# Patient Record
Sex: MALE | Race: BLACK OR AFRICAN AMERICAN | NOT HISPANIC OR LATINO | Employment: OTHER | URBAN - METROPOLITAN AREA
[De-identification: names, ages, dates, MRNs, and addresses within clinical notes are randomized per-mention and may not be internally consistent; named-entity substitution may affect disease eponyms.]

---

## 2017-02-10 ENCOUNTER — HOSPITAL ENCOUNTER (INPATIENT)
Facility: HOSPITAL | Age: 62
LOS: 4 days | Discharge: HOME | DRG: 249 | End: 2017-02-15
Attending: EMERGENCY MEDICINE | Admitting: INTERNAL MEDICINE
Payer: MEDICAID

## 2017-02-10 DIAGNOSIS — F14.10 COCAINE ABUSE: ICD-10-CM

## 2017-02-10 DIAGNOSIS — R20.0 ANESTHESIA OF SKIN: ICD-10-CM

## 2017-02-10 DIAGNOSIS — F19.94 SUBSTANCE INDUCED MOOD DISORDER: ICD-10-CM

## 2017-02-10 DIAGNOSIS — R07.9 CHEST PAIN, UNSPECIFIED TYPE: Primary | ICD-10-CM

## 2017-02-10 DIAGNOSIS — I21.02 ST ELEVATION MYOCARDIAL INFARCTION INVOLVING LEFT ANTERIOR DESCENDING (LAD) CORONARY ARTERY: ICD-10-CM

## 2017-02-10 DIAGNOSIS — R79.89 ELEVATED TROPONIN: ICD-10-CM

## 2017-02-10 DIAGNOSIS — I21.3 ST ELEVATION MYOCARDIAL INFARCTION (STEMI), UNSPECIFIED ARTERY: ICD-10-CM

## 2017-02-10 DIAGNOSIS — I10 ESSENTIAL HYPERTENSION: Chronic | ICD-10-CM

## 2017-02-10 DIAGNOSIS — E11.9 TYPE 2 DIABETES MELLITUS WITHOUT COMPLICATION, WITHOUT LONG-TERM CURRENT USE OF INSULIN: ICD-10-CM

## 2017-02-10 DIAGNOSIS — I21.3 STEMI (ST ELEVATION MYOCARDIAL INFARCTION): ICD-10-CM

## 2017-02-10 DIAGNOSIS — E78.5 HYPERLIPIDEMIA LDL GOAL <70: ICD-10-CM

## 2017-02-10 DIAGNOSIS — I20.9 ISCHEMIC CHEST PAIN: ICD-10-CM

## 2017-02-10 LAB
BASOPHILS # BLD AUTO: 0.03 K/UL
BASOPHILS NFR BLD: 0.5 %
DIFFERENTIAL METHOD: ABNORMAL
EOSINOPHIL # BLD AUTO: 0.4 K/UL
EOSINOPHIL NFR BLD: 6.9 %
ERYTHROCYTE [DISTWIDTH] IN BLOOD BY AUTOMATED COUNT: 13.3 %
HCT VFR BLD AUTO: 41.7 %
HGB BLD-MCNC: 14.1 G/DL
LYMPHOCYTES # BLD AUTO: 3 K/UL
LYMPHOCYTES NFR BLD: 51.2 %
MCH RBC QN AUTO: 31.5 PG
MCHC RBC AUTO-ENTMCNC: 33.8 %
MCV RBC AUTO: 93 FL
MONOCYTES # BLD AUTO: 0.6 K/UL
MONOCYTES NFR BLD: 10.8 %
NEUTROPHILS # BLD AUTO: 1.7 K/UL
NEUTROPHILS NFR BLD: 29.1 %
PLATELET # BLD AUTO: 265 K/UL
PMV BLD AUTO: 8.9 FL
RBC # BLD AUTO: 4.48 M/UL
WBC # BLD AUTO: 5.82 K/UL

## 2017-02-10 PROCEDURE — 96376 TX/PRO/DX INJ SAME DRUG ADON: CPT

## 2017-02-10 PROCEDURE — 93010 ELECTROCARDIOGRAM REPORT: CPT | Mod: ,,, | Performed by: INTERNAL MEDICINE

## 2017-02-10 PROCEDURE — 96374 THER/PROPH/DIAG INJ IV PUSH: CPT

## 2017-02-10 PROCEDURE — 96375 TX/PRO/DX INJ NEW DRUG ADDON: CPT

## 2017-02-10 PROCEDURE — 80307 DRUG TEST PRSMV CHEM ANLYZR: CPT

## 2017-02-10 PROCEDURE — 84484 ASSAY OF TROPONIN QUANT: CPT

## 2017-02-10 PROCEDURE — 80053 COMPREHEN METABOLIC PANEL: CPT

## 2017-02-10 PROCEDURE — 99291 CRITICAL CARE FIRST HOUR: CPT | Mod: 25

## 2017-02-10 PROCEDURE — 99285 EMERGENCY DEPT VISIT HI MDM: CPT | Mod: ,,, | Performed by: EMERGENCY MEDICINE

## 2017-02-10 PROCEDURE — 83880 ASSAY OF NATRIURETIC PEPTIDE: CPT

## 2017-02-10 PROCEDURE — 96361 HYDRATE IV INFUSION ADD-ON: CPT

## 2017-02-10 PROCEDURE — 99285 EMERGENCY DEPT VISIT HI MDM: CPT

## 2017-02-10 PROCEDURE — 85025 COMPLETE CBC W/AUTO DIFF WBC: CPT

## 2017-02-10 PROCEDURE — 93005 ELECTROCARDIOGRAM TRACING: CPT

## 2017-02-10 RX ORDER — ONDANSETRON 2 MG/ML
8 INJECTION INTRAMUSCULAR; INTRAVENOUS
Status: DISCONTINUED | OUTPATIENT
Start: 2017-02-10 | End: 2017-02-11

## 2017-02-10 RX ORDER — NITROGLYCERIN 0.4 MG/1
0.4 TABLET SUBLINGUAL EVERY 5 MIN PRN
Status: COMPLETED | OUTPATIENT
Start: 2017-02-11 | End: 2017-02-11

## 2017-02-10 RX ADMIN — ONDANSETRON 8 MG: 2 INJECTION INTRAMUSCULAR; INTRAVENOUS at 11:02

## 2017-02-10 RX ADMIN — NITROGLYCERIN 0.4 MG: 0.4 TABLET SUBLINGUAL at 11:02

## 2017-02-10 NOTE — IP AVS SNAPSHOT
[facility]  [address]  [facility] [address]  Phone: [phone]           Patient Discharge Instructions     Our goal is to set you up for success. This packet includes information on your condition, medications, and your home care. It will help you to care for yourself so you don't get sicker and need to go back to the hospital.     Please ask your nurse if you have any questions.        There are many details to remember when preparing to leave the hospital. Here is what you will need to do:    1. Take your medicine. If you are prescribed medications, review your Medication List in the following pages. You may have new medications to  at the pharmacy and others that you'll need to stop taking. Review the instructions for how and when to take your medications. Talk with your doctor or nurses if you are unsure of what to do.     2. Go to your follow-up appointments. Specific follow-up information is listed in the following pages. Your may be contacted by a transition nurse or clinical provider about future appointments. Be sure we have all of the phone numbers to reach you, if needed. Please contact your provider's office if you are unable to make an appointment.     3. Watch for warning signs. Your doctor or nurse will give you detailed warning signs to watch for and when to call for assistance. These instructions may also include educational information about your condition. If you experience any of warning signs to your health, call your doctor.               [facility]  Unless otherwise directed by your provider, please contact [facility], our nurse care line that is available for 24/7 assistance.     [phone] (toll-free)    Registered nurses in the [facility] provide clinical advisement, health education, appointment booking, and other advisory services.                    ** Verify the list of medication(s) below is accurate and up  to date. Carry this with you in case of emergency. If your medications have changed, please notify your healthcare provider.             Medication List      START taking these medications        Additional Info    Begin Date AM Noon PM Bedtime    carvedilol 6.25 MG tablet   Commonly known as:  COREG   Quantity:  60 tablet   Refills:  1   Dose:  6.25 mg    Last time this was given:  6.25 mg on 2/15/2017  8:41 AM   Instructions:  Take 1 tablet (6.25 mg total) by mouth 2 (two) times daily.            02/16/2017           02/15/2017           clopidogrel 75 mg tablet   Commonly known as:  PLAVIX   Quantity:  30 tablet   Refills:  1   Dose:  75 mg    Instructions:  Take 1 tablet (75 mg total) by mouth once daily.            02/16/2017                   nitroGLYCERIN 0.4 MG SL tablet   Commonly known as:  NITROSTAT   Quantity:  25 tablet   Refills:  2   Dose:  0.4 mg    Last time this was given:  0.4 mg on 2/11/2017 12:23 PM   Instructions:  Place 1 tablet (0.4 mg total) under the tongue every 5 (five) minutes as needed for Chest pain.                              CHANGE how you take these medications        Additional Info    Begin Date AM Noon PM Bedtime    aspirin 81 MG EC tablet   Commonly known as:  ECOTRIN   Refills:  0   Dose:  81 mg   What changed:    - medication strength  - how much to take    Last time this was given:  81 mg on 2/13/2017  8:38 AM   Instructions:  Take 1 tablet (81 mg total) by mouth once daily.            02/16/2017                   atorvastatin 80 MG tablet   Commonly known as:  LIPITOR   Quantity:  30 tablet   Refills:  1   Dose:  80 mg   What changed:    - medication strength  - how much to take    Last time this was given:  80 mg on 2/15/2017  8:41 AM   Instructions:  Take 1 tablet (80 mg total) by mouth once daily.            02/16/2017                   lisinopril 10 MG tablet   Quantity:  30 tablet   Refills:  1   Dose:  10 mg   What changed:    - medication strength  - how much to  take    Last time this was given:  10 mg on 2/15/2017  8:41 AM   Instructions:  Take 1 tablet (10 mg total) by mouth once daily.            02/16/2017                     CONTINUE taking these medications        Additional Info    Begin Date AM Noon PM Bedtime    sertraline 25 MG tablet   Commonly known as:  ZOLOFT   Refills:  0   Dose:  25 mg    Last time this was given:  25 mg on 2/14/2017  9:01 AM   Instructions:  Take 25 mg by mouth once daily.            02/16/2017                        Where to Get Your Medications      These medications were sent to [facility] - [place], [place] - [address]  [address] [facility] [facility] [address]     Phone:  [phone]     atorvastatin 80 MG tablet    carvedilol 6.25 MG tablet    clopidogrel 75 mg tablet    lisinopril 10 MG tablet    nitroGLYCERIN 0.4 MG SL tablet         You can get these medications from any pharmacy     You don't need a prescription for these medications     aspirin 81 MG EC tablet                  Please bring to all follow up appointments:    1. A copy of your discharge instructions.  2. All medicines you are currently taking in their original bottles.  3. Identification and insurance card.    Please arrive 15 minutes ahead of scheduled appointment time.    Please call 24 hours in advance if you must reschedule your appointment and/or time.        Follow-up Information     Follow up with [facility] On 3/9/2017.    Why:   @1:30PM  TERRELL BARNES   [id number]    Contact information:    [address]  [place] [address]  [phone]          Schedule an appointment as soon as possible for a visit with [facility] - Cardiology.    Specialty:  Cardiology    Why:  follow up 6 weeks    Contact information:    [address]  [facility] [address] [phone]    Additional information:    3rd floor        Call [facility]-Interventional Card.    Specialty:  Cardiology    Why:  Follow up 2-3 weeks   "   Contact information:    [address]  [facility] [address] [phone]    Additional information:    [facility] [place] - 3rd Floor      Referrals     Future Orders    Ambulatory referral to Cardiology     Ambulatory Referral to Interventional Cardiology         Discharge Instructions     Future Orders    Activity as tolerated     Call MD for:  difficulty breathing or increased cough     Call MD for:  increased confusion or weakness     Call MD for:  persistent dizziness, light-headedness, or visual disturbances     Call MD for:  persistent nausea and vomiting or diarrhea     Call MD for:  severe uncontrolled pain     Call MD for:  temperature >100.4     Diet general     Questions:    Total calories:      Fat restriction, if any:      Protein restriction, if any:      Na restriction, if any:      Fluid restriction:      Additional restrictions:  Diabetic 1800    Cardiac (Low Na/Chol)      Discharge References/Attachments     CARDIAC REHABILITATION, FOLLOWING AN EXERCISE PROGRAM (ENGLISH)    REHABILITATION, CARDIAC (ENGLISH)        Primary Diagnosis     Your primary diagnosis was:  Heart Attack      Admission Information     Date & Time Provider Department CSN    2/10/2017 10:53 PM Tiffany Jimenez MD [facility] [id number]      Care Providers     Provider Role Specialty Primary office phone    Tiffany Jimenez MD Attending Provider Hospitalist [phone]    Tiffany Jimenez MD Team Attending  Hospitalist [phone]      Important Medicare Message          Most Recent Value    Important Message from Medicare Regarding Discharge Appeal Rights  Given to patient/caregiver, Explained to patient/caregiver, Signed/date by patient/caregiver yes 02/14/2017 0849      Your Vitals Were     BP Pulse Temp Resp Height Weight    96/61 (BP Location: Right arm, Patient Position: Sitting, BP Method: Automatic) 77 98.6 °F (37 °C) (Oral) 16 5' 11" (1.803 m) 90.2 kg (198 lb 13.7 oz)    SpO2 BMI          "    99% 27.73 kg/m2         Recent Lab Values        12/16/2011 5/25/2016                        6:40 AM  9:34 AM          A1C 5.6 5.9                     Pending Labs     Order Current Status    Blood culture In process    Hemoglobin A1c if not done in past 6 weeks In process    Blood culture Preliminary result    Blood culture Preliminary result      Allergies as of 2/15/2017     No Known Allergies      Advance Directives     An advance directive is a document which, in the event you are no longer able to make decisions for yourself, tells your healthcare team what kind of treatment you do or do not want to receive, or who you would like to make those decisions for you.  If you do not currently have an advance directive, [facility] encourages you to create one.  For more information call:  [phone]), [phone] ([phone]),  or log on to [email or web address].        Smoking Cessation     If you would like to quit smoking:   You may be eligible for free services if you are a [place] resident and started smoking cigarettes before September 1, 1988.  Call the Smoking Cessation Trust (SCT) toll free at (439) 477-0560 or (259) 650-4143.   Call 2-587-QUIT-NOW if you do not meet the above criteria.            Language Assistance Services     ATTENTION: Language assistance services are available, free of charge. Please call [phone].      ATENCIÓN: Si habla español, tiene a diane disposición servicios gratuitos de asistencia lingüística. Llame al [phone].     CHÚ Ý: N?u b?n nói Ti?ng Vi?t, có các d?ch v? h? tr? ngôn ng? mi?n phí dành cho b?n. G?i s? [phone].        Diabetes Discharge Instructions                                    [facility] complies with applicable Federal civil rights laws and does not discriminate on the basis of race, color, national origin, age, disability, or sex.

## 2017-02-11 PROBLEM — R79.89 ELEVATED TROPONIN: Status: ACTIVE | Noted: 2017-02-11

## 2017-02-11 PROBLEM — I21.3 STEMI (ST ELEVATION MYOCARDIAL INFARCTION): Status: ACTIVE | Noted: 2017-02-11

## 2017-02-11 PROBLEM — R07.9 CHEST PAIN: Status: ACTIVE | Noted: 2017-02-11

## 2017-02-11 LAB
ALBUMIN SERPL BCP-MCNC: 3.8 G/DL
ALP SERPL-CCNC: 87 U/L
ALT SERPL W/O P-5'-P-CCNC: 73 U/L
AMPHET+METHAMPHET UR QL: NEGATIVE
ANION GAP SERPL CALC-SCNC: 8 MMOL/L
AST SERPL-CCNC: 54 U/L
BARBITURATES UR QL SCN>200 NG/ML: NEGATIVE
BENZODIAZ UR QL SCN>200 NG/ML: NEGATIVE
BILIRUB SERPL-MCNC: 0.3 MG/DL
BNP SERPL-MCNC: <10 PG/ML
BUN SERPL-MCNC: 17 MG/DL
BZE UR QL SCN: NORMAL
CALCIUM SERPL-MCNC: 9.4 MG/DL
CANNABINOIDS UR QL SCN: NEGATIVE
CHLORIDE SERPL-SCNC: 108 MMOL/L
CO2 SERPL-SCNC: 26 MMOL/L
CREAT SERPL-MCNC: 1.4 MG/DL
CREAT UR-MCNC: 133 MG/DL
D DIMER PPP IA.FEU-MCNC: 0.78 MG/L FEU
EST. GFR  (AFRICAN AMERICAN): >60 ML/MIN/1.73 M^2
EST. GFR  (NON AFRICAN AMERICAN): 53.5 ML/MIN/1.73 M^2
ETHANOL UR-MCNC: <10 MG/DL
GLUCOSE SERPL-MCNC: 109 MG/DL
METHADONE UR QL SCN>300 NG/ML: NEGATIVE
OPIATES UR QL SCN: NEGATIVE
PCP UR QL SCN>25 NG/ML: NEGATIVE
POTASSIUM SERPL-SCNC: 4 MMOL/L
PROT SERPL-MCNC: 7.8 G/DL
SODIUM SERPL-SCNC: 142 MMOL/L
TOXICOLOGY INFORMATION: NORMAL
TROPONIN I SERPL DL<=0.01 NG/ML-MCNC: 0.04 NG/ML
TROPONIN I SERPL DL<=0.01 NG/ML-MCNC: >50 NG/ML

## 2017-02-11 PROCEDURE — 92921 CATH LAB PROCEDURE: CPT | Mod: LD

## 2017-02-11 PROCEDURE — 92941 PRQ TRLML REVSC TOT OCCL AMI: CPT | Mod: LC,GC,, | Performed by: INTERNAL MEDICINE

## 2017-02-11 PROCEDURE — 93005 ELECTROCARDIOGRAM TRACING: CPT

## 2017-02-11 PROCEDURE — 92921 CATH LAB PROCEDURE: CPT | Mod: LD,GC,, | Performed by: INTERNAL MEDICINE

## 2017-02-11 PROCEDURE — 99223 1ST HOSP IP/OBS HIGH 75: CPT | Mod: ,,, | Performed by: INTERNAL MEDICINE

## 2017-02-11 PROCEDURE — 63600175 PHARM REV CODE 636 W HCPCS: Performed by: PHYSICIAN ASSISTANT

## 2017-02-11 PROCEDURE — 25000003 PHARM REV CODE 250: Performed by: INTERNAL MEDICINE

## 2017-02-11 PROCEDURE — B2111ZZ FLUOROSCOPY OF MULTIPLE CORONARY ARTERIES USING LOW OSMOLAR CONTRAST: ICD-10-PCS | Performed by: INTERNAL MEDICINE

## 2017-02-11 PROCEDURE — 25000003 PHARM REV CODE 250

## 2017-02-11 PROCEDURE — 80307 DRUG TEST PRSMV CHEM ANLYZR: CPT

## 2017-02-11 PROCEDURE — 93458 L HRT ARTERY/VENTRICLE ANGIO: CPT | Mod: 26,59,GC, | Performed by: INTERNAL MEDICINE

## 2017-02-11 PROCEDURE — 25500020 PHARM REV CODE 255: Performed by: EMERGENCY MEDICINE

## 2017-02-11 PROCEDURE — 94761 N-INVAS EAR/PLS OXIMETRY MLT: CPT

## 2017-02-11 PROCEDURE — 85379 FIBRIN DEGRADATION QUANT: CPT

## 2017-02-11 PROCEDURE — 4A023N7 MEASUREMENT OF CARDIAC SAMPLING AND PRESSURE, LEFT HEART, PERCUTANEOUS APPROACH: ICD-10-PCS | Performed by: INTERNAL MEDICINE

## 2017-02-11 PROCEDURE — 63600175 PHARM REV CODE 636 W HCPCS

## 2017-02-11 PROCEDURE — 99233 SBSQ HOSP IP/OBS HIGH 50: CPT | Mod: ,,, | Performed by: INTERNAL MEDICINE

## 2017-02-11 PROCEDURE — 25000003 PHARM REV CODE 250: Performed by: EMERGENCY MEDICINE

## 2017-02-11 PROCEDURE — 63600175 PHARM REV CODE 636 W HCPCS: Performed by: EMERGENCY MEDICINE

## 2017-02-11 PROCEDURE — 93010 ELECTROCARDIOGRAM REPORT: CPT | Mod: 76,,, | Performed by: INTERNAL MEDICINE

## 2017-02-11 PROCEDURE — 11000001 HC ACUTE MED/SURG PRIVATE ROOM

## 2017-02-11 PROCEDURE — 93010 ELECTROCARDIOGRAM REPORT: CPT | Mod: ,,, | Performed by: INTERNAL MEDICINE

## 2017-02-11 PROCEDURE — 02C03ZZ EXTIRPATION OF MATTER FROM CORONARY ARTERY, ONE ARTERY, PERCUTANEOUS APPROACH: ICD-10-PCS | Performed by: INTERNAL MEDICINE

## 2017-02-11 PROCEDURE — 84484 ASSAY OF TROPONIN QUANT: CPT

## 2017-02-11 PROCEDURE — 02703EZ DILATION OF CORONARY ARTERY, ONE ARTERY WITH TWO INTRALUMINAL DEVICES, PERCUTANEOUS APPROACH: ICD-10-PCS | Performed by: INTERNAL MEDICINE

## 2017-02-11 RX ORDER — HYDROCODONE BITARTRATE AND ACETAMINOPHEN 10; 325 MG/1; MG/1
1 TABLET ORAL ONCE
Status: DISCONTINUED | OUTPATIENT
Start: 2017-02-11 | End: 2017-02-15 | Stop reason: HOSPADM

## 2017-02-11 RX ORDER — ONDANSETRON 8 MG/1
8 TABLET, ORALLY DISINTEGRATING ORAL EVERY 8 HOURS PRN
Status: DISCONTINUED | OUTPATIENT
Start: 2017-02-11 | End: 2017-02-15 | Stop reason: HOSPADM

## 2017-02-11 RX ORDER — ONDANSETRON 2 MG/ML
4 INJECTION INTRAMUSCULAR; INTRAVENOUS EVERY 8 HOURS PRN
Status: DISCONTINUED | OUTPATIENT
Start: 2017-02-11 | End: 2017-02-15 | Stop reason: HOSPADM

## 2017-02-11 RX ORDER — MORPHINE SULFATE 2 MG/ML
8 INJECTION, SOLUTION INTRAMUSCULAR; INTRAVENOUS
Status: COMPLETED | OUTPATIENT
Start: 2017-02-11 | End: 2017-02-11

## 2017-02-11 RX ORDER — LABETALOL HYDROCHLORIDE 5 MG/ML
5 INJECTION, SOLUTION INTRAVENOUS ONCE
Status: COMPLETED | OUTPATIENT
Start: 2017-02-11 | End: 2017-02-11

## 2017-02-11 RX ORDER — AMLODIPINE BESYLATE 5 MG/1
5 TABLET ORAL DAILY
Status: DISCONTINUED | OUTPATIENT
Start: 2017-02-11 | End: 2017-02-13

## 2017-02-11 RX ORDER — ATORVASTATIN CALCIUM 20 MG/1
80 TABLET, FILM COATED ORAL DAILY
Status: DISCONTINUED | OUTPATIENT
Start: 2017-02-12 | End: 2017-02-11

## 2017-02-11 RX ORDER — HYDRALAZINE HYDROCHLORIDE 25 MG/1
25 TABLET, FILM COATED ORAL EVERY 6 HOURS PRN
Status: DISCONTINUED | OUTPATIENT
Start: 2017-02-11 | End: 2017-02-12

## 2017-02-11 RX ORDER — LISINOPRIL 5 MG/1
5 TABLET ORAL DAILY
Status: DISCONTINUED | OUTPATIENT
Start: 2017-02-11 | End: 2017-02-13

## 2017-02-11 RX ORDER — PANTOPRAZOLE SODIUM 40 MG/1
40 TABLET, DELAYED RELEASE ORAL DAILY
Status: DISCONTINUED | OUTPATIENT
Start: 2017-02-12 | End: 2017-02-15 | Stop reason: HOSPADM

## 2017-02-11 RX ORDER — ATORVASTATIN CALCIUM 20 MG/1
40 TABLET, FILM COATED ORAL DAILY
Status: DISCONTINUED | OUTPATIENT
Start: 2017-02-11 | End: 2017-02-11

## 2017-02-11 RX ORDER — LABETALOL HYDROCHLORIDE 5 MG/ML
INJECTION, SOLUTION INTRAVENOUS
Status: DISPENSED
Start: 2017-02-11 | End: 2017-02-12

## 2017-02-11 RX ORDER — ENOXAPARIN SODIUM 100 MG/ML
40 INJECTION SUBCUTANEOUS EVERY 24 HOURS
Status: DISCONTINUED | OUTPATIENT
Start: 2017-02-11 | End: 2017-02-11

## 2017-02-11 RX ORDER — LISINOPRIL 2.5 MG/1
2.5 TABLET ORAL DAILY
Status: DISCONTINUED | OUTPATIENT
Start: 2017-02-11 | End: 2017-02-11

## 2017-02-11 RX ORDER — ONDANSETRON 2 MG/ML
8 INJECTION INTRAMUSCULAR; INTRAVENOUS
Status: COMPLETED | OUTPATIENT
Start: 2017-02-11 | End: 2017-02-10

## 2017-02-11 RX ORDER — MORPHINE SULFATE 2 MG/ML
4 INJECTION, SOLUTION INTRAMUSCULAR; INTRAVENOUS EVERY 4 HOURS PRN
Status: DISCONTINUED | OUTPATIENT
Start: 2017-02-11 | End: 2017-02-11

## 2017-02-11 RX ORDER — MORPHINE SULFATE 2 MG/ML
7 INJECTION, SOLUTION INTRAMUSCULAR; INTRAVENOUS
Status: DISCONTINUED | OUTPATIENT
Start: 2017-02-11 | End: 2017-02-11

## 2017-02-11 RX ORDER — ACETAMINOPHEN 325 MG/1
650 TABLET ORAL EVERY 4 HOURS PRN
Status: DISCONTINUED | OUTPATIENT
Start: 2017-02-11 | End: 2017-02-14

## 2017-02-11 RX ORDER — SERTRALINE HYDROCHLORIDE 25 MG/1
25 TABLET, FILM COATED ORAL DAILY
Status: DISCONTINUED | OUTPATIENT
Start: 2017-02-12 | End: 2017-02-15 | Stop reason: HOSPADM

## 2017-02-11 RX ORDER — DIPHENHYDRAMINE HYDROCHLORIDE 50 MG/ML
50 INJECTION INTRAMUSCULAR; INTRAVENOUS EVERY 6 HOURS
Status: CANCELLED | OUTPATIENT
Start: 2017-02-11 | End: 2017-02-12

## 2017-02-11 RX ORDER — ATORVASTATIN CALCIUM 20 MG/1
80 TABLET, FILM COATED ORAL NIGHTLY
Status: DISCONTINUED | OUTPATIENT
Start: 2017-02-11 | End: 2017-02-13

## 2017-02-11 RX ORDER — ACETAMINOPHEN 325 MG/1
650 TABLET ORAL EVERY 6 HOURS PRN
Status: DISCONTINUED | OUTPATIENT
Start: 2017-02-11 | End: 2017-02-15 | Stop reason: HOSPADM

## 2017-02-11 RX ORDER — ONDANSETRON 2 MG/ML
4 INJECTION INTRAMUSCULAR; INTRAVENOUS EVERY 12 HOURS PRN
Status: DISCONTINUED | OUTPATIENT
Start: 2017-02-11 | End: 2017-02-14

## 2017-02-11 RX ORDER — MORPHINE SULFATE 2 MG/ML
2 INJECTION, SOLUTION INTRAMUSCULAR; INTRAVENOUS EVERY 4 HOURS PRN
Status: DISCONTINUED | OUTPATIENT
Start: 2017-02-11 | End: 2017-02-11

## 2017-02-11 RX ORDER — SODIUM CHLORIDE 0.9 % (FLUSH) 0.9 %
3 SYRINGE (ML) INJECTION EVERY 8 HOURS
Status: DISCONTINUED | OUTPATIENT
Start: 2017-02-11 | End: 2017-02-15 | Stop reason: HOSPADM

## 2017-02-11 RX ORDER — ASPIRIN 81 MG/1
81 TABLET ORAL DAILY
Status: DISCONTINUED | OUTPATIENT
Start: 2017-02-11 | End: 2017-02-13

## 2017-02-11 RX ORDER — NITROGLYCERIN 20 MG/100ML
INJECTION INTRAVENOUS
Status: DISCONTINUED
Start: 2017-02-11 | End: 2017-02-11 | Stop reason: WASHOUT

## 2017-02-11 RX ORDER — HEPARIN SODIUM 5000 [USP'U]/ML
5000 INJECTION, SOLUTION INTRAVENOUS; SUBCUTANEOUS EVERY 8 HOURS
Status: DISCONTINUED | OUTPATIENT
Start: 2017-02-12 | End: 2017-02-15 | Stop reason: HOSPADM

## 2017-02-11 RX ORDER — SODIUM CHLORIDE 9 MG/ML
1.5 INJECTION, SOLUTION INTRAVENOUS CONTINUOUS
Status: ACTIVE | OUTPATIENT
Start: 2017-02-11 | End: 2017-02-11

## 2017-02-11 RX ADMIN — NITROGLYCERIN 0.4 MG: 0.4 TABLET SUBLINGUAL at 12:02

## 2017-02-11 RX ADMIN — MORPHINE SULFATE 8 MG: 2 INJECTION, SOLUTION INTRAMUSCULAR; INTRAVENOUS at 12:02

## 2017-02-11 RX ADMIN — MORPHINE SULFATE 8 MG: 2 INJECTION, SOLUTION INTRAMUSCULAR; INTRAVENOUS at 03:02

## 2017-02-11 RX ADMIN — IOHEXOL 75 ML: 350 INJECTION, SOLUTION INTRAVENOUS at 04:02

## 2017-02-11 RX ADMIN — ATORVASTATIN CALCIUM 80 MG: 20 TABLET, FILM COATED ORAL at 08:02

## 2017-02-11 RX ADMIN — MORPHINE SULFATE 8 MG: 2 INJECTION, SOLUTION INTRAMUSCULAR; INTRAVENOUS at 07:02

## 2017-02-11 RX ADMIN — SODIUM CHLORIDE, SODIUM LACTATE, POTASSIUM CHLORIDE, AND CALCIUM CHLORIDE 1000 ML: .6; .31; .03; .02 INJECTION, SOLUTION INTRAVENOUS at 12:02

## 2017-02-11 RX ADMIN — LABETALOL HYDROCHLORIDE 5 MG: 5 INJECTION, SOLUTION INTRAVENOUS at 12:02

## 2017-02-11 RX ADMIN — ASPIRIN 81 MG: 81 TABLET, COATED ORAL at 02:02

## 2017-02-11 RX ADMIN — SODIUM CHLORIDE 1.5 ML/KG/HR: 0.9 INJECTION, SOLUTION INTRAVENOUS at 02:02

## 2017-02-11 RX ADMIN — AMLODIPINE BESYLATE 5 MG: 5 TABLET ORAL at 08:02

## 2017-02-11 RX ADMIN — SODIUM CHLORIDE, PRESERVATIVE FREE 3 ML: 5 INJECTION INTRAVENOUS at 09:02

## 2017-02-11 RX ADMIN — ONDANSETRON 4 MG: 2 INJECTION INTRAMUSCULAR; INTRAVENOUS at 06:02

## 2017-02-11 NOTE — PROGRESS NOTES
Several infiltrated IVs while in ED. Could not establish another due to swelling. At this time, no need for IV from ED prospective, so EJ not attempted. Cardiology aware. Please call cardiology for further IV management.    Rae Sharp MD, PGY-3  10:58 AM

## 2017-02-11 NOTE — ED PROVIDER NOTES
Encounter Date: 2/10/2017    SCRIBE #1 NOTE: I, Nikolas Bridges, am scribing for, and in the presence of,  Dr. Rivas. I have scribed the following portions of the note - the Resident attestation.       History     Chief Complaint   Patient presents with    Chest Pain     x15 minutes PTA intermittenly. 324 mg aspirin, 1 SL nitro given with some pain relief. Pain currently 5/10.      Review of patient's allergies indicates:  No Known Allergies  HPI Comments: 60 yo male with h/o HIV, CVA with residual left sided deficits, depression with SI, HTN, cocaine use and kidney dysfunction presenting to ED c/o acute onset chest pain that started approx 1 hour PTA. Not associated with recent cocaine or substance use. Pain is described as substernal sharp pressure rated 20/10, associated with N/V and mild SOB. Denies h/o DVT or PE, pt denies LE swelling/calf tenderness or recent travel. Pt has had chest pain in the past, has never had an angiogram. Echo from 5/2016 was WNL. Pt was given 324mg aspirin and SL nitrogylcerin in fast track, then moved to ED bed.     Past Medical History   Diagnosis Date    Acute renal insufficiency 6/21/2015    Cocaine abuse     Depression     History of psychiatric care     HTN (hypertension) 1/25/2014    Stroke      Past Medical History Pertinent Negatives   Diagnosis Date Noted    ADHD (attention deficit hyperactivity disorder) 5/4/2014    Anxiety 5/4/2014    Asthma 9/9/2013    Behavioral problem 5/4/2014    Bipolar disorder 5/4/2014    Borderline personality disorder 5/4/2014    CHF (congestive heart failure) 5/4/2014    COPD (chronic obstructive pulmonary disease) 5/4/2014    Dementia 5/4/2014    Diabetes mellitus 9/9/2013    Dialysis patient 5/4/2014    Fatigue 5/4/2014    Headache(784.0) 5/4/2014    History of psychiatric hospitalization 5/4/2014    HIV infection 5/4/2014    Liver disease 5/4/2014    Keyla 5/4/2014    Neuropathy 5/4/2014    Obsessive-compulsive  disorder 5/4/2014    Oppositional defiant disorder 5/4/2014    Psychiatric exam requested by authority 5/4/2014    Psychiatric problem 5/4/2014    Psychosis 5/4/2014    PTSD (post-traumatic stress disorder) 5/4/2014    Schizoaffective disorder 5/4/2014    Seizures 5/4/2014    Self-harming behavior 5/4/2014    Suicide attempt 5/4/2014    Therapy 5/4/2014    Thyroid disease 5/4/2014     Past Surgical History   Procedure Laterality Date    Neck surgery       Family History   Problem Relation Age of Onset    Diabetes Mother     Heart disease Mother     Hypertension Mother      Social History   Substance Use Topics    Smoking status: Current Every Day Smoker     Packs/day: 0.25     Types: Cigarettes    Smokeless tobacco: Never Used    Alcohol use Yes      Comment: 6pk/week     Review of Systems   Constitutional: Negative for chills and fever.   HENT: Negative for congestion and sore throat.    Respiratory: Positive for shortness of breath. Negative for cough and stridor.    Cardiovascular: Positive for chest pain. Negative for palpitations and leg swelling.   Gastrointestinal: Positive for nausea and vomiting.   Genitourinary: Negative for dysuria.   Musculoskeletal: Negative for back pain.   Skin: Negative for rash.   Neurological: Negative for dizziness, tremors, seizures, syncope, speech difficulty, weakness, light-headedness, numbness and headaches.   Hematological: Does not bruise/bleed easily.       Physical Exam   Initial Vitals   BP Pulse Resp Temp SpO2   02/10/17 2252 02/10/17 2252 02/10/17 2252 -- 02/10/17 2252   141/100 96 16  99 %     Physical Exam    Nursing note and vitals reviewed.  Constitutional: He is diaphoretic. He appears distressed.   HENT:   Head: Normocephalic and atraumatic.   Mouth/Throat: Oropharynx is clear and moist.   Eyes: Conjunctivae and EOM are normal. Pupils are equal, round, and reactive to light.   Neck: Normal range of motion. Neck supple.   Cardiovascular: Normal  rate, regular rhythm, normal heart sounds and intact distal pulses. Exam reveals no gallop and no friction rub.    No murmur heard.  Pulmonary/Chest: Breath sounds normal. No respiratory distress. He has no wheezes. He has no rhonchi. He has no rales.   Abdominal: Soft. Bowel sounds are normal. He exhibits no distension. There is no tenderness. There is no rebound and no guarding.   Pt is actively vomiting     Musculoskeletal: Normal range of motion. He exhibits no edema or tenderness.   Neurological: He is alert and oriented to person, place, and time. He has normal strength. No cranial nerve deficit or sensory deficit.   Skin: Skin is warm. No rash noted. No pallor.         ED Course   Procedures  Labs Reviewed   CBC W/ AUTO DIFFERENTIAL - Abnormal; Notable for the following:        Result Value    RBC 4.48 (*)     MCH 31.5 (*)     MPV 8.9 (*)     Gran # 1.7 (*)     Gran% 29.1 (*)     Lymph% 51.2 (*)     All other components within normal limits   COMPREHENSIVE METABOLIC PANEL - Abnormal; Notable for the following:     AST 54 (*)     ALT 73 (*)     eGFR if non  53.5 (*)     All other components within normal limits   TROPONIN I - Abnormal; Notable for the following:     Troponin I 0.036 (*)     All other components within normal limits   D DIMER, QUANTITATIVE - Abnormal; Notable for the following:     D-Dimer 0.78 (*)     All other components within normal limits   B-TYPE NATRIURETIC PEPTIDE   TOXICOLOGY SCREEN, URINE, RANDOM (COMPLIANCE)   LIPASE   DRUGS OF ABUSE SCREEN, BLOOD   DRUGS OF ABUSE SCREEN, BLOOD    Narrative:     drugb add on per Wandy Rivas MD [phone]   02/11/2017  00:35      EKG Readings: (Independently Interpreted)   NSR at 87 bpm, upright T waves (III, AVF) that were inverted in previous EKG from 11-, no ST elevation.       X-Rays:   Independently Interpreted Readings:   Chest X-Ray: Increased interstitial markings throughout, no focal infiltrates.     Medical  Decision Making:   History:   Old Medical Records: I decided to obtain old medical records.  Independently Interpreted Test(s):   I have ordered and independently interpreted X-rays - see prior notes.  I have ordered and independently interpreted EKG Reading(s) - see prior notes  Clinical Tests:   Lab Tests: Ordered and Reviewed  Radiological Study: Ordered and Reviewed  Medical Tests: Ordered and Reviewed       APC / Resident Notes:   PGY-3 MDM A/P:  60 yo M with h/o HIV, CVA, HTN, depression presenting with acute onset of chest pain associated with diaphoresis, N/V and mild SOB.  EKG on arrival shows NSR without T wave inversions, Q waves or ST elevations/depressions. Repeat unchanged.  (prior EKG did show diffuse T wave inversions)   Pt given 324mg aspirin and one sublingual nitroglycerin prior to ED bed at triage.   Diff dx: NSTEMI, PE, pancreatitis, GERD, pericarditis, endocarditis, new onset CHF, aortic dissection  Plan: Zofran 8mg for active vomiting, 1 L LR, CBC, CMP troponin, BNP, CXR, repeat EKG.  Administer SL nitroglycerin. If pain still unresolved despite nitroglycerin will administer morphine as long as SBP stable.   Will re-assess pt when not actively vomiting and plan on bedside echocardiogram.     Alyse Garcia MD  [facility] PGY-3  11:43 PM    PGY-3 MDM Update:  Troponin mildly elevated at 0.036, pt still actively vomiting with persistent chest pain despite Zofran and SL nitroglycerin. Will administer morphine, CXR negative for acute cardiopulmonary disease, or widened mediastinum. Given severity of symptoms concerned for aortic dissection vs PE, will obtain CTA. Pt will likely require admission regardless for ACS r/o.     Alyse Garcia MD PGY-3  12:13 AM    PGY-3 MDM Update:  CTA delayed as there were multiple stroke activations. Pt appears comfortable now, not actively vomiting but still asks for pain medications.  When I walk by patient's room he is resting comfortably, closing  "his eyes. When he sees someone in hallway he sits up immediately and asks for pain medication. Pt c/o long wait for CTA, it was explained to patient reason for delay, he threatened to pull out IV and leave and states "i can wait at home in pain." Pt agreed to stay for CTA, will give pt repeat dose of pain medication.    Alyse Garcia MD PGY-3  4:21 AM    PGY-3 MDM Update:  CTA was delayed as IV infiltrated and nurses unable to obtain line. I was able to place a left deep brachial and pt sent to CTA. Pt also given re-dose on morphine.    Plan of care was discussed with oncoming resident Dr. Sharp who will follow up with results of CTA. If negative for aortic dissection or PE, pt will then be consulted to medicine for ACS r/o.    Alyse Garcia MD PGY-3  7:13 AM       No evidence of aortic dissection or PE. Will consult medicine to rule out ACS. [place] be [place] [facility] [place]. Marie.   Rae Sharp MD, PGY-3  10:01 AM    Repeat EKG shows acute STEMI. Code STEMI called. At this time, will send to cath Lab.  Rae Sharp MD, PGY-3  12:23 PM            Scribe Attestation:   Scribe #1: I performed the above scribed service and the documentation accurately describes the services I performed. I attest to the accuracy of the note.    Attending Attestation:   Physician Attestation Statement for Resident:  As the supervising MD   Physician Attestation Statement: I have personally seen and examined this patient.   I agree with the above history. -: Patient complains of sudden onset of mid chest pain, nonradiating, associated with nausea and vomiting.  No prior episodes like this.  Reports that it is severe.  Nothing makes it worse or better.   As the supervising MD I agree with the above PE.   -: Patient appears very uncomfortable, actively vomiting.  He is hypertensive.  Lungs are clear to auscultation.  Heart: Regular rate and rhythm.   As the supervising MD I agree with the above treatment, " course, plan, and disposition.   -: Initial EKG had significant artifact since pt was actively vomiting and may have shown hyperacute T waves but no ST elevation.  Repeat EKG showed no ischemic changes.  Chest x-ray, independently interpreted by me, showed no infiltrate, no acute findings.  Patient was given nitroglycerin which did not significantly improve his pain.  He was treated with morphine which temporarily improved his pain.  We ordered a CT scan to evaluate his aorta and will let dissection.  There was a delay in obtaining the study as we had issues with IV access.  His troponin returned mildly elevated as did his d-dimer.  If there is no evidence of aortic dissection on CT scan, patient will need to be admitted for rule out MI.  Do not think PE given patient has no complaints of pleuritic chest pain.          Physician Attestation for Scribe:  Physician Attestation Statement for Scribe #1: I, Dr. Rivas, reviewed documentation, as scribed by Nikolas Bridges in my presence, and it is both accurate and complete.                 ED Course     Clinical Impression:   The primary encounter diagnosis was Type 2 diabetes mellitus without complication, without long-term current use of insulin. Diagnoses of Essential hypertension and Elevated troponin were also pertinent to this visit.    Disposition:   Disposition: Placed in Observation  Condition: Stable       Alyse Garcia MD  Resident  02/12/17 2108       Wandy Mata MD  02/13/17 1900

## 2017-02-11 NOTE — PROGRESS NOTES
Radiology       Radiology was contacted by Dr. Segura (ED attending) earlier this morning regarding planned CTA for concern of aortic dissection.  The patient suffered a contrast extravasation with the first attempt at contrast administration within his upper left arm.  Since then, multiple attempts were made at obtaining IV access with eventual access established within the right hand.  Due to the great difficulty in obtaining access, the request was made to use this access for IV contrast administration.  The case was discussed with Dr. Hercules (Radiology attending).  The risk of recurrent contrast extravasation and potential for a non-diagnostic study were discussed with Dr. Segura.  Due to the emergent nature of the examination, it was decided to proceed with administration of contrast through the hand with understanding of the risks involved.    The study was successfully obtained and the patient again suffered contrast extravasation.  When seen at bedside, the patient reported some right hand swelling with no other symptoms as detailed in the radiology note earlier today.      Shana Gutierrez MD  Radiology Resident

## 2017-02-11 NOTE — ED NOTES
Dr Sharp at bedside to attempt another IV insertion. Internal medicine MD present to bedside as well.

## 2017-02-11 NOTE — PROGRESS NOTES
"    Post Cath Note  Referring Physician: Nilesh Marie MD  Procedure: HEART CATH-LEFT (Left)       Access: Right CFA    LVEDP 20 mmHg, LVEF mildly depressed    See full report for further details    Intervention:   BMS x 1 to mid LAD with CLEMENCIA 3 flow  Balloon angioplasty to ostial first diagonal with CLEMENCIA 3 flow  Tolerated well with <5cc blood loss  Closure device: Perclosure    Post Cath Exam:     Visit Vitals    /60    Pulse 105    Temp 98.6 °F (37 °C) (Oral)    Resp 19    Ht 5' 11" (1.803 m)    Wt 95.3 kg (210 lb)    SpO2 95%    BMI 29.29 kg/m2     No unusual pain, hematoma, thrill or bruit at vascular access site.  Distal pulse present without signs of ischemia.    Recommendations:   - Routine post-cath care  - IVF at 1.5cc/kg/hr for 4 hrs  - ASA 81mg indefinitely; ticagrelor 90mg BID x 3 months, Cardiac rehab referral, Smoking cessation counseling, Risk factor reduction      Tucker Pierre MD  PGY-4 [phone])  Cardiology Fellow    "

## 2017-02-11 NOTE — ED NOTES
Back from CT. Receive report from CT staff that pt's left upper arm IV infiltrated while contrast was being pushed. Dr Segura is aware and currently at bedside attempting to insert a new IV. Pt AAOX4, no distress noted. Denies pain to left upper arm. No redness or edema noted to area. Safety maintained. Will continue to monitor.

## 2017-02-11 NOTE — CONSULTS
Interventional Cardiology Consult Note  Attending Physician: Nilesh Marie MD  Reason for Consult: STEMI     HPI:   61 y.o. gentleman with PMH of HIV, CVA with residual left sided deficits, depression with SI, HTN, active cocaine use and kidney dysfunction presenting to ED c/o acute onset chest pain that started approx 1 hour PTA around 10PM. Associated with recent cocaine or substance use. Pain is described as substernal sharp pressure rated 20/10, associated with N/V and mild SOB. Denies h/o DVT or PE, pt denies LE swelling/calf tenderness or recent travel. Pt has had chest pain in the past, has never had an angiogram. Echo from 5/2016 was WNL. Pt was given 324mg aspirin and SL nitrogylcerin in fast track, then moved to ED bed. CTA chest done overnight negative for PE or aortic dissection.The pain improved overnight with morphine. Code STEMI called 30 minutes earlier, EKG consistent with Anterior STEMI. Case d/w Dr. Woodson/Deana. Will take the patient to cath lab.       ROS:    Constitution: Negative for fever, chills, weight loss or gain.   HENT: Negative for sore throat, rhinorrhea, or headache.  Eyes: Negative for blurred or double vision.   Cardiovascular: See above  Pulmonary: Positive for SOB   Gastrointestinal: Negative for abdominal pain, nausea, vomiting, or diarrhea.   : Negative for dysuria.   Neurological: Negative for focal weakness or sensory changes.  PMH:     Past Medical History   Diagnosis Date    Acute renal insufficiency 6/21/2015    Cocaine abuse     Depression     History of psychiatric care     HTN (hypertension) 1/25/2014    Stroke      Past Surgical History   Procedure Laterality Date    Neck surgery       Allergies:   Review of patient's allergies indicates:  No Known Allergies  Medications:     No current facility-administered medications on file prior to encounter.      Current Outpatient Prescriptions on File Prior to Encounter   Medication Sig Dispense Refill     "aspirin (ECOTRIN) 325 MG EC tablet Take 1 tablet (325 mg total) by mouth once daily.  0    atorvastatin (LIPITOR) 40 MG tablet Take 1 tablet (40 mg total) by mouth once daily. 30 tablet 1    lisinopril (PRINIVIL,ZESTRIL) 2.5 MG tablet Take 2.5 mg by mouth once daily.      sertraline (ZOLOFT) 25 MG tablet Take 25 mg by mouth once daily.      [DISCONTINUED] atorvastatin (LIPITOR) 40 MG tablet Take 1 tablet (40 mg total) by mouth once daily. 90 tablet 0       Inpatient Medications   Continuous Infusions:   Scheduled Meds:   atorvastatin  40 mg Oral Daily    enoxaparin  40 mg Subcutaneous Daily    hydrocodone-acetaminophen 10-325mg  1 tablet Oral Once    labetalol        lisinopril  5 mg Oral Daily    nitroGLYCERIN        sodium chloride 0.9%  3 mL Intravenous Q8H     PRN Meds:acetaminophen, morphine, morphine, ondansetron, ondansetron     Social History:     Social History   Substance Use Topics    Smoking status: Current Every Day Smoker     Packs/day: 0.25     Types: Cigarettes    Smokeless tobacco: Never Used    Alcohol use Yes      Comment: 6pk/week     Family History:     Family History   Problem Relation Age of Onset    Diabetes Mother     Heart disease Mother     Hypertension Mother      Physical Exam:     Vitals:  Temp:  [98.6 °F (37 °C)]   Pulse:  [74-97]   Resp:  [16-24]   BP: (141-193)/()   SpO2:  [94 %-100 %]     Visit Vitals    BP (!) 171/95    Pulse 97    Temp 98.6 °F (37 °C) (Oral)    Resp 19    Ht 5' 11" (1.803 m)    Wt 95.3 kg (210 lb)    SpO2 (!) 94%    BMI 29.29 kg/m2     I/O's:  No intake or output data in the 24 hours ending 02/11/17 1235     Constitutional: Appearing uncomfortable  HEENT: Sclera anicteric, PERRLA, EOMI  Neck: No JVD, no carotid bruits  CV: RRR, no murmur, normal S1/S2, No Pericardial rub  Pulm: CTAB with no wheezes, rales, or rhonchi  GI: Abdomen soft, NTND, +BS  Extremities: No LE edema, warm and well perfused, No cyanosis, No clubbing  Skin: No " ecchymosis, erythema, or ulcers  Psych: AOx3, appropriate affect  Neuro: CNII-XII intact, no focal deficits  Vascular:   LEFT RIGHT   RADIAL 2+ 2+   BRACHIAL 2+ 2+   FEMORAL 2+ 2+   DP 2+ 2+   TP 2+ 2+   Moe's Test Normal Normal       Labs:       Recent Labs  Lab 02/10/17  2309      K 4.0      CO2 26   BUN 17   CREATININE 1.4   ANIONGAP 8       Recent Labs  Lab 02/10/17  2309   AST 54*   ALT 73*   ALKPHOS 87   BILITOT 0.3   ALBUMIN 3.8       Recent Labs  Lab 02/10/17  2309   TROPONINI 0.036*   BNP <10      Recent Labs  Lab 02/10/17  2309   WBC 5.82   HGB 14.1   HCT 41.7      GRAN 29.1*  1.7*     No results for input(s): PTT, INR in the last 168 hours.  Lab Results   Component Value Date    CHOL 181 05/26/2016    HDL 42 05/26/2016    LDLCALC 126.6 05/26/2016    TRIG 62 05/26/2016     Lab Results   Component Value Date    HGBA1C 5.9 05/25/2016        Micro:  Blood Cultures  No results found for: LABBLOO  Urine Cultures  No results found for: LABURIN    Imaging:     EF   Date Value Ref Range Status   05/26/2016 55 55 - 65        EKG: NSR, ST elevations V2-V5    Assessment:   Anterior STEMI    ASA Physical Status: 3 - A patient with severe systemic disease    Plan:   Emergent LHC via femoral access  Loaded with  mg, not loaded with 2nd antiplatelet agent (Dr. Leblanc aware)  CMICU transfer post LHC    - The risks, benefits & alternatives of the procedure were explained to the patient.   - The risks of coronary angiography include but are not limited to: Bleeding, infection, heart rhythm abnormalities, allergic reactions, kidney injury requiring dilaysis, stroke and death.   - Should stenting be indicated, the patient has agreed to dual anti-platelet therapy for 1-consecutive year with a drug-eluting stent and a minimum of 1-month with the use of a bare metal stent.   - The risks of moderate sedation include hypotension, respiratory depression, arrhythmias, bronchospasm, & death.   - Informed  consent was obtained & the patient is agreeable to proceed with the procedure.  - This patient was discussed with the attending interventional cardiologist who agrees with the above assessment & plan.        Signed:  Gatito Cao MD  Cardiology Fellow  Pager: [phone]  2/11/2017 12:35 PM

## 2017-02-11 NOTE — ED NOTES
Resting in bed. No distress noted. Reports improvement in pain. Rates 3/10. Denies c/o needs. Safety maintained. Call bell in reach.

## 2017-02-11 NOTE — PROVIDER PROGRESS NOTES - EMERGENCY DEPT.
Encounter Date: 2/10/2017    ED Physician Progress Notes        Physician Note:   Repeat EKG c/w STEMI.  Code STEMI activated.  Cardiology fellow at bedside.  Pt still with active CP but refusing NTG.  To be admitted to cath lab

## 2017-02-11 NOTE — H&P
[facility]  Cardiology  History and Physical     Patient Name: Damir Faria  MRN: [id number]  Admission Date: 2/10/2017  Code Status: Full Code   Attending Provider: Garth Dunn MD  Primary Care Physician: [facility]  Principal Problem:STEMI (ST elevation myocardial infarction)    Patient information was obtained from patient and ER records.     Subjective:     Chief Complaint:  Chest Pain     HPI: Mr. Faria is a 61 y.o. gentleman with PMH of ? Able HIV (Pt denied adamantly, medical record is positive for this history),  HTN, CKD, active cocaine user, CVA with left sided residual deficits, depression with SI, who presenting to ED last night with sharp relentless chest pain that started around 10 PM, and he woke up from sleep with this sharp pain. The pain stayed for more than an hour, associated with N/V and mild SOB with out radiation to jaw, left side of the neck, or left arm.   He denied h/o DVT or PE, pt denies LE swelling/calf tenderness or recent travel.   Echo from 5/2016 was WNL.   In the ER, pt was given 325mg aspirin, SL nitroglycerine and later on Morphine IV which improved his chest pain. His initial EKG was negative for significant changes of current of ischemia. CTA chest done overnight negative for PE or aortic dissection. Pt did have recurrent chest pain last night and then this morning. He underwent a repeat EKG later in the afternoon today and was found to have STEMI. Code STEMI called and patient was taken to cath lab.    On table he received Brillinta bolus 180 mg. He was found to have significant mid LAD lesion and tight ostial D1 lesion. He underwent BMS placement in the mLAD and balloon angioplasty of the ostial D1. His EF appeared around 40-45% in cath lab. There werent any complications.     At the time of my interview, he denied CP, SOB or palpitation.     Past Medical History   Diagnosis Date    Acute renal insufficiency 6/21/2015    Cocaine abuse      Depression     History of psychiatric care     HTN (hypertension) 1/25/2014    Stroke        Past Surgical History   Procedure Laterality Date    Neck surgery         Review of patient's allergies indicates:  No Known Allergies    No current facility-administered medications on file prior to encounter.      Current Outpatient Prescriptions on File Prior to Encounter   Medication Sig    aspirin (ECOTRIN) 325 MG EC tablet Take 1 tablet (325 mg total) by mouth once daily.    atorvastatin (LIPITOR) 40 MG tablet Take 1 tablet (40 mg total) by mouth once daily.    lisinopril (PRINIVIL,ZESTRIL) 2.5 MG tablet Take 2.5 mg by mouth once daily.    sertraline (ZOLOFT) 25 MG tablet Take 25 mg by mouth once daily.    [DISCONTINUED] atorvastatin (LIPITOR) 40 MG tablet Take 1 tablet (40 mg total) by mouth once daily.     Family History     Problem Relation (Age of Onset)    Diabetes Mother    Heart disease Mother    Hypertension Mother        Social History Main Topics    Smoking status: Current Every Day Smoker     Packs/day: 0.25     Types: Cigarettes    Smokeless tobacco: Never Used    Alcohol use Yes      Comment: 6pk/week    Drug use: Yes     Special: Cocaine      Comment: last used a few days ago    Sexual activity: Yes     Partners: Female     Birth control/ protection: Condom     ROS  Objective:     Vital Signs (Most Recent):  Temp: 98.4 °F (36.9 °C) (02/11/17 1500)  Pulse: 96 (02/11/17 1630)  Resp: 15 (02/11/17 1630)  BP: (!) 161/72 (02/11/17 1630)  SpO2: 97 % (02/11/17 1630) Vital Signs (24h Range):  Temp:  [98.4 °F (36.9 °C)-98.6 °F (37 °C)] 98.4 °F (36.9 °C)  Pulse:  [] 96  Resp:  [13-28] 15  SpO2:  [94 %-100 %] 97 %  BP: (119-193)/() 161/72     Weight: 95.3 kg (210 lb)  Body mass index is 29.29 kg/(m^2).    SpO2: 97 %  O2 Device (Oxygen Therapy): room air      Intake/Output Summary (Last 24 hours) at 02/11/17 8692  Last data filed at 02/11/17 1600   Gross per 24 hour   Intake           195.43  ml   Output                0 ml   Net           195.43 ml       Lines/Drains/Airways     Peripheral Intravenous Line                 Peripheral IV - Single Lumen 02/11/17 1242 Left Hand less than 1 day                Physical Exam   GEN: Lying in bed in no distress.   HEENT: No e/o oropharyngeal erythema  HEART: s1s2 no m/r/g.   Peripheral vascular: 2+ Peripheral pulses. Right groin with perclose in place with a small ooze.    CHEST: CTAB, No crackles, no wheeze  ABD: Soft, NT, ND, BS audible.   EXT: No clubbing, cyanosis, or pitting edema.   Neuro: AOX3, Non focal     Significant Labs:   BMP:   Recent Labs  Lab 02/10/17  2309         K 4.0      CO2 26   BUN 17   CREATININE 1.4   CALCIUM 9.4    and CBC   Recent Labs  Lab 02/10/17  2309   WBC 5.82   HGB 14.1   HCT 41.7          Significant Imaging: STEMI in V1-V5  Assessment and Plan:     Active Diagnoses:    Diagnosis Date Noted POA    PRINCIPAL PROBLEM:  STEMI (ST elevation myocardial infarction) [I21.3] 02/11/2017 Unknown    Chest pain [R07.9] 02/11/2017 Yes    Elevated troponin [R79.89] 02/11/2017 Unknown    Type 2 diabetes mellitus without complication, without long-term current use of insulin [E11.9] 09/28/2016 Yes      Problems Resolved During this Admission:    Diagnosis Date Noted Date Resolved POA       VTE Risk Mitigation         Ordered     heparin (porcine) injection 5,000 Units  Every 8 hours     Route:  Subcutaneous        02/11/17 1711     Medium Risk of VTE  Once      02/11/17 1711     enoxaparin injection 40 mg  Daily     Route:  Subcutaneous        02/11/17 1119        61 YRs OLD gentleman with STEMI with significant risk factors.     1) STEMI S/P C with PCI to mLAD   - Continue ASA, Ticagrilor 90 mg BID, and high intensity statin and ACE in hibitors after post-contrast protocol hydration.    - Hold BB for today due to recent cocaine use.    - Cardiac rehab phase 1   - Counseling about cocaine use.    - Follow up on  Troponin and post procedure EKG     2) HTN    - Stable for now   - Will start on PRN Hydralazine.     DVT ppx   - Heparin 5k TID    PUD ppx   - Pantoprazole.             Basilia Ferrera MD  Cardiology   [facility]

## 2017-02-11 NOTE — PLAN OF CARE
Radiology      Contrast Extravasation Note     Called to see patient in ER room secondary to contrast extravasation. Approximately 20 cc of Omnipaque 350 extravasated into the right hand.      Patient denies any pain, numbness, tingling, or motor dysfunction in the right hand. There is an area of nontender soft tissue swelling of the right hand, which the patient reports had reduced significantly since the initial extravastion. Patient was instructed to keep his arm elevated for at least 24hours. The patient should also use cold compresses over the affected area for at least 24hours.      Patient was instructed to return in 24 hours for re-evaluation by the Radiology resident. If patient experiences any new symptoms such as increased arm pain, worsening swelling, or skin changes, she should contact the Radiology resident on call or report to the Emergency Department at once.      Shnaa Gutierrez M.D.  Resident  Department of Radiology

## 2017-02-11 NOTE — ED NOTES
Spoke to Dr Awad via phone. Notified her of pt's need for a central line or PICC line due to multiple infiltrated IV's as well as need for PO pain meds due to pt's refusal of nitro for returning intermittent chest pain. Per Dr Awad, the ER doctors need to place a EJ before pt is brought to the floor. Dr Segura notified and given repeat EKG ordered per Dr Awad. ECG changes noted. Awaiting new orders.

## 2017-02-11 NOTE — PLAN OF CARE
Problem: Patient Care Overview  Goal: Plan of Care Review  Outcome: Ongoing (interventions implemented as appropriate)  Pt's VSS at this time, pt's BP more on the hypertensive side, pt has no c/o CP or chest discomfort, R groin site is CDI no hematoma, will continue to monitor patient closely, see flow sheet for full assessment, will continue to monitor.

## 2017-02-11 NOTE — ED NOTES
Left side lying on stretcher sleeping. Respirations even/unlabored at 16/min. Opens eyes to voice. Denies c/o needs. Safety maintained. Call bell in reach.

## 2017-02-11 NOTE — PLAN OF CARE
Radiology     Contrast Extravasation Note    Called to see patient in CT room secondary to contrast reaction. 75cc of Omnipaque 350 was injected into the left arm.     Patient denies any pain in the left arm. There is an area of nontender soft tissue swelling of the left upper arm. Patient was instructed to keep her arm elevated for at least 24hours. The patient should also use cold compresses over the affected area for at least 24hours.     Patient was instructed to return in 24 hours for re-evaluation by the Radiology resident. If patient experiences any new symptoms such as increased arm pain, worsening swelling, or skin changes, she should contact the Radiology resident on call or report to the Emergency Department at once.     Richardson Griffin M.D.  PGY-3  Department of Radiology

## 2017-02-11 NOTE — ED TRIAGE NOTES
Damir Faria, a 61 y.o. male presents to the ED      Chief Complaint   Patient presents with    Chest Pain     x15 minutes PTA intermittenly. 324 mg aspirin, 1 SL nitro given with some pain relief. Pain currently 5/10.      Review of patient's allergies indicates:  No Known Allergies  Past Medical History   Diagnosis Date    Acute renal insufficiency 6/21/2015    Cocaine abuse     Depression     History of psychiatric care     HTN (hypertension) 1/25/2014    Stroke

## 2017-02-11 NOTE — PROGRESS NOTES
61 year old AAM with past medical history of CVA with residual left-sided weakness, DMT2, HLD, HTN, depression with SI and previous psychiatric admissions. The ED note reports h/o HIV but patient denies and last HIV test negative (10/2016). The patient was called in for admission around 1015 to hospital medicine for chest pain in patient with uncontrolled HTN, DMT2, medication non-compliance, and active cocaine use with positive urine drug screen. Elevated Troponin 0.036. Per patient, chest pain started Friday evening while at rest and described as severe, substernal heaviness that relieved some with NTG and ASA in ambulance but later returned. He also endorses associated SOB and nausea with vomiting x 4. While in ED, given Morphine IV x 3 doses and chest pain improved. He denies previous h/o chest pain and last 2D Echo (5/2016) with EF 55%.   Repeat EKG showed ST-T wave changes in patient with ongoing chest pain and elevated troponin. Cardiology consulted and patient to be taken to cath lab today. Case discussed with Cardiology service and agree with plan.     Heidi Awad PA-C  ADY  Staff: Dr. Marie

## 2017-02-11 NOTE — ED NOTES
Back from CT. No distress noted. Per CT tech Shyam, right hand 20G IV infiltrated post injection of IV contrast. Right hand noted with +2 edema laterally. Cannula tip removed intact. Pressure dressing applied. Pt tolerated well. Denies c/o needs. Safety maintained. Call bell in reach.

## 2017-02-12 PROBLEM — I21.3 STEMI (ST ELEVATION MYOCARDIAL INFARCTION): Status: ACTIVE | Noted: 2017-02-12

## 2017-02-12 PROBLEM — R79.89 ELEVATED TROPONIN: Status: ACTIVE | Noted: 2017-02-12

## 2017-02-12 LAB
ALBUMIN SERPL BCP-MCNC: 3.7 G/DL
ALP SERPL-CCNC: 91 U/L
ALT SERPL W/O P-5'-P-CCNC: 98 U/L
ANION GAP SERPL CALC-SCNC: 14 MMOL/L
AST SERPL-CCNC: 410 U/L
BASOPHILS # BLD AUTO: 0.01 K/UL
BASOPHILS NFR BLD: 0.1 %
BILIRUB SERPL-MCNC: 1.6 MG/DL
BUN SERPL-MCNC: 15 MG/DL
CALCIUM SERPL-MCNC: 9.5 MG/DL
CHLORIDE SERPL-SCNC: 102 MMOL/L
CHOLEST/HDLC SERPL: 3.7 {RATIO}
CO2 SERPL-SCNC: 23 MMOL/L
CORONARY STENOSIS: ABNORMAL
CORONARY STENT: YES
CREAT SERPL-MCNC: 1.2 MG/DL
DIASTOLIC DYSFUNCTION: NO
DIFFERENTIAL METHOD: ABNORMAL
EOSINOPHIL # BLD AUTO: 0 K/UL
EOSINOPHIL NFR BLD: 0.3 %
ERYTHROCYTE [DISTWIDTH] IN BLOOD BY AUTOMATED COUNT: 13.3 %
EST. GFR  (AFRICAN AMERICAN): >60 ML/MIN/1.73 M^2
EST. GFR  (NON AFRICAN AMERICAN): >60 ML/MIN/1.73 M^2
ESTIMATED PA SYSTOLIC PRESSURE: 15.68
GLUCOSE SERPL-MCNC: 98 MG/DL
HCT VFR BLD AUTO: 44.2 %
HDL/CHOLESTEROL RATIO: 27.3 %
HDLC SERPL-MCNC: 176 MG/DL
HDLC SERPL-MCNC: 48 MG/DL
HGB BLD-MCNC: 15.4 G/DL
LDLC SERPL CALC-MCNC: 106.8 MG/DL
LYMPHOCYTES # BLD AUTO: 1.7 K/UL
LYMPHOCYTES NFR BLD: 17.3 %
MAGNESIUM SERPL-MCNC: 1.8 MG/DL
MCH RBC QN AUTO: 31.8 PG
MCHC RBC AUTO-ENTMCNC: 34.8 %
MCV RBC AUTO: 91 FL
MITRAL VALVE REGURGITATION: ABNORMAL
MONOCYTES # BLD AUTO: 1.4 K/UL
MONOCYTES NFR BLD: 14.5 %
NEUTROPHILS # BLD AUTO: 6.4 K/UL
NEUTROPHILS NFR BLD: 67.3 %
NONHDLC SERPL-MCNC: 128 MG/DL
PLATELET # BLD AUTO: 293 K/UL
PMV BLD AUTO: 9.2 FL
POTASSIUM SERPL-SCNC: 4 MMOL/L
PROT SERPL-MCNC: 8 G/DL
RBC # BLD AUTO: 4.84 M/UL
RETIRED EF AND QEF - SEE NOTES: 35 (ref 55–65)
SODIUM SERPL-SCNC: 139 MMOL/L
TRICUSPID VALVE REGURGITATION: ABNORMAL
TRIGL SERPL-MCNC: 106 MG/DL
TROPONIN I SERPL DL<=0.01 NG/ML-MCNC: >50 NG/ML
WBC # BLD AUTO: 9.56 K/UL

## 2017-02-12 PROCEDURE — 93005 ELECTROCARDIOGRAM TRACING: CPT

## 2017-02-12 PROCEDURE — 99233 SBSQ HOSP IP/OBS HIGH 50: CPT | Mod: ,,, | Performed by: INTERNAL MEDICINE

## 2017-02-12 PROCEDURE — 25000003 PHARM REV CODE 250: Performed by: INTERNAL MEDICINE

## 2017-02-12 PROCEDURE — 93306 TTE W/DOPPLER COMPLETE: CPT

## 2017-02-12 PROCEDURE — 11000001 HC ACUTE MED/SURG PRIVATE ROOM

## 2017-02-12 PROCEDURE — 85025 COMPLETE CBC W/AUTO DIFF WBC: CPT

## 2017-02-12 PROCEDURE — 93306 TTE W/DOPPLER COMPLETE: CPT | Mod: 26,,, | Performed by: INTERNAL MEDICINE

## 2017-02-12 PROCEDURE — 80053 COMPREHEN METABOLIC PANEL: CPT

## 2017-02-12 PROCEDURE — 25000003 PHARM REV CODE 250: Performed by: PHYSICIAN ASSISTANT

## 2017-02-12 PROCEDURE — 80061 LIPID PANEL: CPT

## 2017-02-12 PROCEDURE — 93010 ELECTROCARDIOGRAM REPORT: CPT | Mod: ,,, | Performed by: INTERNAL MEDICINE

## 2017-02-12 PROCEDURE — 63600175 PHARM REV CODE 636 W HCPCS: Performed by: INTERNAL MEDICINE

## 2017-02-12 PROCEDURE — 84484 ASSAY OF TROPONIN QUANT: CPT

## 2017-02-12 PROCEDURE — 81001 URINALYSIS AUTO W/SCOPE: CPT

## 2017-02-12 PROCEDURE — 83735 ASSAY OF MAGNESIUM: CPT

## 2017-02-12 RX ORDER — CARVEDILOL 12.5 MG/1
12.5 TABLET ORAL 2 TIMES DAILY
Status: DISCONTINUED | OUTPATIENT
Start: 2017-02-12 | End: 2017-02-12

## 2017-02-12 RX ORDER — METOPROLOL TARTRATE 25 MG/1
25 TABLET, FILM COATED ORAL 2 TIMES DAILY
Status: DISCONTINUED | OUTPATIENT
Start: 2017-02-12 | End: 2017-02-12

## 2017-02-12 RX ORDER — CARVEDILOL 6.25 MG/1
6.25 TABLET ORAL 2 TIMES DAILY
Status: DISCONTINUED | OUTPATIENT
Start: 2017-02-12 | End: 2017-02-15 | Stop reason: HOSPADM

## 2017-02-12 RX ORDER — CARVEDILOL 6.25 MG/1
6.25 TABLET ORAL 2 TIMES DAILY
Status: DISCONTINUED | OUTPATIENT
Start: 2017-02-12 | End: 2017-02-12

## 2017-02-12 RX ADMIN — CARVEDILOL 6.25 MG: 6.25 TABLET, FILM COATED ORAL at 09:02

## 2017-02-12 RX ADMIN — MAGNESIUM SULFATE HEPTAHYDRATE 1 G: 500 INJECTION, SOLUTION INTRAMUSCULAR; INTRAVENOUS at 10:02

## 2017-02-12 RX ADMIN — CARVEDILOL 6.25 MG: 6.25 TABLET, FILM COATED ORAL at 08:02

## 2017-02-12 RX ADMIN — HEPARIN SODIUM 5000 UNITS: 5000 INJECTION, SOLUTION INTRAVENOUS; SUBCUTANEOUS at 02:02

## 2017-02-12 RX ADMIN — HEPARIN SODIUM 5000 UNITS: 5000 INJECTION, SOLUTION INTRAVENOUS; SUBCUTANEOUS at 06:02

## 2017-02-12 RX ADMIN — SODIUM CHLORIDE, PRESERVATIVE FREE 3 ML: 5 INJECTION INTRAVENOUS at 02:02

## 2017-02-12 RX ADMIN — ATORVASTATIN CALCIUM 80 MG: 20 TABLET, FILM COATED ORAL at 09:02

## 2017-02-12 RX ADMIN — HYDRALAZINE HYDROCHLORIDE 25 MG: 25 TABLET ORAL at 02:02

## 2017-02-12 RX ADMIN — HEPARIN SODIUM 5000 UNITS: 5000 INJECTION, SOLUTION INTRAVENOUS; SUBCUTANEOUS at 09:02

## 2017-02-12 RX ADMIN — TICAGRELOR 90 MG: 90 TABLET ORAL at 09:02

## 2017-02-12 RX ADMIN — SODIUM CHLORIDE, PRESERVATIVE FREE 3 ML: 5 INJECTION INTRAVENOUS at 06:02

## 2017-02-12 RX ADMIN — AMLODIPINE BESYLATE 5 MG: 5 TABLET ORAL at 08:02

## 2017-02-12 RX ADMIN — ASPIRIN 81 MG: 81 TABLET, COATED ORAL at 08:02

## 2017-02-12 RX ADMIN — LISINOPRIL 5 MG: 5 TABLET ORAL at 08:02

## 2017-02-12 RX ADMIN — TICAGRELOR 90 MG: 90 TABLET ORAL at 08:02

## 2017-02-12 RX ADMIN — PANTOPRAZOLE SODIUM 40 MG: 40 TABLET, DELAYED RELEASE ORAL at 08:02

## 2017-02-12 NOTE — PROGRESS NOTES
Progress Note  Cardiology    Admit Date: 2/10/2017   LOS: 1 day     SUBJECTIVE:     Interval History: Patient seen and examined.  Reports no events over night and has no new complaints. Had some chest discomfort that resolved with burping. No more chest pain.      Scheduled Meds:   amlodipine  5 mg Oral Daily    aspirin  81 mg Oral Daily    atorvastatin  80 mg Oral QHS    carvedilol  6.25 mg Oral BID    heparin (porcine)  5,000 Units Subcutaneous Q8H    hydrocodone-acetaminophen 10-325mg  1 tablet Oral Once    lisinopril  5 mg Oral Daily    pantoprazole  40 mg Oral Daily    sertraline  25 mg Oral Daily    sodium chloride 0.9%  3 mL Intravenous Q8H    sodium chloride 0.9%  3 mL Intravenous Q8H    ticagrelor  180 mg Oral Once    ticagrelor  90 mg Oral BID     Continuous Infusions:   PRN Meds:acetaminophen, acetaminophen, ondansetron, ondansetron, ondansetron    Review of patient's allergies indicates:  No Known Allergies      OBJECTIVE:     Vital Signs (Most Recent)  Temp: 98.6 °F (37 °C) (02/12/17 0700)  Pulse: 109 (02/12/17 0700)  Resp: (!) 24 (02/12/17 0700)  BP: 139/87 (02/12/17 0700)  SpO2: 96 % (02/12/17 0700)    Vital Signs Range (Last 24H):  Temp:  [98.4 °F (36.9 °C)-99.8 °F (37.7 °C)]   Pulse:  []   Resp:  [11-28]   BP: (119-196)/()   SpO2:  [94 %-100 %]     I & O (Last 24H):  Intake/Output Summary (Last 24 hours) at 02/12/17 0759  Last data filed at 02/12/17 0700   Gross per 24 hour   Intake           481.43 ml   Output              915 ml   Net          -433.57 ml       Telemetry: Sinus tachycardia.     Physical Exam:  Constitutional: Oriented to person, place, and time. Appears well-developed and well-nourished.   Neck: No JVD present. No thyromegaly present.   Cardiovascular: Normal rate, regular rhythm and intact distal pulses.  No murmurs heard.   Pulmonary/Chest: No respiratory distress. No wheezes. Pt exhibits no tenderness.   Abdominal: + BS, exhibits no distension. There is  no tenderness. There is no rebound.   Extremities: no cyanosis, clubbing or edema    Labs: Reviewed, Tn >50      ASSESSMENT/PLAN:     Damir Faria is a 61 y.o. male who presented with chest pain and initial normal EKG. He was initially ruled ot for PE, however, subsequently, while in the ED he had ST elevation MI. He underwent PCI with BMS to mid LAD and POBA of D1. EF 40% on v-gram with anterolateral hypokinesis and dyskinetic apex.     1. Acute MI: Continue ASA/Brilinta for 1 month followed by ASA for life. Substance abuse counseling. Will start alpha-beta blocker. He is tachycardic today and is likely a combination of cocaine, heart failure and hydralazine dose.   1. Check 2D echo   2. Cardiac rehab  3. High intensity statin Rx  4. Clarification: Patient DID NOT present to the ED with STEMI.   2. Hypertension: Continue to adjust regimen, avoid hydralazine  3. Cocaine use: Counseling referral    Jass Leblanc MD  Interventional Cardiology  Structural/Valvular heart disease  Fellow PGY-8  [phone]    4.

## 2017-02-12 NOTE — PROGRESS NOTES
Radiology       Contrast Extravasation Progress Note      Evaluated patient at bedside in regards to yesterday's contrast extravasations within the upper left arm and right hand.    Left arm:  Patient denies any pain, numbness, tingling, or motor dysfunction in the left arm. No residual swelling identified.      Right hand: Continued swelling in pt's right hand, similar to yesterday's examination.  Pt reports he has not been elevating the hand or using cold compresses as instructed.  Patient reports minimal pain (3/10) upon palpation over the dorsum of the 5th metacarpal.  Patient denies any numbness, tingling, or motor dysfunction in the right hand.  Patient and nursing were instructed to keep his hand elevated and use cold compresses to aid resolution of swelling.      Patient was instructed to inform nursing of any new symptoms such as increased hand pain, numbness, tingling, worsening swelling, or skin changes      Please contact radiology resident on-call with any worsening symptoms, questions, or concerns.       Shana Gutierrez M.D.  Resident  Department of Radiology

## 2017-02-12 NOTE — PROGRESS NOTES
[facility]  Cardiology  Progress Note    Patient Name: Damir Faria  MRN: [id number]  Admission Date: 2/10/2017  Hospital Length of Stay: 1 days  Code Status: Full Code   Attending Physician: Juan A Walker MD   Primary Care Physician: [facility]  Expected Discharge Date:   Principal Problem:STEMI (ST elevation myocardial infarction)    Subjective:     Hospital Course: Pt underwent PCI to mLAD without complications.   Interval History: Remained stable overnight without new chest pain, SOB or palpitation    ROS:    Objective:     Vital Signs (Most Recent):  Temp: 98 °F (36.7 °C) (02/12/17 1130)  Pulse: 98 (02/12/17 1200)  Resp: (!) 25 (02/12/17 1200)  BP: 123/80 (02/12/17 1200)  SpO2: 96 % (02/12/17 1130) Vital Signs (24h Range):  Temp:  [98 °F (36.7 °C)-99.8 °F (37.7 °C)] 98 °F (36.7 °C)  Pulse:  [] 98  Resp:  [11-31] 25  SpO2:  [94 %-100 %] 96 %  BP: ()/() 123/80     Weight: 95.3 kg (210 lb)  Body mass index is 29.29 kg/(m^2).    SpO2: 96 %  O2 Device (Oxygen Therapy): room air      Intake/Output Summary (Last 24 hours) at 02/12/17 1251  Last data filed at 02/12/17 1027   Gross per 24 hour   Intake           771.43 ml   Output              915 ml   Net          -143.57 ml     Lines/Drains/Airways     Peripheral Intravenous Line                 Peripheral IV - Single Lumen 02/11/17 1242 Left Hand 1 day                Physical Exam   GEN: Lying in bed in no distress.   HEENT: No e/o oropharyngeal erythema  HEART: s1s2 no m/r/g.   Peripheral vascular: 2+ Peripheral pulses.   CHEST: CTAB, No crackles, no wheeze  ABD: Soft, NT, ND, BS audible.   EXT: No clubbing, cyanosis, or pitting edema.   Neuro: AOX3, Non focal   .  Significant Labs:   CMP   Recent Labs  Lab 02/10/17  2309 02/12/17  0415    139   K 4.0 4.0    102   CO2 26 23    98   BUN 17 15   CREATININE 1.4 1.2   CALCIUM 9.4 9.5   PROT 7.8 8.0   ALBUMIN 3.8 3.7   BILITOT 0.3 1.6*   ALKPHOS 87 91    AST 54* 410*   ALT 73* 98*   ANIONGAP 8 14   ESTGFRAFRICA >60.0 >60.0   EGFRNONAA 53.5* >60.0    and CBC   Recent Labs  Lab 02/10/17  2309 02/12/17  0415   WBC 5.82 9.56   HGB 14.1 15.4   HCT 41.7 44.2    293       Significant Imaging: POST PCI EKG with LAD territory ST elevation   Assessment and Plan:     Brief HPI: 62 YO AAM with H/O HTN, HLD, Cocaine use presented to ED with chest pain with initial negative EKG , and subsequent development of STEMI, now post PCI to mLAD.       1) CAD S/P PCI to mLAD and balloon angio toOD1 - DAY1  - No recurrent CP,   - Continued on ASA, Brilinta and ACE-I and high intensity Lipitor   - Started on low dose BB  - Interventional Cardiology following pt, appreciate input.   - Cardiac rehab    2) HTN  - Controlled  - Avoid Hydralaizine for now      3) DLD  - Continue Lipitor.     4) DVT ppx  - Heparin TID    5) PUD ppx  Pantoprazole.      Active Diagnoses:    Diagnosis Date Noted POA    PRINCIPAL PROBLEM:  STEMI (ST elevation myocardial infarction) [I21.3] 02/12/2017 Yes    Elevated troponin [R79.89] 02/12/2017 Yes    Chest pain [R07.9] 02/11/2017 Yes    Type 2 diabetes mellitus without complication, without long-term current use of insulin [E11.9] 09/28/2016 Yes      Problems Resolved During this Admission:    Diagnosis Date Noted Date Resolved POA       VTE Risk Mitigation         Ordered     heparin (porcine) injection 5,000 Units  Every 8 hours     Route:  Subcutaneous        02/11/17 1711     Medium Risk of VTE  Once      02/11/17 1711          Anticipated Disposition: Home or Self Care      Basilia Ferrera MD  Cardiology  [facility]

## 2017-02-12 NOTE — PROGRESS NOTES
No acute events overnight. No c/o SOB or pain. Patient SBP reached 190's, medically managed with hydralazine prn per MD order. Patient site without, hematoma however some drainage is present. Pressure applied and patient remains lying flat. Site does not appear to be actively bleeding. Plan of care reviewed with patient, questions answered, needs addressed.

## 2017-02-13 DIAGNOSIS — Z95.5 STENTED CORONARY ARTERY: Primary | ICD-10-CM

## 2017-02-13 DIAGNOSIS — I21.3 STEMI (ST ELEVATION MYOCARDIAL INFARCTION): ICD-10-CM

## 2017-02-13 PROBLEM — I11.9 HYPERTENSIVE HEART DISEASE WITHOUT HEART FAILURE: Status: ACTIVE | Noted: 2017-02-13

## 2017-02-13 PROBLEM — I20.9 ISCHEMIC CHEST PAIN: Status: ACTIVE | Noted: 2017-02-11

## 2017-02-13 LAB
ALBUMIN SERPL BCP-MCNC: 3.3 G/DL
ALP SERPL-CCNC: 76 U/L
ALT SERPL W/O P-5'-P-CCNC: 71 U/L
ANION GAP SERPL CALC-SCNC: 11 MMOL/L
AST SERPL-CCNC: 185 U/L
BACTERIA #/AREA URNS AUTO: ABNORMAL /HPF
BASOPHILS # BLD AUTO: 0.02 K/UL
BASOPHILS NFR BLD: 0.2 %
BILIRUB SERPL-MCNC: 1.3 MG/DL
BILIRUB UR QL STRIP: NEGATIVE
BILIRUB UR QL STRIP: NEGATIVE
BUN SERPL-MCNC: 29 MG/DL
CALCIUM SERPL-MCNC: 8.6 MG/DL
CHLORIDE SERPL-SCNC: 106 MMOL/L
CLARITY UR REFRACT.AUTO: CLEAR
CLARITY UR REFRACT.AUTO: CLEAR
CO2 SERPL-SCNC: 21 MMOL/L
COLOR UR AUTO: YELLOW
COLOR UR AUTO: YELLOW
CREAT SERPL-MCNC: 1.4 MG/DL
DIFFERENTIAL METHOD: ABNORMAL
EOSINOPHIL # BLD AUTO: 0.2 K/UL
EOSINOPHIL NFR BLD: 2.1 %
ERYTHROCYTE [DISTWIDTH] IN BLOOD BY AUTOMATED COUNT: 14.1 %
EST. GFR  (AFRICAN AMERICAN): >60 ML/MIN/1.73 M^2
EST. GFR  (NON AFRICAN AMERICAN): 53.5 ML/MIN/1.73 M^2
GLUCOSE SERPL-MCNC: 104 MG/DL
GLUCOSE UR QL STRIP: NEGATIVE
GLUCOSE UR QL STRIP: NEGATIVE
HCT VFR BLD AUTO: 42.5 %
HGB BLD-MCNC: 14.2 G/DL
HGB UR QL STRIP: ABNORMAL
HGB UR QL STRIP: NEGATIVE
HYALINE CASTS UR QL AUTO: 16 /LPF
KETONES UR QL STRIP: NEGATIVE
KETONES UR QL STRIP: NEGATIVE
LEUKOCYTE ESTERASE UR QL STRIP: NEGATIVE
LEUKOCYTE ESTERASE UR QL STRIP: NEGATIVE
LYMPHOCYTES # BLD AUTO: 2.8 K/UL
LYMPHOCYTES NFR BLD: 34.5 %
MCH RBC QN AUTO: 31.4 PG
MCHC RBC AUTO-ENTMCNC: 33.4 %
MCV RBC AUTO: 94 FL
MICROSCOPIC COMMENT: ABNORMAL
MONOCYTES # BLD AUTO: 1.3 K/UL
MONOCYTES NFR BLD: 15.5 %
NEUTROPHILS # BLD AUTO: 3.8 K/UL
NEUTROPHILS NFR BLD: 47.7 %
NITRITE UR QL STRIP: NEGATIVE
NITRITE UR QL STRIP: NEGATIVE
PH UR STRIP: 6 [PH] (ref 5–8)
PH UR STRIP: 6 [PH] (ref 5–8)
PLATELET # BLD AUTO: 283 K/UL
PLATELET BLD QL SMEAR: ABNORMAL
PMV BLD AUTO: 9.4 FL
POTASSIUM SERPL-SCNC: 3.8 MMOL/L
PROT SERPL-MCNC: 7.4 G/DL
PROT UR QL STRIP: ABNORMAL
PROT UR QL STRIP: NEGATIVE
RBC # BLD AUTO: 4.52 M/UL
RBC #/AREA URNS AUTO: 5 /HPF (ref 0–4)
SODIUM SERPL-SCNC: 138 MMOL/L
SP GR UR STRIP: 1.02 (ref 1–1.03)
SP GR UR STRIP: 1.03 (ref 1–1.03)
SQUAMOUS #/AREA URNS AUTO: 1 /HPF
URN SPEC COLLECT METH UR: ABNORMAL
URN SPEC COLLECT METH UR: NORMAL
UROBILINOGEN UR STRIP-ACNC: 1 EU/DL
UROBILINOGEN UR STRIP-ACNC: 2 EU/DL
WBC # BLD AUTO: 8.14 K/UL
WBC #/AREA URNS AUTO: 2 /HPF (ref 0–5)

## 2017-02-13 PROCEDURE — 87086 URINE CULTURE/COLONY COUNT: CPT

## 2017-02-13 PROCEDURE — 80053 COMPREHEN METABOLIC PANEL: CPT

## 2017-02-13 PROCEDURE — 25000003 PHARM REV CODE 250: Performed by: PHYSICIAN ASSISTANT

## 2017-02-13 PROCEDURE — 20600001 HC STEP DOWN PRIVATE ROOM

## 2017-02-13 PROCEDURE — 25000003 PHARM REV CODE 250: Performed by: STUDENT IN AN ORGANIZED HEALTH CARE EDUCATION/TRAINING PROGRAM

## 2017-02-13 PROCEDURE — 81003 URINALYSIS AUTO W/O SCOPE: CPT

## 2017-02-13 PROCEDURE — 25000003 PHARM REV CODE 250: Performed by: INTERNAL MEDICINE

## 2017-02-13 PROCEDURE — 99233 SBSQ HOSP IP/OBS HIGH 50: CPT | Mod: ,,, | Performed by: INTERNAL MEDICINE

## 2017-02-13 PROCEDURE — 85025 COMPLETE CBC W/AUTO DIFF WBC: CPT

## 2017-02-13 PROCEDURE — 63600175 PHARM REV CODE 636 W HCPCS: Performed by: STUDENT IN AN ORGANIZED HEALTH CARE EDUCATION/TRAINING PROGRAM

## 2017-02-13 PROCEDURE — 87040 BLOOD CULTURE FOR BACTERIA: CPT | Mod: 59

## 2017-02-13 RX ORDER — LISINOPRIL 10 MG/1
10 TABLET ORAL 2 TIMES DAILY
Status: DISCONTINUED | OUTPATIENT
Start: 2017-02-13 | End: 2017-02-14

## 2017-02-13 RX ORDER — LISINOPRIL 10 MG/1
10 TABLET ORAL 2 TIMES DAILY
Status: CANCELLED | OUTPATIENT
Start: 2017-02-13

## 2017-02-13 RX ADMIN — SODIUM CHLORIDE, PRESERVATIVE FREE 3 ML: 5 INJECTION INTRAVENOUS at 01:02

## 2017-02-13 RX ADMIN — SODIUM CHLORIDE, PRESERVATIVE FREE 3 ML: 5 INJECTION INTRAVENOUS at 10:02

## 2017-02-13 RX ADMIN — LISINOPRIL 10 MG: 10 TABLET ORAL at 09:02

## 2017-02-13 RX ADMIN — HEPARIN SODIUM 5000 UNITS: 5000 INJECTION, SOLUTION INTRAVENOUS; SUBCUTANEOUS at 01:02

## 2017-02-13 RX ADMIN — AMLODIPINE BESYLATE 5 MG: 5 TABLET ORAL at 08:02

## 2017-02-13 RX ADMIN — CARVEDILOL 6.25 MG: 6.25 TABLET, FILM COATED ORAL at 09:02

## 2017-02-13 RX ADMIN — HEPARIN SODIUM 5000 UNITS: 5000 INJECTION, SOLUTION INTRAVENOUS; SUBCUTANEOUS at 09:02

## 2017-02-13 RX ADMIN — CARVEDILOL 6.25 MG: 6.25 TABLET, FILM COATED ORAL at 08:02

## 2017-02-13 RX ADMIN — PANTOPRAZOLE SODIUM 40 MG: 40 TABLET, DELAYED RELEASE ORAL at 08:02

## 2017-02-13 RX ADMIN — ASPIRIN 81 MG: 81 TABLET, COATED ORAL at 08:02

## 2017-02-13 RX ADMIN — LISINOPRIL 5 MG: 5 TABLET ORAL at 08:02

## 2017-02-13 RX ADMIN — CEFTRIAXONE 1 G: 1 INJECTION, SOLUTION INTRAVENOUS at 10:02

## 2017-02-13 RX ADMIN — HEPARIN SODIUM 5000 UNITS: 5000 INJECTION, SOLUTION INTRAVENOUS; SUBCUTANEOUS at 05:02

## 2017-02-13 RX ADMIN — TICAGRELOR 90 MG: 90 TABLET ORAL at 08:02

## 2017-02-13 NOTE — PROGRESS NOTES
"Admit Note     Met with patient to assess needs. Patient is a 61 y.o.  male, admitted for STEMI.      Patient admitted on 2/10/2017 .  At this time, patient presents as alert and oriented x 4, pleasant, calm and asking and answering questions appropriately.  At this time, patient is alone in room.    Household/Family Systems     Patient resides alone, at     [address]  [place] [address].      Cell [phone]  Sister Hilda [phone]    Support system includes sister Hilda.  Patient does not have dependents that are need of being cared for.     Confirmed patient does not have access to reliable transportation. Pt states he uses the bus.    Vocation/Disability   .  Working for Income: No  If no, reason not working: Disability    Substance Use    Patient reports the following substance usage.    Tobacco: pt reports very seldom use.  Alcohol: pt reports 2-3 beers per day.  Illicit Drugs/Non-prescribed Medications: pt reports cocaine use "less than a couple times a week". Pt requested a referral for drug rehab. SW provided pt with contact information for the following out patient programs:  [facility] ph [phone]  [facility] ph [phone]  [facility] ph [phone]  [facility] ph [phone]    Patient states clear understanding of the potential impact of substance use.  Substance abstinence/cessation counseling, education and resources provided and reviewed.     Services Utilizing/ADLS    Infusion Service: Prior to admission, patient utilizing? no  Home Health: Prior to admission, patient utilizing? no  DME: Prior to admission, no. Pt reports he would like a cane at d/c  Pulmonary/Cardiac Rehab: Prior to admission, no  Dialysis:  Prior to admission, no    Prior to admission, patient reports patient was independent with ADLS and was not driving.  Patient reports patient is not able to care for self at this time due to " compromised medical condition (as documented in medical record) and physical weakness..  Patient indicates a willingness to care for self once medically cleared to do so.    Insurance/Medications    Insured by   Payor/Plan Subscr  Sex Relation Sub. Ins. ID Effective Group Num   1. [place] - [place]* KIMMY FIGUEREDO 1955 Male  [id number]                                    [address]   2. [place] KIMMY FIGUEREDO 1955 Male  [id number]                                    [address] [address]      Primary Insurance (for UNOS reporting): Public Insurance - Medicare FFS (Fee For Service)  Secondary Insurance (for UNOS reporting): Public Insurance - Medicaid    Patient reports patient is able to obtain and afford medications at this time and at time of discharge.    Living Will/Healthcare Power of     Patient states patient does not have a LW and/or HCPA.   provided education regarding LW and HCPA and the completion of forms.    Discharge Planning    At time of discharge, patient plans to return to patient's home under the care of self.  Patient reports he will need a cab ride home.  Per rounds today, expected discharge date has not been medically determined at this time. Patient and patients caregiver  verbalize understanding and are involved in treatment planning and discharge process.    Additional Concerns     providing ongoing psychosocial support, education, resources and d/c planning as needed.  SW remains available. Patient denies additional needs and/or concerns at this time.

## 2017-02-13 NOTE — PLAN OF CARE
Problem: Patient Care Overview  Goal: Plan of Care Review  Outcome: Ongoing (interventions implemented as appropriate)  Pt's VSS this shift, pt's HR still 90-100s, pt up ad nevin most of shift, no c/o chest pain, pt states that he is SOB at times however objectively he has no s/s of labored breathing or SOB, pt had blood tinged urine, UA to be performed when pt can give clean catch specimen, will continue to monitor patient, plan to go to floor tomorrow, see flow sheet for full assessment.

## 2017-02-13 NOTE — NURSING TRANSFER
Nursing Transfer Note      2/13/2017     Transfer To: CSU 342A    Transfer via bed    Transfer with cardiac monitoring    Transported by Cristiana BACON RN and Heidy BACON RN    Medicines sent: no medications to be sent at this time.    Chart send with patient: Yes    Notified: Pt notified sister    Patient reassessed at: Immediately upon arrival by SHEYLA Salomon    Upon arrival to floor: cardiac monitor applied, patient oriented to room, call bell in reach and bed in lowest position.  Pt transported with no complications. All vitals stable. All personal belongings sent with pt.

## 2017-02-13 NOTE — PROGRESS NOTES
"Transfer Note:    61 y.o. gentleman with PMH of HIV, CVA with residual left sided deficits, depression with SI, HTN, active cocaine use and kidney dysfunction presenting to ED on 2/10/17 with c/o acute onset chest pain that started approx 1 hour PTA. Associated with recent cocaine or substance use. Pain is described as substernal sharp pressure rated 20/10, associated with N/V and mild SOB. Denies h/o DVT or PE, pt denies LE swelling/calf tenderness or recent travel. Pt has had chest pain in the past, has never had an angiogram. Echo from 5/2016 was WNL. Pt was given 324mg aspirin and SL nitrogylcerin in fast track, then moved to ED bed. CTA chest done which was negative for PE or aortic dissection.The pain improved overnight with morphine. Initial diagnosis was chest pain and ACS was yet to be ruled out. The patient developed recurrence of chest pain and telemetry monitor/EKG were consistent with ANY in anterior leads. Code STEMI called and patient was taken to the cath lab.  On cath table he received Brillinta bolus 180 mg. He was found to have significant mid LAD lesion and tight ostial D1 lesion. He underwent BMS placement in the mLAD and balloon angioplasty of the ostial D1. His EF appeared around 40-45% in cath lab. His course in the ICU was complicated by an episode of hematuria and c/o dysuria. He was started IV ceftriaxone, Blood cx and Urine cx were ordered. Urine drug screen positive for cocaine use.     Visit Vitals    /63 (BP Location: Right arm, Patient Position: Lying, BP Method: Automatic)    Pulse 85    Temp 98.4 °F (36.9 °C) (Oral)    Resp (!) 21    Ht 5' 11" (1.803 m)    Wt 95.3 kg (210 lb)    SpO2 96%    BMI 29.29 kg/m2     PE:  GEN: NAD, laying comfortably  HEENT: No JVD  CHEST: CTA B/L  CVS: S1S2, RRR, no murmurs  ABD: S/ND/ TTP in suprapubic region      Imp/Plan:  1) STEMI s/p PCI of mLAD with BMS and POBA of D1 complicated by ICM (EF 35-40%)  - C/w ASA indefinitely  - Brillinta " 90 mg BID x 1 month minimum but up to 1 year ideal  - c/w lipitor 80, coreg 6.25 mg BID (would not uptitrate coreg due to cocaine use)  - avoid hydralazine,CCBs, increased lisinopril to 10 mg BID (uptitrate to max dose tolerable - 20 mg BID)  - TTE reavealed EF 35-40%  - cardiac rehab  - peak Tn >50    2) ICM - c/w coreg/lisinopril    3) Transient hematuria secondary to UTI  - C/W IV rocephin, F/U Blood cx, Ucx. If Ucx negative, would transition to PO ABx    4) Substance abuse - active cocaine use  - psych referral placed  - smoking cessation    5) Disp: Transfer to floor. IM-C Service.  Once discharged, needs to follow up general cardiology clinic and PCP    Case d/w Dr. Hewitt.    Gatito Cao MD  Cardiology Fellow

## 2017-02-13 NOTE — CONSULTS
"Cardiac Rehab     Damir Faria   [id number]   2/13/2017       Activity taught: Yes    Cardiac Rehab Phase Taught: Phase I & II    Risk Factors-Modifiable: diabetes, nutrition, hyperlipidemia, hypertension, positive family history, tobacco use, stress, exercise    Risk Factors-Non modifiable: age, heredity, gender, race    Teaching Method: Verbal, Written, Living with Heart Disease    Understanding/Response: Patient verbalized understanding, all questions answered. Patient's only transportation is his bicycle, he states [place] is close and would be easier financially.     Comments: S/P STEMI and coronary stent. Discussed cardiac rehab and risk factor modification. Team to refer patient to [facility] Cardiac Rehab Phase II. Educational materials were used in the process and given to the patient. They included "Your Guide to Living with Heart Disease", Phase Two Cardiac Rehabilitation information along with a sample Mediterranean diet.The patient expressed understanding of the teaching and expressed desire to take a role in modifying the risk factors when they return home.    Josefina Loza RN  Cardiac Rehab Nurse        "

## 2017-02-13 NOTE — PROGRESS NOTES
Night RN informed me in report this morning of hematuria in pt's urine. Pt also complains of pain and burning when urinating. Pt has not urinated for me at this time. Urinalysis was done and has just resulted. Called MD with HTS to inform. MD will be by to see pt shortly.       0930: Dr Cao to bedside to assess pt. Informed MD of Pt urine complaints and Urinalysis results. Pt has still not voided this morning. MD started pt on 1 G Rocephin at this time for suspected UTI.   Pt will most likely be transferred to floor today.

## 2017-02-13 NOTE — PLAN OF CARE
Problem: Patient Care Overview  Goal: Plan of Care Review  Outcome: Ongoing (interventions implemented as appropriate)  POC reviewed with pt at 0400. Pt verbalized understanding. Questions and concerns addressed. No acute events overnight. Pt progressing toward goals. Will continue to monitor. See flowsheets for full assessment and VS info

## 2017-02-13 NOTE — CONSULTS
Spoke with Primary team.  According note by LCSW dated 2/13/2017 at 1105 AM, the patient was counciled regarding cocaine abuse and he was given resources for drug rehab upon discharge.  Primary to cancel consult and will re consult as needed.    Hellen Carson. NP  February 13, 2017

## 2017-02-13 NOTE — PROGRESS NOTES
"Acceptance Note:     Patient stepped down to hospital medicine IM from cardiac ICU for ongoing management of STEMI s/p PCI of mLAD with BMS and POBA of D1 complicated by ICM (EF 35-40%). VSS and labs appear baseline although AST/ALT trending up. Will continue to monitor.     Visit Vitals    /75 (BP Location: Right arm, Patient Position: Lying, BP Method: Automatic)    Pulse 85    Temp 98.4 °F (36.9 °C) (Oral)    Resp 18    Ht 5' 11" (1.803 m)    Wt 95.3 kg (210 lb)    SpO2 100%    BMI 29.29 kg/m2     Heidi Awad PA-C  [facility]  Staff: Dr. Marie  "

## 2017-02-14 PROBLEM — N17.9 AKI (ACUTE KIDNEY INJURY): Status: ACTIVE | Noted: 2017-02-14

## 2017-02-14 LAB
ALBUMIN SERPL BCP-MCNC: 3.4 G/DL
ALP SERPL-CCNC: 72 U/L
ALT SERPL W/O P-5'-P-CCNC: 60 U/L
ANION GAP SERPL CALC-SCNC: 11 MMOL/L
ANION GAP SERPL CALC-SCNC: 9 MMOL/L
ANISOCYTOSIS BLD QL SMEAR: SLIGHT
AST SERPL-CCNC: 117 U/L
BACTERIA UR CULT: NO GROWTH
BASOPHILS # BLD AUTO: 0.02 K/UL
BASOPHILS NFR BLD: 0.3 %
BILIRUB SERPL-MCNC: 0.6 MG/DL
BUN SERPL-MCNC: 31 MG/DL
BUN SERPL-MCNC: 31 MG/DL
CALCIUM SERPL-MCNC: 8.8 MG/DL
CALCIUM SERPL-MCNC: 8.9 MG/DL
CHLORIDE SERPL-SCNC: 104 MMOL/L
CHLORIDE SERPL-SCNC: 104 MMOL/L
CO2 SERPL-SCNC: 23 MMOL/L
CO2 SERPL-SCNC: 24 MMOL/L
CREAT SERPL-MCNC: 1.6 MG/DL
CREAT SERPL-MCNC: 1.6 MG/DL
DIFFERENTIAL METHOD: ABNORMAL
EOSINOPHIL # BLD AUTO: 0.3 K/UL
EOSINOPHIL NFR BLD: 3.5 %
ERYTHROCYTE [DISTWIDTH] IN BLOOD BY AUTOMATED COUNT: 13.5 %
EST. GFR  (AFRICAN AMERICAN): 52.6 ML/MIN/1.73 M^2
EST. GFR  (AFRICAN AMERICAN): 52.6 ML/MIN/1.73 M^2
EST. GFR  (NON AFRICAN AMERICAN): 45.5 ML/MIN/1.73 M^2
EST. GFR  (NON AFRICAN AMERICAN): 45.5 ML/MIN/1.73 M^2
GLUCOSE SERPL-MCNC: 89 MG/DL
GLUCOSE SERPL-MCNC: 91 MG/DL
HCT VFR BLD AUTO: 37 %
HGB BLD-MCNC: 12.5 G/DL
HYPOCHROMIA BLD QL SMEAR: ABNORMAL
LYMPHOCYTES # BLD AUTO: 2.6 K/UL
LYMPHOCYTES NFR BLD: 32.9 %
MCH RBC QN AUTO: 31.5 PG
MCHC RBC AUTO-ENTMCNC: 33.8 %
MCV RBC AUTO: 93 FL
MONOCYTES # BLD AUTO: 1.8 K/UL
MONOCYTES NFR BLD: 22.6 %
NEUTROPHILS # BLD AUTO: 3.1 K/UL
NEUTROPHILS NFR BLD: 40.7 %
OVALOCYTES BLD QL SMEAR: ABNORMAL
PLATELET # BLD AUTO: 280 K/UL
PMV BLD AUTO: 9.3 FL
POCT GLUCOSE: 104 MG/DL (ref 70–110)
POCT GLUCOSE: 106 MG/DL (ref 70–110)
POCT GLUCOSE: 142 MG/DL (ref 70–110)
POIKILOCYTOSIS BLD QL SMEAR: SLIGHT
POLYCHROMASIA BLD QL SMEAR: ABNORMAL
POTASSIUM SERPL-SCNC: 3.8 MMOL/L
POTASSIUM SERPL-SCNC: 3.8 MMOL/L
PROT SERPL-MCNC: 7.1 G/DL
RBC # BLD AUTO: 3.97 M/UL
SODIUM SERPL-SCNC: 137 MMOL/L
SODIUM SERPL-SCNC: 138 MMOL/L
WBC # BLD AUTO: 7.76 K/UL

## 2017-02-14 PROCEDURE — 93005 ELECTROCARDIOGRAM TRACING: CPT

## 2017-02-14 PROCEDURE — 80053 COMPREHEN METABOLIC PANEL: CPT

## 2017-02-14 PROCEDURE — 63600175 PHARM REV CODE 636 W HCPCS: Performed by: STUDENT IN AN ORGANIZED HEALTH CARE EDUCATION/TRAINING PROGRAM

## 2017-02-14 PROCEDURE — 20600001 HC STEP DOWN PRIVATE ROOM

## 2017-02-14 PROCEDURE — 93010 ELECTROCARDIOGRAM REPORT: CPT | Mod: ,,, | Performed by: INTERNAL MEDICINE

## 2017-02-14 PROCEDURE — 25000003 PHARM REV CODE 250: Performed by: STUDENT IN AN ORGANIZED HEALTH CARE EDUCATION/TRAINING PROGRAM

## 2017-02-14 PROCEDURE — 25000003 PHARM REV CODE 250: Performed by: PHYSICIAN ASSISTANT

## 2017-02-14 PROCEDURE — 99406 BEHAV CHNG SMOKING 3-10 MIN: CPT

## 2017-02-14 PROCEDURE — 99233 SBSQ HOSP IP/OBS HIGH 50: CPT | Mod: ,,, | Performed by: PHYSICIAN ASSISTANT

## 2017-02-14 PROCEDURE — 36415 COLL VENOUS BLD VENIPUNCTURE: CPT

## 2017-02-14 PROCEDURE — 80048 BASIC METABOLIC PNL TOTAL CA: CPT

## 2017-02-14 PROCEDURE — 85025 COMPLETE CBC W/AUTO DIFF WBC: CPT

## 2017-02-14 PROCEDURE — 25000003 PHARM REV CODE 250: Performed by: INTERNAL MEDICINE

## 2017-02-14 RX ORDER — INSULIN ASPART 100 [IU]/ML
0-5 INJECTION, SOLUTION INTRAVENOUS; SUBCUTANEOUS
Status: DISCONTINUED | OUTPATIENT
Start: 2017-02-14 | End: 2017-02-15 | Stop reason: HOSPADM

## 2017-02-14 RX ORDER — CLOPIDOGREL BISULFATE 75 MG/1
75 TABLET ORAL DAILY
Status: DISCONTINUED | OUTPATIENT
Start: 2017-02-14 | End: 2017-02-14

## 2017-02-14 RX ORDER — ATORVASTATIN CALCIUM 20 MG/1
80 TABLET, FILM COATED ORAL DAILY
Status: DISCONTINUED | OUTPATIENT
Start: 2017-02-14 | End: 2017-02-15 | Stop reason: HOSPADM

## 2017-02-14 RX ORDER — NAPROXEN SODIUM 220 MG/1
81 TABLET, FILM COATED ORAL DAILY
Status: DISCONTINUED | OUTPATIENT
Start: 2017-02-14 | End: 2017-02-15 | Stop reason: HOSPADM

## 2017-02-14 RX ORDER — IBUPROFEN 200 MG
24 TABLET ORAL
Status: DISCONTINUED | OUTPATIENT
Start: 2017-02-14 | End: 2017-02-15 | Stop reason: HOSPADM

## 2017-02-14 RX ORDER — LISINOPRIL 10 MG/1
10 TABLET ORAL DAILY
Status: DISCONTINUED | OUTPATIENT
Start: 2017-02-15 | End: 2017-02-15 | Stop reason: HOSPADM

## 2017-02-14 RX ORDER — GLUCAGON 1 MG
1 KIT INJECTION
Status: DISCONTINUED | OUTPATIENT
Start: 2017-02-14 | End: 2017-02-15 | Stop reason: HOSPADM

## 2017-02-14 RX ORDER — IBUPROFEN 200 MG
16 TABLET ORAL
Status: DISCONTINUED | OUTPATIENT
Start: 2017-02-14 | End: 2017-02-15 | Stop reason: HOSPADM

## 2017-02-14 RX ADMIN — CEFTRIAXONE 1 G: 1 INJECTION, SOLUTION INTRAVENOUS at 09:02

## 2017-02-14 RX ADMIN — SODIUM CHLORIDE, PRESERVATIVE FREE 3 ML: 5 INJECTION INTRAVENOUS at 10:02

## 2017-02-14 RX ADMIN — SODIUM CHLORIDE, PRESERVATIVE FREE 3 ML: 5 INJECTION INTRAVENOUS at 06:02

## 2017-02-14 RX ADMIN — CARVEDILOL 6.25 MG: 6.25 TABLET, FILM COATED ORAL at 10:02

## 2017-02-14 RX ADMIN — ASPIRIN 81 MG CHEWABLE TABLET 81 MG: 81 TABLET CHEWABLE at 12:02

## 2017-02-14 RX ADMIN — LISINOPRIL 10 MG: 10 TABLET ORAL at 09:02

## 2017-02-14 RX ADMIN — HEPARIN SODIUM 5000 UNITS: 5000 INJECTION, SOLUTION INTRAVENOUS; SUBCUTANEOUS at 06:02

## 2017-02-14 RX ADMIN — ATORVASTATIN CALCIUM 80 MG: 20 TABLET, FILM COATED ORAL at 12:02

## 2017-02-14 RX ADMIN — PANTOPRAZOLE SODIUM 40 MG: 40 TABLET, DELAYED RELEASE ORAL at 09:02

## 2017-02-14 RX ADMIN — TICAGRELOR 90 MG: 90 TABLET ORAL at 10:02

## 2017-02-14 RX ADMIN — CARVEDILOL 6.25 MG: 6.25 TABLET, FILM COATED ORAL at 09:02

## 2017-02-14 RX ADMIN — HEPARIN SODIUM 5000 UNITS: 5000 INJECTION, SOLUTION INTRAVENOUS; SUBCUTANEOUS at 02:02

## 2017-02-14 RX ADMIN — TICAGRELOR 90 MG: 90 TABLET ORAL at 12:02

## 2017-02-14 RX ADMIN — SERTRALINE HYDROCHLORIDE 25 MG: 25 TABLET ORAL at 09:02

## 2017-02-14 NOTE — PROGRESS NOTES
Patient Consulted by CTTS:     The following was discussed by the Tobacco Treatment Specialist:  ? Relevance of Quitting  ? Risk to Health  ? Long Term Risk  ? Risk for Others  ? Rewards of Quitting  ? Motivation Intervention to Quit        Information given to patient concerning the [facility] smoking cessation program.

## 2017-02-14 NOTE — ASSESSMENT & PLAN NOTE
- SCr with mild increase from 1.4>1.2  - Decrease ACEi but avoid discontinuation due to above  - Avoid nephrotoxic meds  - Repeat BMP in am

## 2017-02-14 NOTE — PLAN OF CARE
IMM rights explained to pt today since DANELLE noted for tomorrow. Pt verbalized understanding.  When asked about his dc plan, he mentioned outpatient therapy as the plan  Will gather more info at [facility].  Pt may need ride home if sister unable to pick him up but if he has a day's notice, he may be able to secure a ride home.     02/14/17 0849   Right Care Assessment   Can the patient answer the patient profile reliably? Yes, cognitively intact   How often would a person be available to care for the patient? Often   Describe the patient's ability to walk at the present time. Minor restrictions or changes   How does the patient rate their overall health at the present time? Good   Number of comorbid conditions (as recorded on the chart) One   During the past month, has the patient often been bothered by feeling down, depressed or hopeless? No   During the past month, has the patient often been bothered by little interest or pleasure in doing things? No

## 2017-02-14 NOTE — PROGRESS NOTES
Pt went for stroll in hallway, when he returned to the room he reported that he had a little pressure in his chest and a little shortness of breath, both resolved when he stopped for a moment.  Pt BP when returned to the room was 125/74 map 84. Will continue to monitor.

## 2017-02-14 NOTE — SUBJECTIVE & OBJECTIVE
Interval History: Patient up walking around the hallway in no distress. He reports brief episode of chest pain last night while at rest that lasted minutes and resolved.     Review of Systems   Constitutional: Negative for activity change, appetite change, chills, fatigue and fever.   Respiratory: Negative for cough, shortness of breath and wheezing.    Cardiovascular: Positive for chest pain (see HPI). Negative for palpitations and leg swelling.   Gastrointestinal: Negative for abdominal pain, constipation, diarrhea, nausea and vomiting.   Genitourinary: Positive for dysuria and hematuria. Negative for flank pain.   Neurological: Negative for dizziness, weakness, numbness and headaches.     Objective:     Vital Signs (Most Recent):  Temp: 98.1 °F (36.7 °C) (02/14/17 1200)  Pulse: 84 (02/14/17 1500)  Resp: 16 (02/14/17 1200)  BP: 109/66 (02/14/17 1200)  SpO2: 100 % (02/14/17 1200) Vital Signs (24h Range):  Temp:  [98.1 °F (36.7 °C)-99.5 °F (37.5 °C)] 98.1 °F (36.7 °C)  Pulse:  [] 84  Resp:  [16-20] 16  SpO2:  [96 %-100 %] 100 %  BP: ()/(51-75) 109/66     Weight: 89.7 kg (197 lb 12 oz)  Body mass index is 27.58 kg/(m^2).    Intake/Output Summary (Last 24 hours) at 02/14/17 1510  Last data filed at 02/14/17 1000   Gross per 24 hour   Intake              240 ml   Output                0 ml   Net              240 ml      Physical Exam   Constitutional: He is oriented to person, place, and time. He appears well-developed and well-nourished. No distress.   HENT:   Head: Normocephalic and atraumatic.   Poor dentition   Neck: Normal range of motion. Neck supple. No JVD present.   Cardiovascular: Normal rate, regular rhythm and intact distal pulses.    No murmur heard.  Pulmonary/Chest: Effort normal and breath sounds normal. He has no wheezes. He has no rales.   Abdominal: Soft. Bowel sounds are normal. He exhibits no distension and no mass. There is no tenderness. There is no guarding.   Neurological: He is  alert and oriented to person, place, and time. No cranial nerve deficit.       Significant Labs:   BMP:   Recent Labs  Lab 02/14/17  0352   GLU 89  91     137   K 3.8  3.8     104   CO2 23  24   BUN 31*  31*   CREATININE 1.6*  1.6*   CALCIUM 8.8  8.9     CBC:   Recent Labs  Lab 02/13/17  0326 02/14/17  0352   WBC 8.14 7.76   HGB 14.2 12.5*   HCT 42.5 37.0*    280

## 2017-02-14 NOTE — PROGRESS NOTES
Patient admitted to CSU. Patient alert and oriented to room. Patient connected to telemetry, assessed, denies any pain, oriented to room, and instructed to call for assistance or any needs. Call bell in reach. Reviewed goals for today. Will continue to monitor.

## 2017-02-14 NOTE — PLAN OF CARE
Problem: Patient Care Overview  Goal: Plan of Care Review  Outcome: Ongoing (interventions implemented as appropriate)  Pt remained free of falls, injury, or complaints throughout the shift. Generalized skin remains CDI. No occurrences of CP throughout the shift,. Rocephin infused per order. Glucose remained diet controlled. Pt tolerating plan of care.

## 2017-02-14 NOTE — ASSESSMENT & PLAN NOTE
- CAD S/P PCI to mLAD and balloon angio to OD1  - Continue ASA, Brilinta, ACE-I and high intensity Lipitor   - Started on low dose BB, Coreg 12.5 mg BID  - Interventional Cardiology consulted and recs appreciated   - Consult placed to cardiac rehab and provided resources for outpatient follow up

## 2017-02-14 NOTE — ASSESSMENT & PLAN NOTE
- BP well controlled. Decreased lisinopril to 10 mg daily (from BID) in setting of mild CARTER and continue Coreg 12.5 mg BID

## 2017-02-14 NOTE — PROGRESS NOTES
[facility] Hospital Medicine  Progress Note    Patient Name: Damir Faria  MRN: [id number]  Patient Class: IP- Inpatient   Admission Date: 2/10/2017  Length of Stay: 3 days  Attending Physician: Tiffany Jimenez MD  Primary Care Provider: [facility] Medicine Team: [facility] J Heidi Awad PA-C    Subjective:     Principal Problem:STEMI (ST elevation myocardial infarction)    HPI:  61 y.o. gentleman with PMH of ? 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 The pain stayed for more than an hour, associated with N/V and mild SOB with out radiation to jaw, left side of the neck, or left arm.   He denied h/o DVT or PE, pt denies LE swelling/calf tenderness or recent travel.   Echo from 5/2016 was WNL.   In the ER, pt was given 325mg aspirin, SL nitroglycerine and later on Morphine IV which improved his chest pain. His initial EKG was negative for significant changes of current of ischemia. CTA chest done overnight negative for PE or aortic dissection. Pt did have recurrent chest pain last night and then this morning. He underwent a repeat EKG later in the afternoon today and was found to have STEMI. Code STEMI called and patient was taken to cath lab.     On table he received Brillinta bolus 180 mg. He was found to have significant mid LAD lesion and tight ostial D1 lesion. He underwent BMS placement in the mLAD and balloon angioplasty of the ostial D1. His EF appeared around 40-45% in cath lab. There werent any complications.      At the time of my interview, he denied CP, SOB or palpitation.       Hospital Course:  Admitted to cardiac ICU for management of STEMI. Interventional Cardiology consulted and S/P  [facility] with PCI to mLAD (2/12/17). Stepped down to hospital medicine 2/13/17 for ongoing management. Patient counseled on discontinuing use of tobacco products and recreational drugs. He was found to have UTI with hematuria and started on Rocephin. UCx pending. Found to have worsening acute kidney injury on 2/14 and medications adjusted.     Interval History: Patient up walking around the hallway in no distress. He reports brief episode of chest pain last night while at rest that lasted minutes and resolved.     Review of Systems   Constitutional: Negative for activity change, appetite change, chills, fatigue and fever.   Respiratory: Negative for cough, shortness of breath and wheezing.    Cardiovascular: Positive for chest pain (see HPI). Negative for palpitations and leg swelling.   Gastrointestinal: Negative for abdominal pain, constipation, diarrhea, nausea and vomiting.   Genitourinary: Positive for dysuria and hematuria. Negative for flank pain.   Neurological: Negative for dizziness, weakness, numbness and headaches.     Objective:     Vital Signs (Most Recent):  Temp: 98.1 °F (36.7 °C) (02/14/17 1200)  Pulse: 84 (02/14/17 1500)  Resp: 16 (02/14/17 1200)  BP: 109/66 (02/14/17 1200)  SpO2: 100 % (02/14/17 1200) Vital Signs (24h Range):  Temp:  [98.1 °F (36.7 °C)-99.5 °F (37.5 °C)] 98.1 °F (36.7 °C)  Pulse:  [] 84  Resp:  [16-20] 16  SpO2:  [96 %-100 %] 100 %  BP: ()/(51-75) 109/66     Weight: 89.7 kg (197 lb 12 oz)  Body mass index is 27.58 kg/(m^2).    Intake/Output Summary (Last 24 hours) at 02/14/17 1510  Last data filed at 02/14/17 1000   Gross per 24 hour   Intake              240 ml   Output                0 ml   Net              240 ml      Physical Exam   Constitutional: He is oriented to person, place, and time. He appears well-developed and well-nourished. No distress.   HENT:   Head: Normocephalic and atraumatic.   Poor dentition   Neck: Normal range of motion. Neck supple. No JVD present.    Cardiovascular: Normal rate, regular rhythm and intact distal pulses.    No murmur heard.  Pulmonary/Chest: Effort normal and breath sounds normal. He has no wheezes. He has no rales.   Abdominal: Soft. Bowel sounds are normal. He exhibits no distension and no mass. There is no tenderness. There is no guarding.   Neurological: He is alert and oriented to person, place, and time. No cranial nerve deficit.       Significant Labs:   BMP:   Recent Labs  Lab 02/14/17  0352   GLU 89  91     137   K 3.8  3.8     104   CO2 23  24   BUN 31*  31*   CREATININE 1.6*  1.6*   CALCIUM 8.8  8.9     CBC:   Recent Labs  Lab 02/13/17  0326 02/14/17  0352   WBC 8.14 7.76   HGB 14.2 12.5*   HCT 42.5 37.0*    280         Assessment/Plan:      * STEMI (ST elevation myocardial infarction)  - CAD S/P PCI to mLAD and balloon angio to OD1  - Continue ASA, Brilinta, ACE-I and high intensity Lipitor   - Started on low dose BB, Coreg 12.5 mg BID  - Interventional Cardiology consulted and recs appreciated   - Consult placed to cardiac rehab and provided resources for outpatient follow up    Essential hypertension  - BP well controlled. Decreased lisinopril to 10 mg daily (from BID) in setting of mild CARTER and continue Coreg 12.5 mg BID    CARTER (acute kidney injury)  - SCr with mild increase from 1.4>1.2  - Decrease ACEi but avoid discontinuation due to above  - Avoid nephrotoxic meds  - Repeat BMP in am    Type 2 diabetes mellitus without complication, without long-term current use of insulin  - well controlled and cover with sliding scale insulin  - Monitor glucose and diabetic diet  - Check hgb A1C    Cocaine abuse  - UDS positive on admission. Patient counseled on importance of abstaining from recreational drug use and CM/SW to assist with discharge assistance    Hyperlipidemia LDL goal <70  - statin      VTE Risk Mitigation         Ordered     heparin (porcine) injection 5,000 Units  Every 8 hours     Route:   Subcutaneous        02/11/17 1711     Medium Risk of VTE  Once      02/11/17 1711          Heidi Awad PA-C  Department of Hospital Medicine   [facility]

## 2017-02-14 NOTE — PROGRESS NOTES
Pt reported he is reluctant to go pee because it hurts and burns when he goes,  He stated that he has to strain to urinate to get his stream going and he experiences pain. Also reported that he had some blood in his urine yesterday.

## 2017-02-14 NOTE — PLAN OF CARE
met with pt at the bedside to answer questions.  Pt is an alert disabled gentleman who [place] [facility] [place] [facility] [place]  [place] interested in housing resources.   attempted to provide pt with information on [employer]UD, senior living and nursing home and he declines.  He is followed up at [facility].  Pt states [facility] will [facility] [place] when he is discharged.  Pt will purchase a blood pressure machine privately.   will arrange taxi when pt is discharged.

## 2017-02-14 NOTE — ASSESSMENT & PLAN NOTE
- UDS positive on admission. Patient counseled on importance of abstaining from recreational drug use and CM/SW to assist with discharge assistance

## 2017-02-14 NOTE — PLAN OF CARE
Problem: Fall Risk (Adult)  Goal: Absence of Falls  Patient will demonstrate the desired outcomes by discharge/transition of care.   Outcome: Ongoing (interventions implemented as appropriate)  Pt remains free of injury and falls.  Non skid socks used while out of bed. Room free of clutter. Pt ambulates independently.         Problem: Patient Care Overview  Goal: Plan of Care Review  Outcome: Ongoing (interventions implemented as appropriate)  Pt c/o some substernal pressure around 11pm 4/10 intensity what lasted 2-3 minutes then resolved spontaneously. Also says his right groin is a little tender.  Pt BP was low at midnight after getting 20mg lisinopril and 6.25mg coreg, pt was side lying bp was 78/51 map 63 and laying flat pt was 89/54 map 65.  Pt was asymptomatic.  VS and assessment per flow sheet, patient progressing towards goals as tolerated, plan of care reviewed with Damir Faria and family, all concerns addressed, will continue to monitor.

## 2017-02-15 VITALS
HEART RATE: 101 BPM | RESPIRATION RATE: 16 BRPM | BODY MASS INDEX: 27.84 KG/M2 | TEMPERATURE: 99 F | SYSTOLIC BLOOD PRESSURE: 96 MMHG | DIASTOLIC BLOOD PRESSURE: 61 MMHG | OXYGEN SATURATION: 99 % | HEIGHT: 71 IN | WEIGHT: 198.88 LBS

## 2017-02-15 PROBLEM — N17.9 AKI (ACUTE KIDNEY INJURY): Status: RESOLVED | Noted: 2017-02-14 | Resolved: 2017-02-15

## 2017-02-15 LAB
ALBUMIN SERPL BCP-MCNC: 3.2 G/DL
ALP SERPL-CCNC: 70 U/L
ALT SERPL W/O P-5'-P-CCNC: 46 U/L
ANION GAP SERPL CALC-SCNC: 8 MMOL/L
AST SERPL-CCNC: 76 U/L
BASOPHILS # BLD AUTO: 0.02 K/UL
BASOPHILS NFR BLD: 0.3 %
BILIRUB SERPL-MCNC: 0.9 MG/DL
BUN SERPL-MCNC: 22 MG/DL
CALCIUM SERPL-MCNC: 8.6 MG/DL
CHLORIDE SERPL-SCNC: 108 MMOL/L
CO2 SERPL-SCNC: 23 MMOL/L
CREAT SERPL-MCNC: 1.3 MG/DL
DIFFERENTIAL METHOD: ABNORMAL
EOSINOPHIL # BLD AUTO: 0.3 K/UL
EOSINOPHIL NFR BLD: 4.4 %
ERYTHROCYTE [DISTWIDTH] IN BLOOD BY AUTOMATED COUNT: 13.4 %
EST. GFR  (AFRICAN AMERICAN): >60 ML/MIN/1.73 M^2
EST. GFR  (NON AFRICAN AMERICAN): 58.5 ML/MIN/1.73 M^2
ESTIMATED AVG GLUCOSE: 126 MG/DL
GLUCOSE SERPL-MCNC: 89 MG/DL
HBA1C MFR BLD HPLC: 6 %
HCT VFR BLD AUTO: 35.1 %
HGB BLD-MCNC: 12.2 G/DL
LYMPHOCYTES # BLD AUTO: 2.1 K/UL
LYMPHOCYTES NFR BLD: 35.9 %
MAGNESIUM SERPL-MCNC: 2.1 MG/DL
MCH RBC QN AUTO: 31.7 PG
MCHC RBC AUTO-ENTMCNC: 34.8 %
MCV RBC AUTO: 91 FL
MONOCYTES # BLD AUTO: 1 K/UL
MONOCYTES NFR BLD: 16.7 %
NEUTROPHILS # BLD AUTO: 2.5 K/UL
NEUTROPHILS NFR BLD: 41.9 %
PLATELET # BLD AUTO: 309 K/UL
PMV BLD AUTO: 9.2 FL
POCT GLUCOSE: 121 MG/DL (ref 70–110)
POCT GLUCOSE: 95 MG/DL (ref 70–110)
POTASSIUM SERPL-SCNC: 4.1 MMOL/L
PROT SERPL-MCNC: 6.9 G/DL
RBC # BLD AUTO: 3.85 M/UL
SODIUM SERPL-SCNC: 139 MMOL/L
WBC # BLD AUTO: 5.93 K/UL

## 2017-02-15 PROCEDURE — 25000003 PHARM REV CODE 250: Performed by: PHYSICIAN ASSISTANT

## 2017-02-15 PROCEDURE — 25000003 PHARM REV CODE 250: Performed by: INTERNAL MEDICINE

## 2017-02-15 PROCEDURE — 80053 COMPREHEN METABOLIC PANEL: CPT

## 2017-02-15 PROCEDURE — 83036 HEMOGLOBIN GLYCOSYLATED A1C: CPT

## 2017-02-15 PROCEDURE — 85025 COMPLETE CBC W/AUTO DIFF WBC: CPT

## 2017-02-15 PROCEDURE — 99239 HOSP IP/OBS DSCHRG MGMT >30: CPT | Mod: ,,, | Performed by: PHYSICIAN ASSISTANT

## 2017-02-15 PROCEDURE — 25000003 PHARM REV CODE 250

## 2017-02-15 PROCEDURE — 36415 COLL VENOUS BLD VENIPUNCTURE: CPT

## 2017-02-15 PROCEDURE — 83735 ASSAY OF MAGNESIUM: CPT

## 2017-02-15 PROCEDURE — 63600175 PHARM REV CODE 636 W HCPCS: Performed by: PHYSICIAN ASSISTANT

## 2017-02-15 RX ORDER — CLOPIDOGREL BISULFATE 75 MG/1
75 TABLET ORAL DAILY
Qty: 30 TABLET | Refills: 1 | Status: SHIPPED | OUTPATIENT
Start: 2017-02-15 | End: 2018-02-15

## 2017-02-15 RX ORDER — ASPIRIN 81 MG/1
81 TABLET ORAL DAILY
Refills: 0 | COMMUNITY
Start: 2017-02-15 | End: 2018-02-15

## 2017-02-15 RX ORDER — LISINOPRIL 10 MG/1
10 TABLET ORAL DAILY
Qty: 30 TABLET | Refills: 1 | Status: SHIPPED | OUTPATIENT
Start: 2017-02-15 | End: 2018-07-06 | Stop reason: SDUPTHER

## 2017-02-15 RX ORDER — CARVEDILOL 6.25 MG/1
6.25 TABLET ORAL 2 TIMES DAILY
Qty: 60 TABLET | Refills: 1 | Status: SHIPPED | OUTPATIENT
Start: 2017-02-15 | End: 2017-06-08 | Stop reason: SDUPTHER

## 2017-02-15 RX ORDER — CLOPIDOGREL 300 MG/1
TABLET, FILM COATED ORAL
Status: COMPLETED
Start: 2017-02-15 | End: 2017-02-15

## 2017-02-15 RX ORDER — NITROGLYCERIN 0.4 MG/1
0.4 TABLET SUBLINGUAL EVERY 5 MIN PRN
Qty: 25 TABLET | Refills: 2 | Status: SHIPPED | OUTPATIENT
Start: 2017-02-15 | End: 2018-02-15

## 2017-02-15 RX ORDER — CLOPIDOGREL 300 MG/1
600 TABLET, FILM COATED ORAL ONCE
Status: COMPLETED | OUTPATIENT
Start: 2017-02-15 | End: 2017-02-15

## 2017-02-15 RX ORDER — ATORVASTATIN CALCIUM 80 MG/1
80 TABLET, FILM COATED ORAL DAILY
Qty: 30 TABLET | Refills: 1 | Status: SHIPPED | OUTPATIENT
Start: 2017-02-15 | End: 2018-02-15

## 2017-02-15 RX ADMIN — SODIUM CHLORIDE, PRESERVATIVE FREE 3 ML: 5 INJECTION INTRAVENOUS at 05:02

## 2017-02-15 RX ADMIN — CARVEDILOL 6.25 MG: 6.25 TABLET, FILM COATED ORAL at 08:02

## 2017-02-15 RX ADMIN — CLOPIDOGREL 600 MG: 300 TABLET, FILM COATED ORAL at 12:02

## 2017-02-15 RX ADMIN — CEFTRIAXONE 1 G: 1 INJECTION, SOLUTION INTRAVENOUS at 08:02

## 2017-02-15 RX ADMIN — CLOPIDOGREL BISULFATE 600 MG: 300 TABLET, FILM COATED ORAL at 12:02

## 2017-02-15 RX ADMIN — TICAGRELOR 90 MG: 90 TABLET ORAL at 08:02

## 2017-02-15 RX ADMIN — ATORVASTATIN CALCIUM 80 MG: 20 TABLET, FILM COATED ORAL at 08:02

## 2017-02-15 RX ADMIN — LISINOPRIL 10 MG: 10 TABLET ORAL at 08:02

## 2017-02-15 RX ADMIN — PANTOPRAZOLE SODIUM 40 MG: 40 TABLET, DELAYED RELEASE ORAL at 08:02

## 2017-02-15 RX ADMIN — ASPIRIN 81 MG CHEWABLE TABLET 81 MG: 81 TABLET CHEWABLE at 08:02

## 2017-02-15 NOTE — ASSESSMENT & PLAN NOTE
- UDS positive on admission.   - Patient counseled on importance of abstaining from recreational drug use and CM/SW to assist with discharge assistance

## 2017-02-15 NOTE — ASSESSMENT & PLAN NOTE
- BP well controlled. Decreased lisinopril to 10 mg daily (from BID) in setting of mild CARTER and continue Coreg 12.5 mg BID.

## 2017-02-15 NOTE — PLAN OF CARE
pt requested  assist with transportation to hotel and back to hospital to wait on his sister to pick him up.   explained to pt that case management can pay for a taxi through [employer] to bring him to the hotel or to his sisters home.  Pt decided he will go down stairs and wait on his sister.

## 2017-02-15 NOTE — DISCHARGE SUMMARY
[facility] Hospital Medicine  Discharge Summary      Patient Name: Damir Faria  MRN: [id number]  Admission Date: 2/10/2017  Hospital Length of Stay: 4 days  Discharge Date and Time:  02/15/2017 4:52 PM  Attending Physician: No att. providers found   Discharging Provider: Heidi Awad PA-C  Primary Care Provider: [facility] Medicine Team: [facility] [place] [place] [facility] MED J Heidi Awad PA-C    HPI:   61 y.o. gentleman with PMH of ? 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 The pain stayed for more than an hour, associated with N/V and mild SOB with out radiation to jaw, left side of the neck, or left arm.   He denied h/o DVT or PE, pt denies LE swelling/calf tenderness or recent travel.   Echo from 5/2016 was WNL.   In the ER, pt was given 325mg aspirin, SL nitroglycerine and later on Morphine IV which improved his chest pain. His initial EKG was negative for significant changes of current of ischemia. CTA chest done overnight negative for PE or aortic dissection. Pt did have recurrent chest pain last night and then this morning. He underwent a repeat EKG later in the afternoon today and was found to have STEMI. Code STEMI called and patient was taken to cath lab.     On table he received Brillinta bolus 180 mg. He was found to have significant mid LAD lesion and tight ostial D1 lesion. He underwent BMS placement in the mLAD and balloon angioplasty of the ostial D1. His EF appeared around 40-45% in cath lab. There werent any complications.      At the time of my interview, he denied CP, SOB or palpitation.       Procedure(s) (LRB):  HEART CATH-LEFT (Left)      Indwelling Lines/Drains at time of discharge:    Lines/Drains/Airways          No matching active lines, drains, or airways        Hospital Course:   Admitted to cardiac ICU for management of STEMI. Interventional Cardiology consulted and S/P [facility] with PCI to mLAD (2/12/17). Stepped down to hospital medicine 2/13/17 for ongoing management. Patient counseled on discontinuing use of tobacco products and recreational drugs. He was found to have UTI with hematuria and started on Rocephin. UCx no growth (prelim). CM/SW assisted with discharge planning and medication assistance. Discharged home in stable condition.     Consults:   Consults         Status Ordering Provider     Inpatient consult to Cardiac Rehab  Once     Provider:  (Not yet assigned)    Completed JOVANI PANDYA     Inpatient consult to Cardiac Rehab  Once     Provider:  (Not yet assigned)    Completed DONALD MENA     Inpatient consult to PICC team (NIAS)  Once     Provider:  (Not yet assigned)    Completed DONALD MENA     Inpatient consult to Psychiatry  Once     Provider:  (Not yet assigned)    Completed DEMETRIUS VALDEZ     Inpatient consult to Social Work  Once     Provider:  (Not yet assigned)    Acknowledged TWILA GALARZA          Significant Diagnostic Studies: Labs:   BMP:   Recent Labs  Lab 02/14/17  0352 02/15/17  0516   GLU 89  91 89     137 139   K 3.8  3.8 4.1     104 108   CO2 23  24 23   BUN 31*  31* 22   CREATININE 1.6*  1.6* 1.3   CALCIUM 8.8  8.9 8.6*   MG  --  2.1   , CMP   Recent Labs  Lab 02/14/17  0352 02/15/17  0516     137 139   K 3.8  3.8 4.1     104 108   CO2 23  24 23   GLU 89  91 89   BUN 31*  31* 22   CREATININE 1.6*  1.6* 1.3   CALCIUM 8.8  8.9 8.6*   PROT 7.1 6.9   ALBUMIN 3.4* 3.2*   BILITOT 0.6 0.9   ALKPHOS 72 70   * 76*   ALT 60* 46*   ANIONGAP 11  9 8   ESTGFRAFRICA 52.6*  52.6* >60.0   EGFRNONAA 45.5*  45.5* 58.5*    and All labs within the past 24 hours have been reviewed  Cardiac Graphics:  Echocardiogram:   2D echo with color flow doppler:   Results for orders placed or performed during the hospital encounter of 02/10/17   2D echo with color flow doppler   Result Value Ref Range    EF 35 (A) 55 - 65    Mitral Valve Regurgitation TRIVIAL     Diastolic Dysfunction No     Est. PA Systolic Pressure 15.68     Tricuspid Valve Regurgitation TRIVIAL      Cath lab procedure (2/11/17)  Diagnostic:          Patient has a right dominant coronary artery.        - Left Main Coronary Artery:             The LM has luminal irregularities. There is CLEMENCIA 3 flow.     - Left Anterior Descending Artery:             The mid LAD is occluded. There is CLEMENCIA 0 flow.     - Left Circumflex Artery:             The LCX is normal. There is CLEMENCIA 3 flow.     - Right Coronary Artery:             The RCA has luminal irregularities. There is CLEMENCIA 3 flow.     - D1:             The D1 arises from the LAD culprit lesion and has 90% ostial stenosis.  This sidebranch was covered by the LAD stent.  Itt is too small to stent so was not protected.  It closed when the LAD was stented so we opened it with a 2.0 balloon to   restore flow.     - Common Femoral Artery:             The right CFA is normal.    Intervention          Mid LAD:              The lesion was successfully intervened. Post-stenosis of 0%, post-CLEMENCIA 3 flow and TMP grade 3. The vessel was accessed natively.  The following items were used: 2.5X20 Euphora SC Balloon, Cath Pronto Lp and Stent Integrity 3.0 X 26.       RCA:              The lesion was successfully intervened. Post-stenosis of 95%, post-CLEMENCIA 3 flow and TMP grade 3. The vessel was accessed natively.        D1:              The following items were used: Blln Sc Euphora 2.0 X 10.    Pending Diagnostic Studies:     None        Final Active Diagnoses:    Diagnosis Date Noted POA    PRINCIPAL PROBLEM:  STEMI (ST elevation myocardial infarction) [I21.3] 02/12/2017 Yes    Essential hypertension [I10] 01/25/2014 Yes      Chronic    Type 2 diabetes mellitus without complication, without long-term current use of insulin [E11.9] 09/28/2016 Yes    Cocaine abuse [F14.10] 09/28/2016 Yes    Hyperlipidemia LDL goal <70 [E78.5] 05/25/2016 Yes      Problems Resolved During this Admission:    Diagnosis Date Noted Date Resolved POA    CARTER (acute kidney injury) [N17.9] 02/14/2017 02/15/2017 No      * STEMI (ST elevation myocardial infarction)  - CAD S/P PCI to mLAD and balloon angio to OD1  - Continue ASA, ACE-I and high intensity Lipitor.   - Patient started on Brillinta post-cath, however due to costs it was discontinued and given Plavix 300 mg x 1 dose and continued on plavix 75 mg daily.   - Started on low dose BB, Coreg 12.5 mg BID  - Interventional Cardiology consulted and recs appreciated   - Consult placed to cardiac rehab and provided resources for outpatient follow up  - Patient was provided 1 month supply of all discharge medications and provided resources for medications assistance long term. Follow up at [address] for ongoing assistance.     Essential hypertension  - BP well controlled. Decreased lisinopril to 10 mg daily (from BID) in setting of mild CARTER and continue Coreg 12.5 mg BID.    CARTER (acute kidney injury), resolved as of 2/15/2017  - SCr trending back down. SCr 1.2>>1.6>1.3  - Decrease ACEi but avoid discontinuation due to above  - Avoid nephrotoxic meds    Type 2 diabetes mellitus without complication, without long-term current use of insulin  - well controlled and cover with sliding scale insulin  - Monitor glucose and diabetic diet    Cocaine abuse  - UDS positive on admission.   - Patient counseled on importance of abstaining from recreational drug use and CM/SW to assist with discharge assistance    Hyperlipidemia LDL goal <70  - statin      Discharged Condition: good    Disposition: Home or Self Care    Follow Up:  Follow-up Information     Follow up with [facility]EN[facility] On 3/9/2017.    Why:    @1:30PM  TERRELL BARNES   [address]    Contact information:    [address] [address]  [phone]          Schedule an appointment as soon as possible for a visit with [facility].    Specialty:  Cardiology    Why:  follow up 6 weeks    Contact information:    [address]  [facility] [address] [phone]    Additional information:    3rd floor        Call [facility].    Specialty:  Cardiology    Why:  Follow up 2-3 weeks     Contact information:    [address]  [facility] [address] [phone]    Additional information:    Clinic [place] - 3rd Floor        Patient Instructions:     Ambulatory referral to Cardiology   Referral Priority: Routine Referral Type: Consultation   Referral Reason: Specialty Services Required    Requested Specialty: Cardiology    Number of Visits Requested: 1      Ambulatory Referral to Interventional Cardiology   Referral Priority: Routine Referral Type: Consultation   Referral Reason: Specialty Services Required    Requested Specialty: INTERVENTIONAL CARDIOLOGY    Number of Visits Requested: 1      Diet general   Order Specific Question Answer Comments   Additional restrictions: Diabetic 1800    Additional restrictions: Cardiac (Low Na/Chol)      Activity as tolerated     Call MD for:  temperature >100.4     Call MD for:  persistent nausea and vomiting or diarrhea     Call MD for:  severe uncontrolled pain     Call MD for:  difficulty breathing or increased cough     Call MD for:  persistent dizziness, light-headedness, or visual disturbances     Call MD for:  increased confusion or weakness       Medications:  Reconciled Home Medications:   Discharge Medication List as of 2/15/2017 12:01 PM      START taking these medications    Details   carvedilol (COREG) 6.25 MG tablet Take 1 tablet (6.25 mg total) by mouth 2 (two) times daily., Starting 2/15/2017, Until u 2/15/18, Normal      clopidogrel (PLAVIX) 75 mg  tablet Take 1 tablet (75 mg total) by mouth once daily., Starting 2/15/2017, Until Thu 2/15/18, Normal      nitroGLYCERIN (NITROSTAT) 0.4 MG SL tablet Place 1 tablet (0.4 mg total) under the tongue every 5 (five) minutes as needed for Chest pain., Starting 2/15/2017, Until Thu 2/15/18, Normal         CONTINUE these medications which have CHANGED    Details   aspirin (ECOTRIN) 81 MG EC tablet Take 1 tablet (81 mg total) by mouth once daily., Starting 2/15/2017, Until Thu 2/15/18, OTC      atorvastatin (LIPITOR) 80 MG tablet Take 1 tablet (80 mg total) by mouth once daily., Starting 2/15/2017, Until Thu 2/15/18, Normal      lisinopril 10 MG tablet Take 1 tablet (10 mg total) by mouth once daily., Starting 2/15/2017, Until Discontinued, Normal         CONTINUE these medications which have NOT CHANGED    Details   sertraline (ZOLOFT) 25 MG tablet Take 25 mg by mouth once daily., Until Discontinued, Historical Med         STOP taking these medications       ticagrelor (BRILINTA) 90 mg tablet Comments:   Reason for Stopping:             Time spent on the discharge of patient: 40 minutes    Heidi Awda PA-C  Department of Hospital Medicine  [facility]

## 2017-02-15 NOTE — PROGRESS NOTES
Pt discharged per MD orders.  Tele and IV discontinued.  Catheter tip intact x1.  Medication list and prescriptions reviewed; prescriptions sent to pt preferred pharmacy and printed prescriptions provided.  Pt verbalizes understanding of all written and verbal discharge instructions.  MIS performed and documented in chart. Pt awaiting family arrival.  Will continue to monitor.

## 2017-02-15 NOTE — ASSESSMENT & PLAN NOTE
- CAD S/P PCI to mLAD and balloon angio to OD1  - Continue ASA, ACE-I and high intensity Lipitor.   - Patient started on Brillinta post-cath, however due to costs it was discontinued and given Plavix 300 mg x 1 dose and continued on plavix 75 mg daily.   - Started on low dose BB, Coreg 12.5 mg BID  - Interventional Cardiology consulted and recs appreciated   - Consult placed to cardiac rehab and provided resources for outpatient follow up  - Patient was provided 1 month supply of all discharge medications and provided resources for medications assistance long term. Follow up at [facility] for ongoing assistance.

## 2017-02-15 NOTE — ASSESSMENT & PLAN NOTE
- SCr trending back down. SCr 1.2>>1.6>1.3  - Decrease ACEi but avoid discontinuation due to above  - Avoid nephrotoxic meds

## 2017-02-15 NOTE — PLAN OF CARE
Problem: Patient Care Overview  Goal: Plan of Care Review  Outcome: Outcome(s) achieved Date Met:  02/15/17  Pt remained free of falls, injury, or complaints throughout the shift. Generalized skin remains CDI. Rocephin infused per order. Glucose remains diet controlled. Pt to be discharged today pending medication arrival to room from pharmacy. Pt tolerating plan of care.

## 2017-02-15 NOTE — PLAN OF CARE
Problem: Patient Care Overview  Goal: Plan of Care Review  Outcome: Ongoing (interventions implemented as appropriate)  No acute events throughout night, VS and assessment per flow sheet, medication information provided to patient and blood pressure parameters explained to patient.  Patient progressing towards goals as tolerated, plan of care reviewed with Damir Faria and family, all concerns addressed, will continue to monitor.

## 2017-02-18 LAB
BACTERIA BLD CULT: NORMAL
BACTERIA BLD CULT: NORMAL

## 2017-02-23 ENCOUNTER — OFFICE VISIT (OUTPATIENT)
Dept: CARDIOLOGY | Facility: CLINIC | Age: 62
End: 2017-02-23
Payer: MEDICARE

## 2017-02-23 VITALS
DIASTOLIC BLOOD PRESSURE: 59 MMHG | SYSTOLIC BLOOD PRESSURE: 121 MMHG | HEART RATE: 82 BPM | BODY MASS INDEX: 28.05 KG/M2 | HEIGHT: 71 IN | WEIGHT: 200.38 LBS

## 2017-02-23 DIAGNOSIS — F19.10 POLYSUBSTANCE ABUSE: ICD-10-CM

## 2017-02-23 DIAGNOSIS — I21.02 ST ELEVATION MYOCARDIAL INFARCTION INVOLVING LEFT ANTERIOR DESCENDING (LAD) CORONARY ARTERY: Primary | ICD-10-CM

## 2017-02-23 DIAGNOSIS — I51.9 DEPRESSED LEFT VENTRICULAR SYSTOLIC FUNCTION: ICD-10-CM

## 2017-02-23 PROCEDURE — 99214 OFFICE O/P EST MOD 30 MIN: CPT | Mod: S$PBB,,, | Performed by: HOSPITALIST

## 2017-02-23 PROCEDURE — 99213 OFFICE O/P EST LOW 20 MIN: CPT | Mod: PBBFAC

## 2017-02-23 PROCEDURE — 99999 PR PBB SHADOW E&M-EST. PATIENT-LVL III: CPT | Mod: PBBFAC,,,

## 2017-02-23 RX ORDER — SPIRONOLACTONE 25 MG/1
25 TABLET ORAL DAILY
Qty: 30 TABLET | Refills: 11 | Status: SHIPPED | OUTPATIENT
Start: 2017-02-23 | End: 2018-02-23

## 2017-02-23 NOTE — PROGRESS NOTES
Cardiology Clinic Note  Reason for Visit: STEMI follow up at 2 weeks    HPI:    Mr. Damir Faria is a 61 y.o. gentleman with history of recent STEMI of the LAD territory in the setting of cocaine abuse (s/p PCI with NAM to m LAD, balloon angioplasty of ostial D1 on 2/11/17) with post-MI myocardial stunning (LVEF 35% on /14/17 while previous LVEF 55% 9 months ago; new septal hypokinesis and apical akinesis), DM (A1C 6.0% in 2/17), prior CVA with residual left paresis, HTN, chronic cocaine and tobacco abuse who presented to the clinic for out-pt follow up after recent MI.     He presented to the ED on 2/10/17 after severe, crushing, substernal chest pain that improved with nitroglycerin. He reports no similar pain again similar to this after discharge from the hospital. He had an episode of chest discomfort last night that felt like heart burn and went away on its own in a few minutes; he vehemently denies any chest pressure, diaphoresis or pain anything similar to 2/10/17; in fact, he hasn't used his nitroglycerin since discharge. He denies any continued tobacco use today but admits to still smoking cigarettes. He reports that he cannot give up cigarettes. However, he denies any continued cocaine use and reports quitting it forever. He is grateful to have his life saved after the heart attack. He takes all his cardiac medications prescribed in the hospital. Today, he specifically denies any orthopnea, PND, dyspnea, exertional angina, dyspnea on exertion, claudication, or peripheral edema. He regularly walks more than a mile, and denies any angina with this activity.     ROS:    Constitution: negative for - fever, chills, weight loss or weight gain  HENT: negative for - sore throat, rhinorrhea, or headache  Eyes: negative for - blurred or double vision  Cardiovascular: no chest pain or dyspnea on exertion  Pulmonary: no cough, shortness of breath, or wheezing  Gastrointestinal: negative for - abdominal pain,  "nausea, vomiting, or diarrhea  : negative for - dysuria  Neurological: negative for - focal weakness or sensory changes  PMH:     Past Medical History   Diagnosis Date    Acute renal insufficiency 6/21/2015    Cocaine abuse     Depression     History of psychiatric care     HTN (hypertension) 1/25/2014    Stroke      Past Surgical History   Procedure Laterality Date    Neck surgery       Allergies:   Review of patient's allergies indicates:  No Known Allergies  Medications:     Current Outpatient Prescriptions on File Prior to Visit   Medication Sig Dispense Refill    aspirin (ECOTRIN) 81 MG EC tablet Take 1 tablet (81 mg total) by mouth once daily.  0    atorvastatin (LIPITOR) 80 MG tablet Take 1 tablet (80 mg total) by mouth once daily. 30 tablet 1    carvedilol (COREG) 6.25 MG tablet Take 1 tablet (6.25 mg total) by mouth 2 (two) times daily. 60 tablet 1    clopidogrel (PLAVIX) 75 mg tablet Take 1 tablet (75 mg total) by mouth once daily. 30 tablet 1    lisinopril 10 MG tablet Take 1 tablet (10 mg total) by mouth once daily. 30 tablet 1    nitroGLYCERIN (NITROSTAT) 0.4 MG SL tablet Place 1 tablet (0.4 mg total) under the tongue every 5 (five) minutes as needed for Chest pain. 25 tablet 2    sertraline (ZOLOFT) 25 MG tablet Take 25 mg by mouth once daily.       No current facility-administered medications on file prior to visit.      Social History:     Social History   Substance Use Topics    Smoking status: Current Some Day Smoker     Packs/day: 0.25     Types: Cigarettes    Smokeless tobacco: Never Used    Alcohol use Yes      Comment: 6pk/week     Family History:     Family History   Problem Relation Age of Onset    Diabetes Mother     Heart disease Mother     Hypertension Mother      Physical Exam:     Visit Vitals    BP (!) 121/59 (BP Location: Left arm, Patient Position: Sitting, BP Method: Automatic)    Pulse 82    Ht 5' 11" (1.803 m)    Wt 90.9 kg (200 lb 6.4 oz)    BMI 27.95 " kg/m2        Constitutional: NAD, conversant  HEENT: Sclera anicteric, PERRLA, EOMI  Neck: No JVD, no carotid bruits  CV: RRR, no murmur, no S4 appreciated  Pulm: CTAB, no wheezes, rales, or ronchi  GI: Abdomen soft, NTND, +BS  Extremities: No LE edema, warm and well perfused  Skin: No ecchymosis, erythema, or ulcers; 2x0.5 cm mass at right groin at LHC insertion site c/w hematoma   Psych: AOx3, appropriate affect  Neuro: CNII-XII intact, no focal deficits    Labs:     Lab Results   Component Value Date     02/15/2017    K 4.1 02/15/2017     02/15/2017    CO2 23 02/15/2017    BUN 22 02/15/2017    CREATININE 1.3 02/15/2017    ANIONGAP 8 02/15/2017     Lab Results   Component Value Date    HGBA1C 6.0 02/15/2017     Lab Results   Component Value Date    BNP <10 02/10/2017    BNP 21 06/20/2015    BNP 24 01/28/2015    Lab Results   Component Value Date    WBC 5.93 02/15/2017    HGB 12.2 (L) 02/15/2017    HCT 35.1 (L) 02/15/2017     02/15/2017    GRAN 2.5 02/15/2017    GRAN 41.9 02/15/2017     Lab Results   Component Value Date    CHOL 176 02/12/2017    HDL 48 02/12/2017    LDLCALC 106.8 02/12/2017    TRIG 106 02/12/2017          Imaging:     EF   Date Value Ref Range Status   02/12/2017 35 (A) 55 - 65    05/26/2016 55 55 - 65          EKG: NSR with ST elevation in anterior & lateral leads c/w recent STEMI; q waves in inferior leads   Assessment:     60 yo man with recent STEMI in the setting of cocaine abuse seen in clinic for post-MI outpatient cardiology evaluation. Patient underwent percutaneous intervention with one NAM placed in mLAD and balloon angioplasty of ostial D1. Patient with no symptoms clinically suggesting acute heart failure in the setting of MI, but recent ECHO suggests newly depressed LVEF 35%    Plan:   ST elevation myocardial infarction involving left anterior descending (LAD) coronary artery  -     spironolactone (ALDACTONE) 25 MG tablet; Take 1 tablet (25 mg total) by mouth once  "daily.  Dispense: 30 tablet; Refill: 11  -     Basic metabolic panel; Future; Expected date: 3/2/17  -     2D Echo w/ Color Flow Doppler; Future; Expected date: 5/23/17        1. ST elevation myocardial infarction involving left anterior descending (LAD) coronary artery  - 2 weeks after STEMI now s/p PCI with NAM in mLAD, balloon angioplasty of ostial D1   - continue medical management:    - carvedilol 6.25 mg, lisinopril 20 mg    - given his hx of DM and LVEF 35% on last ECHO after STEMI, we will add spironolactone 25 mg once daily now; check his BMP in one week    - pt counseled to continue ASA 81 mg and Plavix 75 mg s/p NAM placement in LAD   - spironolactone (ALDACTONE) 25 MG tablet; Take 1 tablet (25 mg total) by mouth once daily.  Dispense: 30 tablet; Refill: 11  - Basic metabolic panel; Future      2. Depressed left ventricular systolic function  - will check LVEF with repeat ECHO in 90 days after maximizingl medical therapy   - 2D Echo w/ Color Flow Doppler; Future       3. Polysubstance abuse  - discussed at length regarding quitting tobacco   - discussed at length about risks of continued cocaine abuse, including another myocardial infarction or stroke, significant injury and even death    - told pt specifically regarding risks of continued cocaine abuse on myocardial function, including another massive heart attack or massive stroke  told in no uncertain terms that continued cocaine use can kill him     - pt willing to abuse from drug use now, vowing to never do it again as this is his "second chance"     Discussed with Dr. Koo. RTC in 3 months after ECHO done.       Signed:  Discussed with attending Dr. Koo. Staff recommendations to follow.     Cornelio Nation MD   PGY1 Internal Medicine   [facility]   Pager [phone]     "

## 2017-02-23 NOTE — PATIENT INSTRUCTIONS
Please continue to take ASPIRIN and CLOPIDOGREL for at least one year since you have a stent in your heart blood vessels. You have to call or contact us if you want to stop it for any reason.   You are starting a new pill called spironolactone. You have to take it everyday. Please get blood work done one week after you start this pill.   Remember to quit smoking and not do cocaine as we discussed at length today.   Heart study called ECHO in 3 months. We will have you come back to the clinic after you get this study done.

## 2017-02-23 NOTE — MR AVS SNAPSHOT
[facility]  [address]  [place] [address]  Phone: [phone]                  Damir Faria   2017 10:00 AM   Office Visit    Description:  Male : 1955   Provider:  CARDIO, RESIDENT   Department:  [facility]           Reason for Visit     Type 2 diabetes mellitus without complication, without long-     Essential hypertension     STEMI (ST elevation myocardial infarction)     Hyperlipidemia LDL goal <70           Diagnoses this Visit        Comments    ST elevation myocardial infarction involving left anterior descending (LAD) coronary artery    -  Primary            To Do List           Goals (5 Years of Data)     None      Follow-Up and Disposition     Return in about 3 months (around 2017).       These Medications        Disp Refills Start End    spironolactone (ALDACTONE) 25 MG tablet 30 tablet 11 2017    Take 1 tablet (25 mg total) by mouth once daily. - Oral    Pharmacy: [facility] - [place], [place] - [address] Ph #: [phone]         [facility]     [facility] -  Assistance  Registered nurses in the [facility] provide clinical advisement, health education, appointment booking, and other advisory services.  Call for this free service at [phone].             Medications           Message regarding Medications     Verify the changes and/or additions to your medication regime listed below are the same as discussed with your clinician today.  If any of these changes or additions are incorrect, please notify your healthcare provider.        START taking these NEW medications        Refills    spironolactone (ALDACTONE) 25 MG tablet 11    Sig: Take 1 tablet (25 mg total) by mouth once daily.    Class: Normal    Route: Oral           Verify that the below list of medications is an accurate representation of the medications you are currently  "taking.  If none reported, the list may be blank. If incorrect, please contact your healthcare provider. Carry this list with you in case of emergency.           Current Medications     aspirin (ECOTRIN) 81 MG EC tablet Take 1 tablet (81 mg total) by mouth once daily.    atorvastatin (LIPITOR) 80 MG tablet Take 1 tablet (80 mg total) by mouth once daily.    carvedilol (COREG) 6.25 MG tablet Take 1 tablet (6.25 mg total) by mouth 2 (two) times daily.    clopidogrel (PLAVIX) 75 mg tablet Take 1 tablet (75 mg total) by mouth once daily.    lisinopril 10 MG tablet Take 1 tablet (10 mg total) by mouth once daily.    nitroGLYCERIN (NITROSTAT) 0.4 MG SL tablet Place 1 tablet (0.4 mg total) under the tongue every 5 (five) minutes as needed for Chest pain.    sertraline (ZOLOFT) 25 MG tablet Take 25 mg by mouth once daily.    spironolactone (ALDACTONE) 25 MG tablet Take 1 tablet (25 mg total) by mouth once daily.           Clinical Reference Information           Your Vitals Were     BP Pulse Height Weight BMI    121/59 (BP Location: Left arm, Patient Position: Sitting, BP Method: Automatic) 82 5' 11" (1.803 m) 90.9 kg (200 lb 6.4 oz) 27.95 kg/m2      Blood Pressure          Most Recent Value    Right Arm BP - Sitting  118/67    Left Arm BP - Sitting  121/59    BP  (!)  121/59      Allergies as of 2/23/2017     No Known Allergies      Immunizations Administered on Date of Encounter - 2/23/2017     None      Orders Placed During Today's Visit     Future Labs/Procedures Expected by Expires    Basic metabolic panel  3/2/2017 4/24/2018    2D Echo w/ Color Flow Doppler  5/23/2017 2/23/2018      Instructions    Please continue to take ASPIRIN and CLOPIDOGREL for at least one year since you have a stent in your heart blood vessels. You have to call or contact us if you want to stop it for any reason.   You are starting a new pill called spironolactone. You have to take it everyday. Please get blood work done one week after you " start this pill.   Remember to quit smoking and not do cocaine as we discussed at length today.   Heart study called ECHO in 3 months. We will have you come back to the clinic after you get this study done.        Smoking Cessation     If you would like to quit smoking:   You may be eligible for free services if you are a [place] resident and started smoking cigarettes before September 1, 1988.  Call the [employer]oking [employer]essation [employer] ([employer]) toll free at (377) 534-7451 or (545) 210-6619.   Call 1-356-QUIT-NOW if you do not meet the above criteria.            Language Assistance Services     ATTENTION: Language assistance services are available, free of charge. Please call [phone].      ATENCIÓN: Si habla español, tiene a diane disposición servicios gratuitos de asistencia lingüística. Llame al [phone].     CHÚ Ý: N?u b?n nói Ti?ng Vi?t, có các d?ch v? h? tr? ngôn ng? mi?n phí dành cho b?n. G?i s? [phone].         [facility] complies with applicable Federal civil rights laws and does not discriminate on the basis of race, color, national origin, age, disability, or sex.

## 2017-02-24 NOTE — PROGRESS NOTES
I have personally seen and examined the patient. All labs and studies were reviewed. I agree with the fellows findings and plan as above. He appears well compensated. Start spironolactone 25 mg daily with follow up BMP in one week. Continue cardiac rehab and drug abstinence. Repeat echo in 3 months to reassess LV function.

## 2017-03-02 ENCOUNTER — LAB VISIT (OUTPATIENT)
Dept: LAB | Facility: HOSPITAL | Age: 62
End: 2017-03-02
Payer: MEDICAID

## 2017-03-02 DIAGNOSIS — I21.02 ST ELEVATION MYOCARDIAL INFARCTION INVOLVING LEFT ANTERIOR DESCENDING (LAD) CORONARY ARTERY: ICD-10-CM

## 2017-03-02 LAB
ANION GAP SERPL CALC-SCNC: 7 MMOL/L
BUN SERPL-MCNC: 17 MG/DL
CALCIUM SERPL-MCNC: 9.1 MG/DL
CHLORIDE SERPL-SCNC: 106 MMOL/L
CO2 SERPL-SCNC: 26 MMOL/L
CREAT SERPL-MCNC: 1.3 MG/DL
EST. GFR  (AFRICAN AMERICAN): >60 ML/MIN/1.73 M^2
EST. GFR  (NON AFRICAN AMERICAN): 58.5 ML/MIN/1.73 M^2
GLUCOSE SERPL-MCNC: 117 MG/DL
POTASSIUM SERPL-SCNC: 4.5 MMOL/L
SODIUM SERPL-SCNC: 139 MMOL/L

## 2017-03-02 PROCEDURE — 80048 BASIC METABOLIC PNL TOTAL CA: CPT

## 2017-03-02 PROCEDURE — 36415 COLL VENOUS BLD VENIPUNCTURE: CPT

## 2017-03-03 ENCOUNTER — TELEPHONE (OUTPATIENT)
Dept: CARDIOLOGY | Facility: CLINIC | Age: 62
End: 2017-03-03

## 2017-03-03 NOTE — TELEPHONE ENCOUNTER
----- Message from Shabnam Koo MD sent at 3/2/2017  1:10 PM CST -----  Potassium and kidney function look stable on spironolactone.

## 2017-03-06 ENCOUNTER — HOSPITAL ENCOUNTER (EMERGENCY)
Facility: HOSPITAL | Age: 62
Discharge: PSYCHIATRIC OR SUBSTANCE USE FACILITY | End: 2017-03-06
Attending: EMERGENCY MEDICINE | Admitting: EMERGENCY MEDICINE
Payer: MEDICAID

## 2017-03-06 VITALS
BODY MASS INDEX: 24.08 KG/M2 | OXYGEN SATURATION: 97 % | HEIGHT: 71 IN | SYSTOLIC BLOOD PRESSURE: 131 MMHG | DIASTOLIC BLOOD PRESSURE: 81 MMHG | WEIGHT: 172 LBS | HEART RATE: 67 BPM | TEMPERATURE: 98 F | RESPIRATION RATE: 16 BRPM

## 2017-03-06 DIAGNOSIS — R45.851 SUICIDAL IDEATIONS: Primary | ICD-10-CM

## 2017-03-06 LAB
ALBUMIN SERPL BCP-MCNC: 3.8 G/DL
ALP SERPL-CCNC: 86 U/L
ALT SERPL W/O P-5'-P-CCNC: 27 U/L
AMPHET+METHAMPHET UR QL: NEGATIVE
ANION GAP SERPL CALC-SCNC: 10 MMOL/L
APAP SERPL-MCNC: <3 UG/ML
AST SERPL-CCNC: 27 U/L
BARBITURATES UR QL SCN>200 NG/ML: NEGATIVE
BASOPHILS # BLD AUTO: 0.01 K/UL
BASOPHILS NFR BLD: 0.2 %
BENZODIAZ UR QL SCN>200 NG/ML: NEGATIVE
BILIRUB SERPL-MCNC: 0.9 MG/DL
BILIRUB UR QL STRIP: NEGATIVE
BUN SERPL-MCNC: 19 MG/DL
BZE UR QL SCN: NORMAL
CALCIUM SERPL-MCNC: 9.3 MG/DL
CANNABINOIDS UR QL SCN: NEGATIVE
CHLORIDE SERPL-SCNC: 109 MMOL/L
CLARITY UR REFRACT.AUTO: CLEAR
CO2 SERPL-SCNC: 23 MMOL/L
COLOR UR AUTO: YELLOW
CREAT SERPL-MCNC: 1.5 MG/DL
CREAT UR-MCNC: 243 MG/DL
DIFFERENTIAL METHOD: ABNORMAL
EOSINOPHIL # BLD AUTO: 0.2 K/UL
EOSINOPHIL NFR BLD: 5.1 %
ERYTHROCYTE [DISTWIDTH] IN BLOOD BY AUTOMATED COUNT: 13.9 %
EST. GFR  (AFRICAN AMERICAN): 56.9 ML/MIN/1.73 M^2
EST. GFR  (NON AFRICAN AMERICAN): 49.2 ML/MIN/1.73 M^2
ETHANOL SERPL-MCNC: <10 MG/DL
GLUCOSE SERPL-MCNC: 79 MG/DL
GLUCOSE UR QL STRIP: NEGATIVE
HCT VFR BLD AUTO: 40.4 %
HGB BLD-MCNC: 13.8 G/DL
HGB UR QL STRIP: ABNORMAL
KETONES UR QL STRIP: NEGATIVE
LEUKOCYTE ESTERASE UR QL STRIP: NEGATIVE
LYMPHOCYTES # BLD AUTO: 1.9 K/UL
LYMPHOCYTES NFR BLD: 39.9 %
MCH RBC QN AUTO: 31.5 PG
MCHC RBC AUTO-ENTMCNC: 34.2 %
MCV RBC AUTO: 92 FL
METHADONE UR QL SCN>300 NG/ML: NEGATIVE
MONOCYTES # BLD AUTO: 0.6 K/UL
MONOCYTES NFR BLD: 12.7 %
NEUTROPHILS # BLD AUTO: 2 K/UL
NEUTROPHILS NFR BLD: 41.7 %
NITRITE UR QL STRIP: NEGATIVE
OPIATES UR QL SCN: NEGATIVE
PCP UR QL SCN>25 NG/ML: NEGATIVE
PH UR STRIP: 6 [PH] (ref 5–8)
PLATELET # BLD AUTO: 275 K/UL
PMV BLD AUTO: 8.6 FL
POTASSIUM SERPL-SCNC: 3.8 MMOL/L
PROT SERPL-MCNC: 8.1 G/DL
PROT UR QL STRIP: NEGATIVE
RBC # BLD AUTO: 4.38 M/UL
SODIUM SERPL-SCNC: 142 MMOL/L
SP GR UR STRIP: 1.02 (ref 1–1.03)
TOXICOLOGY INFORMATION: NORMAL
TSH SERPL DL<=0.005 MIU/L-ACNC: 0.42 UIU/ML
URN SPEC COLLECT METH UR: ABNORMAL
UROBILINOGEN UR STRIP-ACNC: NEGATIVE EU/DL
WBC # BLD AUTO: 4.71 K/UL

## 2017-03-06 PROCEDURE — 80320 DRUG SCREEN QUANTALCOHOLS: CPT

## 2017-03-06 PROCEDURE — 99285 EMERGENCY DEPT VISIT HI MDM: CPT | Mod: 25

## 2017-03-06 PROCEDURE — 84443 ASSAY THYROID STIM HORMONE: CPT

## 2017-03-06 PROCEDURE — 81003 URINALYSIS AUTO W/O SCOPE: CPT

## 2017-03-06 PROCEDURE — 80053 COMPREHEN METABOLIC PANEL: CPT

## 2017-03-06 PROCEDURE — 80329 ANALGESICS NON-OPIOID 1 OR 2: CPT

## 2017-03-06 PROCEDURE — 82570 ASSAY OF URINE CREATININE: CPT

## 2017-03-06 PROCEDURE — 93005 ELECTROCARDIOGRAM TRACING: CPT

## 2017-03-06 PROCEDURE — 85025 COMPLETE CBC W/AUTO DIFF WBC: CPT

## 2017-03-06 PROCEDURE — 93010 ELECTROCARDIOGRAM REPORT: CPT | Mod: ,,, | Performed by: INTERNAL MEDICINE

## 2017-03-06 PROCEDURE — 99285 EMERGENCY DEPT VISIT HI MDM: CPT | Mod: ,,, | Performed by: EMERGENCY MEDICINE

## 2017-03-06 RX ORDER — DOXEPIN HYDROCHLORIDE 100 MG/1
100 CAPSULE ORAL NIGHTLY
Status: ON HOLD | COMMUNITY
End: 2018-08-22 | Stop reason: HOSPADM

## 2017-03-06 NOTE — ED NOTES
Patient sitting in recliner room, Calm and cooperative.  VSS.  Patient given a sandwich and juice.  Denies any other needs.  Sitter in direct visual contact documenting per flow sheet.

## 2017-03-06 NOTE — ED NOTES
Spoke with Flora from [facility] patient has reservations to go to [facility] once medically cleared.

## 2017-03-06 NOTE — ED NOTES
"Patient reports he had an MI 3 weeks ago and was "seeing everyone die around me" and "I got depressed". Reports suicidal thoughts.  States he used Cocaine about 2-3 days ago and last had anything to drink 2 days ago.  Denies having a plan.  Refuses to give emergency contact number and states nobody knows he is here and he wishes to keep it that way. Patient is calm and cooperative.     "

## 2017-03-06 NOTE — ED PROVIDER NOTES
"Encounter Date: 3/6/2017    SCRIBE #1 NOTE: I, Char Cisnerosaide, am scribing for, and in the presence of, Dr. Florez.       History     Chief Complaint   Patient presents with    Suicidal     "I am just depressed ma'am. I suffer with depression often and I takes my meds" Pt AAOx3 cooperative. States he tried to overdose on cocaine last night but today "I realized I better call someone"     Review of patient's allergies indicates:  No Known Allergies  HPI Comments: Time seen by provider: 4:45 PM    This is a 61 y.o. male with a PMHx of MI, HTN, psychiatric problems and acute renal insufficiency who presents with complaint of depression. The patient report recently a few of his friends were killed and he had a recent MI on February 10th, both of which drove him to begin the risky behavior of cocaine use. He states he started doing this in mind of the risk of further heart damages. He denies chest pain. He denies any other drug use. He reports possibly taking too much of his daily medications but he states this was not to hurt himself. He has a history of self harm behavior last year and was admitted to a psychiatric hospital at this time. He is voluntarily here but says he doesn't want his family to know about his struggle with depression. He denies drug or alcohol use today. He denies hallucinations and HI.     The history is provided by the patient.     Past Medical History:   Diagnosis Date    Acute renal insufficiency 6/21/2015    Cocaine abuse     Depression     History of psychiatric care     HTN (hypertension) 1/25/2014    Stroke      Past Surgical History:   Procedure Laterality Date    NECK SURGERY       Family History   Problem Relation Age of Onset    Diabetes Mother     Heart disease Mother     Hypertension Mother      Social History   Substance Use Topics    Smoking status: Current Some Day Smoker     Packs/day: 0.25     Types: Cigarettes    Smokeless tobacco: Never Used    Alcohol use Yes    "   Comment: 6pk/week     Review of Systems   Constitutional: Negative for chills and fever.   HENT: Negative for facial swelling and nosebleeds.    Eyes: Negative for visual disturbance.   Respiratory: Negative for cough and shortness of breath.    Cardiovascular: Negative for chest pain and palpitations.   Gastrointestinal: Negative for abdominal distention, abdominal pain, diarrhea, nausea and vomiting.   Genitourinary: Negative for difficulty urinating, dysuria, frequency and hematuria.   Musculoskeletal: Negative for neck pain and neck stiffness.   Skin: Negative for rash.   Neurological: Negative for seizures, syncope and speech difficulty.   Psychiatric/Behavioral: Positive for suicidal ideas. Negative for hallucinations.       Physical Exam   Initial Vitals   BP Pulse Resp Temp SpO2   03/06/17 1513 03/06/17 1513 03/06/17 1513 03/06/17 1513 03/06/17 1513   146/88 78 17 98.6 °F (37 °C) 99 %     Physical Exam    Nursing note and vitals reviewed.  Constitutional: He appears well-developed and well-nourished. He is not diaphoretic. No distress.   HENT:   Head: Normocephalic and atraumatic.   Poor dentition   Eyes: Conjunctivae and EOM are normal. Pupils are equal, round, and reactive to light. No scleral icterus.   Neck: Normal range of motion. Neck supple.   Cardiovascular: Normal rate, regular rhythm and normal heart sounds.   No murmur heard.  Pulmonary/Chest: Breath sounds normal. No respiratory distress. He has no wheezes. He has no rhonchi. He has no rales.   Abdominal: Soft. Bowel sounds are normal. He exhibits no distension. There is no tenderness. There is no CVA tenderness.   Musculoskeletal: Normal range of motion. He exhibits no edema or tenderness.   No lower extremity edema.    Neurological: He is alert and oriented to person, place, and time. He has normal strength. No cranial nerve deficit.   Skin: Skin is warm and dry. No rash noted. No erythema.   Psychiatric: He is not actively hallucinating. He  exhibits a depressed mood. He expresses suicidal ideation. He expresses no homicidal ideation. He expresses suicidal plans. He expresses no homicidal plans.   The patient is pleasant with good insight.          ED Course   Procedures  Labs Reviewed   CBC W/ AUTO DIFFERENTIAL - Abnormal; Notable for the following:        Result Value    RBC 4.38 (*)     Hemoglobin 13.8 (*)     MCH 31.5 (*)     MPV 8.6 (*)     All other components within normal limits   COMPREHENSIVE METABOLIC PANEL - Abnormal; Notable for the following:     Creatinine 1.5 (*)     eGFR if  56.9 (*)     eGFR if non  49.2 (*)     All other components within normal limits   URINALYSIS - Abnormal; Notable for the following:     Occult Blood UA Trace (*)     All other components within normal limits   ACETAMINOPHEN LEVEL - Abnormal; Notable for the following:     Acetaminophen (Tylenol), Serum <3.0 (*)     All other components within normal limits   TSH   DRUG SCREEN PANEL, URINE EMERGENCY   ALCOHOL,MEDICAL (ETHANOL)     EKG Readings: (Independently Interpreted)   Initial Reading: No STEMI.   Normal sinus rhythm. Negative twaves in V2-V6          Medical Decision Making:   History:   Old Medical Records: I decided to obtain old medical records.  Initial Assessment:   62 y/o M h/o depression presents with suicidal ideation  Good insight, no active plan, poor support system  Routine psych clearance labs ordered  Consult psychiatry  Independently Interpreted Test(s):   I have ordered and independently interpreted EKG Reading(s) - see prior notes  Clinical Tests:   Lab Tests: Ordered and Reviewed  Medical Tests: Reviewed and Ordered  Other:   I have discussed this case with another health care provider.            Scribe Attestation:   Scribe #1: I performed the above scribed service and the documentation accurately describes the services I performed. I attest to the accuracy of the note.    Attending Attestation:            Physician Attestation for Scribe:  Physician Attestation Statement for Scribe #1: I, Dr. Florez, reviewed documentation, as scribed by Char Goode in my presence, and it is both accurate and complete.         Attending ED Notes:   6:15 PM  Patient's creatinine is slightly elevated at 1.5 which is his baseline. He is presumptive positive for cocaine which the patient admits to 2-3 days ago. The patient is medically clear for psychiatric admission.     Patient is admitted to the [place] psychiatric facility.           ED Course     Clinical Impression:   The encounter diagnosis was Suicidal ideations.    Disposition:   Disposition: Transferred       Mckayla Florez MD  03/09/17 0600       Mckayla Florez MD  03/09/17 0601

## 2017-03-07 NOTE — ED NOTES
Patient arrived to [facility] with nurse and security from emergency department at approximately 1922. Calm, cooperative on arrival. Belongings secured.

## 2017-03-07 NOTE — ED NOTES
Patient transported from [facility] to [facility] per [facility]. Security transported to car. Left at approximately 2211. Belongings sent with patient upon discharge. Patient requested no family or friends be notified of transfer.

## 2017-03-07 NOTE — ED NOTES
Report called to Cici De Los Santos RN at [facility]. Notified of transport by [facility]

## 2017-03-07 NOTE — ED NOTES
PEC received in [facility]. Will actively begin seeking inpatient psych placement once patient is medically cleared.

## 2017-03-07 NOTE — ED NOTES
Patient has been accepted by Flora at [facility] ([address] [address] [facility] [place], [place]) under the care Dr. De La O. Call report to [phone]

## 2017-03-15 LAB
AMPHETAMINES SERPL QL: NEGATIVE
BARBITURATES SERPL QL SCN: NEGATIVE
BENZODIAZ SERPL QL: NEGATIVE
CANNABINOIDS SERPL QL: NEGATIVE
ETHANOL SERPL-MCNC: NEGATIVE MG/DL
METHADONE SERPL QL SCN: NEGATIVE
OPIATES SERPL QL SCN: NEGATIVE
PCP SERPL QL SCN: NEGATIVE
PROPOXYPH SERPL QL: NEGATIVE

## 2017-06-08 ENCOUNTER — OFFICE VISIT (OUTPATIENT)
Dept: CARDIOLOGY | Facility: CLINIC | Age: 62
End: 2017-06-08
Payer: MEDICARE

## 2017-06-08 VITALS
HEART RATE: 84 BPM | SYSTOLIC BLOOD PRESSURE: 127 MMHG | DIASTOLIC BLOOD PRESSURE: 75 MMHG | BODY MASS INDEX: 29.02 KG/M2 | HEIGHT: 71 IN | OXYGEN SATURATION: 98 % | WEIGHT: 207.25 LBS

## 2017-06-08 DIAGNOSIS — I10 ESSENTIAL HYPERTENSION: Chronic | ICD-10-CM

## 2017-06-08 DIAGNOSIS — Z86.73 HX-TIA (TRANSIENT ISCHEMIC ATTACK): ICD-10-CM

## 2017-06-08 DIAGNOSIS — E11.9 TYPE 2 DIABETES MELLITUS WITHOUT COMPLICATION, WITHOUT LONG-TERM CURRENT USE OF INSULIN: ICD-10-CM

## 2017-06-08 DIAGNOSIS — I21.02 ST ELEVATION MYOCARDIAL INFARCTION INVOLVING LEFT ANTERIOR DESCENDING (LAD) CORONARY ARTERY: ICD-10-CM

## 2017-06-08 DIAGNOSIS — I25.10 CORONARY ARTERY DISEASE, ANGINA PRESENCE UNSPECIFIED, UNSPECIFIED VESSEL OR LESION TYPE, UNSPECIFIED WHETHER NATIVE OR TRANSPLANTED HEART: Primary | ICD-10-CM

## 2017-06-08 DIAGNOSIS — F19.94 SUBSTANCE INDUCED MOOD DISORDER: ICD-10-CM

## 2017-06-08 PROCEDURE — 99213 OFFICE O/P EST LOW 20 MIN: CPT | Mod: PBBFAC | Performed by: INTERNAL MEDICINE

## 2017-06-08 PROCEDURE — 99999 PR PBB SHADOW E&M-EST. PATIENT-LVL III: CPT | Mod: PBBFAC,,, | Performed by: INTERNAL MEDICINE

## 2017-06-08 PROCEDURE — 99213 OFFICE O/P EST LOW 20 MIN: CPT | Mod: S$GLB,,, | Performed by: INTERNAL MEDICINE

## 2017-06-08 PROCEDURE — 3044F HG A1C LEVEL LT 7.0%: CPT | Mod: S$GLB,,, | Performed by: INTERNAL MEDICINE

## 2017-06-08 RX ORDER — CARVEDILOL 12.5 MG/1
12.5 TABLET ORAL 2 TIMES DAILY
Qty: 60 TABLET | Refills: 3 | Status: SHIPPED | OUTPATIENT
Start: 2017-06-08 | End: 2018-06-08

## 2017-06-08 RX ORDER — MIRTAZAPINE 15 MG/1
15 TABLET, FILM COATED ORAL
Status: ON HOLD | COMMUNITY
End: 2019-07-15 | Stop reason: HOSPADM

## 2017-06-08 NOTE — PROGRESS NOTES
Subjective:    Patient ID:  Damir Faria is a 62 y.o. male who presents for follow-up of ST elevation myocardial infarction involving left anterior d (3 months fu)      HPI   . Damir Faria is a 62 y.o. gentleman with history of STEMI of the LAD territory in the setting of cocaine abuse (s/p PCI with NAM to m LAD, balloon angioplasty of ostial D1 on 2/11/17) with post-MI myocardial stunning (LVEF 35% on /14/17 while previous LVEF 55% 9 months ago; new septal hypokinesis and apical akinesis in Feb, 2017), DM (A1C 6.0% in 2/17), prior CVA with residual left paresis, HTN, chronic cocaine and tobacco abuse who presented to the clinic for out-pt follow up after recent MI. He was supposed to come in with a TTE to re-evaluate LVEF but he missed his appointment with echo.     He admits to still smoking cigarettes. He reports that he cannot give up cigarettes. However, he denies any continued cocaine use and reports quitting it forever. He admits compliant all his cardiac medications prescribed in the hospital. Today, he specifically denies any orthopnea, PND, dyspnea, exertional angina, dyspnea on exertion, claudication, or peripheral edema. He regularly walks more than a mile, and denies any angina with this activity.       Review of Systems   Constitution: Negative for chills, decreased appetite, fever, weakness, night sweats, weight gain and weight loss.   HENT: Negative for congestion, headaches, hoarse voice, stridor and tinnitus.    Eyes: Negative for blurred vision, pain and visual disturbance.   Cardiovascular: Negative for chest pain, claudication, dyspnea on exertion, irregular heartbeat, leg swelling, near-syncope, orthopnea, palpitations, paroxysmal nocturnal dyspnea and syncope.   Respiratory: Negative for cough, hemoptysis, shortness of breath, snoring and wheezing.    Endocrine: Negative for cold intolerance, heat intolerance and polyuria.   Hematologic/Lymphatic: Negative for bleeding problem. Does  not bruise/bleed easily.   Skin: Negative for color change and rash.   Musculoskeletal: Negative for arthritis, back pain, joint pain, muscle cramps, myalgias and stiffness.   Gastrointestinal: Negative for abdominal pain, anorexia, constipation, diarrhea, dysphagia, heartburn, hemorrhoids, melena, nausea and vomiting.   Genitourinary: Negative for frequency, hematuria, hesitancy, nocturia and urgency.   Neurological: Negative for difficulty with concentration, dizziness, focal weakness, light-headedness, numbness, seizures, tremors and vertigo.   Psychiatric/Behavioral: Negative for altered mental status and depression. The patient does not have insomnia.    Allergic/Immunologic: Negative for hives.        Objective:    Physical Exam   Constitutional: He is oriented to person, place, and time. He appears well-developed and well-nourished.   HENT:   Head: Normocephalic and atraumatic.   Eyes: Conjunctivae are normal. Pupils are equal, round, and reactive to light.   Neck: Normal range of motion. No JVD present. No tracheal deviation present. No thyromegaly present.   Cardiovascular: Regular rhythm, S1 normal, S2 normal, normal heart sounds, intact distal pulses and normal pulses.  Exam reveals no S3.    No murmur heard.  Pulses:       Carotid pulses are 2+ on the right side, and 2+ on the left side.       Radial pulses are 2+ on the right side, and 2+ on the left side.        Femoral pulses are 2+ on the right side, and 2+ on the left side.       Dorsalis pedis pulses are 2+ on the right side, and 2+ on the left side.        Posterior tibial pulses are 2+ on the right side, and 2+ on the left side.   Pulmonary/Chest: Effort normal and breath sounds normal. No stridor. No respiratory distress. He has no wheezes. He has no rales.   Abdominal: Soft. Bowel sounds are normal. He exhibits no distension. There is no tenderness.   Musculoskeletal: Normal range of motion. He exhibits no edema or tenderness.   Neurological:  "He is alert and oriented to person, place, and time.   Skin: Skin is warm and dry. He is not diaphoretic. No erythema.   Psychiatric: He has a normal mood and affect.     /75 (BP Location: Left arm, Patient Position: Sitting, BP Method: Automatic)   Pulse 84   Ht 5' 11" (1.803 m)   Wt 94 kg (207 lb 3.7 oz)   SpO2 98%   BMI 28.90 kg/m²         Assessment:   60 yo man with recent STEMI in the setting of cocaine abuse seen in clinic for post-MI outpatient cardiology evaluation. Patient underwent percutaneous intervention with one NAM placed in mLAD and balloon angioplasty of ostial D1. Patient with no symptoms clinically suggesting acute heart failure in the setting of MI, but recent ECHO suggests newly depressed LVEF 35%  Plan:         1. ST elevation myocardial infarction involving left anterior descending (LAD) coronary artery  - 3 months post STEMI  s/p PCI with NAM in mLAD, balloon angioplasty of ostial D1   - continue medical management:                         -  Increased carvedilol to 12.5 mg, continue lisinopril 20 mg                         - given his hx of DM and LVEF 35% continue spironolactone 25 mg once daily                         - pt counseled to continue ASA 81 mg and Plavix 75 mg s/p NAM placement in LAD                          - follow up in one month with Echo to evaluate for an ICD     2. Depressed left ventricular systolic function  - Follow up in one month with Echo to evaluate for an ICD  - 2D Echo w/ Color Flow Doppler; Future        3. Polysubstance abuse  - discussed at length regarding quitting tobacco   - discussed at length about risks of continued cocaine abuse, including another myocardial infarction or stroke, significant injury and even death                         - told pt specifically regarding risks of continued cocaine abuse on myocardial function, including another massive heart attack or massive stroke  told in no uncertain terms that continued cocaine use can " kill him                     Patient seen and case discussed with Dr. Roberts

## 2017-06-19 ENCOUNTER — TELEPHONE (OUTPATIENT)
Dept: CARDIOLOGY | Facility: CLINIC | Age: 62
End: 2017-06-19

## 2017-06-19 ENCOUNTER — HOSPITAL ENCOUNTER (OUTPATIENT)
Dept: CARDIOLOGY | Facility: CLINIC | Age: 62
Discharge: HOME | End: 2017-06-19
Payer: COMMERCIAL

## 2017-06-19 DIAGNOSIS — I23.6 LV (LEFT VENTRICULAR) MURAL THROMBUS FOLLOWING MI: Primary | ICD-10-CM

## 2017-06-19 DIAGNOSIS — I25.10 CORONARY ARTERY DISEASE, ANGINA PRESENCE UNSPECIFIED, UNSPECIFIED VESSEL OR LESION TYPE, UNSPECIFIED WHETHER NATIVE OR TRANSPLANTED HEART: ICD-10-CM

## 2017-06-19 LAB
DIASTOLIC DYSFUNCTION: YES
ESTIMATED PA SYSTOLIC PRESSURE: 23.07
RETIRED EF AND QEF - SEE NOTES: 35 (ref 55–65)
TRICUSPID VALVE REGURGITATION: ABNORMAL

## 2017-06-19 PROCEDURE — 93306 TTE W/DOPPLER COMPLETE: CPT | Mod: S$GLB,,, | Performed by: INTERNAL MEDICINE

## 2017-06-20 ENCOUNTER — TELEPHONE (OUTPATIENT)
Dept: CARDIOLOGY | Facility: CLINIC | Age: 62
End: 2017-06-20

## 2017-06-20 DIAGNOSIS — I23.6 LV (LEFT VENTRICULAR) MURAL THROMBUS FOLLOWING MI: Primary | ICD-10-CM

## 2017-06-20 RX ORDER — WARFARIN SODIUM 5 MG/1
5 TABLET ORAL DAILY
Qty: 30 TABLET | Refills: 11 | Status: SHIPPED | OUTPATIENT
Start: 2017-06-20 | End: 2018-06-20

## 2017-06-21 ENCOUNTER — ANTI-COAG VISIT (OUTPATIENT)
Dept: CARDIOLOGY | Facility: CLINIC | Age: 62
End: 2017-06-21

## 2017-06-21 DIAGNOSIS — I23.6 LV (LEFT VENTRICULAR) MURAL THROMBUS FOLLOWING MI: ICD-10-CM

## 2017-06-21 DIAGNOSIS — Z79.01 LONG TERM (CURRENT) USE OF ANTICOAGULANTS: Primary | ICD-10-CM

## 2017-06-21 NOTE — ADDENDUM NOTE
Encounter addended by: Bessie Kathleen PharmD on: 6/21/2017  8:26 AM<BR>    Actions taken:  activity accessed

## 2017-06-21 NOTE — PROGRESS NOTES
"63yo M with PMHx: STEMI (2/2017), current cocaine abuse, DM, hx of stroke, HTN, hx of psychiatric care, depression and tobacco abuse.  It appears that he is starting on warfarin due to a LV mural thrombus following his recent MI.  I was able to reach the pt on 6/21.  Per the pt and his med card, he was given Warfarin 5mg tablets and he started therapy on 6/20.  He knows to take his warfarin in the evenings and has our contact info.  He will come into the lab for his first INR on 6/23 and is booked for a new pt education session on 6/29.  Of note, I sent a message in regards to possible lovenox bridge during warfarin initiation.  From Dr. Smith: "Anahi, He doesn't need loenox bridge and can be started on coumadin with target INR 2-3."  "

## 2017-06-23 ENCOUNTER — ANTI-COAG VISIT (OUTPATIENT)
Dept: CARDIOLOGY | Facility: CLINIC | Age: 62
End: 2017-06-23

## 2017-06-23 ENCOUNTER — LAB VISIT (OUTPATIENT)
Dept: LAB | Facility: HOSPITAL | Age: 62
End: 2017-06-23
Payer: COMMERCIAL

## 2017-06-23 DIAGNOSIS — I23.6 LV (LEFT VENTRICULAR) MURAL THROMBUS FOLLOWING MI: ICD-10-CM

## 2017-06-23 DIAGNOSIS — Z79.01 LONG TERM (CURRENT) USE OF ANTICOAGULANTS: ICD-10-CM

## 2017-06-23 LAB
INR PPP: 1.2
PROTHROMBIN TIME: 12.9 SEC

## 2017-06-23 PROCEDURE — 36415 COLL VENOUS BLD VENIPUNCTURE: CPT

## 2017-06-23 PROCEDURE — 85610 PROTHROMBIN TIME: CPT

## 2017-06-23 NOTE — PROGRESS NOTES
"Per BOSTON Pryor, "Some SOB--gets tired, had cranberry juice this am, had 2 beers-6/22 (drinks 2 beers about 2-3 times a week), verified coumadin: 5mg daily"  Begin to increase warfarin dose.  "

## 2017-06-26 ENCOUNTER — ANTI-COAG VISIT (OUTPATIENT)
Dept: CARDIOLOGY | Facility: CLINIC | Age: 62
End: 2017-06-26

## 2017-06-26 ENCOUNTER — LAB VISIT (OUTPATIENT)
Dept: LAB | Facility: HOSPITAL | Age: 62
End: 2017-06-26
Payer: COMMERCIAL

## 2017-06-26 DIAGNOSIS — Z79.01 LONG TERM (CURRENT) USE OF ANTICOAGULANTS: ICD-10-CM

## 2017-06-26 DIAGNOSIS — I23.6 LV (LEFT VENTRICULAR) MURAL THROMBUS FOLLOWING MI: ICD-10-CM

## 2017-06-26 LAB
INR PPP: 2.3
PROTHROMBIN TIME: 22.9 SEC

## 2017-06-26 PROCEDURE — 85610 PROTHROMBIN TIME: CPT

## 2017-06-26 PROCEDURE — 36415 COLL VENOUS BLD VENIPUNCTURE: CPT

## 2017-06-29 ENCOUNTER — ANTI-COAG VISIT (OUTPATIENT)
Dept: CARDIOLOGY | Facility: CLINIC | Age: 62
End: 2017-06-29
Payer: MEDICAID

## 2017-06-29 DIAGNOSIS — Z79.01 LONG TERM (CURRENT) USE OF ANTICOAGULANTS: Primary | ICD-10-CM

## 2017-06-29 DIAGNOSIS — I23.6 LV (LEFT VENTRICULAR) MURAL THROMBUS FOLLOWING MI: ICD-10-CM

## 2017-06-29 LAB — INR PPP: 1.8 (ref 2–3)

## 2017-06-29 PROCEDURE — 99999 PR PBB SHADOW E&M-EST. PATIENT-LVL I: CPT | Mod: PBBFAC,,, | Performed by: PHARMACIST

## 2017-06-29 PROCEDURE — 99211 OFF/OP EST MAY X REQ PHY/QHP: CPT | Mod: PBBFAC | Performed by: PHARMACIST

## 2017-06-29 PROCEDURE — 85610 PROTHROMBIN TIME: CPT | Mod: PBBFAC | Performed by: PHARMACIST

## 2017-06-29 NOTE — PROGRESS NOTES
This patient was educated on the proper use of warfarin. Various aspects of warfarin therapy were discussed with the patient, including the indication for anticoagulation, the expected duration of therapy and the importance of being compliant. The counseling session also included directions for use, the importance of monitoring lab work and proper follow-up, how to handle missed doses, and side effects. Medication and dietary interactions were also discussed. He reports that he has been drinking 2 beers per day and says he will continue this. He reports having a beer before today's appointment. I advised him on the dangers of acute increases in alcohol while on warfarin. He denies history of bleeding events in the past. He took a lower than prescribed warfarin dose over the last couple of days Patient was instructed to avoid aspirin or NSAIDs while taking warfarin, unless approved by physician. Patient was also instructed to report any unusual bleeding issues. Patient verbalized understanding and had the opportunity to ask questions.

## 2017-07-12 ENCOUNTER — TELEPHONE (OUTPATIENT)
Dept: CARDIOLOGY | Facility: CLINIC | Age: 62
End: 2017-07-12

## 2017-07-12 NOTE — PROGRESS NOTES
"I received a call from Bird with [facility] regarding Mr. Faria.  He has been admitted to [facility] since 7/5 and they are trying to discharge to a rehab unit.  The rehab unit states they are unable to obtain labs while patient is admitted and are inquiring if warfarin can be switched to another medication.  He was just recently started on warfarin so does require frequent monitoring at this time if he remains on it.  They are trying to arrange his discharge for today, 7/12, but need this issue addressed first.  I advised Bird that a medication change would be up to his referring physician.  I sent a high priority message to Dr. Man's staff.  Bird can be reached at [phone].  Addendum: Per Dr. Man, "He is on coumadin due to LV mural thrombus for which only coumadin is indicated. Needs to be on at least 3 months and a follow up echocardiogram" His staff responded to Bird per review of chart.  "

## 2017-07-12 NOTE — TELEPHONE ENCOUNTER
----- Message from Jai Abraham sent at 7/12/2017  9:56 AM CDT -----  Contact: Ingris/[facility]  Please call Ingris at [phone]. Patient is currently in pending discharge status awaiting for coumadin order to be change due to patient going to a rehab unit ([facility]) that will not monitor his coumadin. Last seen Dr Smith 06/08/17 and now being monitor by Dr Man. Ingris already spoke to our Coumadin clinic to directed her to Dr Man    Thank you

## 2017-07-12 NOTE — TELEPHONE ENCOUNTER
Spoke with Bird, nurse for the pt at [facility]. Advised her to contact the pt's MD at their facility and she verbalized understanding.

## 2017-07-17 ENCOUNTER — TELEPHONE (OUTPATIENT)
Dept: CARDIOLOGY | Facility: CLINIC | Age: 62
End: 2017-07-17

## 2017-07-17 NOTE — TELEPHONE ENCOUNTER
----- Message from Macy Keyes sent at 7/17/2017 12:54 PM CDT -----  Contact: pt   Pt called to find out when the paper that show's his particular diet will be sent to [facility].  He can be reached @ [phone].  The fax # at [facility] is [phone].  Thanks!!

## 2017-09-25 ENCOUNTER — ANTI-COAG VISIT (OUTPATIENT)
Dept: CARDIOLOGY | Facility: CLINIC | Age: 62
End: 2017-09-25
Payer: COMMERCIAL

## 2017-09-25 DIAGNOSIS — Z79.01 LONG TERM (CURRENT) USE OF ANTICOAGULANTS: Primary | ICD-10-CM

## 2017-09-25 DIAGNOSIS — I23.6 LV (LEFT VENTRICULAR) MURAL THROMBUS FOLLOWING MI: ICD-10-CM

## 2017-09-25 LAB — INR PPP: 1.1

## 2017-09-25 NOTE — PROGRESS NOTES
Patient presents to clinic today with INR of 1.1. He reports that he was previously discharged from [facility] and says he has not taken coumadin and is not sure if he was given it while there or switched to another blood thinner. (Please refer to Sera Hung's progress note from 7/13/17 for background of this occurrence). Patient cannot confirm or deny being on any of the other choices (xarelto, pradaxa, eliquis). In addition, per previous progress note recently referenced, Dr Man said that patient needed to remain on coumadin. He gave me the number for the treatment center and I have left a message for callback. In addition, I asked the patient to call me as soon as he returned home to read to me each medication he is currently taking.     Nurse at [facility] reports they cannot give me this information without the patient requesting a release.   I sent a message to Dr Man as well for guidance.     Per Dr man, There has been to studies of NOACs for this so coumadin is preferred

## 2017-10-04 ENCOUNTER — ANTI-COAG VISIT (OUTPATIENT)
Dept: CARDIOLOGY | Facility: CLINIC | Age: 62
End: 2017-10-04
Payer: MEDICAID

## 2017-10-04 DIAGNOSIS — I23.6 LV (LEFT VENTRICULAR) MURAL THROMBUS FOLLOWING MI: ICD-10-CM

## 2017-10-04 DIAGNOSIS — Z79.01 LONG-TERM (CURRENT) USE OF ANTICOAGULANTS: Primary | ICD-10-CM

## 2017-10-04 LAB — INR PPP: 1.2 (ref 2–3)

## 2017-10-04 PROCEDURE — 99211 OFF/OP EST MAY X REQ PHY/QHP: CPT | Mod: PBBFAC | Performed by: PHARMACIST

## 2017-10-04 PROCEDURE — 99999 PR PBB SHADOW E&M-EST. PATIENT-LVL I: CPT | Mod: PBBFAC,,, | Performed by: PHARMACIST

## 2017-10-04 PROCEDURE — 85610 PROTHROMBIN TIME: CPT | Mod: PBBFAC | Performed by: PHARMACIST

## 2017-10-04 NOTE — PROGRESS NOTES
INR low due to missed doses. Patient educated on importance of taking warfarin everyday. Patient would like to eat green vegetables. Instructed patient ok to eat green vegetables once weekly. Warfarin dose increased due to low INR and diet changes. INR follow up scheduled 10/10.  I have reviewed the student's initial findings and agree with their assessment.  I have personally spoken with and assessed the patient in clinic to devise care plan. Advised patient to call with any problems or changes, especially missed doses.

## 2017-10-13 ENCOUNTER — ANTI-COAG VISIT (OUTPATIENT)
Dept: CARDIOLOGY | Facility: CLINIC | Age: 62
End: 2017-10-13

## 2017-10-13 DIAGNOSIS — I23.6 LV (LEFT VENTRICULAR) MURAL THROMBUS FOLLOWING MI: ICD-10-CM

## 2017-10-13 LAB — INR PPP: 1.2

## 2017-10-13 NOTE — PROGRESS NOTES
Spoke with Bryson at [facility] as to whether the Patient has had a recent INR drawn, INR was due 10/10 with the coumadin clinic at [facility] but Bryson reports that the Patient was [facility] and an INR was drawn 10/12, result to be faxed, will check In Patient status 10/20

## 2017-11-07 ENCOUNTER — HOSPITAL ENCOUNTER (EMERGENCY)
Facility: HOSPITAL | Age: 62
Discharge: PSYCHIATRIC OR SUBSTANCE USE FACILITY | End: 2017-11-08
Attending: EMERGENCY MEDICINE
Payer: COMMERCIAL

## 2017-11-07 DIAGNOSIS — F14.10 COCAINE ABUSE: ICD-10-CM

## 2017-11-07 DIAGNOSIS — T14.91XA SUICIDAL BEHAVIOR WITH ATTEMPTED SELF-INJURY: Primary | ICD-10-CM

## 2017-11-07 PROCEDURE — 85025 COMPLETE CBC W/AUTO DIFF WBC: CPT

## 2017-11-07 PROCEDURE — 84443 ASSAY THYROID STIM HORMONE: CPT

## 2017-11-07 PROCEDURE — 80329 ANALGESICS NON-OPIOID 1 OR 2: CPT

## 2017-11-07 PROCEDURE — 80053 COMPREHEN METABOLIC PANEL: CPT

## 2017-11-07 PROCEDURE — 80320 DRUG SCREEN QUANTALCOHOLS: CPT

## 2017-11-07 PROCEDURE — 85610 PROTHROMBIN TIME: CPT

## 2017-11-07 PROCEDURE — 93010 ELECTROCARDIOGRAM REPORT: CPT | Mod: ,,, | Performed by: INTERNAL MEDICINE

## 2017-11-07 RX ORDER — IBUPROFEN 200 MG
1 TABLET ORAL DAILY
Status: DISCONTINUED | OUTPATIENT
Start: 2017-11-07 | End: 2017-11-08 | Stop reason: HOSPADM

## 2017-11-07 RX ORDER — LISINOPRIL 10 MG/1
10 TABLET ORAL DAILY
Status: DISCONTINUED | OUTPATIENT
Start: 2017-11-08 | End: 2017-11-08 | Stop reason: HOSPADM

## 2017-11-07 RX ORDER — SPIRONOLACTONE 25 MG/1
25 TABLET ORAL DAILY
Status: DISCONTINUED | OUTPATIENT
Start: 2017-11-08 | End: 2017-11-08 | Stop reason: HOSPADM

## 2017-11-07 RX ORDER — CLOPIDOGREL BISULFATE 75 MG/1
75 TABLET ORAL DAILY
Status: DISCONTINUED | OUTPATIENT
Start: 2017-11-08 | End: 2017-11-08 | Stop reason: HOSPADM

## 2017-11-07 RX ORDER — OLANZAPINE 10 MG/1
10 TABLET, ORALLY DISINTEGRATING ORAL EVERY 8 HOURS PRN
Status: DISCONTINUED | OUTPATIENT
Start: 2017-11-08 | End: 2017-11-08 | Stop reason: HOSPADM

## 2017-11-07 RX ORDER — NAPROXEN SODIUM 220 MG/1
81 TABLET, FILM COATED ORAL DAILY
Status: DISCONTINUED | OUTPATIENT
Start: 2017-11-08 | End: 2017-11-08 | Stop reason: HOSPADM

## 2017-11-07 RX ORDER — ACETAMINOPHEN 325 MG/1
650 TABLET ORAL EVERY 6 HOURS PRN
Status: DISCONTINUED | OUTPATIENT
Start: 2017-11-08 | End: 2017-11-08 | Stop reason: HOSPADM

## 2017-11-07 RX ORDER — CARVEDILOL 12.5 MG/1
12.5 TABLET ORAL 2 TIMES DAILY
Status: DISCONTINUED | OUTPATIENT
Start: 2017-11-08 | End: 2017-11-08 | Stop reason: HOSPADM

## 2017-11-07 RX ORDER — MAG HYDROX/ALUMINUM HYD/SIMETH 200-200-20
30 SUSPENSION, ORAL (FINAL DOSE FORM) ORAL EVERY 6 HOURS PRN
Status: DISCONTINUED | OUTPATIENT
Start: 2017-11-08 | End: 2017-11-08 | Stop reason: HOSPADM

## 2017-11-07 RX ORDER — WARFARIN 2 MG/1
4 TABLET ORAL DAILY
Status: DISCONTINUED | OUTPATIENT
Start: 2017-11-08 | End: 2017-11-07

## 2017-11-07 RX ORDER — ATORVASTATIN CALCIUM 20 MG/1
80 TABLET, FILM COATED ORAL DAILY
Status: DISCONTINUED | OUTPATIENT
Start: 2017-11-08 | End: 2017-11-08 | Stop reason: HOSPADM

## 2017-11-08 VITALS
SYSTOLIC BLOOD PRESSURE: 157 MMHG | RESPIRATION RATE: 18 BRPM | DIASTOLIC BLOOD PRESSURE: 85 MMHG | HEIGHT: 71 IN | WEIGHT: 200 LBS | HEART RATE: 67 BPM | TEMPERATURE: 98 F | OXYGEN SATURATION: 98 % | BODY MASS INDEX: 28 KG/M2

## 2017-11-08 LAB
ALBUMIN SERPL BCP-MCNC: 3.3 G/DL
ALP SERPL-CCNC: 72 U/L
ALT SERPL W/O P-5'-P-CCNC: 26 U/L
AMPHET+METHAMPHET UR QL: NEGATIVE
ANION GAP SERPL CALC-SCNC: 8 MMOL/L
APAP SERPL-MCNC: <3 UG/ML
AST SERPL-CCNC: 30 U/L
BACTERIA #/AREA URNS AUTO: ABNORMAL /HPF
BARBITURATES UR QL SCN>200 NG/ML: NEGATIVE
BASOPHILS # BLD AUTO: 0.03 K/UL
BASOPHILS NFR BLD: 0.5 %
BENZODIAZ UR QL SCN>200 NG/ML: NEGATIVE
BILIRUB SERPL-MCNC: 0.5 MG/DL
BILIRUB UR QL STRIP: NEGATIVE
BUN SERPL-MCNC: 19 MG/DL
BZE UR QL SCN: NORMAL
CALCIUM SERPL-MCNC: 9.1 MG/DL
CANNABINOIDS UR QL SCN: NEGATIVE
CAOX CRY UR QL COMP ASSIST: ABNORMAL
CHLORIDE SERPL-SCNC: 110 MMOL/L
CLARITY UR REFRACT.AUTO: ABNORMAL
CO2 SERPL-SCNC: 24 MMOL/L
COLOR UR AUTO: YELLOW
CREAT SERPL-MCNC: 1.5 MG/DL
CREAT UR-MCNC: 367 MG/DL
DIFFERENTIAL METHOD: ABNORMAL
EOSINOPHIL # BLD AUTO: 0.2 K/UL
EOSINOPHIL NFR BLD: 3.4 %
ERYTHROCYTE [DISTWIDTH] IN BLOOD BY AUTOMATED COUNT: 13.6 %
EST. GFR  (AFRICAN AMERICAN): 56.5 ML/MIN/1.73 M^2
EST. GFR  (NON AFRICAN AMERICAN): 48.8 ML/MIN/1.73 M^2
ETHANOL SERPL-MCNC: <10 MG/DL
GLUCOSE SERPL-MCNC: 117 MG/DL
GLUCOSE UR QL STRIP: NEGATIVE
HCT VFR BLD AUTO: 38.3 %
HGB BLD-MCNC: 12.9 G/DL
HGB UR QL STRIP: NEGATIVE
HYALINE CASTS UR QL AUTO: 9 /LPF
IMM GRANULOCYTES # BLD AUTO: 0.04 K/UL
IMM GRANULOCYTES NFR BLD AUTO: 0.7 %
INR PPP: 1.2
KETONES UR QL STRIP: NEGATIVE
LEUKOCYTE ESTERASE UR QL STRIP: NEGATIVE
LYMPHOCYTES # BLD AUTO: 2.6 K/UL
LYMPHOCYTES NFR BLD: 46.5 %
MCH RBC QN AUTO: 30.6 PG
MCHC RBC AUTO-ENTMCNC: 33.7 G/DL
MCV RBC AUTO: 91 FL
METHADONE UR QL SCN>300 NG/ML: NEGATIVE
MICROSCOPIC COMMENT: ABNORMAL
MONOCYTES # BLD AUTO: 1 K/UL
MONOCYTES NFR BLD: 17 %
NEUTROPHILS # BLD AUTO: 1.8 K/UL
NEUTROPHILS NFR BLD: 31.9 %
NITRITE UR QL STRIP: NEGATIVE
NRBC BLD-RTO: 0 /100 WBC
OPIATES UR QL SCN: NEGATIVE
PCP UR QL SCN>25 NG/ML: NEGATIVE
PH UR STRIP: 5 [PH] (ref 5–8)
PLATELET # BLD AUTO: 271 K/UL
PMV BLD AUTO: 8.9 FL
POTASSIUM SERPL-SCNC: 3.6 MMOL/L
PROT SERPL-MCNC: 7.2 G/DL
PROT UR QL STRIP: NEGATIVE
PROTHROMBIN TIME: 12.2 SEC
RBC # BLD AUTO: 4.21 M/UL
RBC #/AREA URNS AUTO: 2 /HPF (ref 0–4)
SODIUM SERPL-SCNC: 142 MMOL/L
SP GR UR STRIP: 1.03 (ref 1–1.03)
SQUAMOUS #/AREA URNS AUTO: 1 /HPF
TOXICOLOGY INFORMATION: NORMAL
TSH SERPL DL<=0.005 MIU/L-ACNC: 0.93 UIU/ML
URN SPEC COLLECT METH UR: ABNORMAL
UROBILINOGEN UR STRIP-ACNC: NEGATIVE EU/DL
WBC # BLD AUTO: 5.59 K/UL
WBC #/AREA URNS AUTO: 5 /HPF (ref 0–5)

## 2017-11-08 PROCEDURE — 80307 DRUG TEST PRSMV CHEM ANLYZR: CPT

## 2017-11-08 PROCEDURE — 25000003 PHARM REV CODE 250: Performed by: EMERGENCY MEDICINE

## 2017-11-08 PROCEDURE — 93005 ELECTROCARDIOGRAM TRACING: CPT

## 2017-11-08 PROCEDURE — 99285 EMERGENCY DEPT VISIT HI MDM: CPT | Mod: 25

## 2017-11-08 PROCEDURE — 81001 URINALYSIS AUTO W/SCOPE: CPT

## 2017-11-08 PROCEDURE — 99285 EMERGENCY DEPT VISIT HI MDM: CPT | Mod: ,,, | Performed by: EMERGENCY MEDICINE

## 2017-11-08 RX ADMIN — CARVEDILOL 12.5 MG: 12.5 TABLET, FILM COATED ORAL at 12:11

## 2017-11-08 NOTE — ED NOTES
Pt remains in paper scrubs, resting in stretcher comfortably. No signs of distress noted. Sitter remains at bedside in direct visual contact, charting per protocol every 15 minutes. No belongings are in the patients room. Pt aware of plan of care. Will continue to monitor.

## 2017-11-08 NOTE — ED NOTES
Pt remains in paper scrubs, resting in stretcher comfortably. No signs of distress noted. Sitter remains at bedside in direct visual contact, charting per protocol every 15 minutes. No equipment or belongings are in the patients room. Pt aware of plan of care. Will continue to monitor.

## 2017-11-08 NOTE — ED NOTES
Charge nurse and security aware sitting in triage for strict visual contact, no beds avail at this time

## 2017-11-08 NOTE — ED PROVIDER NOTES
"Encounter Date: 11/7/2017    SCRIBE #1 NOTE: I, Janeth Gutierrez, am scribing for, and in the presence of,  Dr. Sandra. I have scribed the following portions of the note - the EKG reading and the Resident attestation.       History     Chief Complaint   Patient presents with    Suicidal     Mr. Damir Faria is a 62 y.o. male w/ medical history significant for STEMI of the LAD territory in the setting of cocaine abuse (s/p PCI and balloon angioplasty - 2/11/17) with post-MI myocardial stunning, DM, prior CVA with residual left paresis, HTN, major depressive disorder,  chronic cocaine and tobacco abuse presents to the ED after a suicide attempt. States that he initially tried to overdose on cocaine, as he is aware that he has a "weak heart" and that the cocaine has caused his MI earlier this year. As he was not successful with that he attempted to "slit his throat" w/ a blade, but his nephew took that away. He was brought to the ER at that time.   Pt has a history of multiple suicide attempts as well as multiple inpatient psychiatric hospitalization and is supposed to follow up w/ a psychiatrist outpatient, but has failed to do so for some time.   He states that he has been feeling especially down for the past 4-5 days which brought on this recent suicide attempt; states that his daily life is just "too hard" and he doesn't want to be a "bother to his family".  Denies any chest pain, SOB, palpitations, abdominal pain, F/C/V/N/D, sick contacts. States that he is usually compliant w/ medication but has ran out of some of his pills and has not taken them for the past couple of days, but cannot recall which ones.  Sitter at bedside; pt appears to be comfortable at this time          Review of patient's allergies indicates:  No Known Allergies  Past Medical History:   Diagnosis Date    Acute renal insufficiency 6/21/2015    Cocaine abuse     Depression     History of psychiatric care     HTN (hypertension) " 1/25/2014    Stroke      Past Surgical History:   Procedure Laterality Date    NECK SURGERY       Family History   Problem Relation Age of Onset    Diabetes Mother     Heart disease Mother     Hypertension Mother      Social History   Substance Use Topics    Smoking status: Current Some Day Smoker     Packs/day: 0.25     Types: Cigarettes    Smokeless tobacco: Never Used    Alcohol use Yes      Comment: 6pk/week     Review of Systems   Constitutional: Negative.    HENT: Negative.    Eyes: Negative.    Respiratory: Negative.    Cardiovascular: Negative.    Gastrointestinal: Negative.    Endocrine: Negative.    Genitourinary: Negative.    Musculoskeletal: Negative.    Allergic/Immunologic: Negative.    Neurological: Negative.    Hematological: Negative.    Psychiatric/Behavioral: Positive for decreased concentration, dysphoric mood, self-injury, sleep disturbance and suicidal ideas.       Physical Exam     Initial Vitals [11/07/17 1753]   BP Pulse Resp Temp SpO2   131/81 92 18 98.2 °F (36.8 °C) 99 %      MAP       97.67         Physical Exam    Constitutional: He appears well-developed and well-nourished. He is not diaphoretic. No distress.   HENT:   Head: Normocephalic and atraumatic.   Eyes: EOM are normal. Pupils are equal, round, and reactive to light.   Neck: Normal range of motion. Neck supple.   Cardiovascular: Normal rate and regular rhythm.   No murmur heard.  Pulmonary/Chest: Breath sounds normal. No respiratory distress.   Abdominal: Soft. Bowel sounds are normal. He exhibits no distension. There is no tenderness.   Musculoskeletal: Normal range of motion. He exhibits no edema or tenderness.   Residual deficits 2/2 CVA present  ( L sided)   Neurological: He is alert and oriented to person, place, and time.   Skin: Skin is warm and dry.   Psychiatric: Judgment normal. His affect is not angry and not inappropriate. His speech is delayed. Cognition and memory are impaired. He expresses suicidal  "ideation. He expresses suicidal plans. He exhibits abnormal remote memory. He is inattentive.         ED Course   Procedures  Labs Reviewed   CBC W/ AUTO DIFFERENTIAL - Abnormal; Notable for the following:        Result Value    RBC 4.21 (*)     Hemoglobin 12.9 (*)     Hematocrit 38.3 (*)     MPV 8.9 (*)     Immature Granulocytes 0.7 (*)     Gran% 31.9 (*)     Mono% 17.0 (*)     All other components within normal limits   COMPREHENSIVE METABOLIC PANEL - Abnormal; Notable for the following:     Glucose 117 (*)     Creatinine 1.5 (*)     Albumin 3.3 (*)     eGFR if  56.5 (*)     eGFR if non  48.8 (*)     All other components within normal limits   URINALYSIS - Abnormal; Notable for the following:     Appearance, UA Hazy (*)     All other components within normal limits   ACETAMINOPHEN LEVEL - Abnormal; Notable for the following:     Acetaminophen (Tylenol), Serum <3.0 (*)     All other components within normal limits   URINALYSIS MICROSCOPIC - Abnormal; Notable for the following:     Hyaline Casts, UA 9 (*)     All other components within normal limits   TSH   DRUG SCREEN PANEL, URINE EMERGENCY   ALCOHOL,MEDICAL (ETHANOL)   PROTIME-INR     EKG Readings: (Independently Interpreted)   Normal sinus rhythm at 79. Nonspecific ST changes. No acute ischemic changes.           Medical Decision Making:   History:   Old Medical Records: I decided to obtain old medical records.  Independently Interpreted Test(s):   I have ordered and independently interpreted EKG Reading(s) - see prior notes  Clinical Tests:   Lab Tests: Ordered and Reviewed  Medical Tests: Ordered and Reviewed  Pt presents to the ED after attempted suicide. Currently stable and is feeling "safer", although he is still having suicidal ideations. States that he is agreeable w/ inpatient placement at this time as he is interested in getting help and has had success w/ the previous hospitalizations.    Will obtain a full tox screen, " Ua, TSH, Ethanol levels, CBC, CMP, EKG, INR-PT as well as a psychiatric consult at this time.     10:45  Labs pending. Spoke to Psychiatry resident who agrees that an inpatient admission is reasonable for this patient. [facility] does not currently have an male bed available; will await medical clearance at this time and will search for an appropriate placement for the patient after that is complete.     11:42 PM  Pt PEC'ed at this time and will await labs to medically clear patient for inpatient placement    1:13 AM  CBC w/ mild anemia (12.9); CMP w/ Cr 1.5 ( Baseline 1.3-1.5); Ethanol and Acetaminophen levels negative and INR 1.2 WNL . Other labs pending.     2:12 AM  Pt awaiting psychiatric placement.             Scribe Attestation:   Scribe #1: I performed the above scribed service and the documentation accurately describes the services I performed. I attest to the accuracy of the note.    Attending Attestation:   Physician Attestation Statement for Resident:  As the supervising MD   Physician Attestation Statement: I have personally seen and examined this patient.   I agree with the above history. -: 62 y.o. male with history of polysubstance abuse and depression presents with 2 suicide attempts this week. Patient reports that a few days ago he attempted cocaine overdose in an attempt to give himself a heart attack and today took a straight razor to his neck but his son stopped him. Report prior suicide attempt with admission to psychiatric hospital. Only takes Remeron. Patient is calm and cooperative but is clearly a danger to himself. Will file PEC and medically clear for admission to psychiatric hospital.     As the supervising MD I agree with the above PE.    As the supervising MD I agree with the above treatment, course, plan, and disposition.            Attending ED Notes:   Patient is medically cleared for psychiatric placement          ED Course      Clinical Impression:   The primary  encounter diagnosis was Suicidal behavior with attempted self-injury. A diagnosis of Cocaine abuse was also pertinent to this visit.   Pt medically cleared, PEC'ed and is awaiting psychiatric hospital placement at this time, as no beds available at the [facility].    Disposition:   Disposition: Transferred                        Margarita Sandra MD  11/08/17 0241

## 2017-11-08 NOTE — ED NOTES
Patient accepted at [facility] by Dr. Marcus arranged by Suyapa. Report to be called to [phone].

## 2017-11-08 NOTE — ED NOTES
"Pt reports SI. Pt denies HI, audible/auditory/tactile hallucinations. Pt states "I tried to slice my throat earlier with a razor but my nephew stopped   "

## 2017-11-08 NOTE — ED NOTES
Patient reports did have suicidal jesture with knife. He denies HI, V/A hallucinations and he reports his depression 8/10. His son  around this time of year. SVC maintained. Report was called to Apoorva with [facility].

## 2017-11-08 NOTE — ED NOTES
The room has been made safe for pt arrival. All wires, cords, bins and cleaning supplies have been removed from the room. Nusrat Perez, is at the bedside with no personal belongings to provide direct visual observation and q15 minute checks. The patient has been searched for harmful objects and changed into paper scrubs as per protocol. Pt belongings have been removed from the room, labelled in a bag and secured in pt belongings closet. The pt has been given an explanation of pt rights while hospitalized. Pt has signed acknowledgement of those rights and a copy has been placed in the chart.

## 2017-11-08 NOTE — ED NOTES
Patient identifiers verified and correct for Damir Faria.    LOC: The patient is awake, alert and aware of environment with an appropriate affect, the patient is oriented x 3 and speaking appropriately.  APPEARANCE: Patient resting comfortably and in no acute distress, patient is clean and well groomed, patient's clothing is properly fastened.  SKIN: The skin is warm and dry, color consistent with ethnicity, patient has normal skin turgor and moist mucus membranes, skin intact, no breakdown or bruising noted.  MUSCULOSKELETAL: Patient moving all extremities spontaneously, no obvious swelling or deformities noted.  RESPIRATORY: Airway is open and patent, respirations are spontaneous, patient has a normal effort and rate, no accessory muscle use noted  CARDIAC: Patient has a normal rate and regular rhythm, no periphreal edema noted, capillary refill < 3 seconds.  ABDOMEN: Soft and non tender to palpation, no distention noted, normoactive bowel sounds present in all four quadrants.  NEUROLOGIC: PERRL, 4mm bilaterally, eyes open spontaneously, behavior appropriate to situation, follows commands, facial expression symmetrical, bilateral hand grasp equal and even, purposeful motor response noted, normal sensation in all extremities when touched with a finger.

## 2017-11-08 NOTE — ED NOTES
Patient transferred to [facility] by [facility] with 1 bag of belongings with security present. Report was given to Apoorva with [facility]. Vitals stable. Patient did not want contact preson called.

## 2017-11-09 ENCOUNTER — ANTI-COAG VISIT (OUTPATIENT)
Dept: CARDIOLOGY | Facility: CLINIC | Age: 62
End: 2017-11-09

## 2017-11-09 DIAGNOSIS — I23.6 LV (LEFT VENTRICULAR) MURAL THROMBUS FOLLOWING MI: ICD-10-CM

## 2017-11-29 NOTE — PROGRESS NOTES
Stephanie/Nurse ( [facility])( [phone]) called today to inform us that patient is still admitted in Center until Dec/2017.

## 2017-12-14 NOTE — PROGRESS NOTES
Stephanie with [facility] ([phone]) called to schedule a coumadin clinic appointment for the Patient, states will be discharged from the center 12/18, appointment was scheduled for 12/19, Stephanie notified

## 2017-12-27 ENCOUNTER — LAB VISIT (OUTPATIENT)
Dept: LAB | Facility: HOSPITAL | Age: 62
End: 2017-12-27
Payer: MEDICARE

## 2017-12-27 ENCOUNTER — ANTI-COAG VISIT (OUTPATIENT)
Dept: CARDIOLOGY | Facility: CLINIC | Age: 62
End: 2017-12-27

## 2017-12-27 DIAGNOSIS — I23.6 LV (LEFT VENTRICULAR) MURAL THROMBUS FOLLOWING MI: ICD-10-CM

## 2017-12-27 DIAGNOSIS — Z79.01 LONG-TERM (CURRENT) USE OF ANTICOAGULANTS: ICD-10-CM

## 2017-12-27 LAB
INR PPP: 1
PROTHROMBIN TIME: 11.1 SEC

## 2017-12-27 PROCEDURE — 85610 PROTHROMBIN TIME: CPT

## 2017-12-27 PROCEDURE — 36415 COLL VENOUS BLD VENIPUNCTURE: CPT

## 2018-01-02 ENCOUNTER — ANTI-COAG VISIT (OUTPATIENT)
Dept: CARDIOLOGY | Facility: CLINIC | Age: 63
End: 2018-01-02
Payer: MEDICAID

## 2018-01-02 DIAGNOSIS — Z79.01 LONG-TERM (CURRENT) USE OF ANTICOAGULANTS: Primary | ICD-10-CM

## 2018-01-02 DIAGNOSIS — I23.6 LV (LEFT VENTRICULAR) MURAL THROMBUS FOLLOWING MI: ICD-10-CM

## 2018-01-02 LAB — INR PPP: 1.4 (ref 2–3)

## 2018-01-02 PROCEDURE — 85610 PROTHROMBIN TIME: CPT | Mod: PBBFAC

## 2018-01-02 PROCEDURE — 99211 OFF/OP EST MAY X REQ PHY/QHP: CPT | Mod: S$PBB,25,,

## 2018-01-02 NOTE — PROGRESS NOTES
Quick follow up for low INR on 12/27. INR low today. Patient states that he has been drinking a little more than usual but does not plan to continue this amount of use. Re educated patient on the impact of alcohol use while on coumadin.  He was recently hospitalized in [facility]. Patient states that he received all coumadin while inpatient. He denies any bleeding, bruising or other changes. We will increase his weekly dose and follow up next week. Advised patient to notify us of any changes or concerns.

## 2018-01-07 ENCOUNTER — NURSE TRIAGE (OUTPATIENT)
Dept: ADMINISTRATIVE | Facility: CLINIC | Age: 63
End: 2018-01-07

## 2018-01-07 NOTE — TELEPHONE ENCOUNTER
"Patient stated that he missed he last night's dose of Coumadin. Already has an appointment scheduled at Coumadin clinic on Tuesday. Advised per protocol. Instructed to call back for additional problems and/or concerns and he verbalized understanding    Reason for Disposition   Caller has NON-URGENT medication question about med that PCP prescribed and triager unable to answer question    Answer Assessment - Initial Assessment Questions  1. REASON FOR CALL or QUESTION: "What is your reason for calling today?" or "How can I best help you?" or "What question do you have that I can help answer?"      Missed Coumadin dose last night    Protocols used: ST MEDICATION QUESTION CALL-A-AH, ST INFORMATION ONLY CALL-A-AH      "

## 2018-01-09 ENCOUNTER — ANTI-COAG VISIT (OUTPATIENT)
Dept: CARDIOLOGY | Facility: CLINIC | Age: 63
End: 2018-01-09
Payer: MEDICAID

## 2018-01-09 DIAGNOSIS — Z79.01 LONG TERM (CURRENT) USE OF ANTICOAGULANTS: Primary | ICD-10-CM

## 2018-01-09 DIAGNOSIS — I23.6 LV (LEFT VENTRICULAR) MURAL THROMBUS FOLLOWING MI: ICD-10-CM

## 2018-01-09 LAB — INR PPP: 1.3 (ref 2–3)

## 2018-01-09 PROCEDURE — 99211 OFF/OP EST MAY X REQ PHY/QHP: CPT | Mod: S$PBB,,,

## 2018-01-09 PROCEDURE — 85610 PROTHROMBIN TIME: CPT | Mod: PBBFAC

## 2018-01-09 NOTE — PROGRESS NOTES
Quick follow up for low INR on 1/2. INR low today. Patient denies any bruising. Occasional bleeding from gums. He denies any other changes. Missed dose on 1/6. Patient notified CC and was given dosing instructions.  Will boost dose today then resume. Follow up on 1/11. Advised patient to notify us of any changes or concerns

## 2018-01-10 NOTE — PROGRESS NOTES
Patient called to report he has a very dark black stool today after bowel movement 1/10 . Reports no bleeding or other changes.

## 2018-01-10 NOTE — PROGRESS NOTES
I spoke with the pt this afternoon.  He reports one episode of darker stool.  He reports no traces of blood, just dark stool, which has occurred once.  He denies any iron supplements or any pepto bismol.  His last three INRs have actually been subtherapeutic and I explained that his blood has actually been on the thick side, rather than on the thin side.  He will continue taking his warfarin and re-check his INR tomorrow as scheduled.  He will let us know if/when this occurs again.

## 2018-01-11 ENCOUNTER — ANTI-COAG VISIT (OUTPATIENT)
Dept: CARDIOLOGY | Facility: CLINIC | Age: 63
End: 2018-01-11

## 2018-01-11 ENCOUNTER — LAB VISIT (OUTPATIENT)
Dept: LAB | Facility: HOSPITAL | Age: 63
End: 2018-01-11
Attending: INTERNAL MEDICINE
Payer: MEDICAID

## 2018-01-11 DIAGNOSIS — I23.6 LV (LEFT VENTRICULAR) MURAL THROMBUS FOLLOWING MI: ICD-10-CM

## 2018-01-11 DIAGNOSIS — Z79.01 LONG TERM (CURRENT) USE OF ANTICOAGULANTS: ICD-10-CM

## 2018-01-11 DIAGNOSIS — Z79.01 LONG TERM CURRENT USE OF ANTICOAGULANT THERAPY: ICD-10-CM

## 2018-01-11 LAB
INR PPP: 2.1
PROTHROMBIN TIME: 20.8 SEC

## 2018-01-11 PROCEDURE — 85610 PROTHROMBIN TIME: CPT

## 2018-01-11 PROCEDURE — 36415 COLL VENOUS BLD VENIPUNCTURE: CPT

## 2018-01-15 ENCOUNTER — ANTI-COAG VISIT (OUTPATIENT)
Dept: CARDIOLOGY | Facility: CLINIC | Age: 63
End: 2018-01-15
Payer: MEDICAID

## 2018-01-15 DIAGNOSIS — I23.6 LV (LEFT VENTRICULAR) MURAL THROMBUS FOLLOWING MI: ICD-10-CM

## 2018-01-15 DIAGNOSIS — Z79.01 LONG TERM (CURRENT) USE OF ANTICOAGULANTS: Primary | ICD-10-CM

## 2018-01-15 LAB — INR PPP: 2.4 (ref 2–3)

## 2018-01-15 PROCEDURE — 85610 PROTHROMBIN TIME: CPT | Mod: PBBFAC | Performed by: PHARMACIST

## 2018-01-15 PROCEDURE — 99211 OFF/OP EST MAY X REQ PHY/QHP: CPT | Mod: PBBFAC | Performed by: PHARMACIST

## 2018-01-15 PROCEDURE — 99999 PR PBB SHADOW E&M-EST. PATIENT-LVL I: CPT | Mod: PBBFAC,,, | Performed by: PHARMACIST

## 2018-01-15 NOTE — PROGRESS NOTES
INR in normal range today. Patient reports he is still having dark stools. Advised patient to call primary care physician if dark stools continue. No bruising or other changes. Will maintain weekly dose until follow up in 1 week. Advised patient to call with any concerns or changes.

## 2018-01-18 NOTE — PROGRESS NOTES
Followed up with patient with respect to dark stools reported earlier this week. He reports he has not noticed dark stool since he last reported to us. Pt seen by Lani SARABIA . I have reviewed her initial findings and agree with her assessment and plan

## 2018-01-29 ENCOUNTER — NURSE TRIAGE (OUTPATIENT)
Dept: ADMINISTRATIVE | Facility: CLINIC | Age: 63
End: 2018-01-29

## 2018-01-30 NOTE — TELEPHONE ENCOUNTER
"  Reason for Disposition   Health Information question, no triage required and triager able to answer question    Answer Assessment - Initial Assessment Questions  1. REASON FOR CALL or QUESTION: "What is your reason for calling today?" or "How can I best help you?" or "What question do you have that I can help answer?"      Pt wanted to know if 127/88 HR 82 and cbg 166 are good readings-he [facility] to [facility] and wanted to know if they were giving him good advice    Protocols used: ST INFORMATION ONLY CALL-A-AH    "

## 2018-06-27 ENCOUNTER — TELEPHONE (OUTPATIENT)
Dept: CARDIOLOGY | Facility: CLINIC | Age: 63
End: 2018-06-27

## 2018-07-06 ENCOUNTER — OFFICE VISIT (OUTPATIENT)
Dept: CARDIOLOGY | Facility: CLINIC | Age: 63
End: 2018-07-06
Payer: MEDICAID

## 2018-07-06 VITALS
BODY MASS INDEX: 27.48 KG/M2 | SYSTOLIC BLOOD PRESSURE: 140 MMHG | HEIGHT: 71 IN | HEART RATE: 60 BPM | DIASTOLIC BLOOD PRESSURE: 80 MMHG | WEIGHT: 196.31 LBS

## 2018-07-06 DIAGNOSIS — I25.5 ISCHEMIC CARDIOMYOPATHY: Primary | ICD-10-CM

## 2018-07-06 DIAGNOSIS — I51.9 DEPRESSED LEFT VENTRICULAR SYSTOLIC FUNCTION: ICD-10-CM

## 2018-07-06 DIAGNOSIS — I10 ESSENTIAL HYPERTENSION: ICD-10-CM

## 2018-07-06 DIAGNOSIS — Z79.01 LONG TERM (CURRENT) USE OF ANTICOAGULANTS: ICD-10-CM

## 2018-07-06 DIAGNOSIS — E11.9 TYPE 2 DIABETES MELLITUS WITHOUT COMPLICATION, WITHOUT LONG-TERM CURRENT USE OF INSULIN: ICD-10-CM

## 2018-07-06 DIAGNOSIS — Z86.73 HX-TIA (TRANSIENT ISCHEMIC ATTACK): ICD-10-CM

## 2018-07-06 DIAGNOSIS — Z95.5 STENTED CORONARY ARTERY: ICD-10-CM

## 2018-07-06 DIAGNOSIS — I21.02 ST ELEVATION MYOCARDIAL INFARCTION INVOLVING LEFT ANTERIOR DESCENDING (LAD) CORONARY ARTERY: ICD-10-CM

## 2018-07-06 DIAGNOSIS — I25.10 CORONARY ARTERY DISEASE, ANGINA PRESENCE UNSPECIFIED, UNSPECIFIED VESSEL OR LESION TYPE, UNSPECIFIED WHETHER NATIVE OR TRANSPLANTED HEART: ICD-10-CM

## 2018-07-06 PROCEDURE — 99214 OFFICE O/P EST MOD 30 MIN: CPT | Mod: S$PBB,,, | Performed by: INTERNAL MEDICINE

## 2018-07-06 PROCEDURE — 99999 PR PBB SHADOW E&M-EST. PATIENT-LVL III: CPT | Mod: PBBFAC,,, | Performed by: INTERNAL MEDICINE

## 2018-07-06 PROCEDURE — 99213 OFFICE O/P EST LOW 20 MIN: CPT | Mod: PBBFAC,PO | Performed by: INTERNAL MEDICINE

## 2018-07-06 RX ORDER — CLOPIDOGREL BISULFATE 75 MG/1
75 TABLET ORAL DAILY
COMMUNITY
End: 2018-07-06 | Stop reason: SDUPTHER

## 2018-07-06 RX ORDER — ATORVASTATIN CALCIUM 80 MG/1
80 TABLET, FILM COATED ORAL DAILY
Qty: 30 TABLET | Refills: 6 | Status: ON HOLD | OUTPATIENT
Start: 2018-07-06 | End: 2019-07-15 | Stop reason: HOSPADM

## 2018-07-06 RX ORDER — CLOPIDOGREL BISULFATE 75 MG/1
75 TABLET ORAL DAILY
Qty: 30 TABLET | Refills: 6 | Status: ON HOLD | OUTPATIENT
Start: 2018-07-06 | End: 2018-08-22 | Stop reason: HOSPADM

## 2018-07-06 RX ORDER — WARFARIN SODIUM 5 MG/1
5 TABLET ORAL DAILY
COMMUNITY
End: 2018-07-06 | Stop reason: ALTCHOICE

## 2018-07-06 RX ORDER — ATORVASTATIN CALCIUM 80 MG/1
80 TABLET, FILM COATED ORAL DAILY
COMMUNITY
End: 2018-07-06 | Stop reason: SDUPTHER

## 2018-07-06 RX ORDER — CARVEDILOL 12.5 MG/1
12.5 TABLET ORAL 2 TIMES DAILY WITH MEALS
COMMUNITY
End: 2018-07-06 | Stop reason: SDUPTHER

## 2018-07-06 RX ORDER — LISINOPRIL 10 MG/1
10 TABLET ORAL DAILY
Qty: 30 TABLET | Refills: 6 | Status: ON HOLD | OUTPATIENT
Start: 2018-07-06 | End: 2019-07-15 | Stop reason: HOSPADM

## 2018-07-06 RX ORDER — CARVEDILOL 12.5 MG/1
12.5 TABLET ORAL 2 TIMES DAILY WITH MEALS
Qty: 60 TABLET | Refills: 6 | Status: ON HOLD | OUTPATIENT
Start: 2018-07-06 | End: 2019-07-15 | Stop reason: HOSPADM

## 2018-07-06 RX ORDER — ASPIRIN 81 MG/1
81 TABLET ORAL DAILY
Status: ON HOLD | COMMUNITY
End: 2019-07-15 | Stop reason: HOSPADM

## 2018-07-06 RX ORDER — SPIRONOLACTONE 25 MG/1
25 TABLET ORAL DAILY
COMMUNITY
End: 2018-07-06 | Stop reason: SDUPTHER

## 2018-07-06 RX ORDER — SPIRONOLACTONE 25 MG/1
25 TABLET ORAL DAILY
Qty: 30 TABLET | Refills: 6 | Status: ON HOLD | OUTPATIENT
Start: 2018-07-06 | End: 2019-07-15 | Stop reason: SDUPTHER

## 2018-07-06 NOTE — PROGRESS NOTES
Subjective:    Patient ID:  Damir Faria is a 63 y.o. male who presents for evaluation of Pre-op Exam      HPI     Mr. Damir Faria is a 61 y.o. gentleman with history of  STEMI 2/12/17 in the LAD territory in the setting of cocaine abuse (s/p PCI with NAM to m LAD, balloon angioplasty of ostial D1 on 2/11/17) with post-MI myocardial stunning (LVEF 35% on /14/17 while previous LVEF 55% 9 months ago; new septal hypokinesis and apical akinesis), DM (A1C 6.0% in 2/17), prior CVA with residual left paresis, HTN, chronic cocaine and tobacco abuse .. He was last seen in cardiology clinic 6/8/17. He presents today for pre-op evaluation of full mouth dental extraction. He denies chest pain but has mild KERN. He is able to climb 2 flight of stairs. He was placed on coumadin for a apical thrombus [see echo report below]        CONCLUSIONS     1 - Moderately depressed left ventricular systolic function (EF 35-40%).     2 - There is thrombus in the cardiac apex.     3 - Wall motion abnormalities.     4 - Impaired LV relaxation, normal LAP (grade 1 diastolic dysfunction).     5 - Normal right ventricular systolic function .     6 - Mild tricuspid regurgitation.     7 - The estimated PA systolic pressure is 23 mmHg.             This document has been electronically    SIGNED BY: Renetta Wilson MD On: 06/19/2017 16:46  Lab Results   Component Value Date     11/07/2017    K 3.6 11/07/2017     11/07/2017    CO2 24 11/07/2017    BUN 19 11/07/2017    CREATININE 1.5 (H) 11/07/2017     (H) 11/07/2017    HGBA1C 6.0 02/15/2017    MG 2.1 02/15/2017    AST 30 11/07/2017    ALT 26 11/07/2017    ALBUMIN 3.3 (L) 11/07/2017    PROT 7.2 11/07/2017    BILITOT 0.5 11/07/2017    WBC 5.59 11/07/2017    HGB 12.9 (L) 11/07/2017    HCT 38.3 (L) 11/07/2017    MCV 91 11/07/2017     11/07/2017    INR 2.4 01/15/2018    INR 2.1 (H) 01/11/2018    INR 1.2 10/12/2017    PSA 4.5 (H) 09/29/2016    TSH 0.926 11/07/2017         Lab  Results   Component Value Date    CHOL 176 02/12/2017    HDL 48 02/12/2017    TRIG 106 02/12/2017       Lab Results   Component Value Date    LDLCALC 106.8 02/12/2017       Past Medical History:   Diagnosis Date    Acute renal insufficiency 6/21/2015    Cocaine abuse     Depression     History of psychiatric care     HTN (hypertension) 1/25/2014    Stroke        Current Outpatient Prescriptions:     aspirin (ECOTRIN) 81 MG EC tablet, Take 81 mg by mouth once daily., Disp: , Rfl:     atorvastatin (LIPITOR) 80 MG tablet, Take 1 tablet (80 mg total) by mouth once daily., Disp: 30 tablet, Rfl: 6    carvedilol (COREG) 12.5 MG tablet, Take 1 tablet (12.5 mg total) by mouth 2 (two) times daily with meals., Disp: 60 tablet, Rfl: 6    clopidogrel (PLAVIX) 75 mg tablet, Take 1 tablet (75 mg total) by mouth once daily., Disp: 30 tablet, Rfl: 6    doxepin (SINEQUAN) 100 MG capsule, Take 100 mg by mouth every evening., Disp: , Rfl:     lisinopril 10 MG tablet, Take 1 tablet (10 mg total) by mouth once daily., Disp: 30 tablet, Rfl: 6    mirtazapine (REMERON) 15 MG tablet, Take 15 mg by mouth., Disp: , Rfl:     spironolactone (ALDACTONE) 25 MG tablet, Take 1 tablet (25 mg total) by mouth once daily., Disp: 30 tablet, Rfl: 6    nitroGLYCERIN (NITROSTAT) 0.4 MG SL tablet, Place 1 tablet (0.4 mg total) under the tongue every 5 (five) minutes as needed for Chest pain., Disp: 25 tablet, Rfl: 2        Review of Systems   Constitution: Negative for decreased appetite, diaphoresis, fever, weakness, malaise/fatigue, weight gain and weight loss.   HENT: Negative for congestion, ear discharge, ear pain and nosebleeds.    Eyes: Negative for blurred vision, double vision and visual disturbance.   Cardiovascular: Negative for chest pain, claudication, cyanosis, dyspnea on exertion, irregular heartbeat, leg swelling, near-syncope, orthopnea, palpitations, paroxysmal nocturnal dyspnea and syncope.   Respiratory: Negative for cough,  "hemoptysis, shortness of breath, sleep disturbances due to breathing, snoring, sputum production and wheezing.    Endocrine: Negative for polydipsia, polyphagia and polyuria.   Hematologic/Lymphatic: Negative for adenopathy and bleeding problem. Does not bruise/bleed easily.   Skin: Negative for color change, nail changes, poor wound healing and rash.   Musculoskeletal: Negative for muscle cramps and muscle weakness.   Gastrointestinal: Negative for abdominal pain, anorexia, change in bowel habit, hematochezia, nausea and vomiting.   Genitourinary: Negative for dysuria, frequency and hematuria.   Neurological: Negative for brief paralysis, difficulty with concentration, excessive daytime sleepiness, dizziness, focal weakness, headaches, light-headedness, seizures and vertigo.   Psychiatric/Behavioral: Negative for altered mental status and depression.   Allergic/Immunologic: Negative for persistent infections.        Objective:BP (!) 140/80   Pulse 60   Ht 5' 11" (1.803 m)   Wt 89 kg (196 lb 4.8 oz)   BMI 27.38 kg/m²           Physical Exam   Constitutional: He is oriented to person, place, and time. He appears well-developed and well-nourished.   HENT:   Head: Normocephalic.   Right Ear: External ear normal.   Left Ear: External ear normal.   Nose: Nose normal.   Inspection of lips, teeth and gums normal   Eyes: EOM are normal. Pupils are equal, round, and reactive to light. No scleral icterus.   Neck: Normal range of motion. Neck supple. No JVD present. No tracheal deviation present. No thyromegaly present.   Cardiovascular: Normal rate, regular rhythm and intact distal pulses.  Exam reveals no gallop and no friction rub.    No murmur heard.  Pulses:       Dorsalis pedis pulses are 2+ on the right side, and 2+ on the left side.   Pulmonary/Chest: Effort normal and breath sounds normal.   Abdominal: Bowel sounds are normal. He exhibits no distension. There is no hepatosplenomegaly. There is no tenderness. There " is no guarding.   Musculoskeletal: Normal range of motion. He exhibits no edema or tenderness.   Lymphadenopathy:   Palpation of neck and groin lymph nodes normal   Neurological: He is alert and oriented to person, place, and time. No cranial nerve deficit. He exhibits normal muscle tone. Coordination normal.   Skin: Skin is dry.   Palpation of skin normal   Psychiatric: His behavior is normal. Judgment and thought content normal.         Assessment:       1. Ischemic cardiomyopathy    2. Coronary artery disease, angina presence unspecified, unspecified vessel or lesion type, unspecified whether native or transplanted heart    3. ST elevation myocardial infarction involving left anterior descending (LAD) coronary artery    4. Essential hypertension    5. Type 2 diabetes mellitus without complication, without long-term current use of insulin    6. Hx-TIA (transient ischemic attack)    7. Depressed left ventricular systolic function    8. Stented coronary artery    9. Long term (current) use of anticoagulants         Plan:       Damir was seen today for pre-op exam.    Diagnoses and all orders for this visit:    Ischemic cardiomyopathy  -     carvedilol (COREG) 12.5 MG tablet; Take 1 tablet (12.5 mg total) by mouth 2 (two) times daily with meals.  -     lisinopril 10 MG tablet; Take 1 tablet (10 mg total) by mouth once daily.  -     spironolactone (ALDACTONE) 25 MG tablet; Take 1 tablet (25 mg total) by mouth once daily.  -     Comprehensive metabolic panel; Future; Expected date: 07/06/2018    Coronary artery disease, angina presence unspecified, unspecified vessel or lesion type, unspecified whether native or transplanted heart  -     atorvastatin (LIPITOR) 80 MG tablet; Take 1 tablet (80 mg total) by mouth once daily.  -     clopidogrel (PLAVIX) 75 mg tablet; Take 1 tablet (75 mg total) by mouth once daily.  -     Lipid panel; Future; Expected date: 07/06/2018    ST elevation myocardial infarction involving left  anterior descending (LAD) coronary artery    Essential hypertension  -     Comprehensive metabolic panel; Future; Expected date: 07/06/2018    Type 2 diabetes mellitus without complication, without long-term current use of insulin  -     Comprehensive metabolic panel; Future; Expected date: 07/06/2018  -     Lipid panel; Future; Expected date: 07/06/2018    Hx-TIA (transient ischemic attack)    Depressed left ventricular systolic function    Stented coronary artery    Long term (current) use of anticoagulants  -     CBC auto differential; Future; Expected date: 07/06/2018    Other orders  -     aspirin (ECOTRIN) 81 MG EC tablet; Take 81 mg by mouth once daily.  -     Discontinue: atorvastatin (LIPITOR) 80 MG tablet; Take 80 mg by mouth once daily.  -     Discontinue: carvedilol (COREG) 12.5 MG tablet; Take 12.5 mg by mouth 2 (two) times daily with meals.  -     Discontinue: clopidogrel (PLAVIX) 75 mg tablet; Take 75 mg by mouth once daily.  -     Discontinue: spironolactone (ALDACTONE) 25 MG tablet; Take 25 mg by mouth once daily.  -     Discontinue: warfarin (COUMADIN) 5 MG tablet; Take 5 mg by mouth once daily.    high CV risk [ RCRI 11%] for low risk surgery

## 2018-07-12 ENCOUNTER — TELEPHONE (OUTPATIENT)
Dept: CARDIOLOGY | Facility: CLINIC | Age: 63
End: 2018-07-12

## 2018-07-12 NOTE — TELEPHONE ENCOUNTER
----- Message from Ann-Marie Collins MA sent at 7/12/2018  1:10 PM CDT -----  Contact: patient called  Jeni please call the patient at [phone] the patient need to talk to you about his clearance letter. Thank you.

## 2018-07-12 NOTE — TELEPHONE ENCOUNTER
----- Message from Soledad Bui sent at 7/12/2018 10:50 AM CDT -----  Contact: self  Patient states need to speak with nurse.   Pt states need clearance letter from doctor stating ok to have dental work done.      Patient would like to  letter for tomorrow at the [facility] location.    Please call pt at [phone]

## 2018-07-13 ENCOUNTER — TELEPHONE (OUTPATIENT)
Dept: CARDIOLOGY | Facility: CLINIC | Age: 63
End: 2018-07-13

## 2018-07-13 NOTE — TELEPHONE ENCOUNTER
----- Message from Ann-Marie Collins MA sent at 7/13/2018  9:52 AM CDT -----  Contact: patient called  Elinor the patient need clarification on his medication Carvedilol 12.5 mg. Last visit was on 7-6-2018 Dr. Man. Please call [phone] Thank you.

## 2018-07-13 NOTE — TELEPHONE ENCOUNTER
Spoke with the pt - instructed him to take carvedilol 12.5 mg twice a day. Reviewed cardiac meds with the pt,  and his BP and HR reading at his visit with Dr. Man on 7-6-18. Encouraged pt to take his medicines and he verbalized understanding.  Also, pt says that he is going to have dental extractions and his dental office is requesting clearance - requests that his dental office be contacted - called and spoke with Meagan [[phone]] - gave her fax number for [facility]/Dr. Man's location this afternoon.

## 2018-07-26 ENCOUNTER — TELEPHONE (OUTPATIENT)
Dept: CARDIOLOGY | Facility: CLINIC | Age: 63
End: 2018-07-26

## 2018-07-26 NOTE — TELEPHONE ENCOUNTER
----- Message from Ann-Marie Collins MA sent at 7/26/2018  3:33 PM CDT -----  Contact: patient called  Jeni please call the patient at [phone] she need to talk you about this dentist appointment. Thank you.

## 2018-08-20 ENCOUNTER — HOSPITAL ENCOUNTER (INPATIENT)
Facility: HOSPITAL | Age: 63
LOS: 2 days | Discharge: HOME | DRG: 287 | End: 2018-08-22
Attending: EMERGENCY MEDICINE | Admitting: INTERNAL MEDICINE
Payer: COMMERCIAL

## 2018-08-20 DIAGNOSIS — Z79.01 LONG TERM (CURRENT) USE OF ANTICOAGULANTS: ICD-10-CM

## 2018-08-20 DIAGNOSIS — I20.0 UNSTABLE ANGINA: Primary | ICD-10-CM

## 2018-08-20 DIAGNOSIS — R20.0 ANESTHESIA OF SKIN: ICD-10-CM

## 2018-08-20 DIAGNOSIS — I23.6 LV (LEFT VENTRICULAR) MURAL THROMBUS FOLLOWING MI: ICD-10-CM

## 2018-08-20 DIAGNOSIS — R07.9 CHEST PAIN: ICD-10-CM

## 2018-08-20 DIAGNOSIS — I21.4 NON-ST ELEVATION MYOCARDIAL INFARCTION (NSTEMI): ICD-10-CM

## 2018-08-20 DIAGNOSIS — I21.4 NSTEMI (NON-ST ELEVATION MYOCARDIAL INFARCTION): ICD-10-CM

## 2018-08-20 DIAGNOSIS — K21.9 GASTROESOPHAGEAL REFLUX DISEASE WITHOUT ESOPHAGITIS: ICD-10-CM

## 2018-08-20 LAB
ABO + RH BLD: NORMAL
ALBUMIN SERPL BCP-MCNC: 3.8 G/DL
ALP SERPL-CCNC: 81 U/L
ALT SERPL W/O P-5'-P-CCNC: 31 U/L
ANION GAP SERPL CALC-SCNC: 9 MMOL/L
APTT BLDCRRT: 21.3 SEC
AST SERPL-CCNC: 26 U/L
BASOPHILS # BLD AUTO: 0.05 K/UL
BASOPHILS NFR BLD: 0.9 %
BILIRUB SERPL-MCNC: 0.6 MG/DL
BLD GP AB SCN CELLS X3 SERPL QL: NORMAL
BNP SERPL-MCNC: 67 PG/ML
BUN SERPL-MCNC: 21 MG/DL
CALCIUM SERPL-MCNC: 9.1 MG/DL
CHLORIDE SERPL-SCNC: 109 MMOL/L
CHOLEST SERPL-MCNC: 155 MG/DL
CHOLEST/HDLC SERPL: 3.7 {RATIO}
CO2 SERPL-SCNC: 22 MMOL/L
CREAT SERPL-MCNC: 1.9 MG/DL
DIFFERENTIAL METHOD: ABNORMAL
EOSINOPHIL # BLD AUTO: 0.3 K/UL
EOSINOPHIL NFR BLD: 5.7 %
ERYTHROCYTE [DISTWIDTH] IN BLOOD BY AUTOMATED COUNT: 13.9 %
EST. GFR  (AFRICAN AMERICAN): 42.1 ML/MIN/1.73 M^2
EST. GFR  (NON AFRICAN AMERICAN): 36.4 ML/MIN/1.73 M^2
ESTIMATED AVG GLUCOSE: 120 MG/DL
GLUCOSE SERPL-MCNC: 103 MG/DL
HBA1C MFR BLD HPLC: 5.8 %
HCT VFR BLD AUTO: 45.3 %
HDLC SERPL-MCNC: 42 MG/DL
HDLC SERPL: 27.1 %
HGB BLD-MCNC: 15 G/DL
IMM GRANULOCYTES # BLD AUTO: 0.06 K/UL
IMM GRANULOCYTES NFR BLD AUTO: 1.1 %
INR PPP: 1
LDLC SERPL CALC-MCNC: 95.6 MG/DL
LYMPHOCYTES # BLD AUTO: 2.2 K/UL
LYMPHOCYTES NFR BLD: 41.9 %
MCH RBC QN AUTO: 31 PG
MCHC RBC AUTO-ENTMCNC: 33.1 G/DL
MCV RBC AUTO: 94 FL
MONOCYTES # BLD AUTO: 0.7 K/UL
MONOCYTES NFR BLD: 12.9 %
NEUTROPHILS # BLD AUTO: 2 K/UL
NEUTROPHILS NFR BLD: 37.5 %
NONHDLC SERPL-MCNC: 113 MG/DL
NRBC BLD-RTO: 0 /100 WBC
PLATELET # BLD AUTO: 223 K/UL
PMV BLD AUTO: 9 FL
POTASSIUM SERPL-SCNC: 4.3 MMOL/L
PROT SERPL-MCNC: 7.6 G/DL
PROTHROMBIN TIME: 10.7 SEC
RBC # BLD AUTO: 4.84 M/UL
SODIUM SERPL-SCNC: 140 MMOL/L
TRIGL SERPL-MCNC: 87 MG/DL
TROPONIN I SERPL DL<=0.01 NG/ML-MCNC: <0.006 NG/ML
TROPONIN I SERPL DL<=0.01 NG/ML-MCNC: <0.006 NG/ML
WBC # BLD AUTO: 5.28 K/UL

## 2018-08-20 PROCEDURE — 84484 ASSAY OF TROPONIN QUANT: CPT

## 2018-08-20 PROCEDURE — 96374 THER/PROPH/DIAG INJ IV PUSH: CPT

## 2018-08-20 PROCEDURE — 63600175 PHARM REV CODE 636 W HCPCS: Performed by: HOSPITALIST

## 2018-08-20 PROCEDURE — 93010 ELECTROCARDIOGRAM REPORT: CPT | Mod: 76,,, | Performed by: INTERNAL MEDICINE

## 2018-08-20 PROCEDURE — 25000003 PHARM REV CODE 250

## 2018-08-20 PROCEDURE — 85025 COMPLETE CBC W/AUTO DIFF WBC: CPT

## 2018-08-20 PROCEDURE — 93010 ELECTROCARDIOGRAM REPORT: CPT | Mod: ,,, | Performed by: INTERNAL MEDICINE

## 2018-08-20 PROCEDURE — 85730 THROMBOPLASTIN TIME PARTIAL: CPT

## 2018-08-20 PROCEDURE — 20000000 HC ICU ROOM

## 2018-08-20 PROCEDURE — 93010 ELECTROCARDIOGRAM REPORT: CPT | Mod: 77,,, | Performed by: INTERNAL MEDICINE

## 2018-08-20 PROCEDURE — 99285 EMERGENCY DEPT VISIT HI MDM: CPT

## 2018-08-20 PROCEDURE — 84484 ASSAY OF TROPONIN QUANT: CPT | Mod: 91

## 2018-08-20 PROCEDURE — 83036 HEMOGLOBIN GLYCOSYLATED A1C: CPT

## 2018-08-20 PROCEDURE — 83880 ASSAY OF NATRIURETIC PEPTIDE: CPT

## 2018-08-20 PROCEDURE — 86850 RBC ANTIBODY SCREEN: CPT

## 2018-08-20 PROCEDURE — 80061 LIPID PANEL: CPT

## 2018-08-20 PROCEDURE — 99223 1ST HOSP IP/OBS HIGH 75: CPT | Mod: GC,,, | Performed by: INTERNAL MEDICINE

## 2018-08-20 PROCEDURE — 93005 ELECTROCARDIOGRAM TRACING: CPT

## 2018-08-20 PROCEDURE — 25000003 PHARM REV CODE 250: Performed by: HOSPITALIST

## 2018-08-20 PROCEDURE — 63600175 PHARM REV CODE 636 W HCPCS: Performed by: EMERGENCY MEDICINE

## 2018-08-20 PROCEDURE — 85610 PROTHROMBIN TIME: CPT

## 2018-08-20 PROCEDURE — 80053 COMPREHEN METABOLIC PANEL: CPT

## 2018-08-20 PROCEDURE — 99285 EMERGENCY DEPT VISIT HI MDM: CPT | Mod: ,,, | Performed by: EMERGENCY MEDICINE

## 2018-08-20 PROCEDURE — 25000003 PHARM REV CODE 250: Performed by: INTERNAL MEDICINE

## 2018-08-20 RX ORDER — CLOPIDOGREL 300 MG/1
300 TABLET, FILM COATED ORAL ONCE
Status: DISCONTINUED | OUTPATIENT
Start: 2018-08-20 | End: 2018-08-20

## 2018-08-20 RX ORDER — MIRTAZAPINE 15 MG/1
15 TABLET, FILM COATED ORAL NIGHTLY
Status: DISCONTINUED | OUTPATIENT
Start: 2018-08-20 | End: 2018-08-22 | Stop reason: HOSPADM

## 2018-08-20 RX ORDER — ASPIRIN 325 MG
325 TABLET, DELAYED RELEASE (ENTERIC COATED) ORAL
Status: DISCONTINUED | OUTPATIENT
Start: 2018-08-20 | End: 2018-08-20

## 2018-08-20 RX ORDER — NAPROXEN SODIUM 220 MG/1
324 TABLET, FILM COATED ORAL ONCE
Status: DISCONTINUED | OUTPATIENT
Start: 2018-08-20 | End: 2018-08-22 | Stop reason: HOSPADM

## 2018-08-20 RX ORDER — NITROGLYCERIN 20 MG/100ML
5 INJECTION INTRAVENOUS CONTINUOUS
Status: DISCONTINUED | OUTPATIENT
Start: 2018-08-20 | End: 2018-08-21

## 2018-08-20 RX ORDER — NITROGLYCERIN 0.4 MG/1
0.4 TABLET SUBLINGUAL EVERY 5 MIN PRN
Status: DISCONTINUED | OUTPATIENT
Start: 2018-08-20 | End: 2018-08-22 | Stop reason: HOSPADM

## 2018-08-20 RX ORDER — CARVEDILOL 12.5 MG/1
12.5 TABLET ORAL 2 TIMES DAILY WITH MEALS
Status: DISCONTINUED | OUTPATIENT
Start: 2018-08-20 | End: 2018-08-22 | Stop reason: HOSPADM

## 2018-08-20 RX ORDER — CLOPIDOGREL BISULFATE 75 MG/1
75 TABLET ORAL DAILY
Status: DISCONTINUED | OUTPATIENT
Start: 2018-08-21 | End: 2018-08-22

## 2018-08-20 RX ORDER — ASPIRIN 325 MG
TABLET ORAL
Status: COMPLETED
Start: 2018-08-20 | End: 2018-08-20

## 2018-08-20 RX ORDER — CLOPIDOGREL 300 MG/1
300 TABLET, FILM COATED ORAL ONCE
Status: COMPLETED | OUTPATIENT
Start: 2018-08-20 | End: 2018-08-20

## 2018-08-20 RX ORDER — NITROGLYCERIN 0.4 MG/1
0.4 TABLET SUBLINGUAL EVERY 5 MIN PRN
Status: DISCONTINUED | OUTPATIENT
Start: 2018-08-20 | End: 2018-08-20

## 2018-08-20 RX ORDER — HEPARIN SODIUM,PORCINE/D5W 25000/250
12 INTRAVENOUS SOLUTION INTRAVENOUS CONTINUOUS
Status: DISCONTINUED | OUTPATIENT
Start: 2018-08-20 | End: 2018-08-22

## 2018-08-20 RX ORDER — LISINOPRIL 10 MG/1
10 TABLET ORAL DAILY
Status: DISCONTINUED | OUTPATIENT
Start: 2018-08-21 | End: 2018-08-20

## 2018-08-20 RX ORDER — ASPIRIN 81 MG/1
81 TABLET ORAL DAILY
Status: DISCONTINUED | OUTPATIENT
Start: 2018-08-21 | End: 2018-08-22

## 2018-08-20 RX ADMIN — CLOPIDOGREL BISULFATE 300 MG: 300 TABLET, FILM COATED ORAL at 04:08

## 2018-08-20 RX ADMIN — MIRTAZAPINE 15 MG: 15 TABLET, FILM COATED ORAL at 08:08

## 2018-08-20 RX ADMIN — NITROGLYCERIN 5 MCG/MIN: 20 INJECTION INTRAVENOUS at 06:08

## 2018-08-20 RX ADMIN — CARVEDILOL 12.5 MG: 12.5 TABLET, FILM COATED ORAL at 08:08

## 2018-08-20 RX ADMIN — HEPARIN SODIUM AND DEXTROSE 12 UNITS/KG/HR: 10000; 5 INJECTION INTRAVENOUS at 04:08

## 2018-08-20 RX ADMIN — NITROGLYCERIN 0.4 MG: 0.4 TABLET SUBLINGUAL at 04:08

## 2018-08-20 RX ADMIN — Medication: at 04:08

## 2018-08-20 NOTE — ED NOTES
Cardiology at bedside to assess pt; transportation to assigned room pending completion of exam; pt placed on portable monitor; will continue to monitor and provide care

## 2018-08-20 NOTE — HPI
63 year old male with past medical history significant for STEMI 2/12/17 in the LAD territory in the setting of cocaine abuse (s/p PCI with NAM to m LAD, balloon angioplasty of ostial D1 on 2/11/17) with post-MI myocardial stunning (LVEF 35% on /14/17 while previous LVEF 55% 9 months ago; new septal hypokinesis and apical akinesis), DM (A1C 6.0% in 2/17), prior CVA with residual left paresis, HTN, chronic cocaine who presents with new onset chest pain the night before presentation, initially thought to be due to gas pain. The pain failed to improve the morning of presentation that brought the patient to the ER. In the ER patient has 4/10 chest pain, he received NTG X 3 with improvement in chest pain down to 1/10. His EKG revealed anterior new TWI, with old Q waves. His troponin came back negative. Patient is compliant on ASA and Plavix. He reported cocaine use 2 weeks ago.

## 2018-08-20 NOTE — ASSESSMENT & PLAN NOTE
Unstable angina  -CLEMENCIA SCORE-4  -TERRY 2.0 score-90- 3% Risk of Mortality at 6 months  -Shabbir risk score-3 with risk of SUSANA 7.5 % and need for HD 0.04 %  -ACS protocol-Aspirin, clopidogrel ,heparin  -beta blocker-continue coreg   -high potency statin  -interventional cardiology consult  -Keep NPO after midnight  -Echo with color flow in am  -EKG prn chest pain  -Nitro GTT  -cardiac monitoring

## 2018-08-20 NOTE — CONSULTS
[facility]  Cardiology  Consult Note    Patient Name: Damir Faria  MRN: [id number]  Admission Date: 8/20/2018  Hospital Length of Stay: 0 days  Code Status: Prior   Attending Provider: Ron Cramer MD   Consulting Provider: Merle Demarco MD  Primary Care Physician: [facility]  Principal Problem:Unstable angina    Patient information was obtained from patient and ER records.     Inpatient consult to Cardiology  Consult performed by: Merle Demarco MD  Consult ordered by: Ron Cramer MD        Subjective:     Chief Complaint:  Chest pain.      HPI:   61 y.o. gentleman with history of  STEMI 2/12/17 in the LAD territory in the setting of cocaine abuse (s/p PCI with NAM to m LAD, balloon angioplasty of ostial D1 on 2/11/17) with post-MI myocardial stunning (LVEF 35% on /14/17 while previous LVEF 55% 9 months ago; new septal hypokinesis and apical akinesis), DM (A1C 6.0% in 2/17), prior CVA with residual left paresis, HTN, chronic cocaine who presents with new onset chest pain started last night, initially thought it's gas pain. Pain failed to improve this morning that brought the patient to the ER.  In the ER patient has 4/10 chest pain, he received NTG X 3 with improvement in chest pain down to 1/10.   His EKG revealed anterior new TWI, with old Q waves.  His troponin came back negative.  Patient is compliant on ASA and Plavix.   He reported Cocaine use 2 weeks ago.         Past Medical History:   Diagnosis Date    Acute renal insufficiency 6/21/2015    Cocaine abuse     Depression     History of psychiatric care     HTN (hypertension) 1/25/2014    Stroke        Past Surgical History:   Procedure Laterality Date    NECK SURGERY         Review of patient's allergies indicates:  No Known Allergies    No current facility-administered medications on file prior to encounter.      Current Outpatient Medications on File Prior to Encounter   Medication Sig    aspirin (ECOTRIN)  81 MG EC tablet Take 81 mg by mouth once daily.    atorvastatin (LIPITOR) 80 MG tablet Take 1 tablet (80 mg total) by mouth once daily.    carvedilol (COREG) 12.5 MG tablet Take 1 tablet (12.5 mg total) by mouth 2 (two) times daily with meals.    clopidogrel (PLAVIX) 75 mg tablet Take 1 tablet (75 mg total) by mouth once daily.    lisinopril 10 MG tablet Take 1 tablet (10 mg total) by mouth once daily.    mirtazapine (REMERON) 15 MG tablet Take 15 mg by mouth.    spironolactone (ALDACTONE) 25 MG tablet Take 1 tablet (25 mg total) by mouth once daily.    doxepin (SINEQUAN) 100 MG capsule Take 100 mg by mouth every evening.    nitroGLYCERIN (NITROSTAT) 0.4 MG SL tablet Place 1 tablet (0.4 mg total) under the tongue every 5 (five) minutes as needed for Chest pain.     Family History     Problem Relation (Age of Onset)    Diabetes Mother    Heart disease Mother    Hypertension Mother        Tobacco Use    Smoking status: Current Some Day Smoker     Packs/day: 0.25     Types: Cigarettes    Smokeless tobacco: Never Used   Substance and Sexual Activity    Alcohol use: Yes     Comment: 6pk/week    Drug use: Yes     Types: Cocaine     Comment: last used a few days ago    Sexual activity: Yes     Partners: Female     Birth control/protection: Condom     Review of Systems   Constitution: Negative for chills, decreased appetite, diaphoresis, weight gain and weight loss.   HENT: Negative for congestion.    Eyes: Negative for blurred vision.   Cardiovascular: Positive for chest pain. Negative for cyanosis, irregular heartbeat, leg swelling, orthopnea, palpitations, paroxysmal nocturnal dyspnea and syncope.   Respiratory: Negative for cough and shortness of breath.    Endocrine: Negative.    Hematologic/Lymphatic: Negative.    Skin: Negative.    Gastrointestinal: Negative for bloating, melena, nausea and vomiting.   Genitourinary: Negative.    Neurological: Negative.      Objective:     Vital Signs (Most  Recent):  Temp: 98.6 °F (37 °C) (08/20/18 1518)  Pulse: 62 (08/20/18 1648)  Resp: 20 (08/20/18 1648)  BP: 104/66 (08/20/18 1648)  SpO2: 99 % (08/20/18 1648) Vital Signs (24h Range):  Temp:  [98.6 °F (37 °C)] 98.6 °F (37 °C)  Pulse:  [61-69] 62  Resp:  [18-20] 20  SpO2:  [95 %-99 %] 99 %  BP: (104-132)/(62-76) 104/66     Weight: 88 kg (194 lb)  Body mass index is 27.06 kg/m².    SpO2: 99 %  O2 Device (Oxygen Therapy): room air    No intake or output data in the 24 hours ending 08/20/18 1712    Lines/Drains/Airways     Peripheral Intravenous Line                 Peripheral IV - Single Lumen 08/20/18 1603 Left Antecubital less than 1 day                Physical Exam   Constitutional: He is oriented to person, place, and time. He appears well-developed. No distress.   HENT:   Head: Normocephalic and atraumatic.   Eyes: Conjunctivae and EOM are normal. Pupils are equal, round, and reactive to light. No scleral icterus.   Neck: Normal range of motion. Neck supple. No JVD present. No thyromegaly present.   Cardiovascular: Normal rate, regular rhythm, S1 normal, S2 normal and intact distal pulses. Exam reveals no gallop and no friction rub.   No murmur heard.  Pulses:       Dorsalis pedis pulses are 2+ on the right side, and 2+ on the left side.   Pulmonary/Chest: Effort normal and breath sounds normal. He has no wheezes. He has no rales. He exhibits no tenderness.   No Skin Lesions   Abdominal: Normal appearance and bowel sounds are normal. He exhibits no distension, no ascites, no pulsatile midline mass and no mass. There is no hepatosplenomegaly. There is no tenderness. There is no rigidity, no rebound and no guarding. No hernia.   Musculoskeletal:   No Tenderness. Normal ROM   Lymphadenopathy:     He has no cervical adenopathy.   Neurological: He is alert and oriented to person, place, and time. He has normal strength. No cranial nerve deficit or sensory deficit. Coordination normal.   Skin: No lesion and no rash noted.  He is not diaphoretic. No cyanosis. No pallor. Nails show no clubbing.   Psychiatric: He has a normal mood and affect. His mood appears not anxious. He does not exhibit a depressed mood.   Vitals reviewed.      Assessment and Plan:     * Unstable angina    -New onset chest pain resolved with NTG.  -Start ACS. ASA, Plavix, High intensity statins, BB, ACE, Heparin ggt.  -Will admit to CCU and start NTG ggt for goal chest pain free.   -Interventional Cardiology in the AM.  -TTE            VTE Risk Mitigation (From admission, onward)        Ordered     heparin 25,000 units in dextrose 5% 250 mL (100 units/mL) infusion LOW INTENSITY nomogram - [facility]  Continuous      08/20/18 0684          Thank you for your consult. I will sign off. Please contact us if you have any additional questions.    Merle Demarco MD  Cardiology   [facility]

## 2018-08-20 NOTE — SUBJECTIVE & OBJECTIVE
Past Medical History:   Diagnosis Date    Acute renal insufficiency 6/21/2015    Cocaine abuse     Depression     History of psychiatric care     HTN (hypertension) 1/25/2014    Stroke        Past Surgical History:   Procedure Laterality Date    NECK SURGERY         Review of patient's allergies indicates:  No Known Allergies    No current facility-administered medications on file prior to encounter.      Current Outpatient Medications on File Prior to Encounter   Medication Sig    aspirin (ECOTRIN) 81 MG EC tablet Take 81 mg by mouth once daily.    atorvastatin (LIPITOR) 80 MG tablet Take 1 tablet (80 mg total) by mouth once daily.    carvedilol (COREG) 12.5 MG tablet Take 1 tablet (12.5 mg total) by mouth 2 (two) times daily with meals.    clopidogrel (PLAVIX) 75 mg tablet Take 1 tablet (75 mg total) by mouth once daily.    lisinopril 10 MG tablet Take 1 tablet (10 mg total) by mouth once daily.    mirtazapine (REMERON) 15 MG tablet Take 15 mg by mouth.    spironolactone (ALDACTONE) 25 MG tablet Take 1 tablet (25 mg total) by mouth once daily.    doxepin (SINEQUAN) 100 MG capsule Take 100 mg by mouth every evening.    nitroGLYCERIN (NITROSTAT) 0.4 MG SL tablet Place 1 tablet (0.4 mg total) under the tongue every 5 (five) minutes as needed for Chest pain.     Family History     Problem Relation (Age of Onset)    Diabetes Mother    Heart disease Mother    Hypertension Mother        Tobacco Use    Smoking status: Current Some Day Smoker     Packs/day: 0.25     Types: Cigarettes    Smokeless tobacco: Never Used   Substance and Sexual Activity    Alcohol use: Yes     Comment: 6pk/week    Drug use: Yes     Types: Cocaine     Comment: last used a few days ago    Sexual activity: Yes     Partners: Female     Birth control/protection: Condom     Review of Systems   Constitution: Negative for chills, decreased appetite, diaphoresis, weight gain and weight loss.   HENT: Negative for congestion.    Eyes:  Negative for blurred vision.   Cardiovascular: Positive for chest pain. Negative for cyanosis, irregular heartbeat, leg swelling, orthopnea, palpitations, paroxysmal nocturnal dyspnea and syncope.   Respiratory: Negative for cough and shortness of breath.    Endocrine: Negative.    Hematologic/Lymphatic: Negative.    Skin: Negative.    Gastrointestinal: Negative for bloating, melena, nausea and vomiting.   Genitourinary: Negative.    Neurological: Negative.      Objective:     Vital Signs (Most Recent):  Temp: 98.6 °F (37 °C) (08/20/18 1518)  Pulse: 62 (08/20/18 1648)  Resp: 20 (08/20/18 1648)  BP: 104/66 (08/20/18 1648)  SpO2: 99 % (08/20/18 1648) Vital Signs (24h Range):  Temp:  [98.6 °F (37 °C)] 98.6 °F (37 °C)  Pulse:  [61-69] 62  Resp:  [18-20] 20  SpO2:  [95 %-99 %] 99 %  BP: (104-132)/(62-76) 104/66     Weight: 88 kg (194 lb)  Body mass index is 27.06 kg/m².    SpO2: 99 %  O2 Device (Oxygen Therapy): room air    No intake or output data in the 24 hours ending 08/20/18 1712    Lines/Drains/Airways     Peripheral Intravenous Line                 Peripheral IV - Single Lumen 08/20/18 1603 Left Antecubital less than 1 day                Physical Exam   Constitutional: He is oriented to person, place, and time. He appears well-developed. No distress.   HENT:   Head: Normocephalic and atraumatic.   Eyes: Conjunctivae and EOM are normal. Pupils are equal, round, and reactive to light. No scleral icterus.   Neck: Normal range of motion. Neck supple. No JVD present. No thyromegaly present.   Cardiovascular: Normal rate, regular rhythm, S1 normal, S2 normal and intact distal pulses. Exam reveals no gallop and no friction rub.   No murmur heard.  Pulses:       Dorsalis pedis pulses are 2+ on the right side, and 2+ on the left side.   Pulmonary/Chest: Effort normal and breath sounds normal. He has no wheezes. He has no rales. He exhibits no tenderness.   No Skin Lesions   Abdominal: Normal appearance and bowel sounds are  normal. He exhibits no distension, no ascites, no pulsatile midline mass and no mass. There is no hepatosplenomegaly. There is no tenderness. There is no rigidity, no rebound and no guarding. No hernia.   Musculoskeletal:   No Tenderness. Normal ROM   Lymphadenopathy:     He has no cervical adenopathy.   Neurological: He is alert and oriented to person, place, and time. He has normal strength. No cranial nerve deficit or sensory deficit. Coordination normal.   Skin: No lesion and no rash noted. He is not diaphoretic. No cyanosis. No pallor. Nails show no clubbing.   Psychiatric: He has a normal mood and affect. His mood appears not anxious. He does not exhibit a depressed mood.   Vitals reviewed.

## 2018-08-20 NOTE — ED PROVIDER NOTES
Encounter Date: 8/20/2018       History     Chief Complaint   Patient presents with    Chest Pain     Pt c/o chest pain-onset last night, has been intermittent.      63 yr old male with pmhx CAD, prior stents, cocaine use presents with substernal cp radiating to left since last night. Pt reports feels similar to 'when I had my heart attack.' mild intermittent sob. No abd pain or n/v. No syncope.           Review of patient's allergies indicates:  No Known Allergies  Past Medical History:   Diagnosis Date    Acute renal insufficiency 6/21/2015    Cocaine abuse     Depression     History of psychiatric care     HTN (hypertension) 1/25/2014    Stroke      Past Surgical History:   Procedure Laterality Date    NECK SURGERY       Family History   Problem Relation Age of Onset    Diabetes Mother     Heart disease Mother     Hypertension Mother      Social History     Tobacco Use    Smoking status: Current Some Day Smoker     Packs/day: 0.25     Types: Cigarettes    Smokeless tobacco: Never Used   Substance Use Topics    Alcohol use: Yes     Comment: 6pk/week    Drug use: Yes     Types: Cocaine     Comment: last used a few days ago     Review of Systems   Constitutional: Negative for chills, fatigue and fever.   HENT: Negative for ear discharge, ear pain, sore throat and trouble swallowing.    Eyes: Negative for pain and redness.   Respiratory: Positive for chest tightness. Negative for cough, choking and wheezing.    Cardiovascular: Positive for chest pain. Negative for palpitations.   Gastrointestinal: Negative for abdominal pain, nausea and vomiting.   Endocrine: Negative for polydipsia and polyphagia.   Genitourinary: Negative for dysuria and flank pain.   Musculoskeletal: Negative for back pain, neck pain and neck stiffness.   Skin: Negative for color change and pallor.   Neurological: Negative for dizziness, seizures, syncope, numbness and headaches.   Psychiatric/Behavioral: Negative for agitation and  behavioral problems.       Physical Exam     Initial Vitals [08/20/18 1518]   BP Pulse Resp Temp SpO2   114/62 69 18 98.6 °F (37 °C) 95 %      MAP       --         Physical Exam    Nursing note and vitals reviewed.  Constitutional: He appears well-developed and well-nourished. He is not diaphoretic. No distress.   HENT:   Head: Normocephalic and atraumatic.   Nose: Nose normal.   Mouth/Throat: Oropharynx is clear and moist.   Eyes: EOM are normal. Pupils are equal, round, and reactive to light.   Neck: Normal range of motion. Neck supple.   Cardiovascular: Normal rate, regular rhythm, normal heart sounds and intact distal pulses.   Pulmonary/Chest: Breath sounds normal. No respiratory distress. He has no wheezes. He has no rhonchi. He has no rales. He exhibits no tenderness.   Abdominal: Soft. Bowel sounds are normal. There is no tenderness. There is no rebound and no guarding.   Musculoskeletal: Normal range of motion.   Neurological: He is alert and oriented to person, place, and time. He has normal strength. No cranial nerve deficit. GCS score is 15. GCS eye subscore is 4. GCS verbal subscore is 5. GCS motor subscore is 6.   Skin: Skin is warm and dry.   Psychiatric: He has a normal mood and affect. Thought content normal.         ED Course   Procedures  Labs Reviewed   CBC W/ AUTO DIFFERENTIAL - Abnormal; Notable for the following components:       Result Value    MPV 9.0 (*)     Immature Granulocytes 1.1 (*)     Immature Grans (Abs) 0.06 (*)     Gran% 37.5 (*)     All other components within normal limits   COMPREHENSIVE METABOLIC PANEL - Abnormal; Notable for the following components:    CO2 22 (*)     Creatinine 1.9 (*)     eGFR if  42.1 (*)     eGFR if non  36.4 (*)     All other components within normal limits   TROPONIN I   B-TYPE NATRIURETIC PEPTIDE   APTT   PROTIME-INR   PROTIME-INR    Narrative:     add on PT and APTT per: Nupur Cleary RN and Ron Cramer MD     08/20/2018  16:47    APTT    Narrative:     add on PT and APTT per: Nupur Cleary RN and Ron Cramer MD    08/20/2018  16:47      EKG Readings: (Independently Interpreted)   Initial Reading: Ischemia. Previous EKG: Compared with most recent EKG Previous EKG Date: 11/17. Rhythm: Normal Sinus Rhythm. Heart Rate: 64. ST Segment Elevation: V2, V3 and V4. Other Impression: TWI v3-v5       Imaging Results          X-Ray Chest AP Portable (Final result)  Result time 08/20/18 16:29:21    Final result by Aleksandar Cardoso III, MD (08/20/18 16:29:21)                 Impression:      See above    No acute process seen.      Electronically signed by: Aleksandar Cardoso MD  Date:    08/20/2018  Time:    16:29             Narrative:    EXAMINATION:  XR CHEST AP PORTABLE    CLINICAL HISTORY:  Chest Pain;    FINDINGS:  Heart size is normal.  Lungs are clear.  There is postoperative change of the spine and aortic plaque.                                 Medical Decision Making:   History:   Old Medical Records: I decided to obtain old medical records.  Old Records Summarized: records from clinic visits.  Initial Assessment:   Ekg with concerning findings, given his pmhx, stat cardiology consult and workup, asa, ntg, continue to closely monitor.   Clinical Tests:   Lab Tests: Ordered and Reviewed  Radiological Study: Ordered and Reviewed  Medical Tests: Ordered and Reviewed              Attending Attestation:             Attending ED Notes:   5:14 PM prelim labs and imaging reviewed. Pt seen and evaluated by cardiology service and attending, report not STEMI, but will admit to ccu for ACS.    CP improved on repeat eval, awaiting admission to ccu             Clinical Impression:   The primary encounter diagnosis was Unstable angina. Diagnoses of Chest pain, Non-ST elevation myocardial infarction (NSTEMI), NSTEMI (non-ST elevation myocardial infarction), Anesthesia of skin, Gastroesophageal reflux disease without esophagitis, Long term  (current) use of anticoagulants, and LV (left ventricular) mural thrombus following MI were also pertinent to this visit.      Disposition:   Disposition: Admitted  Condition: Serious                        Ron Cramer MD  08/24/18 6709

## 2018-08-20 NOTE — SUBJECTIVE & OBJECTIVE
Past Medical History:   Diagnosis Date    Acute renal insufficiency 6/21/2015    Cocaine abuse     Depression     History of psychiatric care     HTN (hypertension) 1/25/2014    Stroke        Past Surgical History:   Procedure Laterality Date    NECK SURGERY         Review of patient's allergies indicates:  No Known Allergies    No current facility-administered medications on file prior to encounter.      Current Outpatient Medications on File Prior to Encounter   Medication Sig    aspirin (ECOTRIN) 81 MG EC tablet Take 81 mg by mouth once daily.    atorvastatin (LIPITOR) 80 MG tablet Take 1 tablet (80 mg total) by mouth once daily.    carvedilol (COREG) 12.5 MG tablet Take 1 tablet (12.5 mg total) by mouth 2 (two) times daily with meals.    clopidogrel (PLAVIX) 75 mg tablet Take 1 tablet (75 mg total) by mouth once daily.    lisinopril 10 MG tablet Take 1 tablet (10 mg total) by mouth once daily.    mirtazapine (REMERON) 15 MG tablet Take 15 mg by mouth.    spironolactone (ALDACTONE) 25 MG tablet Take 1 tablet (25 mg total) by mouth once daily.    doxepin (SINEQUAN) 100 MG capsule Take 100 mg by mouth every evening.    nitroGLYCERIN (NITROSTAT) 0.4 MG SL tablet Place 1 tablet (0.4 mg total) under the tongue every 5 (five) minutes as needed for Chest pain.     Family History     Problem Relation (Age of Onset)    Diabetes Mother    Heart disease Mother    Hypertension Mother        Tobacco Use    Smoking status: Current Some Day Smoker     Packs/day: 0.25     Types: Cigarettes    Smokeless tobacco: Never Used   Substance and Sexual Activity    Alcohol use: Yes     Comment: 6pk/week    Drug use: Yes     Types: Cocaine     Comment: last used a few days ago    Sexual activity: Yes     Partners: Female     Birth control/protection: Condom     Review of Systems   All other systems reviewed and are negative.    Objective:     Vital Signs (Most Recent):  Temp: 98.6 °F (37 °C) (08/20/18 1518)  Pulse: 68  (08/20/18 1802)  Resp: 17 (08/20/18 1802)  BP: 131/76 (08/20/18 1802)  SpO2: 98 % (08/20/18 1802) Vital Signs (24h Range):  Temp:  [98.6 °F (37 °C)] 98.6 °F (37 °C)  Pulse:  [61-69] 68  Resp:  [17-20] 17  SpO2:  [95 %-99 %] 98 %  BP: (104-132)/(62-76) 131/76     Weight: 88 kg (194 lb)  Body mass index is 27.06 kg/m².    SpO2: 98 %  O2 Device (Oxygen Therapy): room air    No intake or output data in the 24 hours ending 08/20/18 1846    Lines/Drains/Airways     Peripheral Intravenous Line                 Peripheral IV - Single Lumen 08/20/18 1603 Left Antecubital less than 1 day         Peripheral IV - Single Lumen 08/20/18 1744 Right Wrist less than 1 day                Physical Exam  General: alert, awake and oriented x 3  Eyes:PERRL.   Neck:no JVD   Lungs:  clear to auscultation bilaterally   Cardiovascular: Heart: regular rate and rhythm, S1, S2 normal, no murmur, click, rub or gallop.   Chest Wall: no tenderness.   Pulses-2+ radial,PT/DP  Extremities: no cyanosis or edema.   Abdomen/Rectal: Abdomen: soft, non-tender non-distented; bowel sounds normal  Neurologic: Normal strength and tone. No focal numbness or weakness  Significant Labs:   CMP   Recent Labs   Lab  08/20/18   1602   NA  140   K  4.3   CL  109   CO2  22*   GLU  103   BUN  21   CREATININE  1.9*   CALCIUM  9.1   PROT  7.6   ALBUMIN  3.8   BILITOT  0.6   ALKPHOS  81   AST  26   ALT  31   ANIONGAP  9   ESTGFRAFRICA  42.1*   EGFRNONAA  36.4*    and CBC   Recent Labs   Lab  08/20/18   1602   WBC  5.28   HGB  15.0   HCT  45.3   PLT  223       Significant Imaging: Echocardiogram:   2D echo with color flow doppler:   Results for orders placed or performed during the hospital encounter of 06/19/17   2D Echo w/ Color Flow Doppler   Result Value Ref Range    EF 35 (A) 55 - 65    Diastolic Dysfunction Yes (A)     Est. PA Systolic Pressure 23.07     Tricuspid Valve Regurgitation MILD

## 2018-08-20 NOTE — ASSESSMENT & PLAN NOTE
-New onset chest pain resolved with NTG.  -Start ACS. ASA, Plavix, High intensity statins, BB, ACE, Heparin ggt.  -Will admit to CCU and start NTG ggt for goal chest pain free.   -Interventional Cardiology in the AM.  -TTE

## 2018-08-20 NOTE — ED TRIAGE NOTES
Pt c/o mid-sternal , 5/10, intermittent chest pain; Radiating to left side; Started last night. Pt also c/o intermittent SOB. Pt denies any diaphoresis, abdominal pain, headache, fevers, or N/V/D. Pt recent MI in Feb. 2017. Pt A&O x4 during triage. Pt ambulatory per self.

## 2018-08-21 ENCOUNTER — DOCUMENTATION ONLY (OUTPATIENT)
Dept: CARDIOLOGY | Facility: CLINIC | Age: 63
End: 2018-08-21

## 2018-08-21 LAB
AMPHET+METHAMPHET UR QL: NEGATIVE
ANION GAP SERPL CALC-SCNC: 7 MMOL/L
APTT BLDCRRT: >150 SEC
BARBITURATES UR QL SCN>200 NG/ML: NEGATIVE
BASOPHILS # BLD AUTO: 0.05 K/UL
BASOPHILS NFR BLD: 0.8 %
BENZODIAZ UR QL SCN>200 NG/ML: NEGATIVE
BUN SERPL-MCNC: 22 MG/DL
BZE UR QL SCN: NORMAL
CALCIUM SERPL-MCNC: 8.7 MG/DL
CANNABINOIDS UR QL SCN: NEGATIVE
CHLORIDE SERPL-SCNC: 108 MMOL/L
CO2 SERPL-SCNC: 22 MMOL/L
CREAT SERPL-MCNC: 1.5 MG/DL
CREAT UR-MCNC: 219 MG/DL
DIASTOLIC DYSFUNCTION: YES
DIFFERENTIAL METHOD: ABNORMAL
EOSINOPHIL # BLD AUTO: 0.4 K/UL
EOSINOPHIL NFR BLD: 6.7 %
ERYTHROCYTE [DISTWIDTH] IN BLOOD BY AUTOMATED COUNT: 13.7 %
EST. GFR  (AFRICAN AMERICAN): 56.1 ML/MIN/1.73 M^2
EST. GFR  (NON AFRICAN AMERICAN): 48.5 ML/MIN/1.73 M^2
ESTIMATED PA SYSTOLIC PRESSURE: 26.23
ETHANOL UR-MCNC: <10 MG/DL
GLUCOSE SERPL-MCNC: 91 MG/DL
HCT VFR BLD AUTO: 40.8 %
HGB BLD-MCNC: 13.8 G/DL
IMM GRANULOCYTES # BLD AUTO: 0.04 K/UL
IMM GRANULOCYTES NFR BLD AUTO: 0.7 %
INR PPP: 1.1
LYMPHOCYTES # BLD AUTO: 2.9 K/UL
LYMPHOCYTES NFR BLD: 47.6 %
MCH RBC QN AUTO: 31.3 PG
MCHC RBC AUTO-ENTMCNC: 33.8 G/DL
MCV RBC AUTO: 93 FL
METHADONE UR QL SCN>300 NG/ML: NEGATIVE
MITRAL VALVE MOBILITY: NORMAL
MITRAL VALVE REGURGITATION: ABNORMAL
MONOCYTES # BLD AUTO: 0.8 K/UL
MONOCYTES NFR BLD: 13.3 %
NEUTROPHILS # BLD AUTO: 1.9 K/UL
NEUTROPHILS NFR BLD: 30.9 %
NRBC BLD-RTO: 0 /100 WBC
OPIATES UR QL SCN: NEGATIVE
PCP UR QL SCN>25 NG/ML: NEGATIVE
PLATELET # BLD AUTO: 236 K/UL
PMV BLD AUTO: 9.7 FL
POTASSIUM SERPL-SCNC: 4.5 MMOL/L
PROTHROMBIN TIME: 11.3 SEC
RBC # BLD AUTO: 4.41 M/UL
RETIRED EF AND QEF - SEE NOTES: 40 (ref 55–65)
SODIUM SERPL-SCNC: 137 MMOL/L
TOXICOLOGY INFORMATION: NORMAL
TRICUSPID VALVE REGURGITATION: ABNORMAL
TROPONIN I SERPL DL<=0.01 NG/ML-MCNC: <0.006 NG/ML
TROPONIN I SERPL DL<=0.01 NG/ML-MCNC: <0.006 NG/ML
WBC # BLD AUTO: 6.01 K/UL

## 2018-08-21 PROCEDURE — 85730 THROMBOPLASTIN TIME PARTIAL: CPT | Mod: 91

## 2018-08-21 PROCEDURE — 85610 PROTHROMBIN TIME: CPT

## 2018-08-21 PROCEDURE — 20000000 HC ICU ROOM

## 2018-08-21 PROCEDURE — 25000003 PHARM REV CODE 250: Performed by: INTERNAL MEDICINE

## 2018-08-21 PROCEDURE — G0269 OCCLUSIVE DEVICE IN VEIN ART: HCPCS

## 2018-08-21 PROCEDURE — 85025 COMPLETE CBC W/AUTO DIFF WBC: CPT

## 2018-08-21 PROCEDURE — 4A023N7 MEASUREMENT OF CARDIAC SAMPLING AND PRESSURE, LEFT HEART, PERCUTANEOUS APPROACH: ICD-10-PCS | Performed by: INTERNAL MEDICINE

## 2018-08-21 PROCEDURE — 80048 BASIC METABOLIC PNL TOTAL CA: CPT

## 2018-08-21 PROCEDURE — 25000003 PHARM REV CODE 250: Performed by: STUDENT IN AN ORGANIZED HEALTH CARE EDUCATION/TRAINING PROGRAM

## 2018-08-21 PROCEDURE — 99152 MOD SED SAME PHYS/QHP 5/>YRS: CPT

## 2018-08-21 PROCEDURE — 80307 DRUG TEST PRSMV CHEM ANLYZR: CPT

## 2018-08-21 PROCEDURE — 84484 ASSAY OF TROPONIN QUANT: CPT

## 2018-08-21 PROCEDURE — 99233 SBSQ HOSP IP/OBS HIGH 50: CPT | Mod: ,,, | Performed by: INTERNAL MEDICINE

## 2018-08-21 PROCEDURE — C8929 TTE W OR WO FOL WCON,DOPPLER: HCPCS

## 2018-08-21 PROCEDURE — 93454 CORONARY ARTERY ANGIO S&I: CPT | Mod: 26,,, | Performed by: INTERNAL MEDICINE

## 2018-08-21 PROCEDURE — 63600175 PHARM REV CODE 636 W HCPCS

## 2018-08-21 PROCEDURE — 93306 TTE W/DOPPLER COMPLETE: CPT | Mod: 26,,, | Performed by: INTERNAL MEDICINE

## 2018-08-21 PROCEDURE — 25000003 PHARM REV CODE 250

## 2018-08-21 PROCEDURE — 84484 ASSAY OF TROPONIN QUANT: CPT | Mod: 91

## 2018-08-21 PROCEDURE — 99152 MOD SED SAME PHYS/QHP 5/>YRS: CPT | Mod: ,,, | Performed by: INTERNAL MEDICINE

## 2018-08-21 PROCEDURE — B2111ZZ FLUOROSCOPY OF MULTIPLE CORONARY ARTERIES USING LOW OSMOLAR CONTRAST: ICD-10-PCS | Performed by: INTERNAL MEDICINE

## 2018-08-21 RX ORDER — WARFARIN SODIUM 5 MG/1
5 TABLET ORAL DAILY
Status: DISCONTINUED | OUTPATIENT
Start: 2018-08-21 | End: 2018-08-22 | Stop reason: HOSPADM

## 2018-08-21 RX ORDER — NOREPINEPHRINE BITARTRATE/D5W 4MG/250ML
PLASTIC BAG, INJECTION (ML) INTRAVENOUS
Status: DISPENSED
Start: 2018-08-21 | End: 2018-08-22

## 2018-08-21 RX ORDER — DIPHENHYDRAMINE HCL 50 MG
50 CAPSULE ORAL ONCE
Status: COMPLETED | OUTPATIENT
Start: 2018-08-21 | End: 2018-08-21

## 2018-08-21 RX ORDER — PANTOPRAZOLE SODIUM 40 MG/1
40 TABLET, DELAYED RELEASE ORAL DAILY
Status: DISCONTINUED | OUTPATIENT
Start: 2018-08-21 | End: 2018-08-22 | Stop reason: HOSPADM

## 2018-08-21 RX ADMIN — PANTOPRAZOLE SODIUM 40 MG: 40 TABLET, DELAYED RELEASE ORAL at 01:08

## 2018-08-21 RX ADMIN — MIRTAZAPINE 15 MG: 15 TABLET, FILM COATED ORAL at 09:08

## 2018-08-21 RX ADMIN — DIPHENHYDRAMINE HYDROCHLORIDE 50 MG: 50 CAPSULE ORAL at 01:08

## 2018-08-21 RX ADMIN — ASPIRIN 81 MG: 81 TABLET, COATED ORAL at 08:08

## 2018-08-21 RX ADMIN — CLOPIDOGREL 75 MG: 75 TABLET, FILM COATED ORAL at 08:08

## 2018-08-21 RX ADMIN — WARFARIN SODIUM 5 MG: 5 TABLET ORAL at 04:08

## 2018-08-21 RX ADMIN — SODIUM CHLORIDE 1.5 ML/KG/HR: 0.9 INJECTION, SOLUTION INTRAVENOUS at 04:08

## 2018-08-21 RX ADMIN — CARVEDILOL 12.5 MG: 12.5 TABLET, FILM COATED ORAL at 04:08

## 2018-08-21 RX ADMIN — CARVEDILOL 12.5 MG: 12.5 TABLET, FILM COATED ORAL at 08:08

## 2018-08-21 NOTE — CONSULTS
[facility]  Interventional Cardiology  Consult Note    Patient Name: Damir Faria  MRN: [id number]  Admission Date: 8/20/2018  Hospital Length of Stay: 0 days  Code Status: Full Code   Attending Provider: Kaz Washington MD  Consulting Provider: Janis Hwang MD  Primary Care Physician: [facility]  Principal Problem:Unstable angina    Patient information was obtained from patient and ER records.     Inpatient consult to Interventional Cardiology  Consult performed by: Janis Hwang MD  Consult ordered by: Agustín Oneil MD        Subjective:     Chief Complaint:  Chest Pain     HPI:  61 y.o. gentleman with history of  STEMI 2/12/17 in the LAD territory in the setting of cocaine abuse (s/p PCI with NAM to m LAD, balloon angioplasty of ostial D1 on 2/11/17) HFrEF LVEF 35% on 6/14/17  new septal hypokinesis and apical akinesis), Apical thrombus seen 6/17 not on AC,  DM (A1C 6.0% in 2/17), prior CVA with residual left paresis, HTN, chronic cocaine who presents with new onset chest pain started last night, initially thought it's gas pain. Pain failed to improve this morning that brought the patient to the ER.  In the ER patient has 4/10 chest pain, he received NTG X 3 with improvement in chest pain down to 1/10.   His EKG revealed anterior new TWI, with old Q waves.  His troponin came back negative.  Patient is compliant on ASA and Plavix.   He reported Cocaine use 2 weeks ago.         Past Medical History:   Diagnosis Date    Acute renal insufficiency 6/21/2015    Cocaine abuse     Depression     History of psychiatric care     HTN (hypertension) 1/25/2014    Stroke        Past Surgical History:   Procedure Laterality Date    NECK SURGERY         Review of patient's allergies indicates:  No Known Allergies    PTA Medications   Medication Sig    aspirin (ECOTRIN) 81 MG EC tablet Take 81 mg by mouth once daily.    atorvastatin (LIPITOR) 80 MG tablet Take 1  tablet (80 mg total) by mouth once daily.    carvedilol (COREG) 12.5 MG tablet Take 1 tablet (12.5 mg total) by mouth 2 (two) times daily with meals.    clopidogrel (PLAVIX) 75 mg tablet Take 1 tablet (75 mg total) by mouth once daily.    lisinopril 10 MG tablet Take 1 tablet (10 mg total) by mouth once daily.    mirtazapine (REMERON) 15 MG tablet Take 15 mg by mouth.    spironolactone (ALDACTONE) 25 MG tablet Take 1 tablet (25 mg total) by mouth once daily.    doxepin (SINEQUAN) 100 MG capsule Take 100 mg by mouth every evening.    nitroGLYCERIN (NITROSTAT) 0.4 MG SL tablet Place 1 tablet (0.4 mg total) under the tongue every 5 (five) minutes as needed for Chest pain.     Family History     Problem Relation (Age of Onset)    Diabetes Mother    Heart disease Mother    Hypertension Mother        Tobacco Use    Smoking status: Current Some Day Smoker     Packs/day: 0.25     Types: Cigarettes    Smokeless tobacco: Never Used   Substance and Sexual Activity    Alcohol use: Yes     Comment: 6pk/week    Drug use: Yes     Types: Cocaine     Comment: last used a few days ago    Sexual activity: Yes     Partners: Female     Birth control/protection: Condom     Review of Systems   Constitution: Negative for chills, decreased appetite, diaphoresis, weight gain and weight loss.   HENT: Negative for congestion.    Eyes: Negative for blurred vision.   Cardiovascular: Positive for chest pain. Negative for cyanosis, irregular heartbeat, leg swelling, orthopnea, palpitations, paroxysmal nocturnal dyspnea and syncope.   Respiratory: Negative for cough and shortness of breath.    Endocrine: Negative.    Hematologic/Lymphatic: Negative.    Skin: Negative.    Gastrointestinal: Negative for bloating, melena, nausea and vomiting.   Genitourinary: Negative.    Neurological: Negative.      Objective:     Vital Signs (Most Recent):  Temp: 97.9 °F (36.6 °C) (08/20/18 1900)  Pulse: 61 (08/20/18 2000)  Resp: (!) 22 (08/20/18  2000)  BP: 115/65 (08/20/18 2000)  SpO2: 99 % (08/20/18 2000) Vital Signs (24h Range):  Temp:  [97 °F (36.1 °C)-98.6 °F (37 °C)] 97.9 °F (36.6 °C)  Pulse:  [60-69] 61  Resp:  [17-44] 22  SpO2:  [95 %-100 %] 99 %  BP: (104-132)/(62-79) 115/65     Weight: 88 kg (194 lb)  Body mass index is 27.06 kg/m².    SpO2: 99 %  O2 Device (Oxygen Therapy): room air      Intake/Output Summary (Last 24 hours) at 8/20/2018 2131  Last data filed at 8/20/2018 2019  Gross per 24 hour   Intake 437.86 ml   Output --   Net 437.86 ml       Lines/Drains/Airways     Peripheral Intravenous Line                 Peripheral IV - Single Lumen 08/20/18 1603 Left Antecubital less than 1 day         Peripheral IV - Single Lumen 08/20/18 1744 Right Wrist less than 1 day                Physical Exam   Constitutional: He is oriented to person, place, and time. He appears well-developed and well-nourished. No distress.   Eyes: Conjunctivae are normal. Pupils are equal, round, and reactive to light.   Neck: No tracheal deviation present. No thyromegaly present.   Cardiovascular: Normal rate, regular rhythm, normal heart sounds and intact distal pulses. Exam reveals no gallop and no friction rub.   No murmur heard.  Pulses:       Radial pulses are 2+ on the right side, and 2+ on the left side.        Femoral pulses are 1+ on the right side, and 1+ on the left side.  Pulmonary/Chest: Effort normal and breath sounds normal. No respiratory distress. He has no wheezes. He has no rales.   Abdominal: Soft. Bowel sounds are normal. He exhibits no distension. There is no tenderness.   Musculoskeletal: He exhibits no edema or deformity.   Neurological: He is alert and oriented to person, place, and time. No cranial nerve deficit. Coordination normal.   Skin: Skin is warm and dry. He is not diaphoretic.   Psychiatric: He has a normal mood and affect. His behavior is normal.       Significant Labs:   BMP:   Recent Labs   Lab  08/20/18   1602   GLU  103   NA  140   K   4.3   CL  109   CO2  22*   BUN  21   CREATININE  1.9*   CALCIUM  9.1   , CMP   Recent Labs   Lab  08/20/18   1602   NA  140   K  4.3   CL  109   CO2  22*   GLU  103   BUN  21   CREATININE  1.9*   CALCIUM  9.1   PROT  7.6   ALBUMIN  3.8   BILITOT  0.6   ALKPHOS  81   AST  26   ALT  31   ANIONGAP  9   ESTGFRAFRICA  42.1*   EGFRNONAA  36.4*   , Lipid Panel   Recent Labs   Lab  08/20/18   1940   CHOL  155   HDL  42   LDLCALC  95.6   TRIG  87   CHOLHDL  27.1    and Troponin   Recent Labs   Lab  08/20/18   1602  08/20/18   1845   TROPONINI  <0.006  <0.006       Significant Imaging: Echocardiogram:   2D echo with color flow doppler:   Results for orders placed or performed during the hospital encounter of 06/19/17   2D Echo w/ Color Flow Doppler   Result Value Ref Range    EF 35 (A) 55 - 65    Diastolic Dysfunction Yes (A)     Est. PA Systolic Pressure 23.07     Tricuspid Valve Regurgitation MILD      Assessment and Plan:     * Unstable angina    -CLEMENCIA SCORE-4  -TERRY 2.0 score-90- 3% Risk of Mortality at 6 months  -Shabbir risk score-3 with risk of SUSANA 7.5 % and need for HD 0.04 %  -Continue ACS protocol-Aspirin, clopidogrel ,heparin  -beta blocker-continue coreg   -high potency statin   - Creatine baseline 1.3-1.6. Admit 1.9 -> Now 1.5  - UDS +ve for cocaine although patient insists he has not used for 2 weeks  -Currently Chest pain free  -Nitro GTT if needed for intractable Chest pain  - Risks and benefits including stroke, bleeding, dialysis and death explained to patient.  - LHC +/- PCI  - R Radial access                     VTE Risk Mitigation (From admission, onward)        Ordered     IP VTE HIGH RISK PATIENT  Once      08/20/18 1808     heparin 25,000 units in dextrose 5% 250 mL (100 units/mL) infusion LOW INTENSITY nomogram - [facility]  Continuous      08/20/18 1608          Thank you for your consult. I will follow-up with patient. Please contact us if you have any additional questions.    Janis Hwang,  MD  Interventional Cardiology   [facility]

## 2018-08-21 NOTE — SUBJECTIVE & OBJECTIVE
Past Medical History:   Diagnosis Date    Acute renal insufficiency 6/21/2015    Cocaine abuse     Depression     History of psychiatric care     HTN (hypertension) 1/25/2014    Stroke        Past Surgical History:   Procedure Laterality Date    NECK SURGERY         Review of patient's allergies indicates:  No Known Allergies    PTA Medications   Medication Sig    aspirin (ECOTRIN) 81 MG EC tablet Take 81 mg by mouth once daily.    atorvastatin (LIPITOR) 80 MG tablet Take 1 tablet (80 mg total) by mouth once daily.    carvedilol (COREG) 12.5 MG tablet Take 1 tablet (12.5 mg total) by mouth 2 (two) times daily with meals.    clopidogrel (PLAVIX) 75 mg tablet Take 1 tablet (75 mg total) by mouth once daily.    lisinopril 10 MG tablet Take 1 tablet (10 mg total) by mouth once daily.    mirtazapine (REMERON) 15 MG tablet Take 15 mg by mouth.    spironolactone (ALDACTONE) 25 MG tablet Take 1 tablet (25 mg total) by mouth once daily.    doxepin (SINEQUAN) 100 MG capsule Take 100 mg by mouth every evening.    nitroGLYCERIN (NITROSTAT) 0.4 MG SL tablet Place 1 tablet (0.4 mg total) under the tongue every 5 (five) minutes as needed for Chest pain.     Family History     Problem Relation (Age of Onset)    Diabetes Mother    Heart disease Mother    Hypertension Mother        Tobacco Use    Smoking status: Current Some Day Smoker     Packs/day: 0.25     Types: Cigarettes    Smokeless tobacco: Never Used   Substance and Sexual Activity    Alcohol use: Yes     Comment: 6pk/week    Drug use: Yes     Types: Cocaine     Comment: last used a few days ago    Sexual activity: Yes     Partners: Female     Birth control/protection: Condom     Review of Systems   Constitution: Negative for chills, decreased appetite, diaphoresis, weight gain and weight loss.   HENT: Negative for congestion.    Eyes: Negative for blurred vision.   Cardiovascular: Positive for chest pain. Negative for cyanosis, irregular heartbeat, leg  swelling, orthopnea, palpitations, paroxysmal nocturnal dyspnea and syncope.   Respiratory: Negative for cough and shortness of breath.    Endocrine: Negative.    Hematologic/Lymphatic: Negative.    Skin: Negative.    Gastrointestinal: Negative for bloating, melena, nausea and vomiting.   Genitourinary: Negative.    Neurological: Negative.      Objective:     Vital Signs (Most Recent):  Temp: 97.9 °F (36.6 °C) (08/20/18 1900)  Pulse: 61 (08/20/18 2000)  Resp: (!) 22 (08/20/18 2000)  BP: 115/65 (08/20/18 2000)  SpO2: 99 % (08/20/18 2000) Vital Signs (24h Range):  Temp:  [97 °F (36.1 °C)-98.6 °F (37 °C)] 97.9 °F (36.6 °C)  Pulse:  [60-69] 61  Resp:  [17-44] 22  SpO2:  [95 %-100 %] 99 %  BP: (104-132)/(62-79) 115/65     Weight: 88 kg (194 lb)  Body mass index is 27.06 kg/m².    SpO2: 99 %  O2 Device (Oxygen Therapy): room air      Intake/Output Summary (Last 24 hours) at 8/20/2018 2131  Last data filed at 8/20/2018 2019  Gross per 24 hour   Intake 437.86 ml   Output --   Net 437.86 ml       Lines/Drains/Airways     Peripheral Intravenous Line                 Peripheral IV - Single Lumen 08/20/18 1603 Left Antecubital less than 1 day         Peripheral IV - Single Lumen 08/20/18 1744 Right Wrist less than 1 day                Physical Exam   Constitutional: He is oriented to person, place, and time. He appears well-developed and well-nourished. No distress.   Eyes: Conjunctivae are normal. Pupils are equal, round, and reactive to light.   Neck: No tracheal deviation present. No thyromegaly present.   Cardiovascular: Normal rate, regular rhythm, normal heart sounds and intact distal pulses. Exam reveals no gallop and no friction rub.   No murmur heard.  Pulses:       Radial pulses are 2+ on the right side, and 2+ on the left side.        Femoral pulses are 2+ on the right side, and 2+ on the left side.  Pulmonary/Chest: Effort normal and breath sounds normal. No respiratory distress. He has no wheezes. He has no rales.    Abdominal: Soft. Bowel sounds are normal. He exhibits no distension. There is no tenderness.   Musculoskeletal: He exhibits no edema or deformity.   Neurological: He is alert and oriented to person, place, and time. No cranial nerve deficit. Coordination normal.   Skin: Skin is warm and dry. He is not diaphoretic.   Psychiatric: He has a normal mood and affect. His behavior is normal.       Significant Labs:   BMP:   Recent Labs   Lab  08/20/18   1602   GLU  103   NA  140   K  4.3   CL  109   CO2  22*   BUN  21   CREATININE  1.9*   CALCIUM  9.1   , CMP   Recent Labs   Lab  08/20/18   1602   NA  140   K  4.3   CL  109   CO2  22*   GLU  103   BUN  21   CREATININE  1.9*   CALCIUM  9.1   PROT  7.6   ALBUMIN  3.8   BILITOT  0.6   ALKPHOS  81   AST  26   ALT  31   ANIONGAP  9   ESTGFRAFRICA  42.1*   EGFRNONAA  36.4*   , Lipid Panel   Recent Labs   Lab  08/20/18   1940   CHOL  155   HDL  42   LDLCALC  95.6   TRIG  87   CHOLHDL  27.1    and Troponin   Recent Labs   Lab  08/20/18   1602  08/20/18   1845   TROPONINI  <0.006  <0.006       Significant Imaging: Echocardiogram:   2D echo with color flow doppler:   Results for orders placed or performed during the hospital encounter of 06/19/17   2D Echo w/ Color Flow Doppler   Result Value Ref Range    EF 35 (A) 55 - 65    Diastolic Dysfunction Yes (A)     Est. PA Systolic Pressure 23.07     Tricuspid Valve Regurgitation MILD

## 2018-08-21 NOTE — ASSESSMENT & PLAN NOTE
-CLEMENCIA SCORE-4  -TERRY 2.0 score-90- 3% Risk of Mortality at 6 months  -Shabbir risk score-3 with risk of SUSANA 7.5 % and need for HD 0.04 %  -c/w ACS protocol- (DAPT) Aspirin, clopidogrel ,heparin; BB and statin  -c/w nitro ggt  -interventional cardiology following, planning [facility] on 8/21  -TTE pending

## 2018-08-21 NOTE — ASSESSMENT & PLAN NOTE
-Not taking coumadin at home  -Thrombus seen on last echo 6/2017  -Heparin gtt for now  -Repeat Echo in am

## 2018-08-21 NOTE — CONSULTS
Food & Nutrition  Education    Diet Education: Cardiac (currently NPO for procedure)   Time Spent: 10 minutes   Learners: Patient     Nutrition Education provided with handouts. All questions and concerns answered. Dietitian's contact information provided.     Follow-Up: 8/28    Please re-consult as needed.    Thanks!

## 2018-08-21 NOTE — ASSESSMENT & PLAN NOTE
-Patient does not take coumadin at home  -Thrombus seen on last echo 6/2017  -Heparin gtt for AC presently   -Repeat TTE pending

## 2018-08-21 NOTE — PLAN OF CARE
08/21/18 1455   Discharge Assessment   Assessment Type Discharge Planning Assessment   Confirmed/corrected address and phone number on facesheet? Yes   Assessment information obtained from? Medical Record   Expected Length of Stay (days) 3   Prior to hospitilization cognitive status: Alert/Oriented   Prior to hospitalization functional status: Independent   Current cognitive status: Alert/Oriented   Current Functional Status: Independent   Facility Arrived From: home via ED   Lives With alone   Able to Return to Prior Arrangements yes   Is patient able to care for self after discharge? Yes   Who are your caregiver(s) and their phone number(s)? Sister Cintia [phone]   Readmission Within The Last 30 Days no previous admission in last 30 days   Patient currently being followed by outpatient case management? No   Patient currently receives any other outside agency services? No   Equipment Currently Used at Home none   Do you have any problems affording any of your prescribed medications? No   Is the patient taking medications as prescribed? yes   Does the patient have transportation home? Yes   Transportation Available car;family or friend will provide   Does the patient receive services at the Coumadin Clinic? No   Discharge Plan A Home   Patient/Family In Agreement With Plan yes     Pt is unable to follow up with Cardiology servcie OP or OP cardiac Rehab as the patient is a [facility] patient and will need follow up care at [facility] or [facility] w/Dr. Audie Felton or Mariama Carver PA-C.

## 2018-08-21 NOTE — HPI
61 y.o. gentleman with history of  STEMI 2/12/17 in the LAD territory in the setting of cocaine abuse (s/p PCI with NAM to m LAD, balloon angioplasty of ostial D1 on 2/11/17) HFrEF LVEF 35% on 6/14/17  new septal hypokinesis and apical akinesis), Apical thrombus seen 6/17 not on AC,  DM (A1C 6.0% in 2/17), prior CVA with residual left paresis, HTN, chronic cocaine who presents with new onset chest pain started last night, initially thought it's gas pain. Pain failed to improve this morning that brought the patient to the ER.  In the ER patient has 4/10 chest pain, he received NTG X 3 with improvement in chest pain down to 1/10.   His EKG revealed anterior new TWI, with old Q waves.  His troponin came back negative.  Patient is compliant on ASA and Plavix.   He reported Cocaine use 2 weeks ago.

## 2018-08-21 NOTE — ASSESSMENT & PLAN NOTE
-Last A1c 2/2017 (6.0%)  -Patient denies being on home meds  -FS q6hrs with hypoglycemia protocol in place

## 2018-08-21 NOTE — PROCEDURES
Procedures   [facility]  Cardiac Catheterization  Procedure Note    SUMMARY     Damir Faria  [id number]  [place]    Date of Procedure: 8/21/2018    Procedure: Left heart catheterization    Provider: Beka Tripathi III, MD    Indications: He was referred for cardiac catheterization to evaluate etiology of chest pain. Indications for the procedure include: unstable angina in the setting of cocaine abuse.      Pre-Procedure Diagnosis: Unstable Angina    Post-Procedure Diagnosis: Stable Coronary Artery Disease    Anesthesia: RN IV Sedation    Description of the Findings of the Procedure:     The risks, benefits, complications, treatment options, and expected outcomes were discussed with the patient. The patient and/or family concurred with the proposed plan, giving informed consent. Patient was brought to the cath lab after IV hydration was begun and oral premedication was given. He was further sedated with fentanyl and midazolam. He was prepped and draped in the usual manner. Using the modified Seldinger access technique, a 5/6 Nigerien sheath was placed in the radial artery. Heparin was administered. A right and left heart catheterization was done. Angiograms were also done.    Interventions:  none    After the procedure was completed, sedation was stopped and the sheaths and catheters were all removed. Hemostasis was achieved with manual pressure.        Left Main  no obstructive disease   RCA  mild to moderate diffuse disease   LAD  mild diffuse disease, patent stent in mid LAD w/ 20% ISR   Circ  mild to moderate diffuse disease                                   Closure Device  Vasc band   Complications  none     Significant Surgical Tasks Conducted by the Assistant(s), if Applicable: none    Complications: None; patient tolerated the procedure well.    Estimated Blood Loss (EBL): Minimal          Condition: stable    Disposition: PACU-hemodynamicallystable    Attestation: I was  present and scrubbed for the entire procedure.

## 2018-08-21 NOTE — ASSESSMENT & PLAN NOTE
-CLEMENCIA SCORE-4  -TERRY 2.0 score-90- 3% Risk of Mortality at 6 months  -Shabbir risk score-3 with risk of SUSANA 7.5 % and need for HD 0.04 %  -Continue ACS protocol-Aspirin, clopidogrel ,heparin  -beta blocker-continue coreg   -high potency statin   - Creatine baseline 1.3-1.6. Admit 1.9 -> Now   - Check Urine drug screen due to cocaine abuse  -Nitro GTT if needed for Chest pain  - Risks and benefits including stroke, bleeding, dialysis and death explained to patient.  - [facility] +/- PCI  - R Radial access

## 2018-08-21 NOTE — ASSESSMENT & PLAN NOTE
-Baseline 1.3-1.6-Today 1.5  -Shabbir risk score as noted above  -Avoid nephrotoxic drugs  -holding lisinopril and aldactone for anticipated LHC on 8/21

## 2018-08-21 NOTE — PROGRESS NOTES
Patient identified by 2 identifiers. Denies previous reactions to blood transfusions and allergies reviewed.  Procedure explained & consent obtained.  20 g IV in place to Rt wrist, flushed w/ 10cc NS pre & post contrast administration.  3cc Optison administered, echo images obtained.  Pt tolerated procedure well.

## 2018-08-21 NOTE — ASSESSMENT & PLAN NOTE
-Last EF 35 % 6/2017  -Pt appears euvolemic on exam  -Repeat echo with CFD pending   -Continue BB  -Will hold potassium sparing diuretic/ACE-I in anticipation of LHC

## 2018-08-21 NOTE — PROGRESS NOTES
[facility]  Cardiology  Progress Note    Patient Name: Damir Faria  MRN: [id number]  Admission Date: 8/20/2018  Hospital Length of Stay: 1 days  Code Status: Full Code   Attending Physician: Kaz Washington MD   Primary Care Physician: Marcia Evans Ariana  Expected Discharge Date:   Principal Problem:Unstable angina    Subjective:     Hospital Course:   He was admitted to the CCU and placed on nitro ggt where he remained CP free. Troponin's x2 (0.006). On 8/21, Interventional Cards consulted for [facility].     Interval History: Overnight, no acute events. This AM, pt seen by Interventional Cards, planning to go for [facility].     Review of Systems   Constitution: Negative for chills, fever, weakness and malaise/fatigue.   Cardiovascular: Negative for chest pain, dyspnea on exertion, irregular heartbeat, leg swelling, near-syncope, orthopnea, palpitations, paroxysmal nocturnal dyspnea and syncope.   Respiratory: Negative for shortness of breath.    Gastrointestinal: Negative for nausea and vomiting.   Genitourinary: Negative for dysuria.   Neurological: Negative for dizziness, focal weakness, headaches and light-headedness.   Psychiatric/Behavioral: The patient is not nervous/anxious.      Objective:     Vital Signs (Most Recent):  Temp: 98.4 °F (36.9 °C) (08/21/18 0702)  Pulse: (!) 57 (08/21/18 0745)  Resp: 14 (08/21/18 0745)  BP: (!) 110/58 (08/21/18 0702)  SpO2: 100 % (08/21/18 0715) Vital Signs (24h Range):  Temp:  [97 °F (36.1 °C)-98.6 °F (37 °C)] 98.4 °F (36.9 °C)  Pulse:  [54-69] 57  Resp:  [14-68] 14  SpO2:  [94 %-100 %] 100 %  BP: ()/(50-79) 110/58     Weight: 88 kg (194 lb)  Body mass index is 27.06 kg/m².     SpO2: 100 %  O2 Device (Oxygen Therapy): room air      Intake/Output Summary (Last 24 hours) at 8/21/2018 0819  Last data filed at 8/21/2018 0600  Gross per 24 hour   Intake 882.45 ml   Output 300 ml   Net 582.45 ml       Lines/Drains/Airways     Peripheral Intravenous Line                  Peripheral IV - Single Lumen 08/20/18 1603 Left Antecubital less than 1 day         Peripheral IV - Single Lumen 08/20/18 1744 Right Wrist less than 1 day                Physical Exam   Constitutional: He is oriented to person, place, and time.   Eyes: Conjunctivae are normal. Pupils are equal, round, and reactive to light.   Neck: Neck supple.   Cardiovascular: Normal rate, regular rhythm, normal heart sounds and intact distal pulses.   Pulmonary/Chest: Effort normal and breath sounds normal.   Abdominal: Soft. Bowel sounds are normal.   Musculoskeletal: Normal range of motion. He exhibits no edema or tenderness.   Neurological: He is alert and oriented to person, place, and time.   Vitals reviewed.      Significant Labs:   BMP:   Recent Labs   Lab  08/20/18   1602  08/21/18   0355   GLU  103  91   NA  140  137   K  4.3  4.5   CL  109  108   CO2  22*  22*   BUN  21  22   CREATININE  1.9*  1.5*   CALCIUM  9.1  8.7       Significant Imaging: Echocardiogram:   2D echo with color flow doppler:   Results for orders placed or performed during the hospital encounter of 06/19/17   2D Echo w/ Color Flow Doppler   Result Value Ref Range    EF 35 (A) 55 - 65    Diastolic Dysfunction Yes (A)     Est. PA Systolic Pressure 23.07     Tricuspid Valve Regurgitation MILD      Assessment and Plan:     Brief HPI: 64 yo M with hx of STEMI (2017, s/p PCI with NAM to mLAD and balloon angioplasty of ostial D1) with post-MI myocardial stunning (EF 35%), DM2, CVA, HTN and chronic cocaine use along with LV mural thrombus not on OAC who presented with unstable angina.     * Unstable angina    -CLEMENCIA SCORE-4  -TERRY 2.0 score-90- 3% Risk of Mortality at 6 months  -Shabbir risk score-3 with risk of SUSANA 7.5 % and need for HD 0.04 %  -c/w ACS protocol- (DAPT) Aspirin, clopidogrel ,heparin; BB and statin  -c/w nitro ggt  -interventional cardiology following, planning [facility] on 8/21  -TTE pending          Ischemic cardiomyopathy    -Last EF 35  % 6/2017  -Pt appears euvolemic on exam  -Repeat echo with CFD pending   -Continue BB  -Will hold potassium sparing diuretic/ACE-I in anticipation of LHC        LV (left ventricular) mural thrombus following MI    -Patient does not take coumadin at home  -Thrombus seen on last echo 6/2017  -Heparin gtt for AC presently   -Repeat TTE pending         CARTER (acute kidney injury)    -Baseline 1.3-1.6-Today 1.5  -Shabbir risk score as noted above  -Avoid nephrotoxic drugs  -holding lisinopril and aldactone for anticipated LHC on 8/21        Liver mass    -Hemangioma last noted 9/2016 on Triple Phase CT        Cocaine abuse    -Reports last use 2 weeks ago  -UDS positive   -counseling provided to patient        Type 2 diabetes mellitus without complication, without long-term current use of insulin    -Last A1c 2/2017 (6.0%)  -Patient denies being on home meds  -FS q6hrs with hypoglycemia protocol in place        Hyperlipidemia LDL goal <70    -recent LDL level 95  -continue with high intensity statin              VTE Risk Mitigation (From admission, onward)        Ordered     IP VTE HIGH RISK PATIENT  Once      08/20/18 1808     heparin 25,000 units in dextrose 5% 250 mL (100 units/mL) infusion LOW INTENSITY nomogram - [facility]  Continuous      08/20/18 1608          Charla Ngo MD  Cardiology Fellow   [facility]

## 2018-08-21 NOTE — ASSESSMENT & PLAN NOTE
-Baseline 1.3-1.6-Today 1.9  -Shabbri risk score as noted above  -Avoid nephrotoxic drugs  -will hold lisinopril and aldactone

## 2018-08-21 NOTE — SUBJECTIVE & OBJECTIVE
Interval History: Overnight, no acute events. This AM, pt seen by Interventional Cards, planning to go for [facility].     Review of Systems   Constitution: Negative for chills, fever, weakness and malaise/fatigue.   Cardiovascular: Negative for chest pain, dyspnea on exertion, irregular heartbeat, leg swelling, near-syncope, orthopnea, palpitations, paroxysmal nocturnal dyspnea and syncope.   Respiratory: Negative for shortness of breath.    Gastrointestinal: Negative for nausea and vomiting.   Genitourinary: Negative for dysuria.   Neurological: Negative for dizziness, focal weakness, headaches and light-headedness.   Psychiatric/Behavioral: The patient is not nervous/anxious.      Objective:     Vital Signs (Most Recent):  Temp: 98.4 °F (36.9 °C) (08/21/18 0702)  Pulse: (!) 57 (08/21/18 0745)  Resp: 14 (08/21/18 0745)  BP: (!) 110/58 (08/21/18 0702)  SpO2: 100 % (08/21/18 0715) Vital Signs (24h Range):  Temp:  [97 °F (36.1 °C)-98.6 °F (37 °C)] 98.4 °F (36.9 °C)  Pulse:  [54-69] 57  Resp:  [14-68] 14  SpO2:  [94 %-100 %] 100 %  BP: ()/(50-79) 110/58     Weight: 88 kg (194 lb)  Body mass index is 27.06 kg/m².     SpO2: 100 %  O2 Device (Oxygen Therapy): room air      Intake/Output Summary (Last 24 hours) at 8/21/2018 0819  Last data filed at 8/21/2018 0600  Gross per 24 hour   Intake 882.45 ml   Output 300 ml   Net 582.45 ml       Lines/Drains/Airways     Peripheral Intravenous Line                 Peripheral IV - Single Lumen 08/20/18 1603 Left Antecubital less than 1 day         Peripheral IV - Single Lumen 08/20/18 1744 Right Wrist less than 1 day                Physical Exam   Constitutional: He is oriented to person, place, and time.   Eyes: Conjunctivae are normal. Pupils are equal, round, and reactive to light.   Neck: Neck supple.   Cardiovascular: Normal rate, regular rhythm, normal heart sounds and intact distal pulses.   Pulmonary/Chest: Effort normal and breath sounds normal.   Abdominal: Soft. Bowel  sounds are normal.   Musculoskeletal: Normal range of motion. He exhibits no edema or tenderness.   Neurological: He is alert and oriented to person, place, and time.   Vitals reviewed.      Significant Labs:   BMP:   Recent Labs   Lab  08/20/18   1602  08/21/18   0355   GLU  103  91   NA  140  137   K  4.3  4.5   CL  109  108   CO2  22*  22*   BUN  21  22   CREATININE  1.9*  1.5*   CALCIUM  9.1  8.7       Significant Imaging: Echocardiogram:   2D echo with color flow doppler:   Results for orders placed or performed during the hospital encounter of 06/19/17   2D Echo w/ Color Flow Doppler   Result Value Ref Range    EF 35 (A) 55 - 65    Diastolic Dysfunction Yes (A)     Est. PA Systolic Pressure 23.07     Tricuspid Valve Regurgitation MILD

## 2018-08-21 NOTE — H&P
[facility]  Cardiology  History and Physical     Patient Name: Damir Faria  MRN: [id number]  Admission Date: 8/20/2018  Code Status: Full Code   Attending Provider: Kaz Washington MD   Primary Care Physician: [facility]  Principal Problem:Unstable angina    Patient information was obtained from patient, past medical records and ER records.     Subjective:     Chief Complaint:  Chest pain     HPI:  61 y.o. gentleman with history of  STEMI 2/12/17 in the LAD territory in the setting of cocaine abuse (s/p PCI with NAM to m LAD, balloon angioplasty of ostial D1 on 2/11/17) with post-MI myocardial stunning (LVEF 35% on2 /14/17 and again on 6/2017 while previous LVEF 55% 9 months ago; new septal hypokinesis and apical akinesis), DM (A1C 6.0% in 2/17,not on meds), prior CVA with residual left paresis per patient(none on exam), HTN, chronic cocaine use(last 2 weeks ago), LV mural thrombus(not on coumadin, last noted 6/2017 and was on coumadiN) who presents with new onset chest pain started last night around 8 pm initially thought it's gas pain. Pain failed to improve this morning that brought the patient to the ER.He received NTG X 3 with Resolution of chest pain-None on my interview.His EKG revealed anterior new TWI(compared to EKG 11/2017 -however similar to EKG 3/2017), with old Q waves.  His troponin came back negative.Patient reports compliant on ASA and Plavix.   Crt 1.3-1.6 baseline-1.9 today.      Past Medical History:   Diagnosis Date    Acute renal insufficiency 6/21/2015    Cocaine abuse     Depression     History of psychiatric care     HTN (hypertension) 1/25/2014    Stroke        Past Surgical History:   Procedure Laterality Date    NECK SURGERY         Review of patient's allergies indicates:  No Known Allergies    No current facility-administered medications on file prior to encounter.      Current Outpatient Medications on File Prior to Encounter   Medication Sig     aspirin (ECOTRIN) 81 MG EC tablet Take 81 mg by mouth once daily.    atorvastatin (LIPITOR) 80 MG tablet Take 1 tablet (80 mg total) by mouth once daily.    carvedilol (COREG) 12.5 MG tablet Take 1 tablet (12.5 mg total) by mouth 2 (two) times daily with meals.    clopidogrel (PLAVIX) 75 mg tablet Take 1 tablet (75 mg total) by mouth once daily.    lisinopril 10 MG tablet Take 1 tablet (10 mg total) by mouth once daily.    mirtazapine (REMERON) 15 MG tablet Take 15 mg by mouth.    spironolactone (ALDACTONE) 25 MG tablet Take 1 tablet (25 mg total) by mouth once daily.    doxepin (SINEQUAN) 100 MG capsule Take 100 mg by mouth every evening.    nitroGLYCERIN (NITROSTAT) 0.4 MG SL tablet Place 1 tablet (0.4 mg total) under the tongue every 5 (five) minutes as needed for Chest pain.     Family History     Problem Relation (Age of Onset)    Diabetes Mother    Heart disease Mother    Hypertension Mother        Tobacco Use    Smoking status: Current Some Day Smoker     Packs/day: 0.25     Types: Cigarettes    Smokeless tobacco: Never Used   Substance and Sexual Activity    Alcohol use: Yes     Comment: 6pk/week    Drug use: Yes     Types: Cocaine     Comment: last used a few days ago    Sexual activity: Yes     Partners: Female     Birth control/protection: Condom     Review of Systems   All other systems reviewed and are negative.    Objective:     Vital Signs (Most Recent):  Temp: 98.6 °F (37 °C) (08/20/18 1518)  Pulse: 68 (08/20/18 1802)  Resp: 17 (08/20/18 1802)  BP: 131/76 (08/20/18 1802)  SpO2: 98 % (08/20/18 1802) Vital Signs (24h Range):  Temp:  [98.6 °F (37 °C)] 98.6 °F (37 °C)  Pulse:  [61-69] 68  Resp:  [17-20] 17  SpO2:  [95 %-99 %] 98 %  BP: (104-132)/(62-76) 131/76     Weight: 88 kg (194 lb)  Body mass index is 27.06 kg/m².    SpO2: 98 %  O2 Device (Oxygen Therapy): room air    No intake or output data in the 24 hours ending 08/20/18 1846    Lines/Drains/Airways     Peripheral Intravenous  Line                 Peripheral IV - Single Lumen 08/20/18 1603 Left Antecubital less than 1 day         Peripheral IV - Single Lumen 08/20/18 1744 Right Wrist less than 1 day                Physical Exam  General: alert, awake and oriented x 3  Eyes:PERRL.   Neck:no JVD   Lungs:  clear to auscultation bilaterally   Cardiovascular: Heart: regular rate and rhythm, S1, S2 normal, no murmur, click, rub or gallop.   Chest Wall: no tenderness.   Pulses-2+ radial,PT/DP  Extremities: no cyanosis or edema.   Abdomen/Rectal: Abdomen: soft, non-tender non-distented; bowel sounds normal  Neurologic: Normal strength and tone. No focal numbness or weakness  Significant Labs:   CMP   Recent Labs   Lab  08/20/18   1602   NA  140   K  4.3   CL  109   CO2  22*   GLU  103   BUN  21   CREATININE  1.9*   CALCIUM  9.1   PROT  7.6   ALBUMIN  3.8   BILITOT  0.6   ALKPHOS  81   AST  26   ALT  31   ANIONGAP  9   ESTGFRAFRICA  42.1*   EGFRNONAA  36.4*    and CBC   Recent Labs   Lab  08/20/18   1602   WBC  5.28   HGB  15.0   HCT  45.3   PLT  223       Significant Imaging: Echocardiogram:   2D echo with color flow doppler:   Results for orders placed or performed during the hospital encounter of 06/19/17   2D Echo w/ Color Flow Doppler   Result Value Ref Range    EF 35 (A) 55 - 65    Diastolic Dysfunction Yes (A)     Est. PA Systolic Pressure 23.07     Tricuspid Valve Regurgitation MILD      Assessment and Plan:     * Unstable angina    Unstable angina  -CLEMENCIA SCORE-4  -TERRY 2.0 score-90- 3% Risk of Mortality at 6 months  -Shabbir risk score-3 with risk of SUSANA 7.5 % and need for HD 0.04 %  -ACS protocol-Aspirin, clopidogrel ,heparin  -beta blocker-continue coreg   -high potency statin  -interventional cardiology consult  -Keep NPO after midnight  -Echo with color flow in am  -EKG prn chest pain  -Nitro GTT  -cardiac monitoring        Ischemic cardiomyopathy    -Last EF 35 % 6/2017  -Euvolemic on exam  -Repeat echo with CFD  -Continue BB  -Will  hold ACE-I in anticipation of C        LV (left ventricular) mural thrombus following MI    -Not taking coumadin at home  -Thrombus seen on last echo 6/2017  -Heparin gtt for now  -Repeat Echo in am        CARTER (acute kidney injury)    -Baseline 1.3-1.6-Today 1.9  -Shabbir risk score as noted above  -Avoid nephrotoxic drugs  -will hold lisinopril and aldactone        Liver mass    -C/w Hemangioma last noted 9/2016 on Triple Phase CT        Cocaine abuse    -Reports last use 2 weeks ago  -Check Urine for drug screen  -counselling        Type 2 diabetes mellitus without complication, without long-term current use of insulin    -Last A1c 2/2017 6.0  -Patient denies being on meds  -Follow fasting BS  -Check A1c        Hyperlipidemia LDL goal <70    -last LDL 2/2017 with   -continue High intensity statin  -Recheck lipid profile            VTE Risk Mitigation (From admission, onward)        Ordered     IP VTE HIGH RISK PATIENT  Once      08/20/18 1808     heparin 25,000 units in dextrose 5% 250 mL (100 units/mL) infusion LOW INTENSITY nomogram - [facility]  Continuous      08/20/18 1608          Agustín Oneil MD  Cardiology   [facility]

## 2018-08-22 VITALS
TEMPERATURE: 99 F | DIASTOLIC BLOOD PRESSURE: 72 MMHG | SYSTOLIC BLOOD PRESSURE: 128 MMHG | HEIGHT: 71 IN | WEIGHT: 194 LBS | BODY MASS INDEX: 27.16 KG/M2 | OXYGEN SATURATION: 98 % | RESPIRATION RATE: 17 BRPM | HEART RATE: 62 BPM

## 2018-08-22 LAB
INR PPP: 1.1
PROTHROMBIN TIME: 10.9 SEC

## 2018-08-22 PROCEDURE — 25000003 PHARM REV CODE 250: Performed by: STUDENT IN AN ORGANIZED HEALTH CARE EDUCATION/TRAINING PROGRAM

## 2018-08-22 PROCEDURE — 25000003 PHARM REV CODE 250: Performed by: INTERNAL MEDICINE

## 2018-08-22 PROCEDURE — 85610 PROTHROMBIN TIME: CPT

## 2018-08-22 RX ORDER — NITROGLYCERIN 0.4 MG/1
0.4 TABLET SUBLINGUAL EVERY 5 MIN PRN
Qty: 30 TABLET | Refills: 11 | Status: ON HOLD | OUTPATIENT
Start: 2018-08-22 | End: 2019-07-15 | Stop reason: HOSPADM

## 2018-08-22 RX ORDER — WARFARIN SODIUM 5 MG/1
5 TABLET ORAL DAILY
Qty: 30 TABLET | Refills: 11 | Status: ON HOLD | OUTPATIENT
Start: 2018-08-22 | End: 2019-07-15 | Stop reason: HOSPADM

## 2018-08-22 RX ADMIN — CARVEDILOL 12.5 MG: 12.5 TABLET, FILM COATED ORAL at 09:08

## 2018-08-22 RX ADMIN — PANTOPRAZOLE SODIUM 40 MG: 40 TABLET, DELAYED RELEASE ORAL at 09:08

## 2018-08-22 NOTE — ASSESSMENT & PLAN NOTE
-Last EF 35 % 6/2017 - repeat from 8/21/2018 with EF 40%  -Pt appears euvolemic on exam  -Repeat echo with CFD pending   -Continue BB  - restart lisinopril/aldactone on discharge

## 2018-08-22 NOTE — ASSESSMENT & PLAN NOTE
-CLEMENCIA SCORE-4  -TERRY 2.0 score-90- 3% Risk of Mortality at 6 months  -Shabbir risk score-3 with risk of SUSANA 7.5 % and need for HD 0.04 %  - resume beta blocker, acei, aldactone, high intensity statin as no intervention following LHC  - discharge on warfarin and aspirin  - SL NTG PRN

## 2018-08-22 NOTE — ASSESSMENT & PLAN NOTE
-Baseline 1.3-1.6-Today 1.5  -Shabbir risk score as noted above  -Avoid nephrotoxic drugs  - resume anti-hypertensives following discharge, lisinopril/aldactone

## 2018-08-22 NOTE — ASSESSMENT & PLAN NOTE
-Patient does not take coumadin at home - poor medication adherence   -Thrombus seen on last echo 6/2017 - remains present on 8/21/2018 echo  -Repeat TTE with   1 - Mildly to moderately depressed left ventricular systolic function (EF 40-45%). LV  apex is aneurysmal. There is a large apical LV clot seen attached to the inferoapical segment measuring 1.5 x 1.2 cm.    2 - Impaired LV relaxation, normal LAP (grade 1 diastolic dysfunction).     3 - Right atrial enlargement.     4 - Normal right ventricular systolic function .     5 - The estimated PA systolic pressure is 26 mmHg.     6 - Trivial mitral regurgitation.     7 - Trivial tricuspid regurgitation.     8 - There is thrombus in the cardiac apex.     9 - Wall motion abnormalities.

## 2018-08-22 NOTE — PLAN OF CARE
08/22/18 1213   Final Note   Assessment Type Final Discharge Note   Discharge Disposition Home   What phone number can be called within the next 1-3 days to see how you are doing after discharge? [phone]   Hospital Follow Up  Appt(s) scheduled? Yes   Discharge plans and expectations educations in teach back method with documentation complete? Yes   Right Care Referral Info   Post Acute Recommendation Other   Referral Type coumadin clinic-active patient     Called [facility] [phone] and spoke with  to schedule a discharge f/u appt for the patient with his PCP Dr. Emily Malloy. The next available appt is This Friday. [facility] will contact the patient to coordinate the time. She stated it will be in the afternoon. Faxed discharge summary to [facility] for Dr. Malloy to review for her records. The patient is current with coumadin clinic however, has significant compliance issues with administration. The patient was last seen in Cardiology clinic by Dr. Man for medical clearance for full mouth teeth extraction on 7/6/2018. Called [employer] and next available appt for service is 4pm today. Will call back bedside RN to report of time of . [facility] will  the patient beside bed [id number]. [employer] [facility] [phone].

## 2018-08-22 NOTE — NURSING
Verbal and written discharge instructions given.  Verbalizes understanding.   to arrange transportation home.

## 2018-08-22 NOTE — DISCHARGE SUMMARY
[facility]  Cardiology  Discharge Summary      Patient Name: Damir Faria  MRN: [id number]  Admission Date: 8/20/2018  Hospital Length of Stay: 2 days  Discharge Date and Time:  08/22/2018 10:09 AM  Attending Physician: Kaz Washington MD    Discharging Provider: Rufino Mann MD  Primary Care Physician: [facility]    HPI:   63 year old male with past medical history significant for STEMI 2/12/17 in the LAD territory in the setting of cocaine abuse (s/p PCI with NAM to m LAD, balloon angioplasty of ostial D1 on 2/11/17) with post-MI myocardial stunning (LVEF 35% on /14/17 while previous LVEF 55% 9 months ago; new septal hypokinesis and apical akinesis), DM (A1C 6.0% in 2/17), prior CVA with residual left paresis, HTN, chronic cocaine who presents with new onset chest pain the night before presentation, initially thought to be due to gas pain. The pain failed to improve the morning of presentation that brought the patient to the ER. In the ER patient has 4/10 chest pain, he received NTG X 3 with improvement in chest pain down to 1/10. His EKG revealed anterior new TWI, with old Q waves. His troponin came back negative. Patient is compliant on ASA and Plavix. He reported cocaine use 2 weeks ago.         Procedure(s) (LRB):  Left heart cath (Left)     Indwelling Lines/Drains at time of discharge:  Lines/Drains/Airways          None          Hospital Course:  He was admitted to the CCU and placed on nitro ggt where he remained CP free. Troponin's x2 (0.006). [facility] found 30% RCA stenosis no intervention. 2D echo with reduced EF 40%, +WMA, LV aneurysmal with LV thrombus. Patient restarted on Warfarin for LV thrombus. Patient with no further chest pain, ambulating without difficulty. Encouraged cocaine cessation and follow up with [facility]. Ambulatory referral placed to [facility] cou[facility]in clinic.     Vitals:    08/22/18 1000   BP: 132/79   Pulse: 62   Resp: 20   Temp:       Physical Exam   Constitutional: He is oriented to person, place, and time.   Eyes: Conjunctivae are normal. Pupils are equal, round, and reactive to light.   Neck: Neck supple.   Cardiovascular: Normal rate, regular rhythm, normal heart sounds and intact distal pulses.   Pulmonary/Chest: Effort normal and breath sounds normal.   Abdominal: Soft. Bowel sounds are normal.   Musculoskeletal: Normal range of motion. He exhibits no edema or tenderness.   Neurological: He is alert and oriented to person, place, and time.   Vitals reviewed.           Consults:   Consults (From admission, onward)        Status Ordering Provider     Inpatient consult to Cardiac Rehab  Once     Provider:  (Not yet assigned)    Completed EDWARD GALLEGO     Inpatient consult to Cardiology  Once     Provider:  (Not yet assigned)    Completed IMAN ROJO     Inpatient consult to Interventional Cardiology  Once     Provider:  (Not yet assigned)    Completed EDWARD GALLEGO     Inpatient consult to Registered Dietitian/Nutritionist  Once     Provider:  (Not yet assigned)    Completed EDWARD GALLEGO     Inpatient consult to Social Work  Once     Provider:  (Not yet assigned)    Acknowledged PETER BILLINGSLEY     Inpatient consult to Social Work/Case Management  Once     Provider:  (Not yet assigned)    Acknowledged EDWARD GALLEGO          Significant Diagnostic Studies: Labs: All labs within the past 24 hours have been reviewed    Pending Diagnostic Studies:     Procedure Component Value Units Date/Time    Troponin I #2 [[id number]] Collected:  08/20/18 1845    Order Status:  Sent Lab Status:  In process Updated:  08/20/18 1845    Specimen:  Blood           Final Active Diagnoses:    Diagnosis Date Noted POA    PRINCIPAL PROBLEM:  Unstable angina [I20.0] 08/20/2018 Yes    Long term (current) use of anticoagulants [Z79.01] 06/21/2017 Not Applicable    Chest pain [R07.9] 08/20/2018 Yes    Ischemic cardiomyopathy [I25.5] 07/06/2018 Yes    LV  (left ventricular) mural thrombus following MI [I23.6] 06/21/2017 Yes    CARTER (acute kidney injury) [N17.9] 02/14/2017 Yes    Cocaine abuse [F14.10] 09/28/2016 Yes    Type 2 diabetes mellitus without complication, without long-term current use of insulin [E11.9] 09/28/2016 Yes    Liver mass [R16.0] 09/28/2016 Yes    Hyperlipidemia LDL goal <70 [E78.5] 05/25/2016 Yes      Problems Resolved During this Admission:     * Unstable angina    -CLEMENCIA SCORE-4  -TERRY 2.0 score-90- 3% Risk of Mortality at 6 months  -Shabbir risk score-3 with risk of SUSANA 7.5 % and need for HD 0.04 %  - resume beta blocker, acei, aldactone, high intensity statin as no intervention following C  - discharge on warfarin and aspirin  - SL NTG PRN           Chest pain    - resolved        Ischemic cardiomyopathy    -Last EF 35 % 6/2017 - repeat from 8/21/2018 with EF 40%  -Pt appears euvolemic on exam  -Repeat echo with CFD pending   -Continue BB  - restart lisinopril/aldactone on discharge        LV (left ventricular) mural thrombus following MI    -Patient does not take coumadin at home - poor medication adherence   -Thrombus seen on last echo 6/2017 - remains present on 8/21/2018 echo  -Repeat TTE with   1 - Mildly to moderately depressed left ventricular systolic function (EF 40-45%). LV  apex is aneurysmal. There is a large apical LV clot seen attached to the inferoapical segment measuring 1.5 x 1.2 cm.    2 - Impaired LV relaxation, normal LAP (grade 1 diastolic dysfunction).     3 - Right atrial enlargement.     4 - Normal right ventricular systolic function .     5 - The estimated PA systolic pressure is 26 mmHg.     6 - Trivial mitral regurgitation.     7 - Trivial tricuspid regurgitation.     8 - There is thrombus in the cardiac apex.     9 - Wall motion abnormalities.     - discharge patient on aspirin and warfarin  - warfarin INR goal 2.0-3.0  - amb referral to coumadin clinic placed  - records sent to [facility] to assist with  warfarin monitoring if patient doesn't show to [facility] coumadin clinic  - patient given advice on need for close monitoring and risk of bleeding vs stroke if not compliant or not monitored  - no need for triple therapy as last NAM placed 2/2017 and no further NAM placed since then         CARTER (acute kidney injury)    -Baseline 1.3-1.6-Today 1.5  -Shabbir risk score as noted above  -Avoid nephrotoxic drugs  - resume anti-hypertensives following discharge, lisinopril/aldactone        Liver mass    -Hemangioma last noted 9/2016 on Triple Phase CT        Cocaine abuse    -Reports last use 2 weeks ago  -UDS positive   -counseling provided to patient        Type 2 diabetes mellitus without complication, without long-term current use of insulin    -Last A1c 2/2017 (6.0%)  -Patient denies being on home meds  -FS q6hrs with hypoglycemia protocol in place        Hyperlipidemia LDL goal <70    -recent LDL level 95, HDL 42  -continue with high intensity statin              Discharged Condition: stable    Disposition: Home or Self Care    Follow Up:    Patient Instructions:      Diet Cardiac     Activity as tolerated     Medications:  Reconciled Home Medications:      Medication List      START taking these medications    warfarin 5 MG tablet  Commonly known as:  COUMADIN  Take 1 tablet (5 mg total) by mouth Daily.        CHANGE how you take these medications    * nitroGLYCERIN 0.4 MG SL tablet  Commonly known as:  NITROSTAT  Place 1 tablet (0.4 mg total) under the tongue every 5 (five) minutes as needed for Chest pain.  What changed:  Another medication with the same name was added. Make sure you understand how and when to take each.     * nitroGLYCERIN 0.4 MG SL tablet  Commonly known as:  NITROSTAT  Place 1 tablet (0.4 mg total) under the tongue every 5 (five) minutes as needed for Chest pain.  What changed:  You were already taking a medication with the same name, and this prescription was added. Make sure you understand how  and when to take each.         * This list has 2 medication(s) that are the same as other medications prescribed for you. Read the directions carefully, and ask your doctor or other care provider to review them with you.            CONTINUE taking these medications    aspirin 81 MG EC tablet  Commonly known as:  ECOTRIN  Take 81 mg by mouth once daily.     atorvastatin 80 MG tablet  Commonly known as:  LIPITOR  Take 1 tablet (80 mg total) by mouth once daily.     carvedilol 12.5 MG tablet  Commonly known as:  COREG  Take 1 tablet (12.5 mg total) by mouth 2 (two) times daily with meals.     lisinopril 10 MG tablet  Take 1 tablet (10 mg total) by mouth once daily.     mirtazapine 15 MG tablet  Commonly known as:  REMERON  Take 15 mg by mouth.     spironolactone 25 MG tablet  Commonly known as:  ALDACTONE  Take 1 tablet (25 mg total) by mouth once daily.        STOP taking these medications    clopidogrel 75 mg tablet  Commonly known as:  PLAVIX     doxepin 100 MG capsule  Commonly known as:  SINEQUAN            Time spent on the discharge of patient: 35 minutes    Rufino Mann MD  Cardiology  [facility]

## 2018-08-23 ENCOUNTER — PATIENT OUTREACH (OUTPATIENT)
Dept: ADMINISTRATIVE | Facility: CLINIC | Age: 63
End: 2018-08-23

## 2018-08-23 ENCOUNTER — ANTI-COAG VISIT (OUTPATIENT)
Dept: CARDIOLOGY | Facility: CLINIC | Age: 63
End: 2018-08-23

## 2018-08-23 DIAGNOSIS — I23.6 LV (LEFT VENTRICULAR) MURAL THROMBUS FOLLOWING MI: ICD-10-CM

## 2018-08-23 DIAGNOSIS — Z79.01 LONG TERM (CURRENT) USE OF ANTICOAGULANTS: ICD-10-CM

## 2018-08-23 NOTE — PROGRESS NOTES
"Per ROGE Cooper "Mr Faria is being discharged after hospitalization for UA.  He still has an LV thrombus present and admits to stopping his coumadin a few months ago.  We are restarting his coumadin and stressed importance.  Please see him within the week. "    Per JAYA Hung "Patient previously enrolled in clinic, last seen 1/2018. Patient with history of poor medication adherence and cocaine use. LV thrombus seen on last echo 6/2017 - remains present on 8/21/2018 echo. Patient to be discharged aspirin and warfarin (INR goal 2-3). Records sent to [facility] clinic to assist with warfarin monitoring if patient no shows our clinic. Warfarin 5mg given 8/21. Patient still admitted, c/s for discharge."     Patient reached today and he plans to follow-up with [facility]. He has an appointment 8/31 with his PCP at [facility]. I advised him to follow-up Monday 8/27 for an INR and to contact us if he is unable to have this done at [facility] on Monday. He is now discharged from our services.  "

## 2018-08-23 NOTE — PATIENT INSTRUCTIONS
Discharge Instructions for Angina  You have been diagnosed with a type of chest pain called angina. Angina occurs when not enough oxygen reaches the heart muscle. It is most often felt under your breastbone, in your left shoulder, or down your left arm. The pain may even spread to your jaw or back. Exercise, increased activity, emotional upset, or stress can trigger this pain. With proper treatment and lifestyle changes to reduce risk factors, most people with angina are able to maintain a full and active life.  Managing risk factors  Your healthcare provider will work with you to make lifestyle changes as needed. This can help prevent worsening of coronary artery disease, which is likely the cause of your angina.  Coronary artery disease is a narrowing of the blood vessels that supply oxygen and nutrients to the heart muscle. The blood vessels can also spasm and reduce the oxygen reaching the heart muscle from the narrowing inside of the artery. Managing your risk factors may prevent both of these causes of narrowing of your arteries.  Diet  Your healthcare provider will give you information on dietary changes that you may need to make, based on your situation. Your provider may recommend that you see a registered dietitian for help with diet changes. Try these changes to start:    · Reduce fat and cholesterol intake   · Reduce sodium (salt) intake, especially if you have high blood pressure   · Increase your intake of fresh vegetables and fruits   · Eat lean proteins, such as fish, poultry, and legumes (beans and peas) and eat less red meat and processed meats  · Use low-fat dairy products   · Use vegetable and nut oils in limited amounts   · Limit sweets and processed foods such as chips, cookies, and baked goods  · Limit sodas and high calorie drinks  · Limit greasy and fried foods, or those high in saturated fat  · Limit alcohol intake  Physical activity  Your healthcare provider may recommend that you  increase your physical activity if you have not been as active as possible. This may include moderate to vigorous intensity physical activity for at least 30 to 60 minutes each day for at least 5 to 7 days per week. A few examples of moderate to vigorous intensity physical activity include:   · Walking at a brisk pace, about 3 to 4 miles per hour  · Jogging or running  · Swimming or water aerobics  · Hiking  · Dancing  · Martial arts  · Tennis  · Riding a bike  Don't start or increase your activity level without first seeing your healthcare provider.  Weight management  If you are overweight, your healthcare provider will work with you to lose weight and lower your BMI (body mass index) to a normal or near-normal level. Making diet changes and increasing physical activity can help. A healthy and reasonable goal for weight loss is to lose 10% of your current weight per year.  Smoking  If you smoke, break the smoking habit. Enroll in a stop-smoking program to improve your chances of success. You can also join a support group. Talk to your healthcare provider about nicotine replacement products or medicines to help you quit.  Stress  Learn ways to manage stress to help you deal with stress in your home and work life. Your ability to prioritize your health depends on your mental health and focus. Feeling supported in the rest of your life is key to achieving success with your health.  Managing medicines  · Keep a record of your episodes of chest pain. Take these with you when you see your healthcare provider.  · Take your medicines exactly as directed. Dont skip doses. If you miss a dose, call your healthcare provider right away.  · If you have unwanted side effects from your medicine, tell your healthcare provider right away.  Taking nitroglycerin  · Keep your nitroglycerin with you at all times.  · If youre on nitroglycerin, dont take medicines used to treat erectile dysfunction (such as sildenafil) at all. These  can react with nitroglycerin and cause your blood pressure to drop to a dangerous or even life-threatening level.  · If you use nitroglycerin to prevent angina attacks, follow your healthcare providers instructions for your kind of nitroglycerin (pill, spray, or skin patch).  · If you use nitroglycerin to stop an angina attack, follow these steps:  ¨ Sit down (you may become dizzy).  ¨ Place 1 tablet under your tongue, or between your lip and gum, or between your cheek and gum. Let the tablet dissolve completely; do not chew or swallow the tablet.  ¨ If you use a spray, then spray once on or under your tongue. Do not inhale. Close your mouth. Wait a few seconds before you swallow and don't rinse your mouth for 5 to 10 minutes.  ¨ After taking 1 tablet or spraying once, continue sitting for 5 minutes.  ¨ If the angina goes away completely, rest awhile and follow your provider's orders about returning to your normal routine.  ¨ If the chest pain or pressure continues, CALL 911 immediately. Do NOT delay. You may be having a heart attack (acute myocardial infarction, or AMI)!  ¨ You may be told by your doctor to CALL 911 after taking 2 or 3 tables or sprays of nitroglycerin (spaced 5 minutes apart) and the chest pain or pressure is still present 5 minutes after the last dose. Do not take more than 3 tablets, or spray more than 3 times, within 15 minutes.   When to call your doctor  Call your doctor immediately if you have any of these:  · Severe headache  · Severe dizziness, or fainting  · Nausea or vomiting  · Fast heartbeat (higher than 100 beats per minute)  · Swollen ankles  · Weakness  · Angina attacks that last longer, occur more often, or are more severe than in the past  · Angina that occurs at rest, wakes you up out of sleep, or does not resolve   Date Last Reviewed: 6/1/2016  © 4217-4946 [employer]. [address] [place] [address], [place] [address]. All rights reserved. This information is not intended  as a substitute for professional medical care. Always follow your healthcare professional's instructions.

## 2018-08-25 ENCOUNTER — NURSE TRIAGE (OUTPATIENT)
Dept: ADMINISTRATIVE | Facility: CLINIC | Age: 63
End: 2018-08-25

## 2018-08-25 NOTE — TELEPHONE ENCOUNTER
Reason for Disposition   Caller has already spoken with another triager or PCP AND has further questions AND triager able to answer questions.    Protocols used: ST NO CONTACT OR DUPLICATE CONTACT CALL-A-AH    Patient returned the call for triage nurse DELORIS Simpson RN. He wanted to know how much coumadin he should take for tonight. Patient was informed that he should take 7.5 mg tonight and f/u with pcp and coumadin clinic on Monday. Marcelo Faria verbalized understanding.

## 2018-08-25 NOTE — TELEPHONE ENCOUNTER
Pt late taken coumadin today - ok to take 45 mins late. INR 1.7, pt on it for heart attack. Spoke with dr tavarez re - take 7.5 mg today. call coumadin clinic and cardiologist Monday. Pt notified via  at 558pm. Call back with questions     Reason for Disposition   Caller has URGENT medication question about med that PCP prescribed and triager unable to answer question    Protocols used: ST MEDICATION QUESTION CALL-A-AH

## 2018-08-28 NOTE — PHYSICIAN QUERY
PT Name: Damir Faria  MR #: [id number]    Physician Query Form - Emergency Medicine Diagnosis Clarification     CDS/: Maria A Brewer               Contact information: [email or web address]    This form is a permanent document in the medical record.     Query Date: August 28, 2018    By submitting this query, we are merely seeking further clarification of documentation.  Please utilize your independent clinical judgment when addressing the question(s) below.      The Medical record contains the following:     Diagnosis Supporting Clinical Information Location in Medical Record   NSTEMI   Pre-Procedure Diagnosis: Unstable Angina     Post-Procedure Diagnosis: Stable Coronary Artery Disease    He was admitted to the CCU and placed on nitro ggt where he remained CP free. Troponin's x2 (0.006). LHC found 30% RCA stenosis no intervention. 2D echo with reduced EF 40%, +WMA, LV aneurysmal with LV thrombus. Patient restarted on Warfarin for LV thrombus. Patient with no further chest pain, ambulating without difficulty. Encouraged cocaine cessation and follow up with [facility]. Ambulatory referral placed to [facility] coumadin clinic.      Emergency provider note   Procedure note           Discharge summary      Do you agree with the Emergency Medicine diagnosis of __NSTEMI __?    [  ] Yes  [  ] Yes, but it resolved prior to my assessment of the patient  [ X ] No  [  ] Clinically Insignificant  [  ] Other/Clarification of Findings:_________________________________  [  ] Clinically Undetermined    Please document in your progress notes daily for the duration of treatment until resolved and include in your discharge summary.

## 2018-09-06 NOTE — PHYSICIAN QUERY
"PT Name: Damir Faria  MR #: [id number]    Physician Query Form - Heart  Condition Clarification     CDS/: Maria A Brewer               Contact information: [email or web address]    This form is a permanent document in the medical record.     Query Date: September 6, 2018    By submitting this query, we are merely seeking further clarification of documentation. Please utilize your independent clinical judgment when addressing the question(s) below.    The medical record contains the following   Indicators     Supporting Clinical Findings Location in Medical Record   x BNP 67 Labs 8/20    x EF EF 40-45%) 2 D echo 8/21     Radiology findings     x Echo Results  1 - Mildly to moderately depressed left ventricular systolic function (EF 40-45%). LV  apex is aneurysmal. There is a large apical LV clot seen attached to the inferoapical segment measuring 1.5 x 1.2 cm.    2 - Impaired LV relaxation, normal LAP (grade 1 diastolic dysfunction).     3 - Right atrial enlargement.     4 - Normal right ventricular systolic function .     5 - The estimated PA systolic pressure is 26 mmHg.     6 - Trivial mitral regurgitation.     7 - Trivial tricuspid regurgitation.     8 - There is thrombus in the cardiac apex.     9 - Wall motion abnormalities.  2 D Echo 8/21     "Ascites" documented      "SOB" or "KERN" documented      "Hypoxia" documented      Heart Failure documented      "Edema" documented     x Diuretics/Meds carvedilol tablet 12.5 mg   Dose: 12.5 mg  Freq: 2 times daily with meals Route: Oral  Start: 08/20/18 1915 End: 08/22/18 1837    lisinopril tablet 10 mg   Dose: 10 mg  Freq: Daily Route: Oral  Start: 08/21/18 0900 End: 08/20/18 1933 MAR           MAR     Treatment:     x Other:  with post-MI myocardial stunning (LVEF 35% on /14/17 while previous LVEF 55% 9 months ago; new septal hypokinesis and apical akinesis),     Ischemic cardiomyopathy       Discharge summary    Heart failure (HF) can be acute, chronic or " both. It is generally further specificed as systolic, diastolic, or combined. Lastly, it is important to identify an underlying etiology if known or suspected.     Common clues to acute exacerbation:  Rapidly progressive symptoms (w/in 2 weeks of presentation), using IV diuretics to treat, using supplemental O2, pulmonary edema on Xray, MI w/in 4 weeks, and/or BNP >500    Systolic Heart Failure: is defined as chart documentation of a left ventricular ejection fraction (LVEF) less than 40%     Diastolic Heart Failure: is defined as a left ventricular ejection fraction (LVEF) greater than 40%   +      Evidence of diastolic dysfunction on echocardiography OR    Right heart catheterization wedge pressure above 12 mm Hg OR    Left heart catheterization left ventricular end diastolic pressure 18 mm Hg or above.    References: *American Heart Association    The clinical guidelines noted below are only system guidelines, and do not replace the providers clinical judgment.     Provider, please specify the diagnosis associated with above clinical findings    [  X ] Chronic Systolic Heart Failure - Pre-existing systolic HF diagnosis.  EF < 40%  without  acute HF symptoms documented  [   ] Chronic Combined Systolic and Diastolic Heart Failure  [   ] Other Type of Heart Failure (please specify type): _________________________  [   ] Heart Failure Ruled Out  [   ] Other (please specify): ___________________________________  [   ] Clinically Undetermined                          Please document in your progress notes daily for the duration of treatment until resolved and include in your discharge summary.

## 2019-01-05 ENCOUNTER — HOSPITAL ENCOUNTER (EMERGENCY)
Facility: HOSPITAL | Age: 64
Discharge: HOME | End: 2019-01-05
Attending: EMERGENCY MEDICINE
Payer: COMMERCIAL

## 2019-01-05 VITALS
HEIGHT: 71 IN | SYSTOLIC BLOOD PRESSURE: 134 MMHG | HEART RATE: 66 BPM | OXYGEN SATURATION: 80 % | TEMPERATURE: 99 F | WEIGHT: 206 LBS | BODY MASS INDEX: 28.84 KG/M2 | DIASTOLIC BLOOD PRESSURE: 77 MMHG | RESPIRATION RATE: 16 BRPM

## 2019-01-05 DIAGNOSIS — R06.02 SHORTNESS OF BREATH: ICD-10-CM

## 2019-01-05 DIAGNOSIS — F14.10 COCAINE ABUSE: ICD-10-CM

## 2019-01-05 DIAGNOSIS — R10.30 LOWER ABDOMINAL PAIN: Primary | ICD-10-CM

## 2019-01-05 DIAGNOSIS — R42 DIZZINESS: ICD-10-CM

## 2019-01-05 LAB
ALBUMIN SERPL BCP-MCNC: 4 G/DL
ALP SERPL-CCNC: 78 U/L
ALT SERPL W/O P-5'-P-CCNC: 33 U/L
ANION GAP SERPL CALC-SCNC: 13 MMOL/L
AST SERPL-CCNC: 50 U/L
BASOPHILS # BLD AUTO: 0.03 K/UL
BASOPHILS NFR BLD: 0.5 %
BILIRUB SERPL-MCNC: 1.9 MG/DL
BNP SERPL-MCNC: 119 PG/ML
BUN SERPL-MCNC: 21 MG/DL
CALCIUM SERPL-MCNC: 9.3 MG/DL
CHLORIDE SERPL-SCNC: 105 MMOL/L
CO2 SERPL-SCNC: 21 MMOL/L
CREAT SERPL-MCNC: 1.8 MG/DL
CTP QC/QA: YES
DIFFERENTIAL METHOD: ABNORMAL
EOSINOPHIL # BLD AUTO: 0.1 K/UL
EOSINOPHIL NFR BLD: 1.7 %
ERYTHROCYTE [DISTWIDTH] IN BLOOD BY AUTOMATED COUNT: 13.1 %
EST. GFR  (AFRICAN AMERICAN): 45 ML/MIN/1.73 M^2
EST. GFR  (NON AFRICAN AMERICAN): 38.9 ML/MIN/1.73 M^2
GLUCOSE SERPL-MCNC: 149 MG/DL
HCT VFR BLD AUTO: 45.8 %
HGB BLD-MCNC: 15.5 G/DL
IMM GRANULOCYTES # BLD AUTO: 0.02 K/UL
IMM GRANULOCYTES NFR BLD AUTO: 0.3 %
INR PPP: 1.9
LIPASE SERPL-CCNC: 24 U/L
LYMPHOCYTES # BLD AUTO: 2 K/UL
LYMPHOCYTES NFR BLD: 31.4 %
MCH RBC QN AUTO: 32.1 PG
MCHC RBC AUTO-ENTMCNC: 33.8 G/DL
MCV RBC AUTO: 95 FL
MONOCYTES # BLD AUTO: 0.7 K/UL
MONOCYTES NFR BLD: 10.2 %
NEUTROPHILS # BLD AUTO: 3.6 K/UL
NEUTROPHILS NFR BLD: 55.9 %
NRBC BLD-RTO: 0 /100 WBC
PLATELET # BLD AUTO: 295 K/UL
PMV BLD AUTO: 9.3 FL
POC MOLECULAR INFLUENZA A AGN: NEGATIVE
POC MOLECULAR INFLUENZA B AGN: NEGATIVE
POTASSIUM SERPL-SCNC: 3.7 MMOL/L
PROT SERPL-MCNC: 8.1 G/DL
PROTHROMBIN TIME: 18.7 SEC
RBC # BLD AUTO: 4.83 M/UL
SODIUM SERPL-SCNC: 139 MMOL/L
TROPONIN I SERPL DL<=0.01 NG/ML-MCNC: <0.006 NG/ML
WBC # BLD AUTO: 6.49 K/UL

## 2019-01-05 PROCEDURE — 93005 ELECTROCARDIOGRAM TRACING: CPT

## 2019-01-05 PROCEDURE — 99284 PR EMERGENCY DEPT VISIT,LEVEL IV: ICD-10-PCS | Mod: ,,, | Performed by: EMERGENCY MEDICINE

## 2019-01-05 PROCEDURE — 80053 COMPREHEN METABOLIC PANEL: CPT

## 2019-01-05 PROCEDURE — 85610 PROTHROMBIN TIME: CPT

## 2019-01-05 PROCEDURE — 84484 ASSAY OF TROPONIN QUANT: CPT

## 2019-01-05 PROCEDURE — 85025 COMPLETE CBC W/AUTO DIFF WBC: CPT

## 2019-01-05 PROCEDURE — 99284 EMERGENCY DEPT VISIT MOD MDM: CPT | Mod: 25

## 2019-01-05 PROCEDURE — 93010 EKG 12-LEAD: ICD-10-PCS | Mod: ,,, | Performed by: INTERNAL MEDICINE

## 2019-01-05 PROCEDURE — 99284 EMERGENCY DEPT VISIT MOD MDM: CPT | Mod: ,,, | Performed by: EMERGENCY MEDICINE

## 2019-01-05 PROCEDURE — 83880 ASSAY OF NATRIURETIC PEPTIDE: CPT

## 2019-01-05 PROCEDURE — 83690 ASSAY OF LIPASE: CPT

## 2019-01-05 PROCEDURE — 93010 ELECTROCARDIOGRAM REPORT: CPT | Mod: ,,, | Performed by: INTERNAL MEDICINE

## 2019-01-05 PROCEDURE — 96360 HYDRATION IV INFUSION INIT: CPT

## 2019-01-05 PROCEDURE — 25000003 PHARM REV CODE 250: Performed by: INTERNAL MEDICINE

## 2019-01-05 RX ORDER — ONDANSETRON 4 MG/1
4 TABLET, FILM COATED ORAL EVERY 6 HOURS
Qty: 12 TABLET | Refills: 0 | Status: ON HOLD | OUTPATIENT
Start: 2019-01-05 | End: 2019-07-15 | Stop reason: HOSPADM

## 2019-01-05 RX ORDER — ONDANSETRON 8 MG/1
8 TABLET, ORALLY DISINTEGRATING ORAL
Status: COMPLETED | OUTPATIENT
Start: 2019-01-05 | End: 2019-01-05

## 2019-01-05 RX ORDER — PANTOPRAZOLE SODIUM 40 MG/1
40 TABLET, DELAYED RELEASE ORAL DAILY
Qty: 30 TABLET | Refills: 0 | Status: ON HOLD | OUTPATIENT
Start: 2019-01-05 | End: 2019-07-15 | Stop reason: HOSPADM

## 2019-01-05 RX ADMIN — ONDANSETRON 8 MG: 8 TABLET, ORALLY DISINTEGRATING ORAL at 12:01

## 2019-01-05 RX ADMIN — SODIUM CHLORIDE 500 ML: 0.9 INJECTION, SOLUTION INTRAVENOUS at 11:01

## 2019-01-05 RX ADMIN — ALUMINUM HYDROXIDE, MAGNESIUM HYDROXIDE, AND SIMETHICONE 50 ML: 200; 200; 20 SUSPENSION ORAL at 12:01

## 2019-01-05 NOTE — ED NOTES
Patient is unable to provide a urine sample at this time. Bladder scan 65 ml. Per jean-claude Villanueva to DC patient.

## 2019-01-05 NOTE — PROVIDER PROGRESS NOTES - EMERGENCY DEPT.
Encounter Date: 1/5/2019    ED Physician Progress Notes         EKG - STEMI Decision  Initial Reading: No STEMI present.  Response: No Action Needed.

## 2019-01-05 NOTE — ED PROVIDER NOTES
Encounter Date: 1/5/2019       History     Chief Complaint   Patient presents with    Emesis     vomited all day yest,feeling dizzy moving    Dizziness     unsteady around [place] yest     Mr Faria is a 64 yo M with history of CAD, STEMI in August 2018, HFrEF 40% who presents with an abrupt 1 day history of epigastric, lower bilateral abdominal pain not worsened or improved with eating, mild dyspnea on exertion, subjective lightheadness and poor PO intake. He notes n/v that has occurred several times over the last 24 hours, notes some ability to tolerate bland diet and fluids, but notes that his oral intake has not matched his GI losses, and his last emesis was this morning, last diarrhea was yesterday. He denies fevers, sick contacts, cough or coryza.           Review of patient's allergies indicates:  No Known Allergies  Past Medical History:   Diagnosis Date    Acute renal insufficiency 6/21/2015    Cocaine abuse     Depression     History of psychiatric care     HTN (hypertension) 1/25/2014    Stroke      Past Surgical History:   Procedure Laterality Date    NECK SURGERY       Family History   Problem Relation Age of Onset    Diabetes Mother     Heart disease Mother     Hypertension Mother      Social History     Tobacco Use    Smoking status: Current Some Day Smoker     Packs/day: 0.25     Types: Cigarettes    Smokeless tobacco: Never Used   Substance Use Topics    Alcohol use: Yes     Comment: 6pk/week    Drug use: Yes     Types: Cocaine     Comment: last used a few days ago     Review of Systems   Constitutional: Positive for activity change, appetite change, diaphoresis, fatigue and fever. Negative for chills and unexpected weight change.   HENT: Negative for rhinorrhea, sinus pressure, sore throat, tinnitus and trouble swallowing.    Eyes: Negative for photophobia, redness and visual disturbance.   Respiratory: Positive for shortness of breath. Negative for cough, chest tightness and wheezing.     Cardiovascular: Negative for chest pain, palpitations and leg swelling.   Gastrointestinal: Positive for diarrhea, nausea and vomiting. Negative for abdominal distention, abdominal pain, anal bleeding, blood in stool and constipation.   Endocrine: Negative for polydipsia, polyphagia and polyuria.   Genitourinary: Negative for discharge, dysuria, enuresis, genital sores, penile swelling and scrotal swelling.   Musculoskeletal: Negative for arthralgias, back pain, gait problem, joint swelling and myalgias.   Skin: Negative for color change and rash.   Allergic/Immunologic: Negative for immunocompromised state.   Neurological: Positive for light-headedness. Negative for tremors, numbness and headaches.   Hematological: Negative for adenopathy. Does not bruise/bleed easily.   Psychiatric/Behavioral: Negative for agitation.       Physical Exam     Initial Vitals [01/05/19 1032]   BP Pulse Resp Temp SpO2   113/70 91 18 98.5 °F (36.9 °C) 98 %      MAP       --         Physical Exam    Constitutional: He is not diaphoretic. No distress.   HENT:   Mouth/Throat: Oropharynx is clear and moist. No oropharyngeal exudate.   Eyes: EOM are normal. Pupils are equal, round, and reactive to light. Right eye exhibits no discharge. Left eye exhibits no discharge. No scleral icterus.   Neck: Neck supple. No thyromegaly present. No tracheal deviation present. No JVD present.   Cardiovascular: Normal rate, regular rhythm, normal heart sounds and intact distal pulses. Exam reveals no gallop and no friction rub.    No murmur heard.  Pulmonary/Chest: Breath sounds normal. No stridor. No respiratory distress. He has no wheezes. He has no rhonchi. He has no rales.   Abdominal: Soft. Bowel sounds are normal. He exhibits no distension. There is tenderness (nonspecific lower abdominal TTP ). There is no rebound.   Musculoskeletal: Normal range of motion. He exhibits no edema or tenderness.   Neurological: He is alert and oriented to person,  place, and time. He displays normal reflexes. No cranial nerve deficit or sensory deficit. GCS score is 15. GCS eye subscore is 4. GCS verbal subscore is 5. GCS motor subscore is 6.   Skin: Skin is warm and dry. Capillary refill takes 2 to 3 seconds. No rash and no abscess noted. No erythema. No pallor.   Psychiatric: He has a normal mood and affect.         ED Course   Procedures  Labs Reviewed   CBC W/ AUTO DIFFERENTIAL - Abnormal; Notable for the following components:       Result Value    MCH 32.1 (*)     All other components within normal limits   PROTIME-INR - Abnormal; Notable for the following components:    Prothrombin Time 18.7 (*)     INR 1.9 (*)     All other components within normal limits   COMPREHENSIVE METABOLIC PANEL   LIPASE   URINALYSIS, REFLEX TO URINE CULTURE   B-TYPE NATRIURETIC PEPTIDE   TROPONIN I     EKG Readings: (Independently Interpreted)   Rhythm: Sinus Bradycardia. ST Segments: Non-Specific ST Segment Depression. ST Segment Depression: II, III and AVF.       Imaging Results    None          Medical Decision Making:   Initial Assessment:   64 yo M with CAD presents with SOB, n/v and GI volume losses  Differential Diagnosis:   DDX is atypical angina, NSTEMI, viral gastroenteritis, diverticulitis, PUD  Clinical Tests:   Lab Tests: Ordered  Radiological Study: Ordered  Medical Tests: Ordered  ED Management:  500cc Slow IVF, basic labs: TN, CMP, CBC, INR, EKG reviewed- sinus chayito, nonspecific TWI in inf leads. TN negative, mild CARTER 1.8 Cr.        APC / Resident Notes:   Differential Dx to include: NSTEMI vs viral gastroenteritis vs nonspecific abd pain   -labs unrevealing; patient reports cocaine abuse 2 days ago, will discharge on Zofran/ PPI given anticoagulation use. Patient stable for discharge. Cocaine cessation counseling given.               ED Course as of Jan 05 1210   Sat Jan 05, 2019   1142 This is the attending provider  63-year-old man with history of coronary artery disease  comes here with presyncopal symptoms says when he stood up he does fell he was about to call labs and has diffuse generalized nonfocal weakness patient did admit to cocaine use 2 or 3 days ago my physical exam shows complete normal neurological, he has got good muscle strength in all his extremities cranial nerves are intact and normal sensation pain light touch I am going to initiate a syncope workup do a cardiac evaluation his EKG did not show ST elevation  [SA]      ED Course User Index  [SA] Aime Linder MD     Clinical Impression:   The primary encounter diagnosis was Lower abdominal pain. Diagnoses of Dizziness and Shortness of breath were also pertinent to this visit.                             Aime Linder MD  01/06/19 2031

## 2019-01-05 NOTE — ED NOTES
"The patient came to the ER today with c/o lightheadedness, nausea, vomiting. Lives alone. Symptoms began yesterday. Reports SOB at rest and with mild exertion. States "he can barely walk". Pt reports hx of heart attack in December. Eyeglasses on. Denies vision changes. Denies fever or chills. C/o soreness in periumbilical region. Pt on Coumadin and ASA 81mg.   "

## 2019-02-26 NOTE — ASSESSMENT & PLAN NOTE
-Last EF 35 % 6/2017  -Euvolemic on exam  -Repeat echo with CFD  -Continue BB  -Will hold ACE-I in anticipation of C   Lvm for pt to call the office to schedule appt's

## 2019-05-09 ENCOUNTER — HOSPITAL ENCOUNTER (EMERGENCY)
Facility: HOSPITAL | Age: 64
Discharge: PSYCHIATRIC OR SUBSTANCE USE FACILITY | End: 2019-05-09
Attending: EMERGENCY MEDICINE
Payer: COMMERCIAL

## 2019-05-09 VITALS
TEMPERATURE: 98 F | BODY MASS INDEX: 28 KG/M2 | SYSTOLIC BLOOD PRESSURE: 141 MMHG | RESPIRATION RATE: 16 BRPM | OXYGEN SATURATION: 100 % | DIASTOLIC BLOOD PRESSURE: 74 MMHG | HEART RATE: 70 BPM | HEIGHT: 71 IN | WEIGHT: 200 LBS

## 2019-05-09 DIAGNOSIS — R45.851 DEPRESSION WITH SUICIDAL IDEATION: Primary | ICD-10-CM

## 2019-05-09 DIAGNOSIS — F32.A DEPRESSION WITH SUICIDAL IDEATION: Primary | ICD-10-CM

## 2019-05-09 LAB
ALBUMIN SERPL BCP-MCNC: 4.2 G/DL (ref 3.5–5.2)
ALP SERPL-CCNC: 66 U/L (ref 55–135)
ALT SERPL W/O P-5'-P-CCNC: 12 U/L (ref 10–44)
AMPHET+METHAMPHET UR QL: NEGATIVE
ANION GAP SERPL CALC-SCNC: 8 MMOL/L (ref 8–16)
APAP SERPL-MCNC: <3 UG/ML (ref 10–20)
AST SERPL-CCNC: 20 U/L (ref 10–40)
BACTERIA #/AREA URNS AUTO: ABNORMAL /HPF
BARBITURATES UR QL SCN>200 NG/ML: NEGATIVE
BASOPHILS # BLD AUTO: 0.04 K/UL (ref 0–0.2)
BASOPHILS NFR BLD: 0.9 % (ref 0–1.9)
BENZODIAZ UR QL SCN>200 NG/ML: NEGATIVE
BILIRUB SERPL-MCNC: 0.5 MG/DL (ref 0.1–1)
BILIRUB UR QL STRIP: NEGATIVE
BUN SERPL-MCNC: 18 MG/DL (ref 8–23)
BZE UR QL SCN: NORMAL
CALCIUM SERPL-MCNC: 9.9 MG/DL (ref 8.7–10.5)
CANNABINOIDS UR QL SCN: NEGATIVE
CHLORIDE SERPL-SCNC: 106 MMOL/L (ref 95–110)
CLARITY UR REFRACT.AUTO: CLEAR
CO2 SERPL-SCNC: 25 MMOL/L (ref 23–29)
COLOR UR AUTO: YELLOW
CREAT SERPL-MCNC: 1.5 MG/DL (ref 0.5–1.4)
CREAT UR-MCNC: 317 MG/DL (ref 23–375)
DIFFERENTIAL METHOD: ABNORMAL
EOSINOPHIL # BLD AUTO: 0.2 K/UL (ref 0–0.5)
EOSINOPHIL NFR BLD: 3.9 % (ref 0–8)
ERYTHROCYTE [DISTWIDTH] IN BLOOD BY AUTOMATED COUNT: 13.4 % (ref 11.5–14.5)
EST. GFR  (AFRICAN AMERICAN): 56.1 ML/MIN/1.73 M^2
EST. GFR  (NON AFRICAN AMERICAN): 48.5 ML/MIN/1.73 M^2
ETHANOL SERPL-MCNC: <10 MG/DL
GLUCOSE SERPL-MCNC: 76 MG/DL (ref 70–110)
GLUCOSE UR QL STRIP: NEGATIVE
HCT VFR BLD AUTO: 45.4 % (ref 40–54)
HGB BLD-MCNC: 15.3 G/DL (ref 14–18)
HGB UR QL STRIP: NEGATIVE
HYALINE CASTS UR QL AUTO: 4 /LPF
IMM GRANULOCYTES # BLD AUTO: 0.01 K/UL (ref 0–0.04)
IMM GRANULOCYTES NFR BLD AUTO: 0.2 % (ref 0–0.5)
KETONES UR QL STRIP: NEGATIVE
LEUKOCYTE ESTERASE UR QL STRIP: NEGATIVE
LYMPHOCYTES # BLD AUTO: 1.7 K/UL (ref 1–4.8)
LYMPHOCYTES NFR BLD: 39.2 % (ref 18–48)
MCH RBC QN AUTO: 31.7 PG (ref 27–31)
MCHC RBC AUTO-ENTMCNC: 33.7 G/DL (ref 32–36)
MCV RBC AUTO: 94 FL (ref 82–98)
METHADONE UR QL SCN>300 NG/ML: NEGATIVE
MICROSCOPIC COMMENT: ABNORMAL
MONOCYTES # BLD AUTO: 0.5 K/UL (ref 0.3–1)
MONOCYTES NFR BLD: 10.7 % (ref 4–15)
NEUTROPHILS # BLD AUTO: 2 K/UL (ref 1.8–7.7)
NEUTROPHILS NFR BLD: 45.1 % (ref 38–73)
NITRITE UR QL STRIP: NEGATIVE
NRBC BLD-RTO: 0 /100 WBC
OPIATES UR QL SCN: NEGATIVE
PCP UR QL SCN>25 NG/ML: NEGATIVE
PH UR STRIP: 5 [PH] (ref 5–8)
PLATELET # BLD AUTO: 261 K/UL (ref 150–350)
PMV BLD AUTO: 8.5 FL (ref 9.2–12.9)
POTASSIUM SERPL-SCNC: 4.2 MMOL/L (ref 3.5–5.1)
PROT SERPL-MCNC: 8 G/DL (ref 6–8.4)
PROT UR QL STRIP: NEGATIVE
RBC # BLD AUTO: 4.82 M/UL (ref 4.6–6.2)
RBC #/AREA URNS AUTO: 8 /HPF (ref 0–4)
SALICYLATES SERPL-MCNC: <5 MG/DL (ref 15–30)
SODIUM SERPL-SCNC: 139 MMOL/L (ref 136–145)
SP GR UR STRIP: 1.03 (ref 1–1.03)
TOXICOLOGY INFORMATION: NORMAL
TSH SERPL DL<=0.005 MIU/L-ACNC: 0.55 UIU/ML (ref 0.4–4)
URN SPEC COLLECT METH UR: NORMAL
WBC # BLD AUTO: 4.41 K/UL (ref 3.9–12.7)
WBC #/AREA URNS AUTO: 2 /HPF (ref 0–5)

## 2019-05-09 PROCEDURE — 80053 COMPREHEN METABOLIC PANEL: CPT

## 2019-05-09 PROCEDURE — 80307 DRUG TEST PRSMV CHEM ANLYZR: CPT

## 2019-05-09 PROCEDURE — 80329 ANALGESICS NON-OPIOID 1 OR 2: CPT

## 2019-05-09 PROCEDURE — 80320 DRUG SCREEN QUANTALCOHOLS: CPT

## 2019-05-09 PROCEDURE — 99285 PR EMERGENCY DEPT VISIT,LEVEL V: ICD-10-PCS | Mod: ,,, | Performed by: PHYSICIAN ASSISTANT

## 2019-05-09 PROCEDURE — 85025 COMPLETE CBC W/AUTO DIFF WBC: CPT

## 2019-05-09 PROCEDURE — 99285 EMERGENCY DEPT VISIT HI MDM: CPT

## 2019-05-09 PROCEDURE — 99285 EMERGENCY DEPT VISIT HI MDM: CPT | Mod: ,,, | Performed by: PHYSICIAN ASSISTANT

## 2019-05-09 PROCEDURE — 81001 URINALYSIS AUTO W/SCOPE: CPT | Mod: 59

## 2019-05-09 PROCEDURE — 84443 ASSAY THYROID STIM HORMONE: CPT

## 2019-05-09 NOTE — ED NOTES
Since triage, patient has remained in direct visual contact of [facility] security- security is aware- patient remains calm and cooperative

## 2019-05-09 NOTE — ED NOTES
CPT staff actively seeking psych placement. Faxed clinical packet to [facility], [facility], [facility] ([facility]), & [facility] ([facility]). Awaiting response at this time.
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
## 2019-05-09 NOTE — ED PROVIDER NOTES
"Encounter Date: 5/9/2019       History     Chief Complaint   Patient presents with    Depression     when asked if suicidal... patient pauses... looks away- asked twice and second time states "almost there"     63-year-old male with a history of depression, stroke, CAD, HTN, cocaine abuse presents to the ED for evaluation of depression with suicidal ideation.  Patient reports that his depression has worsened recently.  Patient reports suicidal thoughts.  Patient states that he would likely walk in front of a car.  Patient has similar suicide attempt last year, but the car stopped and brought patient to the hospital.  Patient admits to cocaine use over the past few days, last use last night.  Patient denies auditory or visual hallucinations, homicidal ideation.  Patient has been hospitalized for depression and suicidal ideation with in the past year.  Patient spoke to his psychiatrist at [facility] today and they made the decision together for pt to go to the ER for help.  Patient denies acute physical complaints at this time.  Patient cannot recall any recent events that may have exacerbated his depression.  Patient is retired, lives alone in an apartment.         Review of patient's allergies indicates:  No Known Allergies  Past Medical History:   Diagnosis Date    Acute renal insufficiency 6/21/2015    Cocaine abuse     Depression     History of psychiatric care     HTN (hypertension) 1/25/2014    Stroke      Past Surgical History:   Procedure Laterality Date    NECK SURGERY       Family History   Problem Relation Age of Onset    Diabetes Mother     Heart disease Mother     Hypertension Mother      Social History     Tobacco Use    Smoking status: Current Some Day Smoker     Packs/day: 0.25     Types: Cigarettes    Smokeless tobacco: Never Used   Substance Use Topics    Alcohol use: Yes     Comment: 6pk/week    Drug use: Yes     Types: Cocaine     Comment: last used a few days ago     Review of " Systems   Constitutional: Negative for fever.   HENT: Negative for sore throat.    Respiratory: Negative for shortness of breath.    Cardiovascular: Negative for chest pain.   Gastrointestinal: Negative for nausea.   Genitourinary: Negative for dysuria.   Musculoskeletal: Negative for back pain.   Skin: Negative for rash.   Neurological: Negative for weakness.   Hematological: Does not bruise/bleed easily.   Psychiatric/Behavioral: Positive for dysphoric mood and suicidal ideas.       Physical Exam     Initial Vitals [05/09/19 1418]   BP Pulse Resp Temp SpO2   137/77 80 16 98.9 °F (37.2 °C) 99 %      MAP       --         Physical Exam    Nursing note and vitals reviewed.  Constitutional: He appears well-developed and well-nourished.  Non-toxic appearance. He does not appear ill. No distress.   HENT:   Head: Normocephalic and atraumatic.   Neck: Normal range of motion. Neck supple.   Cardiovascular: Normal rate and regular rhythm. Exam reveals no gallop, no distant heart sounds and no friction rub.    No murmur heard.  Pulmonary/Chest: Effort normal and breath sounds normal. No accessory muscle usage. No tachypnea. No respiratory distress. He has no decreased breath sounds. He has no wheezes. He has no rhonchi. He has no rales.   Abdominal: He exhibits no distension.   Neurological: He is alert.   Skin: No rash noted.   Psychiatric: His speech is normal and behavior is normal. He exhibits a depressed mood. He expresses suicidal ideation. He expresses suicidal plans.         ED Course   Procedures  Labs Reviewed   CBC W/ AUTO DIFFERENTIAL - Abnormal; Notable for the following components:       Result Value    Mean Corpuscular Hemoglobin 31.7 (*)     MPV 8.5 (*)     All other components within normal limits   COMPREHENSIVE METABOLIC PANEL - Abnormal; Notable for the following components:    Creatinine 1.5 (*)     eGFR if  56.1 (*)     eGFR if non  48.5 (*)     All other components within  normal limits   ACETAMINOPHEN LEVEL - Abnormal; Notable for the following components:    Acetaminophen (Tylenol), Serum <3.0 (*)     All other components within normal limits   SALICYLATE LEVEL - Abnormal; Notable for the following components:    Salicylate Lvl <5.0 (*)     All other components within normal limits   URINALYSIS MICROSCOPIC - Abnormal; Notable for the following components:    RBC, UA 8 (*)     Hyaline Casts, UA 4 (*)     All other components within normal limits    Narrative:     Preferred Collection Type->Urine, Clean Catch   TSH   URINALYSIS, REFLEX TO URINE CULTURE    Narrative:     Preferred Collection Type->Urine, Clean Catch   DRUG SCREEN PANEL, URINE EMERGENCY    Narrative:     Preferred Collection Type->Urine, Clean Catch   ALCOHOL,MEDICAL (ETHANOL)          Imaging Results    None          Medical Decision Making:   History:   Old Medical Records: I decided to obtain old medical records.  Differential Diagnosis:   My differential diagnosis includes but is not limited to:  Depression, acute psychosis, substance induced mood disorder, electrolyte abnormality  Clinical Tests:   Lab Tests: Ordered and Reviewed       APC / Resident Notes:   63-year-old male presents for evaluation of depression and suicidal ideation.  Vitals are within normal limits.  Patient in no acute distress.  Patient exhibits a depressed mood and endorses SI.  He has remained calm and cooperative in the ED.  Patient was PEC'd for danger to self.  He is medically clear and will be transferred to outside psychiatric facility for further treatment and management.  I have reviewed the patient's records and discussed this case with my supervising physician.           Attending Attestation:     Physician Attestation Statement for NP/PA:   I discussed this assessment and plan of this patient with the NP/PA, but I did not personally examine the patient. The face to face encounter was performed by the NP/PA.                      Clinical Impression:       ICD-10-CM ICD-9-CM   1. Depression with suicidal ideation F32.9 311    R45.851 V62.84         Disposition:   Disposition: Transferred  Condition: Stable                        Flavia Triana PA-C  05/09/19 1805       Jae Castellon MD  05/13/19 1044

## 2019-05-10 NOTE — ED NOTES
Patient accepted by Lupis at [facility] ([address] [place] [address]) for the service of Dr. Ferrera.  Report to be called to [phone].

## 2019-05-10 NOTE — ED NOTES
[facility] denied patient, faxed packet to [facility], [place], [facility], [place] , [place] , [facility] , [facility], [facility] , [facility] , [facility], [facility] , [facility].

## 2019-05-10 NOTE — ED NOTES
Pt made aware of transfer to [facility] MARAL Morgan Wishes to have no next of kin notified.

## 2019-05-10 NOTE — ED NOTES
[facility] arrived for pt transport. Pt stable and wheel chaired out of ED with 2x security personnel.

## 2019-05-10 NOTE — ED NOTES
Refaxed packet to [facility], [facility], [facility], [facility].

## 2019-07-06 ENCOUNTER — HOSPITAL ENCOUNTER (EMERGENCY)
Facility: HOSPITAL | Age: 64
Discharge: PSYCHIATRIC OR SUBSTANCE USE FACILITY | End: 2019-07-06
Attending: EMERGENCY MEDICINE
Payer: COMMERCIAL

## 2019-07-06 VITALS
OXYGEN SATURATION: 98 % | HEART RATE: 76 BPM | RESPIRATION RATE: 18 BRPM | SYSTOLIC BLOOD PRESSURE: 130 MMHG | TEMPERATURE: 98 F | DIASTOLIC BLOOD PRESSURE: 69 MMHG

## 2019-07-06 DIAGNOSIS — F32.A DEPRESSION, UNSPECIFIED DEPRESSION TYPE: Primary | ICD-10-CM

## 2019-07-06 LAB
ALBUMIN SERPL BCP-MCNC: 4.1 G/DL (ref 3.5–5.2)
ALP SERPL-CCNC: 75 U/L (ref 55–135)
ALT SERPL W/O P-5'-P-CCNC: 29 U/L (ref 10–44)
AMPHET+METHAMPHET UR QL: NEGATIVE
ANION GAP SERPL CALC-SCNC: 12 MMOL/L (ref 8–16)
APAP SERPL-MCNC: <3 UG/ML (ref 10–20)
AST SERPL-CCNC: 32 U/L (ref 10–40)
BACTERIA #/AREA URNS AUTO: NORMAL /HPF
BARBITURATES UR QL SCN>200 NG/ML: NEGATIVE
BASOPHILS # BLD AUTO: 0.03 K/UL (ref 0–0.2)
BASOPHILS NFR BLD: 0.6 % (ref 0–1.9)
BENZODIAZ UR QL SCN>200 NG/ML: NEGATIVE
BILIRUB SERPL-MCNC: 1.4 MG/DL (ref 0.1–1)
BILIRUB UR QL STRIP: NEGATIVE
BUN SERPL-MCNC: 22 MG/DL (ref 8–23)
BZE UR QL SCN: NORMAL
CALCIUM SERPL-MCNC: 9.7 MG/DL (ref 8.7–10.5)
CANNABINOIDS UR QL SCN: NEGATIVE
CHLORIDE SERPL-SCNC: 109 MMOL/L (ref 95–110)
CLARITY UR REFRACT.AUTO: ABNORMAL
CO2 SERPL-SCNC: 19 MMOL/L (ref 23–29)
COLOR UR AUTO: YELLOW
CREAT SERPL-MCNC: 1.7 MG/DL (ref 0.5–1.4)
CREAT UR-MCNC: 363 MG/DL (ref 23–375)
DIFFERENTIAL METHOD: ABNORMAL
EOSINOPHIL # BLD AUTO: 0.2 K/UL (ref 0–0.5)
EOSINOPHIL NFR BLD: 3.6 % (ref 0–8)
ERYTHROCYTE [DISTWIDTH] IN BLOOD BY AUTOMATED COUNT: 13.7 % (ref 11.5–14.5)
EST. GFR  (AFRICAN AMERICAN): 47.9 ML/MIN/1.73 M^2
EST. GFR  (NON AFRICAN AMERICAN): 41.4 ML/MIN/1.73 M^2
ETHANOL SERPL-MCNC: <10 MG/DL
GLUCOSE SERPL-MCNC: 84 MG/DL (ref 70–110)
GLUCOSE UR QL STRIP: NEGATIVE
HCT VFR BLD AUTO: 47.9 % (ref 40–54)
HGB BLD-MCNC: 15.8 G/DL (ref 14–18)
HGB UR QL STRIP: ABNORMAL
HYALINE CASTS UR QL AUTO: 0 /LPF
IMM GRANULOCYTES # BLD AUTO: 0.02 K/UL (ref 0–0.04)
IMM GRANULOCYTES NFR BLD AUTO: 0.4 % (ref 0–0.5)
INR PPP: 1.6 (ref 0.8–1.2)
KETONES UR QL STRIP: NEGATIVE
LEUKOCYTE ESTERASE UR QL STRIP: NEGATIVE
LYMPHOCYTES # BLD AUTO: 2.4 K/UL (ref 1–4.8)
LYMPHOCYTES NFR BLD: 46.9 % (ref 18–48)
MCH RBC QN AUTO: 31.7 PG (ref 27–31)
MCHC RBC AUTO-ENTMCNC: 33 G/DL (ref 32–36)
MCV RBC AUTO: 96 FL (ref 82–98)
METHADONE UR QL SCN>300 NG/ML: NEGATIVE
MICROSCOPIC COMMENT: NORMAL
MONOCYTES # BLD AUTO: 0.7 K/UL (ref 0.3–1)
MONOCYTES NFR BLD: 13.6 % (ref 4–15)
NEUTROPHILS # BLD AUTO: 1.8 K/UL (ref 1.8–7.7)
NEUTROPHILS NFR BLD: 34.9 % (ref 38–73)
NITRITE UR QL STRIP: NEGATIVE
NRBC BLD-RTO: 0 /100 WBC
OPIATES UR QL SCN: NEGATIVE
PCP UR QL SCN>25 NG/ML: NEGATIVE
PH UR STRIP: 5 [PH] (ref 5–8)
PLATELET # BLD AUTO: 247 K/UL (ref 150–350)
PMV BLD AUTO: 8.9 FL (ref 9.2–12.9)
POTASSIUM SERPL-SCNC: 3.6 MMOL/L (ref 3.5–5.1)
PROT SERPL-MCNC: 8 G/DL (ref 6–8.4)
PROT UR QL STRIP: ABNORMAL
PROTHROMBIN TIME: 15.6 SEC (ref 9–12.5)
RBC # BLD AUTO: 4.99 M/UL (ref 4.6–6.2)
RBC #/AREA URNS AUTO: 2 /HPF (ref 0–4)
SODIUM SERPL-SCNC: 140 MMOL/L (ref 136–145)
SP GR UR STRIP: 1.03 (ref 1–1.03)
SQUAMOUS #/AREA URNS AUTO: 1 /HPF
TOXICOLOGY INFORMATION: NORMAL
TSH SERPL DL<=0.005 MIU/L-ACNC: 1.23 UIU/ML (ref 0.4–4)
URN SPEC COLLECT METH UR: ABNORMAL
WBC # BLD AUTO: 5.01 K/UL (ref 3.9–12.7)
WBC #/AREA URNS AUTO: 0 /HPF (ref 0–5)

## 2019-07-06 PROCEDURE — 99284 PR EMERGENCY DEPT VISIT,LEVEL IV: ICD-10-PCS | Mod: ,,, | Performed by: EMERGENCY MEDICINE

## 2019-07-06 PROCEDURE — 99285 EMERGENCY DEPT VISIT HI MDM: CPT

## 2019-07-06 PROCEDURE — 81001 URINALYSIS AUTO W/SCOPE: CPT

## 2019-07-06 PROCEDURE — 84443 ASSAY THYROID STIM HORMONE: CPT

## 2019-07-06 PROCEDURE — 80307 DRUG TEST PRSMV CHEM ANLYZR: CPT

## 2019-07-06 PROCEDURE — 99284 EMERGENCY DEPT VISIT MOD MDM: CPT | Mod: ,,, | Performed by: EMERGENCY MEDICINE

## 2019-07-06 PROCEDURE — 80320 DRUG SCREEN QUANTALCOHOLS: CPT

## 2019-07-06 PROCEDURE — 80329 ANALGESICS NON-OPIOID 1 OR 2: CPT

## 2019-07-06 PROCEDURE — 80053 COMPREHEN METABOLIC PANEL: CPT

## 2019-07-06 PROCEDURE — 85025 COMPLETE CBC W/AUTO DIFF WBC: CPT

## 2019-07-06 PROCEDURE — 25000003 PHARM REV CODE 250: Performed by: EMERGENCY MEDICINE

## 2019-07-06 PROCEDURE — 85610 PROTHROMBIN TIME: CPT

## 2019-07-06 RX ORDER — WARFARIN SODIUM 5 MG/1
5 TABLET ORAL DAILY
Status: DISCONTINUED | OUTPATIENT
Start: 2019-07-06 | End: 2019-07-06 | Stop reason: HOSPADM

## 2019-07-06 RX ADMIN — WARFARIN SODIUM 5 MG: 5 TABLET ORAL at 08:07

## 2019-07-06 NOTE — ED PROVIDER NOTES
"Encounter Date: 7/6/2019    SCRIBE #1 NOTE: I, Errol Gonzalez, am scribing for, and in the presence of,  Dr. Segura. I have scribed the entire note.       History     Chief Complaint   Patient presents with    Psychiatric Evaluation     hx of depression and having feelings of wanting to harm self. Reported being compliant with medications. Denies attempting suicide but wants to.     Physician saw the patient at 6:09 AM.  A 64 year old male with a past medical history of depression, cocaine abuse, and stroke, presented to the ED with a cc of self-harm and suicidal ideations. He reports a "pressure" to hurt himself that has increased in the past few days. He denies any attempts at self-harm or suicide. He denies having guns in the house. He denies a plan to take his own life.    The history is provided by the patient.     Review of patient's allergies indicates:  No Known Allergies  Past Medical History:   Diagnosis Date    Acute renal insufficiency 6/21/2015    Cocaine abuse     Depression     History of psychiatric care     HTN (hypertension) 1/25/2014    Stroke      Past Surgical History:   Procedure Laterality Date    NECK SURGERY       Family History   Problem Relation Age of Onset    Diabetes Mother     Heart disease Mother     Hypertension Mother      Social History     Tobacco Use    Smoking status: Current Some Day Smoker     Packs/day: 0.25     Types: Cigarettes    Smokeless tobacco: Never Used   Substance Use Topics    Alcohol use: Yes     Comment: 6pk/week    Drug use: Yes     Types: Cocaine     Comment: last used a few days ago     Review of Systems   Constitutional: Negative for diaphoresis.   HENT: Negative for congestion.    Eyes: Negative for photophobia.   Respiratory: Negative for chest tightness.    Cardiovascular: Negative for chest pain.   Gastrointestinal: Negative for nausea.   Genitourinary: Negative for dysuria.   Skin: Negative for pallor.   Neurological: Negative for " numbness.   Psychiatric/Behavioral: Positive for suicidal ideas.       Physical Exam     Initial Vitals [07/06/19 0556]   BP Pulse Resp Temp SpO2   120/78 89 18 98.3 °F (36.8 °C) 98 %      MAP       --         Physical Exam    Nursing note and vitals reviewed.  Constitutional: He appears well-developed and well-nourished. No distress.   HENT:   Head: Normocephalic and atraumatic.   Eyes: EOM are normal. Pupils are equal, round, and reactive to light.   Pulmonary/Chest: No respiratory distress.   Abdominal: He exhibits no distension.   Musculoskeletal: Normal range of motion.   Neurological: He is alert and oriented to person, place, and time.   Skin: No rash noted.   Psychiatric: His speech is normal. His mood appears not anxious. His affect is blunt. He is withdrawn. He is not actively hallucinating. Cognition and memory are normal. He exhibits a depressed mood. He is attentive.         ED Course   Procedures  Labs Reviewed   CBC W/ AUTO DIFFERENTIAL - Abnormal; Notable for the following components:       Result Value    Mean Corpuscular Hemoglobin 31.7 (*)     MPV 8.9 (*)     Gran% 34.9 (*)     All other components within normal limits   COMPREHENSIVE METABOLIC PANEL - Abnormal; Notable for the following components:    CO2 19 (*)     Creatinine 1.7 (*)     Total Bilirubin 1.4 (*)     eGFR if  47.9 (*)     eGFR if non  41.4 (*)     All other components within normal limits   URINALYSIS, REFLEX TO URINE CULTURE - Abnormal; Notable for the following components:    Appearance, UA Hazy (*)     Protein, UA 1+ (*)     Occult Blood UA 1+ (*)     All other components within normal limits    Narrative:     Preferred Collection Type->Urine, Clean Catch   ACETAMINOPHEN LEVEL - Abnormal; Notable for the following components:    Acetaminophen (Tylenol), Serum <3.0 (*)     All other components within normal limits   PROTIME-INR - Abnormal; Notable for the following components:    Prothrombin Time  15.6 (*)     INR 1.6 (*)     All other components within normal limits   TSH   DRUG SCREEN PANEL, URINE EMERGENCY    Narrative:     Preferred Collection Type->Urine, Clean Catch   ALCOHOL,MEDICAL (ETHANOL)   URINALYSIS MICROSCOPIC    Narrative:     Preferred Collection Type->Urine, Clean Catch          Imaging Results    None          Medical Decision Making:   History:   Old Medical Records: I decided to obtain old medical records.  Old Records Summarized: records from clinic visits and records from previous admission(s).  Initial Assessment:   63 yo m, h/o depression, here with suicidal thoughts, no plan, no access to weapons    Recent psych admit in May 2019    On exam, VSS  Flat affect  comfortable  Clinical Tests:   Lab Tests: Ordered and Reviewed  ED Management:  6:47 AM  Consulted psychiatry.    -labs for medical clearance  -1:1 observation  -PEC  -psychiatry consult for further assessment    6:56 AM  Discussed case with psych - they feel that since pt gets admitted frequently for these sx, no need to evaluate pt formally today.  They recommend PEC and inpatient psych transfer    8:11 AM  INR subtherapeutic.  Pt missed his coumadin dose last night.  Will give dose now.  Otherwise, medically cleared for inpatient psych transfer  Other:   I have discussed this case with another health care provider.       <> Summary of the Discussion: psychiatry            Scribe Attestation:   Scribe #1: I performed the above scribed service and the documentation accurately describes the services I performed. I attest to the accuracy of the note.    I, Dr. Lani Segura, personally performed the services described in this documentation. All medical record entries made by the scribe were at my direction and in my presence.  I have reviewed the chart and agree that the record reflects my personal performance and is accurate and complete. Lani Segura MD.  6:58 AM 07/07/2019           Clinical Impression:       ICD-10-CM  ICD-9-CM   1. Depression, unspecified depression type F32.9 311         Disposition:   Disposition: Transferred                        Lani Segura MD  07/07/19 0971

## 2019-07-06 NOTE — ED TRIAGE NOTES
Damir Faria, a 64 y.o. male presents to the ED w/ complaint of SI r/t to depression. PT has no plan at this time. PT denies chest pain, SOB, fever, chills, and n/v/d.      Triage note:  Chief Complaint   Patient presents with    Psychiatric Evaluation     hx of depression and having feelings of wanting to harm self. Reported being compliant with medications. Denies attempting suicide but wants to.     Review of patient's allergies indicates:  No Known Allergies  Past Medical History:   Diagnosis Date    Acute renal insufficiency 6/21/2015    Cocaine abuse     Depression     History of psychiatric care     HTN (hypertension) 1/25/2014    Stroke      Adult Physical Assessment  LOC: Damir Faria, 64 y.o. male verified via two identifiers.  The patient is awake, alert, oriented and speaking appropriately at this time.  APPEARANCE: Patient resting comfortably and appears to be in no acute distress at this time. Patient is clean and well groomed, patient's clothing is properly fastened.  SKIN:The skin is warm and dry, color consistent with ethnicity, patient has normal skin turgor and moist mucus membranes, skin intact, no breakdown or brusing noted.  MUSCULOSKELETAL: Patient moving all extremities well, no obvious swelling or deformities noted.  RESPIRATORY: Airway is open and patent, respirations are spontaneous, patient has a normal effort and rate, no accessory muscle use noted.  CARDIAC: Patient has a normal rate and rhythm, no periphreal edema noted in any extremity, capillary refill < 3 seconds in all extremities  ABDOMEN: Soft and non tender to palpation, no abdominal distention noted. Bowel sounds present in all four quadrants.  NEUROLOGIC: Eyes open spontaneously, behavior appropriate to situation, follows commands, facial expression symmetrical, bilateral hand grasp equal and even, purposeful motor response noted, normal sensation in all extremities when touched with a finger.

## 2019-07-06 NOTE — ED NOTES
Patient was informed that he was placed at [facility].  Offered the phone to call someone to notify them and her refused.  He had made phone calls earlier.

## 2019-07-06 NOTE — ED NOTES
Patient arrived to the [facility] from ED per wheel chair.  Mood is pleasant.  Patient reports depression and suicidal ideations with no current plan.  No previous suicide attempts.  He denies any physical pain.  Skin warm dry and intact.  In paper scrubs, searched with no contraband found.  Patient has 3 small plastic bags of belonging with him.  Plan of care reviewed.  Patient aware that we are going to find him placement in a psychiatric facility.  On a PEC.   Room changed called into the .  Patient given the phone to notify friend of plan.

## 2019-07-06 NOTE — ED NOTES
Patient accepted by Linda at [facility] ([address], [place], [place]) for the service of Dr. Mckinney. Report to be called to [phone].

## 2019-07-06 NOTE — ED NOTES
Pt is calm and resting quietly. Respirations are spontaneous with normal rate and effort. Pt states he does not feel like he can urinate at this time. Sitter at bedside.

## 2019-07-06 NOTE — ED NOTES
CPT staff actively seeking psych placement. Faxed clinical packet to [facility], [facility], [facility] ([facility]), & [facility] ([facility]). Awaiting response at this time.

## 2019-07-06 NOTE — ED NOTES
Patient discharged to [facility].  Mood depressed and behavior is appropriate.  Report was called to Kristin CARRION at [facility].  Patient was transported per [facility].  Four bags of belongings were sent with [facility].  No complaints of pain or problems.

## 2019-07-07 PROBLEM — R45.851 SUICIDAL IDEATION: Status: ACTIVE | Noted: 2019-07-07

## 2019-07-07 PROBLEM — N18.9 CHRONIC RENAL INSUFFICIENCY: Chronic | Status: ACTIVE | Noted: 2019-07-07

## 2019-07-10 PROBLEM — Z78.9 KNOWLEDGE DEFICIT ABOUT THERAPEUTIC DIET: Status: ACTIVE | Noted: 2019-07-10

## 2019-07-10 PROBLEM — Z55.8 KNOWLEDGE DEFICIT ABOUT THERAPEUTIC DIET: Status: ACTIVE | Noted: 2019-07-10

## 2019-08-27 ENCOUNTER — HOSPITAL ENCOUNTER (EMERGENCY)
Facility: HOSPITAL | Age: 64
Discharge: PSYCHIATRIC OR SUBSTANCE USE FACILITY | End: 2019-08-28
Attending: EMERGENCY MEDICINE
Payer: COMMERCIAL

## 2019-08-27 DIAGNOSIS — R45.851 SUICIDAL IDEATION: Primary | ICD-10-CM

## 2019-08-27 DIAGNOSIS — F32.A DEPRESSION: ICD-10-CM

## 2019-08-27 DIAGNOSIS — F14.10 COCAINE ABUSE: ICD-10-CM

## 2019-08-27 LAB
ALBUMIN SERPL BCP-MCNC: 3.7 G/DL (ref 3.5–5.2)
ALP SERPL-CCNC: 68 U/L (ref 55–135)
ALT SERPL W/O P-5'-P-CCNC: 21 U/L (ref 10–44)
AMPHET+METHAMPHET UR QL: NEGATIVE
ANION GAP SERPL CALC-SCNC: 6 MMOL/L (ref 8–16)
APAP SERPL-MCNC: <3 UG/ML (ref 10–20)
AST SERPL-CCNC: 18 U/L (ref 10–40)
BACTERIA #/AREA URNS AUTO: NORMAL /HPF
BARBITURATES UR QL SCN>200 NG/ML: NEGATIVE
BASOPHILS # BLD AUTO: 0.03 K/UL (ref 0–0.2)
BASOPHILS NFR BLD: 0.7 % (ref 0–1.9)
BENZODIAZ UR QL SCN>200 NG/ML: NEGATIVE
BILIRUB SERPL-MCNC: 0.4 MG/DL (ref 0.1–1)
BILIRUB UR QL STRIP: NEGATIVE
BUN SERPL-MCNC: 10 MG/DL (ref 8–23)
BZE UR QL SCN: NORMAL
CALCIUM SERPL-MCNC: 8.7 MG/DL (ref 8.7–10.5)
CANNABINOIDS UR QL SCN: NEGATIVE
CHLORIDE SERPL-SCNC: 110 MMOL/L (ref 95–110)
CLARITY UR REFRACT.AUTO: CLEAR
CO2 SERPL-SCNC: 24 MMOL/L (ref 23–29)
COLOR UR AUTO: YELLOW
CREAT SERPL-MCNC: 1.2 MG/DL (ref 0.5–1.4)
CREAT UR-MCNC: 214 MG/DL (ref 23–375)
DIFFERENTIAL METHOD: ABNORMAL
EOSINOPHIL # BLD AUTO: 0.2 K/UL (ref 0–0.5)
EOSINOPHIL NFR BLD: 5.9 % (ref 0–8)
ERYTHROCYTE [DISTWIDTH] IN BLOOD BY AUTOMATED COUNT: 14.3 % (ref 11.5–14.5)
EST. GFR  (AFRICAN AMERICAN): >60 ML/MIN/1.73 M^2
EST. GFR  (NON AFRICAN AMERICAN): >60 ML/MIN/1.73 M^2
ETHANOL SERPL-MCNC: <10 MG/DL
GLUCOSE SERPL-MCNC: 95 MG/DL (ref 70–110)
GLUCOSE UR QL STRIP: NEGATIVE
HCT VFR BLD AUTO: 40.2 % (ref 40–54)
HGB BLD-MCNC: 13.2 G/DL (ref 14–18)
HGB UR QL STRIP: ABNORMAL
IMM GRANULOCYTES # BLD AUTO: 0.01 K/UL (ref 0–0.04)
IMM GRANULOCYTES NFR BLD AUTO: 0.2 % (ref 0–0.5)
INR PPP: 2.4 (ref 0.8–1.2)
KETONES UR QL STRIP: NEGATIVE
LEUKOCYTE ESTERASE UR QL STRIP: NEGATIVE
LYMPHOCYTES # BLD AUTO: 2.1 K/UL (ref 1–4.8)
LYMPHOCYTES NFR BLD: 50.9 % (ref 18–48)
MCH RBC QN AUTO: 31.4 PG (ref 27–31)
MCHC RBC AUTO-ENTMCNC: 32.8 G/DL (ref 32–36)
MCV RBC AUTO: 96 FL (ref 82–98)
METHADONE UR QL SCN>300 NG/ML: NEGATIVE
MICROSCOPIC COMMENT: NORMAL
MONOCYTES # BLD AUTO: 0.5 K/UL (ref 0.3–1)
MONOCYTES NFR BLD: 11.9 % (ref 4–15)
NEUTROPHILS # BLD AUTO: 1.2 K/UL (ref 1.8–7.7)
NEUTROPHILS NFR BLD: 30.4 % (ref 38–73)
NITRITE UR QL STRIP: NEGATIVE
NRBC BLD-RTO: 0 /100 WBC
OPIATES UR QL SCN: NEGATIVE
PCP UR QL SCN>25 NG/ML: NEGATIVE
PH UR STRIP: 5 [PH] (ref 5–8)
PLATELET # BLD AUTO: 214 K/UL (ref 150–350)
PMV BLD AUTO: 9.3 FL (ref 9.2–12.9)
POTASSIUM SERPL-SCNC: 3.7 MMOL/L (ref 3.5–5.1)
PROT SERPL-MCNC: 7 G/DL (ref 6–8.4)
PROT UR QL STRIP: NEGATIVE
PROTHROMBIN TIME: 23.4 SEC (ref 9–12.5)
RBC # BLD AUTO: 4.21 M/UL (ref 4.6–6.2)
RBC #/AREA URNS AUTO: 1 /HPF (ref 0–4)
SODIUM SERPL-SCNC: 140 MMOL/L (ref 136–145)
SP GR UR STRIP: 1.02 (ref 1–1.03)
SQUAMOUS #/AREA URNS AUTO: 0 /HPF
TOXICOLOGY INFORMATION: NORMAL
TSH SERPL DL<=0.005 MIU/L-ACNC: 0.67 UIU/ML (ref 0.4–4)
URN SPEC COLLECT METH UR: ABNORMAL
WBC # BLD AUTO: 4.05 K/UL (ref 3.9–12.7)
WBC #/AREA URNS AUTO: 0 /HPF (ref 0–5)

## 2019-08-27 PROCEDURE — 80329 ANALGESICS NON-OPIOID 1 OR 2: CPT

## 2019-08-27 PROCEDURE — 84443 ASSAY THYROID STIM HORMONE: CPT

## 2019-08-27 PROCEDURE — 85025 COMPLETE CBC W/AUTO DIFF WBC: CPT

## 2019-08-27 PROCEDURE — 93010 ELECTROCARDIOGRAM REPORT: CPT | Mod: ,,, | Performed by: INTERNAL MEDICINE

## 2019-08-27 PROCEDURE — 93005 ELECTROCARDIOGRAM TRACING: CPT

## 2019-08-27 PROCEDURE — 80320 DRUG SCREEN QUANTALCOHOLS: CPT

## 2019-08-27 PROCEDURE — 93010 EKG 12-LEAD: ICD-10-PCS | Mod: ,,, | Performed by: INTERNAL MEDICINE

## 2019-08-27 PROCEDURE — 99285 PR EMERGENCY DEPT VISIT,LEVEL V: ICD-10-PCS | Mod: ,,, | Performed by: EMERGENCY MEDICINE

## 2019-08-27 PROCEDURE — 80053 COMPREHEN METABOLIC PANEL: CPT

## 2019-08-27 PROCEDURE — 85610 PROTHROMBIN TIME: CPT

## 2019-08-27 PROCEDURE — 81001 URINALYSIS AUTO W/SCOPE: CPT

## 2019-08-27 PROCEDURE — 99285 EMERGENCY DEPT VISIT HI MDM: CPT | Mod: ,,, | Performed by: EMERGENCY MEDICINE

## 2019-08-27 PROCEDURE — 80307 DRUG TEST PRSMV CHEM ANLYZR: CPT

## 2019-08-28 VITALS
HEIGHT: 71 IN | OXYGEN SATURATION: 100 % | RESPIRATION RATE: 16 BRPM | TEMPERATURE: 99 F | HEART RATE: 62 BPM | DIASTOLIC BLOOD PRESSURE: 86 MMHG | BODY MASS INDEX: 27.3 KG/M2 | WEIGHT: 195 LBS | SYSTOLIC BLOOD PRESSURE: 150 MMHG

## 2019-08-28 PROCEDURE — 99285 EMERGENCY DEPT VISIT HI MDM: CPT

## 2019-08-28 NOTE — ED PROVIDER NOTES
Encounter Date: 8/27/2019       History     Chief Complaint   Patient presents with    Depression     presents to ED c/o worsening depression. compliant with meds. endorses SI. denies HI, visual/auditory hallucinations. Denies new stressors.      Had been feeling worsening depression all day and kept thinking of walking out in front of a car. Can think of no trigger to current depressed feelings. Had been taking medications as scheduled. Denies alcohol or substance abuse recently. No physical complaints.         Review of patient's allergies indicates:  No Known Allergies  Past Medical History:   Diagnosis Date    Acute renal insufficiency 6/21/2015    Cocaine abuse     Depression     History of psychiatric care     HTN (hypertension) 1/25/2014    Stroke      Past Surgical History:   Procedure Laterality Date    NECK SURGERY       Family History   Problem Relation Age of Onset    Diabetes Mother     Heart disease Mother     Hypertension Mother      Social History     Tobacco Use    Smoking status: Current Some Day Smoker     Packs/day: 0.25     Types: Cigarettes    Smokeless tobacco: Never Used   Substance Use Topics    Alcohol use: Yes     Comment: 6pk/week    Drug use: Yes     Types: Cocaine     Comment: last used a few days ago     Review of Systems   Constitutional: Negative.    HENT: Negative.    Eyes: Negative.    Respiratory: Negative.    Cardiovascular: Negative.    Gastrointestinal: Negative.    Endocrine: Negative.    Genitourinary: Negative.    Musculoskeletal: Negative.    Skin: Negative.    Neurological: Negative.    Psychiatric/Behavioral: Positive for self-injury.   All other systems reviewed and are negative.      Physical Exam     Initial Vitals [08/27/19 2031]   BP Pulse Resp Temp SpO2   (!) 167/91 70 18 98.9 °F (37.2 °C) 100 %      MAP       --         Physical Exam    Nursing note and vitals reviewed.  Constitutional: He appears well-developed and well-nourished.   HENT:   Head:  Normocephalic and atraumatic.   Mouth/Throat: Oropharynx is clear and moist.   Dental caries.    Eyes: EOM are normal. Pupils are equal, round, and reactive to light.   Neck: Normal range of motion. Neck supple.   Cardiovascular: Normal rate, regular rhythm and normal heart sounds.   Pulmonary/Chest: Breath sounds normal.   Abdominal: Soft. Bowel sounds are normal. He exhibits no distension. There is no tenderness.   Musculoskeletal: Normal range of motion. He exhibits no edema or tenderness.   Neurological: He is alert and oriented to person, place, and time. He has normal strength. No sensory deficit.   Skin: Skin is warm and dry. Capillary refill takes less than 2 seconds.   Psychiatric:   Depressed, pos si with a plan,          ED Course   Procedures  Labs Reviewed   COMPREHENSIVE METABOLIC PANEL - Abnormal; Notable for the following components:       Result Value    Anion Gap 6 (*)     All other components within normal limits   CBC W/ AUTO DIFFERENTIAL - Abnormal; Notable for the following components:    RBC 4.21 (*)     Hemoglobin 13.2 (*)     Mean Corpuscular Hemoglobin 31.4 (*)     Gran # (ANC) 1.2 (*)     Gran% 30.4 (*)     Lymph% 50.9 (*)     All other components within normal limits   URINALYSIS, REFLEX TO URINE CULTURE - Abnormal; Notable for the following components:    Occult Blood UA 1+ (*)     All other components within normal limits    Narrative:     Preferred Collection Type->Urine, Clean Catch   ACETAMINOPHEN LEVEL - Abnormal; Notable for the following components:    Acetaminophen (Tylenol), Serum <3.0 (*)     All other components within normal limits   PROTIME-INR - Abnormal; Notable for the following components:    Prothrombin Time 23.4 (*)     INR 2.4 (*)     All other components within normal limits   TSH   DRUG SCREEN PANEL, URINE EMERGENCY    Narrative:     Preferred Collection Type->Urine, Clean Catch   ALCOHOL,MEDICAL (ETHANOL)   URINALYSIS MICROSCOPIC    Narrative:     Preferred  Collection Type->Urine, Clean Catch     EKG Readings: (Independently Interpreted)   Initial Reading: No STEMI. Rhythm: Normal Sinus Rhythm. Heart Rate: 62. Ectopy: No Ectopy. Conduction: Normal. ST Segments: Normal ST Segments. T Waves: Normal. T Waves Flipped: V3, V4 and V5. Axis: Normal. Q Waves: V1, V2, V3, II, III and AVF. Clinical Impression: Normal Sinus Rhythm Other Impression: old inf and ant infarct       Imaging Results    None          Medical Decision Making:   ED Management:  Medically cleared. Will admit to psych facility. Noted pos uds for cocaine.                       Clinical Impression:       ICD-10-CM ICD-9-CM   1. Suicidal ideation R45.851 V62.84   2. Depression F32.9 311   3. Cocaine abuse F14.10 305.60                                Darshan Jarvis MD  08/27/19 5792

## 2019-08-28 NOTE — ED NOTES
Patient denies SI, HI,V/A hallucinations. He reports his depression is 5/10. Patient is calm and cooperative.  called and informed of transfer to  2. Spoke with Silvestre with the . DVC maintained.

## 2019-08-28 NOTE — ED NOTES
Pt transferred escorted off there unit by AA. Pt PEC and belongings given to AA staff. Pt instructed on his transfer to [facility] and offered the phone to notify family or friends but declined.

## 2019-08-28 NOTE — ED NOTES
Patient transferred to [facility] by myself, sitter, security with 1 plastic bag and 1 blue bag of belongings. Patient searched for contraband. DVC maintained.

## 2019-08-28 NOTE — ED NOTES
"Patient identifiers verified and correct for  Damir Faria     LOC: The patient is awake, alert and aware of environment with an appropriate affect, the patient is oriented x 3 and speaking appropriately.   APPEARANCE: Patient appears comfortable and in no acute distress, patient is clean and well groomed.  SKIN: The skin is warm and dry, color consistent with ethnicity, patient has normal skin turgor and moist mucus membranes, skin intact, no breakdown or bruising noted.   MUSCULOSKELETAL: Patient moving all extremities spontaneously, no swelling noted.  RESPIRATORY: Airway is open and patent, respirations are spontaneous, patient has a normal effort and rate, no accessory muscle use noted.  CARDIAC: Patient has a normal rate and regular rhythm, no edema noted, capillary refill < 3 seconds.   GASTRO: Pt denies any GI symptoms.  Currently asking for food   : Pt denies any pain or frequency with urination.  NEURO: Pt opens eyes spontaneously, behavior appropriate to situation, follows commands, facial expression symmetrical, bilateral hand grasp equal and even, purposeful motor response noted, normal sensation in all extremities when touched with a finger.     Pt presents with thoughts of self harm and wishes to "no be here anymore", denies auditory/Visual Hallucinations       Past Medical History:   Diagnosis Date    Acute renal insufficiency 6/21/2015    Cocaine abuse     Depression     History of psychiatric care     HTN (hypertension) 1/25/2014    Stroke        Past Surgical History:   Procedure Laterality Date    NECK SURGERY         Family History   Problem Relation Age of Onset    Diabetes Mother     Heart disease Mother     Hypertension Mother        Social History     Socioeconomic History    Marital status: Single     Spouse name: Not on file    Number of children: Not on file    Years of education: Not on file    Highest education level: Not on file   Occupational History     Employer: " [employer]   Social Needs    Financial resource strain: Not on file    Food insecurity:     Worry: Not on file     Inability: Not on file    Transportation needs:     Medical: Not on file     Non-medical: Not on file   Tobacco Use    Smoking status: Current Some Day Smoker     Packs/day: 0.25     Types: Cigarettes    Smokeless tobacco: Never Used   Substance and Sexual Activity    Alcohol use: Yes     Comment: 6pk/week    Drug use: Yes     Types: Cocaine     Comment: last used a few days ago    Sexual activity: Yes     Partners: Female     Birth control/protection: Condom   Lifestyle    Physical activity:     Days per week: Not on file     Minutes per session: Not on file    Stress: Not on file   Relationships    Social connections:     Talks on phone: Not on file     Gets together: Not on file     Attends Anabaptist service: Not on file     Active member of club or organization: Not on file     Attends meetings of clubs or organizations: Not on file     Relationship status: Not on file   Other Topics Concern    Patient feels they ought to cut down on drinking/drug use No    Patient annoyed by others criticizing their drinking/drug use No    Patient has felt bad or guilty about drinking/drug use No    Patient has had a drink/used drugs as an eye opener in the AM No   Social History Narrative    Not on file       No current facility-administered medications for this encounter.      Current Outpatient Medications   Medication Sig Dispense Refill    ARIPiprazole (ABILIFY) 10 MG Tab Take 1 tablet (10 mg total) by mouth once daily. 30 tablet 0    aspirin 81 MG Chew Take 1 tablet (81 mg total) by mouth once daily. 30 tablet 0    atorvastatin (LIPITOR) 40 MG tablet Take 1 tablet (40 mg total) by mouth every evening. 30 tablet 00    buPROPion (WELLBUTRIN XL) 300 MG 24 hr tablet Take 1 tablet (300 mg total) by mouth once daily. 30 tablet 0    carvedilol (COREG) 6.25 MG tablet Take 1 tablet (6.25 mg  total) by mouth 2 (two) times daily. 60 tablet 0    lisinopril (PRINIVIL,ZESTRIL) 20 MG tablet Take 1 tablet (20 mg total) by mouth once daily. 30 tablet 0    mirtazapine (REMERON) 30 MG tablet Take 1 tablet (30 mg total) by mouth every evening. 30 tablet 0    spironolactone (ALDACTONE) 25 MG tablet Take 1 tablet (25 mg total) by mouth once daily. 30 tablet 0    warfarin (COUMADIN) 7.5 MG tablet Take 1 tablet (7.5 mg total) by mouth Daily. 30 tablet 0       Review of patient's allergies indicates:  No Known Allergies

## 2019-09-06 ENCOUNTER — HOSPITAL ENCOUNTER (EMERGENCY)
Facility: HOSPITAL | Age: 64
Discharge: PSYCHIATRIC OR SUBSTANCE USE FACILITY | End: 2019-09-07
Attending: EMERGENCY MEDICINE
Payer: COMMERCIAL

## 2019-09-06 DIAGNOSIS — F32.A DEPRESSION, UNSPECIFIED DEPRESSION TYPE: ICD-10-CM

## 2019-09-06 DIAGNOSIS — R07.9 CHEST PAIN: ICD-10-CM

## 2019-09-06 DIAGNOSIS — R45.851 SUICIDAL IDEATION: Primary | ICD-10-CM

## 2019-09-06 LAB
ALBUMIN SERPL BCP-MCNC: 4.1 G/DL (ref 3.5–5.2)
ALP SERPL-CCNC: 77 U/L (ref 55–135)
ALT SERPL W/O P-5'-P-CCNC: 35 U/L (ref 10–44)
AMPHET+METHAMPHET UR QL: NEGATIVE
ANION GAP SERPL CALC-SCNC: 9 MMOL/L (ref 8–16)
APAP SERPL-MCNC: <3 UG/ML (ref 10–20)
AST SERPL-CCNC: 38 U/L (ref 10–40)
BACTERIA #/AREA URNS AUTO: ABNORMAL /HPF
BARBITURATES UR QL SCN>200 NG/ML: NEGATIVE
BASOPHILS # BLD AUTO: 0.03 K/UL (ref 0–0.2)
BASOPHILS NFR BLD: 0.5 % (ref 0–1.9)
BENZODIAZ UR QL SCN>200 NG/ML: NEGATIVE
BILIRUB SERPL-MCNC: 1.6 MG/DL (ref 0.1–1)
BILIRUB UR QL STRIP: NEGATIVE
BNP SERPL-MCNC: 231 PG/ML (ref 0–99)
BUN SERPL-MCNC: 22 MG/DL (ref 8–23)
BZE UR QL SCN: NORMAL
CALCIUM SERPL-MCNC: 9 MG/DL (ref 8.7–10.5)
CANNABINOIDS UR QL SCN: NEGATIVE
CHLORIDE SERPL-SCNC: 108 MMOL/L (ref 95–110)
CLARITY UR REFRACT.AUTO: CLEAR
CO2 SERPL-SCNC: 23 MMOL/L (ref 23–29)
COLOR UR AUTO: YELLOW
CREAT SERPL-MCNC: 1.2 MG/DL (ref 0.5–1.4)
CREAT UR-MCNC: 237 MG/DL (ref 23–375)
DIFFERENTIAL METHOD: ABNORMAL
EOSINOPHIL # BLD AUTO: 0.2 K/UL (ref 0–0.5)
EOSINOPHIL NFR BLD: 3.6 % (ref 0–8)
ERYTHROCYTE [DISTWIDTH] IN BLOOD BY AUTOMATED COUNT: 14.6 % (ref 11.5–14.5)
EST. GFR  (AFRICAN AMERICAN): >60 ML/MIN/1.73 M^2
EST. GFR  (NON AFRICAN AMERICAN): >60 ML/MIN/1.73 M^2
ETHANOL SERPL-MCNC: <10 MG/DL
GLUCOSE SERPL-MCNC: 87 MG/DL (ref 70–110)
GLUCOSE UR QL STRIP: NEGATIVE
HCT VFR BLD AUTO: 40.6 % (ref 40–54)
HGB BLD-MCNC: 13.8 G/DL (ref 14–18)
HGB UR QL STRIP: ABNORMAL
IMM GRANULOCYTES # BLD AUTO: 0.02 K/UL (ref 0–0.04)
IMM GRANULOCYTES NFR BLD AUTO: 0.4 % (ref 0–0.5)
INR PPP: 2 (ref 0.8–1.2)
KETONES UR QL STRIP: NEGATIVE
LEUKOCYTE ESTERASE UR QL STRIP: NEGATIVE
LIPASE SERPL-CCNC: 48 U/L (ref 4–60)
LYMPHOCYTES # BLD AUTO: 1.9 K/UL (ref 1–4.8)
LYMPHOCYTES NFR BLD: 33.2 % (ref 18–48)
MCH RBC QN AUTO: 31.7 PG (ref 27–31)
MCHC RBC AUTO-ENTMCNC: 34 G/DL (ref 32–36)
MCV RBC AUTO: 93 FL (ref 82–98)
METHADONE UR QL SCN>300 NG/ML: NEGATIVE
MICROSCOPIC COMMENT: ABNORMAL
MONOCYTES # BLD AUTO: 0.7 K/UL (ref 0.3–1)
MONOCYTES NFR BLD: 13.1 % (ref 4–15)
NEUTROPHILS # BLD AUTO: 2.8 K/UL (ref 1.8–7.7)
NEUTROPHILS NFR BLD: 49.2 % (ref 38–73)
NITRITE UR QL STRIP: NEGATIVE
NRBC BLD-RTO: 0 /100 WBC
OPIATES UR QL SCN: NEGATIVE
PCP UR QL SCN>25 NG/ML: NEGATIVE
PH UR STRIP: 6 [PH] (ref 5–8)
PLATELET # BLD AUTO: 221 K/UL (ref 150–350)
PMV BLD AUTO: 8.9 FL (ref 9.2–12.9)
POTASSIUM SERPL-SCNC: 3.9 MMOL/L (ref 3.5–5.1)
PROT SERPL-MCNC: 7.7 G/DL (ref 6–8.4)
PROT UR QL STRIP: NEGATIVE
PROTHROMBIN TIME: 19.2 SEC (ref 9–12.5)
RBC # BLD AUTO: 4.36 M/UL (ref 4.6–6.2)
RBC #/AREA URNS AUTO: 13 /HPF (ref 0–4)
SODIUM SERPL-SCNC: 140 MMOL/L (ref 136–145)
SP GR UR STRIP: 1.02 (ref 1–1.03)
TOXICOLOGY INFORMATION: NORMAL
TROPONIN I SERPL DL<=0.01 NG/ML-MCNC: 0.01 NG/ML (ref 0–0.03)
TROPONIN I SERPL DL<=0.01 NG/ML-MCNC: 0.02 NG/ML (ref 0–0.03)
TSH SERPL DL<=0.005 MIU/L-ACNC: 0.73 UIU/ML (ref 0.4–4)
URN SPEC COLLECT METH UR: ABNORMAL
WBC # BLD AUTO: 5.58 K/UL (ref 3.9–12.7)
WBC #/AREA URNS AUTO: 3 /HPF (ref 0–5)

## 2019-09-06 PROCEDURE — 84443 ASSAY THYROID STIM HORMONE: CPT

## 2019-09-06 PROCEDURE — 99285 EMERGENCY DEPT VISIT HI MDM: CPT | Mod: 25

## 2019-09-06 PROCEDURE — 80053 COMPREHEN METABOLIC PANEL: CPT

## 2019-09-06 PROCEDURE — 80307 DRUG TEST PRSMV CHEM ANLYZR: CPT

## 2019-09-06 PROCEDURE — 99285 PR EMERGENCY DEPT VISIT,LEVEL V: ICD-10-PCS | Mod: ,,, | Performed by: EMERGENCY MEDICINE

## 2019-09-06 PROCEDURE — 85610 PROTHROMBIN TIME: CPT

## 2019-09-06 PROCEDURE — 85025 COMPLETE CBC W/AUTO DIFF WBC: CPT

## 2019-09-06 PROCEDURE — 99285 EMERGENCY DEPT VISIT HI MDM: CPT | Mod: ,,, | Performed by: EMERGENCY MEDICINE

## 2019-09-06 PROCEDURE — 80329 ANALGESICS NON-OPIOID 1 OR 2: CPT

## 2019-09-06 PROCEDURE — 83690 ASSAY OF LIPASE: CPT

## 2019-09-06 PROCEDURE — 80320 DRUG SCREEN QUANTALCOHOLS: CPT

## 2019-09-06 PROCEDURE — 25000003 PHARM REV CODE 250: Performed by: PHYSICIAN ASSISTANT

## 2019-09-06 PROCEDURE — 84484 ASSAY OF TROPONIN QUANT: CPT

## 2019-09-06 PROCEDURE — 83880 ASSAY OF NATRIURETIC PEPTIDE: CPT

## 2019-09-06 PROCEDURE — 81001 URINALYSIS AUTO W/SCOPE: CPT

## 2019-09-06 RX ORDER — MIRTAZAPINE 15 MG/1
30 TABLET, ORALLY DISINTEGRATING ORAL NIGHTLY
Status: DISCONTINUED | OUTPATIENT
Start: 2019-09-06 | End: 2019-09-07 | Stop reason: HOSPADM

## 2019-09-06 RX ADMIN — MIRTAZAPINE 30 MG: 15 TABLET, ORALLY DISINTEGRATING ORAL at 08:09

## 2019-09-06 NOTE — ED NOTES
Pt transferred to  1 checked for contraband and wearing unit scrubs. PEC called into the coroners office. Pt belongings checked in and secured. Pt denies SI/HI/AVH's, pt states he has had suicidal thoughts without a plan earlier today. Pt denies chest pain or discomfort. Pt in bed resting NAD noted. Dinner tray ordered.

## 2019-09-06 NOTE — ED NOTES
Pt resting comfortably in stretcher. Sitter in direct view of patient and documenting q 15 per flowsheets. Side rails up and bed in lowest. No belongings in room. Will continue to monitor.

## 2019-09-06 NOTE — ED NOTES
Patient escorted to [facility] via wheelchair with sitter and 2 security guards.  All belongings and paperwork sent with patient to [facility].

## 2019-09-06 NOTE — ED NOTES
Pt belongings- black bag, clothes, shoes, wallet,and lighter locked up in closet. PEC paper work signed and collected. Nella Perez in direct contact of patient documenting q15 minutes per flowsheet. No equipment in room. Pt in paper scrubs and no belongings in possession. Will continue to monitor.

## 2019-09-06 NOTE — ED TRIAGE NOTES
"Pt presents to ED via EMS with chest pain and suicidal thoughts. Pt states does not have a plan. Pt states "been reminiscing about my son he got killed and my brother  the same year and makes me upset and thoughts cross my mind but no plan".   "

## 2019-09-06 NOTE — CONSULTS
"Emergency Psychiatry Consult Note    2019 12:24 PM  Damir Faria  MRN: [id number]    CHIEF COMPLAINT/REASON FOR CONSULT     suicidal ideation and depression     SUBJECTIVE     History of Present Illness:   Damir Faria is a 64 y.o. male with a past history of depression, cocaine use disorder, cocaine induced STEMI, CVA and HTN  who presented to [facility] after calling EMS due to suicidal ideation and chest pain.    Per ED RN:   Pt presents to ED via EMS with chest pain and suicidal thoughts. Pt states does not have a plan. Pt states "been reminiscing about my son he got killed and my brother  the same year and makes me upset and thoughts cross my mind but no plan".     Per ED Provider:  "64 year old male with medical history of CAD with stents on Coumadin, Hx of cocaine induced STEMI, Multiple psychiatric admissions for SI and depression presenting to the ED with the chief complaint of chest pain and depression. Patient woke up this morning with left-sided lower chest pain described as a localized "aching" sensation that lasted about 20 minutes and worsened with exertion. He denies having chest pain currently. Patient states his angina chest pain is in the same location but has a different sensation. He reports associated SOB. He additionally reports feeling depressed about his son who was killed in . He had thoughts of ending his life this morning additionally which prompted him to call EMS. He states he is feeling very depressed, but does not think he would end his life. He does not have a suicidal plan. Patient recently discharged from [facility] 4 days ago for SI and depression. He is currently living in a hotel by himself. He reports coping with his depression with intranasal cocaine and reports last using 2 days ago. He denies IVDU. He reports social ETOH use and has not consumed recently. He denies HI or hallucinations. He is compliant with his Remeron therapy."     Per Psych " "MD:  Patient presents with minimal eye contact, laying in bed with eyes closed, though participates appropriately in interview.  Reports that he is feeling very sad today and endorses depression off and on since  when both his mother and only son . Says on this occasion, he has been feeling sad for a "couple days". Cannot identify any particular triggers but rather says that "it just came on".  Endorses intermittent suicidal ideation over the past several days and says earlier this morning he was "thinking about ending it" which prompted him to call EMS. Says that he is "tired of living like this" and says "nothing is right for me" .  Endorses anhedonia and cannot think of anything he has enjoyed recently, tearfulness, reduced sleep (4-5 hours per night) , poor concentration, and low daytime energy level. Reports that appetite is normal. Endorses feelings of guilt but cannot specify. Patient admits to ongoing intranasal cocaine use but says he only uses when he starts to "feel depressed" -  last use of cocaine "a few days ago". Says he feels he needs to and wants to go to rehab for cocaine use. Attended rehab earlier this summer at [place] and estimates he was able to stop using for ~ several weeks. No hx of suicide attempts. Reports that he has been living at [address] [place] [address] in a hotel which he pays for via disability income. Says that he has stayed with his family in the past but prefers to live alone and does have social interactions at the hotel.  Says that he is able to return to hotel but is feeling "screwed up in the head" and would not feel safe going home at present. Reports compliance with psychiatric regimen of Remeron, Wellbutrin and Abilify. No outpatient psychiatrist.     Per chart review, patient recently admitted to [facility] between -19 for depression and suicidal ideation. Was discharged with diagnoses of Bipolar I disorder, MRE depressed on Abilify, Wellbutrin, " "Remeron. Per chart review discharged to rehab at that time.     Presented to ED again on  for depression and suicidal ideation and was admitted to [facility] on PEC. Unable to view records.     Tox results: EtOH negative, UDS not resulted     Collateral:   Refuses- sister "has enough on her plate"     Medical Review of Systems:  Cardiovascular: positive for chest pain and getting cardiac workup in ER     Psychiatric Review of Systems:  sleep: yes  appetite: no  weight: no  energy/anergy: yes  interest/pleasure/anhedonia: yes  anxiety/panic: yes  guilty/hopelessness: yes  concentration: yes  S.I.B.s/risky behavior: no  any drugs: yes, cocaine  alcohol: no     Allergies:  Patient has no known allergies.    Past Medical/Surgical History:  Past Medical History:   Diagnosis Date    Acute renal insufficiency 2015    Cocaine abuse     Depression     History of psychiatric care     HTN (hypertension) 2014    Stroke      Past Surgical History:   Procedure Laterality Date    NECK SURGERY         Past Psychiatric History:  Previous Medication Trials: yes   Previous Psychiatric Hospitalizations: yes   Previous Suicide Attempts: no , has "thought about it before" but never attempted   History of Violence: no  Outpatient Psychiatrist: no    Social History:  Marital Status:   Children: 1 son    Employment Status/Info: on disability  Education: 10th grade  Special Ed: no  Housing Status: the hotel, is able to return   History of phys/sexual abuse: no  Access to gun: nephew keeps his gun because he does not feel safe with it around    Substance Abuse History:  Recreational Drugs: cocaine  Use of Alcohol: denied  Rehab History:yes - has been to rehab 3 times for cocaine use   Tobacco Use:Yes 1 pack per week   Use of OTC: no     Legal History:  Past Charges/Incarcerations:no  Pending charges:no     Family Psychiatric History:   No     OBJECTIVE     Current Medications:    Home " "Medications:  Prior to Admission medications    Medication Sig Start Date End Date Taking? Authorizing Provider   ARIPiprazole (ABILIFY) 10 MG Tab Take 1 tablet (10 mg total) by mouth once daily. 7/16/19 8/15/19  Silvia Larson NP   aspirin 81 MG Chew Take 1 tablet (81 mg total) by mouth once daily. 7/16/19 8/15/19  Silvia Larson NP   atorvastatin (LIPITOR) 40 MG tablet Take 1 tablet (40 mg total) by mouth every evening. 7/15/19 8/14/19  Silvia Larson NP   buPROPion (WELLBUTRIN XL) 300 MG 24 hr tablet Take 1 tablet (300 mg total) by mouth once daily. 7/16/19 8/15/19  Silvia Larson NP   carvedilol (COREG) 6.25 MG tablet Take 1 tablet (6.25 mg total) by mouth 2 (two) times daily. 7/15/19 8/14/19  Silvia Larson NP   lisinopril (PRINIVIL,ZESTRIL) 20 MG tablet Take 1 tablet (20 mg total) by mouth once daily. 7/16/19 8/15/19  Silvia Larson NP   mirtazapine (REMERON) 30 MG tablet Take 1 tablet (30 mg total) by mouth every evening. 7/15/19 8/14/19  Silvia Larson NP   spironolactone (ALDACTONE) 25 MG tablet Take 1 tablet (25 mg total) by mouth once daily. 7/15/19 8/14/19  Silvia Larson NP   warfarin (COUMADIN) 7.5 MG tablet Take 1 tablet (7.5 mg total) by mouth Daily. 7/15/19 8/14/19  Silvia Larson NP     Vital Signs:  Temp:  [97.8 °F (36.6 °C)]   Pulse:  [88]   Resp:  [16]   BP: (146)/(80)   SpO2:  [98 %]   Mental Status Exam:  Appearance: unremarkable, laying in bed with eyes closed    Level of Consciousness: alert, awake   Psychomotor Behavior: cooperative, eye contact minimal, laying in bed with eyes closed but participating appropriately in interview    Speech: slowed, soft   Language: english, fluid   Orientation:   person, place, situation, month of year, year   Attention Span/Concentration: Normal   Memory: Grossly intact   Mood: "not good"   Affect: Constricted    Thought Process: normal and logical   Associations: normal and logical   Thought Content: + SI this morning, denies HI/AVH   Fund of Knowledge: adequate to " education level   Insight: fair   Judgment:  fair     Laboratory Data:  Recent Results (from the past 48 hour(s))   CBC auto differential    Collection Time: 09/06/19 10:45 AM   Result Value Ref Range    WBC 5.58 3.90 - 12.70 K/uL    RBC 4.36 (L) 4.60 - 6.20 M/uL    Hemoglobin 13.8 (L) 14.0 - 18.0 g/dL    Hematocrit 40.6 40.0 - 54.0 %    Mean Corpuscular Volume 93 82 - 98 fL    Mean Corpuscular Hemoglobin 31.7 (H) 27.0 - 31.0 pg    Mean Corpuscular Hemoglobin Conc 34.0 32.0 - 36.0 g/dL    RDW 14.6 (H) 11.5 - 14.5 %    Platelets 221 150 - 350 K/uL    MPV 8.9 (L) 9.2 - 12.9 fL    Immature Granulocytes 0.4 0.0 - 0.5 %    Gran # (ANC) 2.8 1.8 - 7.7 K/uL    Immature Grans (Abs) 0.02 0.00 - 0.04 K/uL    Lymph # 1.9 1.0 - 4.8 K/uL    Mono # 0.7 0.3 - 1.0 K/uL    Eos # 0.2 0.0 - 0.5 K/uL    Baso # 0.03 0.00 - 0.20 K/uL    nRBC 0 0 /100 WBC    Gran% 49.2 38.0 - 73.0 %    Lymph% 33.2 18.0 - 48.0 %    Mono% 13.1 4.0 - 15.0 %    Eosinophil% 3.6 0.0 - 8.0 %    Basophil% 0.5 0.0 - 1.9 %    Differential Method Automated    Comprehensive metabolic panel    Collection Time: 09/06/19 10:45 AM   Result Value Ref Range    Sodium 140 136 - 145 mmol/L    Potassium 3.9 3.5 - 5.1 mmol/L    Chloride 108 95 - 110 mmol/L    CO2 23 23 - 29 mmol/L    Glucose 87 70 - 110 mg/dL    BUN, Bld 22 8 - 23 mg/dL    Creatinine 1.2 0.5 - 1.4 mg/dL    Calcium 9.0 8.7 - 10.5 mg/dL    Total Protein 7.7 6.0 - 8.4 g/dL    Albumin 4.1 3.5 - 5.2 g/dL    Total Bilirubin 1.6 (H) 0.1 - 1.0 mg/dL    Alkaline Phosphatase 77 55 - 135 U/L    AST 38 10 - 40 U/L    ALT 35 10 - 44 U/L    Anion Gap 9 8 - 16 mmol/L    eGFR if African American >60.0 >60 mL/min/1.73 m^2    eGFR if non African American >60.0 >60 mL/min/1.73 m^2   TSH    Collection Time: 09/06/19 10:45 AM   Result Value Ref Range    TSH 0.733 0.400 - 4.000 uIU/mL   Ethanol    Collection Time: 09/06/19 10:45 AM   Result Value Ref Range    Alcohol, Medical, Serum <10 <10 mg/dL   Acetaminophen level    Collection  Time: 09/06/19 10:45 AM   Result Value Ref Range    Acetaminophen (Tylenol), Serum <3.0 (L) 10.0 - 20.0 ug/mL   Troponin I    Collection Time: 09/06/19 10:45 AM   Result Value Ref Range    Troponin I 0.016 0.000 - 0.026 ng/mL   Brain natriuretic peptide    Collection Time: 09/06/19 10:45 AM   Result Value Ref Range     (H) 0 - 99 pg/mL   Lipase    Collection Time: 09/06/19 10:45 AM   Result Value Ref Range    Lipase 48 4 - 60 U/L   Protime-INR    Collection Time: 09/06/19 10:45 AM   Result Value Ref Range    Prothrombin Time 19.2 (H) 9.0 - 12.5 sec    INR 2.0 (H) 0.8 - 1.2      No results found for: PHENYTOIN, PHENOBARB, VALPROATE, CBMZ  Imaging:  Imaging Results          X-Ray Chest PA And Lateral (Final result)  Result time 09/06/19 11:29:15    Final result by Aleksandar Cardoso III, MD (09/06/19 11:29:15)                 Impression:      No acute process seen.      Electronically signed by: Aleksandar Cardoso MD  Date:    09/06/2019  Time:    11:29             Narrative:    EXAMINATION:  XR CHEST PA AND LATERAL    CLINICAL HISTORY:  Chest pain, unspecified    FINDINGS:  Two views: Heart mediastinum are normal lungs are clear and the bones show no abnormality.                              IMPRESSION     Damir Faria is a 64 y.o. male with a past psychiatric history of depression, cocaine use disorder who presented to the [facility] ED on 9/6/2019 due to suicidal ideation.     Unspecified depressive disorder  Cocaine Use disorder     R/o feigning/ exaggerating sxs for secondary gain; however unclear what secondary gain may be at present as patient has stable housing    RECOMMEDATIONS     - Continue PEC for danger to self and seek inpatient psychiatric admission for ongoing evaluation and treatment once patient is medically cleared   - Patient counseled as to likely contribution of ongoing cocaine use to to his depressed mood  - Restart home Remeron 30 mg PO QHS; defer other psychiatric medications to admitting  psychiatric provider   - May use PRN Zyprexa 5 mg PO/IM for non-redirectable agitation; however caution until cardiac workup is complete     Case discussed with staff psychiatrist, Dr. Brown.     Thais Lane MD   Psychiatry PGY-3  9/6/2019

## 2019-09-06 NOTE — ED PROVIDER NOTES
"Encounter Date: 9/6/2019       History     Chief Complaint   Patient presents with    Chest Pain     hx MI    Suicidal     64 year old male with medical history of CAD with stents on Coumadin, Hx of cocaine induced STEMI, Multiple psychiatric admissions for SI and depression presenting to the ED with the chief complaint of chest pain and depression. Patient woke up this morning with left-sided lower chest pain described as a localized "aching" sensation that lasted about 20 minutes and worsened with exertion. He denies having chest pain currently. Patient states his angina chest pain is in the same location but has a different sensation. He reports associated SOB. He additionally reports feeling depressed about his son who was killed in 2000. He had thoughts of ending his life this morning additionally which prompted him to call EMS. He states he is feeling very depressed, but does not think he would end his life. He does not have a suicidal plan. Patient recently discharged from [facility] 4 days ago for SI and depression. He is currently living in a hotel by himself. He reports coping with his depression with intranasal cocaine and reports last using 2 days ago. He denies IVDU. He reports social ETOH use and has not consumed recently. He denies HI or hallucinations. He is compliant with his Remeron therapy.         Review of patient's allergies indicates:  No Known Allergies  Past Medical History:   Diagnosis Date    Acute renal insufficiency 6/21/2015    Cocaine abuse     Depression     History of psychiatric care     HTN (hypertension) 1/25/2014    Stroke      Past Surgical History:   Procedure Laterality Date    NECK SURGERY       Family History   Problem Relation Age of Onset    Diabetes Mother     Heart disease Mother     Hypertension Mother      Social History     Tobacco Use    Smoking status: Current Some Day Smoker     Packs/day: 0.25     Types: Cigarettes    Smokeless " tobacco: Never Used   Substance Use Topics    Alcohol use: Yes     Comment: 6pk/week    Drug use: Yes     Types: Cocaine     Comment: last used a few days ago     Review of Systems   Constitutional: Negative for chills, diaphoresis and fever.   HENT: Negative for congestion, sore throat and trouble swallowing.    Eyes: Negative for pain and redness.   Respiratory: Positive for shortness of breath. Negative for cough.    Cardiovascular: Positive for chest pain. Negative for palpitations and leg swelling.   Gastrointestinal: Negative for abdominal pain, constipation, diarrhea, nausea and vomiting.   Genitourinary: Negative for dysuria, flank pain and hematuria.   Musculoskeletal: Negative for arthralgias, back pain, neck pain and neck stiffness.   Skin: Negative for rash and wound.   Neurological: Negative for dizziness, light-headedness and headaches.   Psychiatric/Behavioral: Positive for dysphoric mood and suicidal ideas. Negative for agitation, confusion, hallucinations, self-injury and sleep disturbance. The patient is not nervous/anxious and is not hyperactive.      Physical Exam     Initial Vitals   BP Pulse Resp Temp SpO2   09/06/19 1007 09/06/19 1007 09/06/19 1007 09/06/19 1019 09/06/19 1007   (!) 146/80 88 16 97.8 °F (36.6 °C) 98 %      MAP       --                Physical Exam    Constitutional: He appears well-developed and well-nourished. He is not diaphoretic. No distress.   HENT:   Head: Normocephalic and atraumatic.   Mouth/Throat: Oropharynx is clear and moist. No oropharyngeal exudate.   Eyes: EOM are normal. Pupils are equal, round, and reactive to light.   Neck: Normal range of motion. Neck supple.   Cardiovascular: Normal rate and regular rhythm.   No edema to BLE   Pulmonary/Chest: Breath sounds normal. No respiratory distress. He has no wheezes.   Speaking full sentences without difficulty   Abdominal: Soft. He exhibits no distension. There is no tenderness.   Musculoskeletal: Normal range of  motion. He exhibits no edema or tenderness.   Neurological: He is alert and oriented to person, place, and time. He has normal strength. No cranial nerve deficit or sensory deficit.   Skin: Skin is warm and dry. No rash noted. No erythema.   Psychiatric: His speech is normal and behavior is normal. Judgment normal. Cognition and memory are normal. He exhibits a depressed mood. He expresses suicidal ideation. He expresses no homicidal ideation. He expresses no suicidal plans and no homicidal plans.       ED Course   Procedures  Labs Reviewed   CBC W/ AUTO DIFFERENTIAL - Abnormal; Notable for the following components:       Result Value    RBC 4.36 (*)     Hemoglobin 13.8 (*)     Mean Corpuscular Hemoglobin 31.7 (*)     RDW 14.6 (*)     MPV 8.9 (*)     All other components within normal limits   COMPREHENSIVE METABOLIC PANEL - Abnormal; Notable for the following components:    Total Bilirubin 1.6 (*)     All other components within normal limits   URINALYSIS, REFLEX TO URINE CULTURE - Abnormal; Notable for the following components:    Occult Blood UA 1+ (*)     All other components within normal limits    Narrative:     Preferred Collection Type->Urine, Clean Catch   ACETAMINOPHEN LEVEL - Abnormal; Notable for the following components:    Acetaminophen (Tylenol), Serum <3.0 (*)     All other components within normal limits   B-TYPE NATRIURETIC PEPTIDE - Abnormal; Notable for the following components:     (*)     All other components within normal limits   PROTIME-INR - Abnormal; Notable for the following components:    Prothrombin Time 19.2 (*)     INR 2.0 (*)     All other components within normal limits   URINALYSIS MICROSCOPIC - Abnormal; Notable for the following components:    RBC, UA 13 (*)     Bacteria Few (*)     All other components within normal limits    Narrative:     Preferred Collection Type->Urine, Clean Catch   TSH   ALCOHOL,MEDICAL (ETHANOL)   TROPONIN I   LIPASE   TROPONIN I    Narrative:      2nd trop at 1:45 pm   DRUG SCREEN PANEL, URINE EMERGENCY   DRUG SCREEN PANEL, URINE EMERGENCY   DRUG SCREEN PANEL, URINE EMERGENCY          Imaging Results          X-Ray Chest PA And Lateral (Final result)  Result time 09/06/19 11:29:15    Final result by Aleksandar Cardoso III, MD (09/06/19 11:29:15)                 Impression:      No acute process seen.      Electronically signed by: Aleksandar Cardoso MD  Date:    09/06/2019  Time:    11:29             Narrative:    EXAMINATION:  XR CHEST PA AND LATERAL    CLINICAL HISTORY:  Chest pain, unspecified    FINDINGS:  Two views: Heart mediastinum are normal lungs are clear and the bones show no abnormality.                                 Medical Decision Making:   History:   Old Medical Records: I decided to obtain old medical records.  Old Records Summarized: records from clinic visits and records from previous admission(s).  Independently Interpreted Test(s):   I have ordered and independently interpreted EKG Reading(s) - see summary below       <> Summary of EKG Reading(s): NSR 75 bpm. TWI V3-6 similar to previous. No STEMI  Clinical Tests:   Lab Tests: Ordered and Reviewed  Radiological Study: Ordered and Reviewed  Medical Tests: Ordered and Reviewed  Other:   I have discussed this case with another health care provider.       <> Summary of the Discussion: Psychiatry       APC / Resident Notes:   64 year old male with medical history of CAD with stents on Coumadin, Hx of cocaine induced STEMI, Multiple psychiatric admissions for SI and depression presenting to the ED c/o 20 minute episode of chest pain this AM that has since resolved. Additionally experiencing depression and had suicidal thoughts. Reports using intranasal cocaine 2 days ago. DDx includes but not limited to depression, social stressors, substance abuse, substance withdrawal, suicidal ideation, acute psychosis, metabolic abnormalities, electrolyte disturbance, ACS, pneumonia, CHF. Will check psychiatric  and cardiac work-up. Will give ASA in the ED.     No emergent findings on ED work-up. Trop neg x2, CXR without acute intrathoracic findings. No significant ECG changes. Do not suspect ACS at this time. Patient evaluated by psychiatry for suicidal ideation and depression. Recommending PEC and psychiatric placement. Patient is medically cleared for placement. I have discussed the care of this patient with my supervising physician.          Attending Attestation:     Physician Attestation Statement for NP/PA:   I have conducted a face to face encounter with this patient in addition to the NP/PA, due to Medical Complexity    Other NP/PA Attestation Additions:    History of Present Illness: Atypical chest pain, ruled out troponin x2.  PEC and psych admission                      Clinical Impression:       ICD-10-CM ICD-9-CM   1. Suicidal ideation R45.851 V62.84   2. Chest pain R07.9 786.50   3. Depression, unspecified depression type F32.9 311         Disposition:   Disposition: Transferred  Condition: Fair                        AMBROCIO AnC  09/06/19 1158

## 2019-09-07 VITALS
OXYGEN SATURATION: 99 % | WEIGHT: 195 LBS | DIASTOLIC BLOOD PRESSURE: 68 MMHG | HEART RATE: 68 BPM | SYSTOLIC BLOOD PRESSURE: 134 MMHG | TEMPERATURE: 98 F | BODY MASS INDEX: 27.2 KG/M2 | RESPIRATION RATE: 18 BRPM

## 2019-09-07 LAB
CK MB SERPL-MCNC: 3.9 NG/ML (ref 0.1–6.5)
CK MB SERPL-RTO: 1 % (ref 0–5)
CK SERPL-CCNC: 376 U/L (ref 20–200)
CK SERPL-CCNC: 376 U/L (ref 20–200)

## 2019-09-07 PROCEDURE — 82553 CREATINE MB FRACTION: CPT

## 2019-09-07 PROCEDURE — 82550 ASSAY OF CK (CPK): CPT

## 2019-09-07 NOTE — ED NOTES
Escorted to the restroom, patient voided & returned to bed. He requested a serving of Orange Juice, provided. He resumed a resting position. He is maintained on direct visual observation.

## 2019-09-07 NOTE — ED NOTES
recv'd a call from [facility], spoke w/ Nancy who is requesting a CK & will review once resulted for consideration for admission. Contacted ED, Dr. Segura off duty, spoke w/ Dr. Florez & test ordered.

## 2019-09-07 NOTE — ED NOTES
The patient is recv'd awake in bed. He appears quiet although is engaging pleasantly upon approach. He denies S/HI, A//VH. He is admitted  On a PEC written 09/06/2019 @ 1400 by Dr. PAULA Segura. He is maintained on direct visual observation. He is void of any complaints.

## 2019-09-07 NOTE — ED NOTES
Patient sitting up in bed eating breakfast. No complaints at this time.  Pleasant upon approach.  Denies any current suicidal ideations or homicidal ideations.  Verbalizes understanding of the plan of care.

## 2019-09-07 NOTE — ED NOTES
Received report from Dotty CARRION.  Patient is lying in bed, appears to be asleep.  Eyes closed.  Respirations even and unlabored.  Remains on direct visual observation.

## 2019-09-07 NOTE — ED NOTES
Patient informed that he will be discharged to [facility].  He verbalized understanding.  Patient offered the phone to call family members and refused

## 2019-09-07 NOTE — ED NOTES
Continues to appear asleep w/ eyes close, rise/fall of chest noted. Attempted to contact Nancy @ [facility] [phone], no answer after numerous rings. Will attempt to locate another contact number. Spoke to lab staff & CK will be resulted in Epic shortly.

## 2019-09-07 NOTE — ED NOTES
Attempting to contact Nancy @ [facility] @ [phone], ER & given mental health specialist number, [phone], no answer after numerous rings.

## 2019-09-07 NOTE — ED NOTES
CK lab drawn by phlebotomist, patient cooperative. Maintained on direct visual observation. Provided w/ an [place] Juice following labs.

## 2019-09-07 NOTE — ED NOTES
The patient is awake, requesting [place] Juice & provided. He is void of any complaints, NAD. He is maintained on direct visual observation.

## 2019-09-07 NOTE — ED NOTES
Patient is discharged to [facility].  Transported per [facility].  Mood and behavior appropriate for transfer.  Patients belonging sent with SPD to [facility].  Patient did not want to use the phone to notify anyone about discharge placement.  Report was called to [facility].  Spoke with Ziggy CARRION.  No complaints at time of discharge.  Security escorted patient to the car.

## 2019-11-04 ENCOUNTER — HOSPITAL ENCOUNTER (EMERGENCY)
Facility: HOSPITAL | Age: 64
Discharge: PSYCHIATRIC OR SUBSTANCE USE FACILITY | End: 2019-11-04
Attending: EMERGENCY MEDICINE
Payer: COMMERCIAL

## 2019-11-04 VITALS
HEIGHT: 71 IN | SYSTOLIC BLOOD PRESSURE: 124 MMHG | DIASTOLIC BLOOD PRESSURE: 80 MMHG | OXYGEN SATURATION: 100 % | WEIGHT: 198 LBS | RESPIRATION RATE: 16 BRPM | HEART RATE: 87 BPM | BODY MASS INDEX: 27.72 KG/M2 | TEMPERATURE: 98 F

## 2019-11-04 DIAGNOSIS — Z00.8 MEDICAL CLEARANCE FOR PSYCHIATRIC ADMISSION: ICD-10-CM

## 2019-11-04 DIAGNOSIS — F99 PSYCHIATRIC COMPLAINT: ICD-10-CM

## 2019-11-04 DIAGNOSIS — I25.10 CORONARY ARTERY DISEASE, ANGINA PRESENCE UNSPECIFIED, UNSPECIFIED VESSEL OR LESION TYPE, UNSPECIFIED WHETHER NATIVE OR TRANSPLANTED HEART: ICD-10-CM

## 2019-11-04 DIAGNOSIS — F32.A DEPRESSION, UNSPECIFIED DEPRESSION TYPE: ICD-10-CM

## 2019-11-04 DIAGNOSIS — R45.851 SUICIDAL IDEATION: Primary | ICD-10-CM

## 2019-11-04 DIAGNOSIS — F14.10 COCAINE ABUSE: ICD-10-CM

## 2019-11-04 LAB
ALBUMIN SERPL BCP-MCNC: 4 G/DL (ref 3.5–5.2)
ALP SERPL-CCNC: 70 U/L (ref 55–135)
ALT SERPL W/O P-5'-P-CCNC: 15 U/L (ref 10–44)
AMPHET+METHAMPHET UR QL: NEGATIVE
ANION GAP SERPL CALC-SCNC: 9 MMOL/L (ref 8–16)
APAP SERPL-MCNC: <3 UG/ML (ref 10–20)
AST SERPL-CCNC: 19 U/L (ref 10–40)
BACTERIA #/AREA URNS AUTO: ABNORMAL /HPF
BARBITURATES UR QL SCN>200 NG/ML: NEGATIVE
BASOPHILS # BLD AUTO: 0.03 K/UL (ref 0–0.2)
BASOPHILS NFR BLD: 0.9 % (ref 0–1.9)
BENZODIAZ UR QL SCN>200 NG/ML: NEGATIVE
BILIRUB SERPL-MCNC: 1.9 MG/DL (ref 0.1–1)
BILIRUB UR QL STRIP: NEGATIVE
BUN SERPL-MCNC: 20 MG/DL (ref 8–23)
BZE UR QL SCN: ABNORMAL
CALCIUM SERPL-MCNC: 9.4 MG/DL (ref 8.7–10.5)
CANNABINOIDS UR QL SCN: NEGATIVE
CHLORIDE SERPL-SCNC: 110 MMOL/L (ref 95–110)
CLARITY UR REFRACT.AUTO: CLEAR
CO2 SERPL-SCNC: 21 MMOL/L (ref 23–29)
COLOR UR AUTO: YELLOW
CREAT SERPL-MCNC: 1.5 MG/DL (ref 0.5–1.4)
CREAT UR-MCNC: 383 MG/DL (ref 23–375)
DIFFERENTIAL METHOD: ABNORMAL
EOSINOPHIL # BLD AUTO: 0.1 K/UL (ref 0–0.5)
EOSINOPHIL NFR BLD: 3.2 % (ref 0–8)
ERYTHROCYTE [DISTWIDTH] IN BLOOD BY AUTOMATED COUNT: 13.7 % (ref 11.5–14.5)
EST. GFR  (AFRICAN AMERICAN): 55.7 ML/MIN/1.73 M^2
EST. GFR  (NON AFRICAN AMERICAN): 48.2 ML/MIN/1.73 M^2
ETHANOL SERPL-MCNC: <10 MG/DL
GLUCOSE SERPL-MCNC: 71 MG/DL (ref 70–110)
GLUCOSE UR QL STRIP: NEGATIVE
HCT VFR BLD AUTO: 49.6 % (ref 40–54)
HGB BLD-MCNC: 16.2 G/DL (ref 14–18)
HGB UR QL STRIP: ABNORMAL
HYALINE CASTS UR QL AUTO: 3 /LPF
IMM GRANULOCYTES # BLD AUTO: 0 K/UL (ref 0–0.04)
IMM GRANULOCYTES NFR BLD AUTO: 0 % (ref 0–0.5)
INR PPP: 1.3 (ref 0.8–1.2)
KETONES UR QL STRIP: ABNORMAL
LEUKOCYTE ESTERASE UR QL STRIP: NEGATIVE
LYMPHOCYTES # BLD AUTO: 1.7 K/UL (ref 1–4.8)
LYMPHOCYTES NFR BLD: 48.1 % (ref 18–48)
MCH RBC QN AUTO: 31.8 PG (ref 27–31)
MCHC RBC AUTO-ENTMCNC: 32.7 G/DL (ref 32–36)
MCV RBC AUTO: 97 FL (ref 82–98)
METHADONE UR QL SCN>300 NG/ML: NEGATIVE
MICROSCOPIC COMMENT: ABNORMAL
MONOCYTES # BLD AUTO: 0.5 K/UL (ref 0.3–1)
MONOCYTES NFR BLD: 15.2 % (ref 4–15)
NEUTROPHILS # BLD AUTO: 1.1 K/UL (ref 1.8–7.7)
NEUTROPHILS NFR BLD: 32.6 % (ref 38–73)
NITRITE UR QL STRIP: NEGATIVE
NRBC BLD-RTO: 0 /100 WBC
OPIATES UR QL SCN: NEGATIVE
PCP UR QL SCN>25 NG/ML: NEGATIVE
PH UR STRIP: 5 [PH] (ref 5–8)
PLATELET # BLD AUTO: 240 K/UL (ref 150–350)
PMV BLD AUTO: 8.8 FL (ref 9.2–12.9)
POTASSIUM SERPL-SCNC: 4 MMOL/L (ref 3.5–5.1)
PROT SERPL-MCNC: 7.5 G/DL (ref 6–8.4)
PROT UR QL STRIP: ABNORMAL
PROTHROMBIN TIME: 13.3 SEC (ref 9–12.5)
RBC # BLD AUTO: 5.1 M/UL (ref 4.6–6.2)
RBC #/AREA URNS AUTO: 4 /HPF (ref 0–4)
SODIUM SERPL-SCNC: 140 MMOL/L (ref 136–145)
SP GR UR STRIP: 1.03 (ref 1–1.03)
TOXICOLOGY INFORMATION: ABNORMAL
TSH SERPL DL<=0.005 MIU/L-ACNC: 0.44 UIU/ML (ref 0.4–4)
URN SPEC COLLECT METH UR: ABNORMAL
WBC # BLD AUTO: 3.49 K/UL (ref 3.9–12.7)
WBC #/AREA URNS AUTO: 3 /HPF (ref 0–5)

## 2019-11-04 PROCEDURE — 80320 DRUG SCREEN QUANTALCOHOLS: CPT

## 2019-11-04 PROCEDURE — 93010 ELECTROCARDIOGRAM REPORT: CPT | Mod: ,,, | Performed by: INTERNAL MEDICINE

## 2019-11-04 PROCEDURE — 81001 URINALYSIS AUTO W/SCOPE: CPT

## 2019-11-04 PROCEDURE — 25000003 PHARM REV CODE 250: Performed by: EMERGENCY MEDICINE

## 2019-11-04 PROCEDURE — 80053 COMPREHEN METABOLIC PANEL: CPT

## 2019-11-04 PROCEDURE — 93010 EKG 12-LEAD: ICD-10-PCS | Mod: ,,, | Performed by: INTERNAL MEDICINE

## 2019-11-04 PROCEDURE — S4991 NICOTINE PATCH NONLEGEND: HCPCS | Performed by: EMERGENCY MEDICINE

## 2019-11-04 PROCEDURE — 99285 EMERGENCY DEPT VISIT HI MDM: CPT | Mod: ,,, | Performed by: EMERGENCY MEDICINE

## 2019-11-04 PROCEDURE — 85610 PROTHROMBIN TIME: CPT

## 2019-11-04 PROCEDURE — 99285 PR EMERGENCY DEPT VISIT,LEVEL V: ICD-10-PCS | Mod: ,,, | Performed by: EMERGENCY MEDICINE

## 2019-11-04 PROCEDURE — 80329 ANALGESICS NON-OPIOID 1 OR 2: CPT

## 2019-11-04 PROCEDURE — 80307 DRUG TEST PRSMV CHEM ANLYZR: CPT

## 2019-11-04 PROCEDURE — 84443 ASSAY THYROID STIM HORMONE: CPT

## 2019-11-04 PROCEDURE — 85025 COMPLETE CBC W/AUTO DIFF WBC: CPT

## 2019-11-04 PROCEDURE — 93005 ELECTROCARDIOGRAM TRACING: CPT

## 2019-11-04 PROCEDURE — 99284 EMERGENCY DEPT VISIT MOD MDM: CPT | Mod: 25

## 2019-11-04 RX ORDER — ATORVASTATIN CALCIUM 20 MG/1
40 TABLET, FILM COATED ORAL NIGHTLY
Status: DISCONTINUED | OUTPATIENT
Start: 2019-11-04 | End: 2019-11-04 | Stop reason: HOSPADM

## 2019-11-04 RX ORDER — MIRTAZAPINE 30 MG/1
30 TABLET, FILM COATED ORAL NIGHTLY
Status: DISCONTINUED | OUTPATIENT
Start: 2019-11-04 | End: 2019-11-04 | Stop reason: HOSPADM

## 2019-11-04 RX ORDER — NAPROXEN SODIUM 220 MG/1
81 TABLET, FILM COATED ORAL DAILY
Status: DISCONTINUED | OUTPATIENT
Start: 2019-11-04 | End: 2019-11-04 | Stop reason: HOSPADM

## 2019-11-04 RX ORDER — WARFARIN 7.5 MG/1
7.5 TABLET ORAL DAILY
Status: DISCONTINUED | OUTPATIENT
Start: 2019-11-04 | End: 2019-11-04 | Stop reason: HOSPADM

## 2019-11-04 RX ORDER — SPIRONOLACTONE 25 MG/1
25 TABLET ORAL DAILY
Status: DISCONTINUED | OUTPATIENT
Start: 2019-11-04 | End: 2019-11-04 | Stop reason: HOSPADM

## 2019-11-04 RX ORDER — ARIPIPRAZOLE 10 MG/1
10 TABLET ORAL DAILY
Status: DISCONTINUED | OUTPATIENT
Start: 2019-11-04 | End: 2019-11-04 | Stop reason: HOSPADM

## 2019-11-04 RX ORDER — LISINOPRIL 20 MG/1
20 TABLET ORAL DAILY
Status: DISCONTINUED | OUTPATIENT
Start: 2019-11-04 | End: 2019-11-04 | Stop reason: HOSPADM

## 2019-11-04 RX ORDER — CARVEDILOL 6.25 MG/1
6.25 TABLET ORAL 2 TIMES DAILY
Status: DISCONTINUED | OUTPATIENT
Start: 2019-11-04 | End: 2019-11-04 | Stop reason: HOSPADM

## 2019-11-04 RX ORDER — BUPROPION HYDROCHLORIDE 300 MG/1
300 TABLET ORAL DAILY
Status: DISCONTINUED | OUTPATIENT
Start: 2019-11-04 | End: 2019-11-04 | Stop reason: HOSPADM

## 2019-11-04 RX ORDER — IBUPROFEN 200 MG
1 TABLET ORAL
Status: DISCONTINUED | OUTPATIENT
Start: 2019-11-04 | End: 2019-11-04 | Stop reason: HOSPADM

## 2019-11-04 RX ADMIN — NICOTINE 1 PATCH: 21 PATCH, EXTENDED RELEASE TRANSDERMAL at 10:11

## 2019-11-04 RX ADMIN — ASPIRIN 81 MG CHEWABLE TABLET 81 MG: 81 TABLET CHEWABLE at 10:11

## 2019-11-04 RX ADMIN — CARVEDILOL 6.25 MG: 3.12 TABLET, FILM COATED ORAL at 10:11

## 2019-11-04 RX ADMIN — SPIRONOLACTONE 25 MG: 25 TABLET, FILM COATED ORAL at 10:11

## 2019-11-04 RX ADMIN — LISINOPRIL 20 MG: 20 TABLET ORAL at 10:11

## 2019-11-04 NOTE — ED PROVIDER NOTES
Encounter Date: 11/4/2019       History     Chief Complaint   Patient presents with    Suicidal     thinking about it     64-year-old male presents with significant worsening suicidal thoughts over the past several days.  He states that he has been depressed for some time.  He goes through periods where he feels suicidal.  In the past he has tried to jump into the street in front of car.  He has no specific plan this time.  He does knows that if he gets this way he should seek attention.  He states he has poor coping mechanisms.  He states he used cocaine to try to get better but he knows that is not the solution.  He denies alcohol use.  He has a history of heart disease but denies any chest discomfort or shortness of breath.  He did not take his medications today.        Review of patient's allergies indicates:  No Known Allergies  Past Medical History:   Diagnosis Date    Acute renal insufficiency 6/21/2015    Cocaine abuse     Depression     History of psychiatric care     HTN (hypertension) 1/25/2014    Stroke      Past Surgical History:   Procedure Laterality Date    NECK SURGERY       Family History   Problem Relation Age of Onset    Diabetes Mother     Heart disease Mother     Hypertension Mother      Social History     Tobacco Use    Smoking status: Current Some Day Smoker     Packs/day: 0.25     Types: Cigarettes    Smokeless tobacco: Never Used   Substance Use Topics    Alcohol use: Yes     Comment: 6pk/week    Drug use: Yes     Types: Cocaine     Comment: last used a few days ago     Review of Systems   Constitutional: Negative for fever.   HENT: Negative for congestion.    Eyes: Negative for visual disturbance.   Respiratory: Negative for shortness of breath.    Cardiovascular: Negative for chest pain and leg swelling.   Gastrointestinal: Negative for abdominal pain.   Genitourinary: Negative for difficulty urinating.   Musculoskeletal: Negative for arthralgias and neck pain.   Skin:  Negative for rash.   Neurological: Negative for weakness and headaches.   Hematological: Negative for adenopathy.   Psychiatric/Behavioral: Positive for suicidal ideas. Negative for agitation and self-injury.       Physical Exam     Initial Vitals [11/04/19 0926]   BP Pulse Resp Temp SpO2   119/84 99 16 98.2 °F (36.8 °C) 100 %      MAP       --         Physical Exam    Constitutional: He appears well-developed and well-nourished.   HENT:   Head: Normocephalic.   Eyes: EOM are normal. Pupils are equal, round, and reactive to light.   Neck: Normal range of motion. Neck supple.   Cardiovascular: Normal rate, regular rhythm, normal heart sounds and intact distal pulses.   Pulmonary/Chest: Breath sounds normal.   Abdominal: Soft. Bowel sounds are normal. He exhibits no distension. There is no tenderness.   Musculoskeletal: Normal range of motion. He exhibits no edema.   Neurological: He is alert. He has normal strength. No sensory deficit. GCS score is 15. GCS eye subscore is 4. GCS verbal subscore is 5. GCS motor subscore is 6.   Skin: Skin is warm. Capillary refill takes less than 2 seconds. No rash noted.   Psychiatric:   Depressed         ED Course   Procedures  Labs Reviewed   CBC W/ AUTO DIFFERENTIAL   COMPREHENSIVE METABOLIC PANEL   TSH   URINALYSIS, REFLEX TO URINE CULTURE   DRUG SCREEN PANEL, URINE EMERGENCY   ALCOHOL,MEDICAL (ETHANOL)   ACETAMINOPHEN LEVEL   PROTIME-INR     EKG Readings: (Independently Interpreted)   Normal sinus rhythm at 74 with no acute ischemic changes       Imaging Results    None          Medical Decision Making:   History:   Old Medical Records: I decided to obtain old medical records.  Initial Assessment:   Patient with significant psychiatric history and cocaine abuse presenting with suicidal thoughts and depression.  I reviewed his medical record.  He has been admitted to psychiatric hospitals.  I reviewed the recent psychiatry consult which reaffirmed his need for psychiatric  placement then.  Will place under a PEC and obtain medical clearance.  He has no medical complaints. His EKG is reassuring  Clinical Tests:   Lab Tests: Ordered and Reviewed  Medical Tests: Ordered and Reviewed  ED Management:  I reviewed studies.  Patient is medically clear and stable for psychiatric placement                      Clinical Impression:       ICD-10-CM ICD-9-CM   1. Suicidal ideation R45.851 V62.84   2. Psychiatric complaint F69 300.9   3. Cocaine abuse F14.10 305.60   4. Medical clearance for psychiatric admission Z00.8 V70.8   5. Depression, unspecified depression type F32.9 311   6. Coronary artery disease, angina presence unspecified, unspecified vessel or lesion type, unspecified whether native or transplanted heart I25.10 414.00         Disposition:   Disposition: Transferred  Condition: Stable  For psychiatric care    Patient is medically clear and stable                        Chato Pollock MD  11/04/19 4550

## 2019-11-04 NOTE — ED NOTES
Patient in paper scrubs. All patient belongings locked up in closet- shirt, pants shoes, underwear, phone, wallet. Sitter Waldemar at bedside. All equipment removed from room.

## 2019-11-04 NOTE — ED NOTES
Pt transferred to  2, Pt checked for contraband and wearing paper scrubs. Pt belongings secured. PEC called into the coroners office. Pt is calm and cooperative. Pt endorses feelings of depression and passive suicidal thoughts. Pt denies HI/AVH's. Pt instructed on the PEC placement process, he verbalizes understanding.

## 2019-11-04 NOTE — ED NOTES
Pt escorted to the [facility] vehicle for transport to [facility]. [facility] staff given PEC and 2 bags of pt belongings. Pt remained calm and cooperative.

## 2019-11-04 NOTE — ED TRIAGE NOTES
Damir Faria, a 64 y.o. male presents to the ED w/ complaint of suicidal thought but no plan.  States he has been dealing with depression for a long time and nothing is going right in his life.     Triage note:  Chief Complaint   Patient presents with    Suicidal     thinking about it     Review of patient's allergies indicates:  No Known Allergies  Past Medical History:   Diagnosis Date    Acute renal insufficiency 6/21/2015    Cocaine abuse     Depression     History of psychiatric care     HTN (hypertension) 1/25/2014    Stroke      LOC: Patient name and date of birth verified. The patient is awake, alert and aware of environment with an appropriate affect, the patient is oriented x 3 and speaking appropriately.   APPEARANCE: Patient resting comfortably, patient is clean and well groomed, patient's clothing is properly fastened.  SKIN: The skin is warm and dry, color consistent with ethnicity, patient has normal skin turgor and moist mucus membranes, skin intact, no breakdown or bruising noted.  MUSCULOSKELETAL: Patient moving all extremities well, no obvious swelling or deformities noted.   RESPIRATORY: Respirations are spontaneous, patient has a normal effort and rate, no accessory muscle use noted.  CARDIAC: Patient has a normal rate and rhythm, no periphreal edema noted, capillary refill < 3 seconds.  ABDOMEN: Soft and non tender to palpation, no distention noted. Bowel sounds present in all four quadrants.  NEUROLOGIC: Eyes open spontaneously, behavior appropriate to situation, follows commands, facial expression symmetrical, bilateral hand grasp equal and even, purposeful motor response noted, normal sensation in all extremities when touched with a finger.

## 2019-11-04 NOTE — ED NOTES
PEC papers signed. Chris Christianson in direct view of patient. Awaiting urine sample. Patient aware needing urine, given pitcher of water and apple juice.

## 2019-11-05 PROBLEM — Z91.148 NONCOMPLIANCE WITH MEDICATION REGIMEN: Chronic | Status: ACTIVE | Noted: 2019-11-05

## 2019-11-05 PROBLEM — Z13.9 ENCOUNTER FOR MEDICAL SCREENING EXAMINATION: Status: ACTIVE | Noted: 2019-11-05

## 2019-11-08 ENCOUNTER — HOSPITAL ENCOUNTER (EMERGENCY)
Facility: HOSPITAL | Age: 64
Discharge: PSYCHIATRIC OR SUBSTANCE USE FACILITY | End: 2019-11-08
Attending: EMERGENCY MEDICINE
Payer: COMMERCIAL

## 2019-11-08 VITALS
OXYGEN SATURATION: 100 % | SYSTOLIC BLOOD PRESSURE: 157 MMHG | DIASTOLIC BLOOD PRESSURE: 80 MMHG | RESPIRATION RATE: 18 BRPM | TEMPERATURE: 98 F | HEART RATE: 74 BPM

## 2019-11-08 DIAGNOSIS — F32.A DEPRESSION, UNSPECIFIED DEPRESSION TYPE: ICD-10-CM

## 2019-11-08 DIAGNOSIS — R45.851 SUICIDAL IDEATION: Primary | ICD-10-CM

## 2019-11-08 LAB
ALBUMIN SERPL BCP-MCNC: 4 G/DL (ref 3.5–5.2)
ALP SERPL-CCNC: 79 U/L (ref 55–135)
ALT SERPL W/O P-5'-P-CCNC: 16 U/L (ref 10–44)
AMPHET+METHAMPHET UR QL: NEGATIVE
ANION GAP SERPL CALC-SCNC: 9 MMOL/L (ref 8–16)
APAP SERPL-MCNC: <3 UG/ML (ref 10–20)
AST SERPL-CCNC: 20 U/L (ref 10–40)
BACTERIA #/AREA URNS AUTO: NORMAL /HPF
BARBITURATES UR QL SCN>200 NG/ML: NEGATIVE
BASOPHILS # BLD AUTO: 0.04 K/UL (ref 0–0.2)
BASOPHILS NFR BLD: 0.9 % (ref 0–1.9)
BENZODIAZ UR QL SCN>200 NG/ML: NEGATIVE
BILIRUB SERPL-MCNC: 0.4 MG/DL (ref 0.1–1)
BILIRUB UR QL STRIP: NEGATIVE
BUN SERPL-MCNC: 11 MG/DL (ref 8–23)
BZE UR QL SCN: NORMAL
CALCIUM SERPL-MCNC: 9.4 MG/DL (ref 8.7–10.5)
CANNABINOIDS UR QL SCN: NEGATIVE
CHLORIDE SERPL-SCNC: 108 MMOL/L (ref 95–110)
CLARITY UR REFRACT.AUTO: CLEAR
CO2 SERPL-SCNC: 24 MMOL/L (ref 23–29)
COLOR UR AUTO: YELLOW
CREAT SERPL-MCNC: 1.2 MG/DL (ref 0.5–1.4)
CREAT UR-MCNC: 121 MG/DL (ref 23–375)
DIFFERENTIAL METHOD: ABNORMAL
EOSINOPHIL # BLD AUTO: 0.2 K/UL (ref 0–0.5)
EOSINOPHIL NFR BLD: 4.3 % (ref 0–8)
ERYTHROCYTE [DISTWIDTH] IN BLOOD BY AUTOMATED COUNT: 13.4 % (ref 11.5–14.5)
EST. GFR  (AFRICAN AMERICAN): >60 ML/MIN/1.73 M^2
EST. GFR  (NON AFRICAN AMERICAN): >60 ML/MIN/1.73 M^2
ETHANOL SERPL-MCNC: <10 MG/DL
GLUCOSE SERPL-MCNC: 78 MG/DL (ref 70–110)
GLUCOSE UR QL STRIP: NEGATIVE
HCT VFR BLD AUTO: 47.2 % (ref 40–54)
HGB BLD-MCNC: 15.2 G/DL (ref 14–18)
HGB UR QL STRIP: ABNORMAL
IMM GRANULOCYTES # BLD AUTO: 0.01 K/UL (ref 0–0.04)
IMM GRANULOCYTES NFR BLD AUTO: 0.2 % (ref 0–0.5)
INR PPP: 1.4 (ref 0.8–1.2)
KETONES UR QL STRIP: NEGATIVE
LEUKOCYTE ESTERASE UR QL STRIP: NEGATIVE
LYMPHOCYTES # BLD AUTO: 2 K/UL (ref 1–4.8)
LYMPHOCYTES NFR BLD: 43.7 % (ref 18–48)
MCH RBC QN AUTO: 31.1 PG (ref 27–31)
MCHC RBC AUTO-ENTMCNC: 32.2 G/DL (ref 32–36)
MCV RBC AUTO: 97 FL (ref 82–98)
METHADONE UR QL SCN>300 NG/ML: NEGATIVE
MICROSCOPIC COMMENT: NORMAL
MONOCYTES # BLD AUTO: 0.6 K/UL (ref 0.3–1)
MONOCYTES NFR BLD: 13.5 % (ref 4–15)
NEUTROPHILS # BLD AUTO: 1.7 K/UL (ref 1.8–7.7)
NEUTROPHILS NFR BLD: 37.4 % (ref 38–73)
NITRITE UR QL STRIP: NEGATIVE
NRBC BLD-RTO: 0 /100 WBC
OPIATES UR QL SCN: NEGATIVE
PCP UR QL SCN>25 NG/ML: NEGATIVE
PH UR STRIP: 5 [PH] (ref 5–8)
PLATELET # BLD AUTO: 239 K/UL (ref 150–350)
PMV BLD AUTO: 9 FL (ref 9.2–12.9)
POTASSIUM SERPL-SCNC: 4.1 MMOL/L (ref 3.5–5.1)
PROT SERPL-MCNC: 7.9 G/DL (ref 6–8.4)
PROT UR QL STRIP: NEGATIVE
PROTHROMBIN TIME: 14 SEC (ref 9–12.5)
RBC # BLD AUTO: 4.88 M/UL (ref 4.6–6.2)
RBC #/AREA URNS AUTO: 4 /HPF (ref 0–4)
SODIUM SERPL-SCNC: 141 MMOL/L (ref 136–145)
SP GR UR STRIP: 1.01 (ref 1–1.03)
TOXICOLOGY INFORMATION: NORMAL
URN SPEC COLLECT METH UR: ABNORMAL
WBC # BLD AUTO: 4.65 K/UL (ref 3.9–12.7)
WBC #/AREA URNS AUTO: 0 /HPF (ref 0–5)

## 2019-11-08 PROCEDURE — 81001 URINALYSIS AUTO W/SCOPE: CPT

## 2019-11-08 PROCEDURE — 80307 DRUG TEST PRSMV CHEM ANLYZR: CPT

## 2019-11-08 PROCEDURE — 80329 ANALGESICS NON-OPIOID 1 OR 2: CPT

## 2019-11-08 PROCEDURE — 99284 EMERGENCY DEPT VISIT MOD MDM: CPT | Mod: ,,, | Performed by: EMERGENCY MEDICINE

## 2019-11-08 PROCEDURE — 25000003 PHARM REV CODE 250: Performed by: EMERGENCY MEDICINE

## 2019-11-08 PROCEDURE — 80320 DRUG SCREEN QUANTALCOHOLS: CPT

## 2019-11-08 PROCEDURE — 99284 PR EMERGENCY DEPT VISIT,LEVEL IV: ICD-10-PCS | Mod: ,,, | Performed by: EMERGENCY MEDICINE

## 2019-11-08 PROCEDURE — 85025 COMPLETE CBC W/AUTO DIFF WBC: CPT

## 2019-11-08 PROCEDURE — 99285 EMERGENCY DEPT VISIT HI MDM: CPT

## 2019-11-08 PROCEDURE — 85610 PROTHROMBIN TIME: CPT

## 2019-11-08 PROCEDURE — 80053 COMPREHEN METABOLIC PANEL: CPT

## 2019-11-08 RX ORDER — WARFARIN 2.5 MG/1
7.5 TABLET ORAL
Status: COMPLETED | OUTPATIENT
Start: 2019-11-08 | End: 2019-11-08

## 2019-11-08 RX ADMIN — WARFARIN SODIUM 7.5 MG: 2.5 TABLET ORAL at 05:11

## 2019-11-08 NOTE — ED NOTES
"Pt transferred to  1, checked for contraband and wearing paper scrubs. Pt is calm and cooperative. Pt endorses depressive feelings but denies SI/HI/AVH's. Pt states " I have a lot on my mind". Pt belongings secured. Pt instructed on the PEC placement process. DVC maintained, pt resting in bed NAD noted.    "

## 2019-11-08 NOTE — ED PROVIDER NOTES
Encounter Date: 11/8/2019       History     Chief Complaint   Patient presents with    Suicidal     Pt was discharged from [facility] today and is still feeling suicidal.      HPI   64-year-old male with past medical history of hypertension, stroke, renal insufficiency, cocaine abuse, depression, recently seen the emergency department and placed in patient for depression with reported 4 days stay, discharge very recently comes back to the emergency department saying he still feels actively suicidal.  Patient denies having a specific plan but states I can come up with 1.  Denies HI, auditory hallucinations, visual hallucinations.    Review of patient's allergies indicates:  No Known Allergies  Past Medical History:   Diagnosis Date    Acute renal insufficiency 6/21/2015    Cocaine abuse     Depression     History of psychiatric care     HTN (hypertension) 1/25/2014    Stroke      Past Surgical History:   Procedure Laterality Date    NECK SURGERY       Family History   Problem Relation Age of Onset    Diabetes Mother     Heart disease Mother     Hypertension Mother      Social History     Tobacco Use    Smoking status: Current Some Day Smoker     Packs/day: 0.25     Types: Cigarettes    Smokeless tobacco: Never Used   Substance Use Topics    Alcohol use: Yes     Comment: 6pk/week    Drug use: Yes     Types: Cocaine     Comment: last used a few days ago     Review of Systems   Constitutional:  No Fever  Eyes: No Vision Changes  ENT/Mouth: No sore throat  Cardiovascular:  No Chest Pain,  Respiratory:  No Cough, No SOB  Gastrointestinal:  No Nausea, No Vomiting, No Diarrhea, No abdo pain.  Genitourinary:  No  pain  Musculoskeletal:  No Back Pain, No Neck Pain, No recent trauma.  Skin:  No skin Lesions  Neuro:  No Weakness, No Dizziness, No Headache          Physical Exam     Initial Vitals [11/08/19 1432]   BP Pulse Resp Temp SpO2   (!) 145/83 74 16 98.5 °F (36.9 °C) 100 %      MAP       --          Physical Exam   Physical Exam:  CONSTITUTIONAL: Well developed, well nourished, in no acute distress.  HENT: Normocephalic, atraumatic    EYES: Sclerae anicteric   NECK: Supple, no thyroid enlargement  CARDIOVASCULAR: Regular rate and rhythm without any murmurs, gallops, rubs  RESPIRATORY: Speaking in full sentences. Breathing comfortably. Auscultation of the lungs revealed normal breath sounds b/l, no wheezing, no rales, no rhonchi.  ABDOMEN: Soft and nontender, no masses, no rebound or guarding   NEUROLOGIC: Alert, interacting normally. No facial droop.   MSK: Moving all four extremities.  Skin: Warm and dry. No visible rash on exposed areas of skin.    Psych:  Flat affect      ED Course   Procedures  Labs Reviewed   CBC W/ AUTO DIFFERENTIAL - Abnormal; Notable for the following components:       Result Value    Mean Corpuscular Hemoglobin 31.1 (*)     MPV 9.0 (*)     Gran # (ANC) 1.7 (*)     Gran% 37.4 (*)     All other components within normal limits   ACETAMINOPHEN LEVEL - Abnormal; Notable for the following components:    Acetaminophen (Tylenol), Serum <3.0 (*)     All other components within normal limits   PROTIME-INR - Abnormal; Notable for the following components:    Prothrombin Time 14.0 (*)     INR 1.4 (*)     All other components within normal limits   COMPREHENSIVE METABOLIC PANEL   ALCOHOL,MEDICAL (ETHANOL)   URINALYSIS, REFLEX TO URINE CULTURE   DRUG SCREEN PANEL, URINE EMERGENCY   URINALYSIS, REFLEX TO URINE CULTURE   DRUG SCREEN PANEL, URINE EMERGENCY          Imaging Results    None          Medical Decision Making:   Initial Assessment:   64-year-old male with suicidal ideation, depressive state.  Recently discharged from psychiatric hospital.  Physical exam is unremarkable.  Patient has no medical complaints.     Will undertake screening labs.  Labs are unremarkable once the bait medical clearance and referral for inpatient psychiatric evaluation  ED Management:  Update:  Vital  stable.  Labs and urine are unremarkable.  Given benign exam, benign labs, stable vitals, this time patient is medically cleared.  Given 1 dose of Coumadin.  Patient will need to remain on his home medications while inpatient.  He is medically cleared to seek psychiatric care and evaluation.    MDM Complexity Points:   Problem Points:  1.New problem, with no additional ED work-up planned (maximum of 1) - suicidal ideation, depressive state.    Data Points:  Review or order clinical lab tests, Decision to obtain old records (in the EHR) and Review and summarization of old records    Risk:  High Risk                                   Clinical Impression:       ICD-10-CM ICD-9-CM   1. Suicidal ideation R45.851 V62.84   2. Depression, unspecified depression type F32.9 311                             Aldair Brown MD  11/08/19 2675

## 2019-11-09 NOTE — ED NOTES
Continues to appear asleep w/ eyes closed & rise/fall of chest noted. Maintained on direct visual observation.

## 2019-11-09 NOTE — ED NOTES
The patient continues to appear asleep, eyes closed, rise/fall of chest noted. Position changes noted however no signs of restlessness. Maintained on direct visual observation.

## 2019-11-09 NOTE — ED NOTES
"The patient is recv'd lying quietly in bed. He is attired in [facility] paper scrubs w/ fair grooming & hygiene. He is engaging upon approach. His affect & mood is pleasant & he engages freely. He states, " I'm feeling great after I got that phone call." He states that he met someone while in the hospital, another patient. He states that he's trying to make her his girlfriend.He is checked for contraband, none found. His legal status is a PEC written by Dr. Brown on  11/08/19 @ 1602. He states that he presented to the ED for "depression." He denies S/HI, A/VH. He rates his depression 5/10. He is maintained on direct visual observation. He accepted 2 servings of Apple Juice & is currently watching TV after the assessment.  "

## 2019-11-09 NOTE — ED NOTES
Call from [facility] of [place], [place]. Inquiry of patient's presentation, medication profile & current SI status. [facility] states will speak w/ the NP & if accepted will contact [facility]. The patient appears asleep w/ eyes closed & rise/fall of chest noted. Maintained on direct visual observation.

## 2019-11-09 NOTE — ED NOTES
Report given to nurse Shivani BORDEN The patient departed the [facility] @ 2891 via [facility] w/ hospital security on site. All belongings given to [facility]'s staff.

## 2019-11-26 ENCOUNTER — NURSE TRIAGE (OUTPATIENT)
Dept: ADMINISTRATIVE | Facility: CLINIC | Age: 64
End: 2019-11-26

## 2019-11-26 ENCOUNTER — HOSPITAL ENCOUNTER (OUTPATIENT)
Facility: HOSPITAL | Age: 64
Discharge: HOME | End: 2019-11-26
Attending: EMERGENCY MEDICINE | Admitting: EMERGENCY MEDICINE
Payer: COMMERCIAL

## 2019-11-26 VITALS
HEIGHT: 70 IN | WEIGHT: 208 LBS | OXYGEN SATURATION: 99 % | BODY MASS INDEX: 29.78 KG/M2 | TEMPERATURE: 98 F | SYSTOLIC BLOOD PRESSURE: 129 MMHG | HEART RATE: 66 BPM | DIASTOLIC BLOOD PRESSURE: 76 MMHG | RESPIRATION RATE: 20 BRPM

## 2019-11-26 DIAGNOSIS — R07.9 CHEST PAIN: Primary | ICD-10-CM

## 2019-11-26 PROBLEM — I20.0 UNSTABLE ANGINA: Status: RESOLVED | Noted: 2018-08-20 | Resolved: 2019-11-26

## 2019-11-26 PROBLEM — N17.9 AKI (ACUTE KIDNEY INJURY): Status: RESOLVED | Noted: 2017-02-14 | Resolved: 2019-11-26

## 2019-11-26 PROBLEM — I21.3 STEMI (ST ELEVATION MYOCARDIAL INFARCTION): Status: RESOLVED | Noted: 2017-02-12 | Resolved: 2019-11-26

## 2019-11-26 PROBLEM — R79.89 ELEVATED TROPONIN: Status: RESOLVED | Noted: 2017-02-12 | Resolved: 2019-11-26

## 2019-11-26 PROBLEM — Z13.9 ENCOUNTER FOR MEDICAL SCREENING EXAMINATION: Status: RESOLVED | Noted: 2019-11-05 | Resolved: 2019-11-26

## 2019-11-26 LAB
ABO + RH BLD: NORMAL
ALBUMIN SERPL BCP-MCNC: 3.8 G/DL (ref 3.5–5.2)
ALP SERPL-CCNC: 67 U/L (ref 55–135)
ALT SERPL W/O P-5'-P-CCNC: 20 U/L (ref 10–44)
AMPHET+METHAMPHET UR QL: NEGATIVE
ANION GAP SERPL CALC-SCNC: 6 MMOL/L (ref 8–16)
ASCENDING AORTA: 2.94 CM
AST SERPL-CCNC: 21 U/L (ref 10–40)
AV INDEX (PROSTH): 1.08
AV MEAN GRADIENT: 2 MMHG
AV PEAK GRADIENT: 3 MMHG
AV VALVE AREA: 3.62 CM2
AV VELOCITY RATIO: 1.03
BARBITURATES UR QL SCN>200 NG/ML: NEGATIVE
BASOPHILS # BLD AUTO: 0.03 K/UL (ref 0–0.2)
BASOPHILS NFR BLD: 0.6 % (ref 0–1.9)
BENZODIAZ UR QL SCN>200 NG/ML: NEGATIVE
BILIRUB SERPL-MCNC: 0.8 MG/DL (ref 0.1–1)
BLD GP AB SCN CELLS X3 SERPL QL: NORMAL
BNP SERPL-MCNC: 68 PG/ML (ref 0–99)
BSA FOR ECHO PROCEDURE: 2.16 M2
BUN SERPL-MCNC: 15 MG/DL (ref 8–23)
BZE UR QL SCN: NORMAL
CALCIUM SERPL-MCNC: 8.6 MG/DL (ref 8.7–10.5)
CANNABINOIDS UR QL SCN: NEGATIVE
CHLORIDE SERPL-SCNC: 112 MMOL/L (ref 95–110)
CO2 SERPL-SCNC: 23 MMOL/L (ref 23–29)
CREAT SERPL-MCNC: 1.1 MG/DL (ref 0.5–1.4)
CREAT UR-MCNC: 288 MG/DL (ref 23–375)
CV ECHO LV RWT: 0.47 CM
DIFFERENTIAL METHOD: ABNORMAL
DOP CALC AO PEAK VEL: 0.9 M/S
DOP CALC AO VTI: 16.53 CM
DOP CALC LVOT AREA: 3.4 CM2
DOP CALC LVOT DIAMETER: 2.07 CM
DOP CALC LVOT PEAK VEL: 0.93 M/S
DOP CALC LVOT STROKE VOLUME: 59.87 CM3
DOP CALCLVOT PEAK VEL VTI: 17.8 CM
E WAVE DECELERATION TIME: 195.66 MSEC
E/A RATIO: 0.66
E/E' RATIO: 8.17 M/S
ECHO LV POSTERIOR WALL: 0.84 CM (ref 0.6–1.1)
EOSINOPHIL # BLD AUTO: 0.2 K/UL (ref 0–0.5)
EOSINOPHIL NFR BLD: 3.5 % (ref 0–8)
ERYTHROCYTE [DISTWIDTH] IN BLOOD BY AUTOMATED COUNT: 14 % (ref 11.5–14.5)
EST. GFR  (AFRICAN AMERICAN): >60 ML/MIN/1.73 M^2
EST. GFR  (NON AFRICAN AMERICAN): >60 ML/MIN/1.73 M^2
ETHANOL UR-MCNC: <10 MG/DL
FRACTIONAL SHORTENING: 20 % (ref 28–44)
GLUCOSE SERPL-MCNC: 93 MG/DL (ref 70–110)
HCT VFR BLD AUTO: 42.4 % (ref 40–54)
HGB BLD-MCNC: 13.4 G/DL (ref 14–18)
IMM GRANULOCYTES # BLD AUTO: 0.01 K/UL (ref 0–0.04)
IMM GRANULOCYTES NFR BLD AUTO: 0.2 % (ref 0–0.5)
INR PPP: 1.2 (ref 0.8–1.2)
INTERVENTRICULAR SEPTUM: 1.21 CM (ref 0.6–1.1)
LA MAJOR: 4.78 CM
LA MINOR: 5.26 CM
LA WIDTH: 3.63 CM
LEFT ATRIUM SIZE: 3.37 CM
LEFT ATRIUM VOLUME INDEX: 24.5 ML/M2
LEFT ATRIUM VOLUME: 52.08 CM3
LEFT INTERNAL DIMENSION IN SYSTOLE: 2.84 CM (ref 2.1–4)
LEFT VENTRICLE DIASTOLIC VOLUME INDEX: 25.09 ML/M2
LEFT VENTRICLE DIASTOLIC VOLUME: 53.26 ML
LEFT VENTRICLE MASS INDEX: 52 G/M2
LEFT VENTRICLE SYSTOLIC VOLUME INDEX: 14.5 ML/M2
LEFT VENTRICLE SYSTOLIC VOLUME: 30.73 ML
LEFT VENTRICULAR INTERNAL DIMENSION IN DIASTOLE: 3.57 CM (ref 3.5–6)
LEFT VENTRICULAR MASS: 110.43 G
LV LATERAL E/E' RATIO: 9.8 M/S
LV SEPTAL E/E' RATIO: 7 M/S
LYMPHOCYTES # BLD AUTO: 2.3 K/UL (ref 1–4.8)
LYMPHOCYTES NFR BLD: 44.1 % (ref 18–48)
MCH RBC QN AUTO: 30.9 PG (ref 27–31)
MCHC RBC AUTO-ENTMCNC: 31.6 G/DL (ref 32–36)
MCV RBC AUTO: 98 FL (ref 82–98)
METHADONE UR QL SCN>300 NG/ML: NEGATIVE
MONOCYTES # BLD AUTO: 0.8 K/UL (ref 0.3–1)
MONOCYTES NFR BLD: 14.6 % (ref 4–15)
MV PEAK A VEL: 0.74 M/S
MV PEAK E VEL: 0.49 M/S
NEUTROPHILS # BLD AUTO: 1.9 K/UL (ref 1.8–7.7)
NEUTROPHILS NFR BLD: 37 % (ref 38–73)
NRBC BLD-RTO: 0 /100 WBC
OPIATES UR QL SCN: NEGATIVE
PCP UR QL SCN>25 NG/ML: NEGATIVE
PISA TR MAX VEL: 2.25 M/S
PLATELET # BLD AUTO: 277 K/UL (ref 150–350)
PMV BLD AUTO: 8.7 FL (ref 9.2–12.9)
POTASSIUM SERPL-SCNC: 3.6 MMOL/L (ref 3.5–5.1)
PROT SERPL-MCNC: 7.3 G/DL (ref 6–8.4)
PROTHROMBIN TIME: 11.8 SEC (ref 9–12.5)
RA MAJOR: 4.44 CM
RA PRESSURE: 3 MMHG
RA WIDTH: 3.03 CM
RBC # BLD AUTO: 4.34 M/UL (ref 4.6–6.2)
RIGHT VENTRICULAR END-DIASTOLIC DIMENSION: 3.21 CM
SINUS: 2.84 CM
SODIUM SERPL-SCNC: 141 MMOL/L (ref 136–145)
STJ: 2.96 CM
TDI LATERAL: 0.05 M/S
TDI SEPTAL: 0.07 M/S
TDI: 0.06 M/S
TOXICOLOGY INFORMATION: NORMAL
TR MAX PG: 20 MMHG
TRICUSPID ANNULAR PLANE SYSTOLIC EXCURSION: 2.3 CM
TROPONIN I SERPL DL<=0.01 NG/ML-MCNC: 0.01 NG/ML (ref 0–0.03)
TROPONIN I SERPL DL<=0.01 NG/ML-MCNC: <0.006 NG/ML (ref 0–0.03)
TROPONIN I SERPL DL<=0.01 NG/ML-MCNC: <0.006 NG/ML (ref 0–0.03)
TV REST PULMONARY ARTERY PRESSURE: 23 MMHG
WBC # BLD AUTO: 5.13 K/UL (ref 3.9–12.7)

## 2019-11-26 PROCEDURE — 93010 EKG 12-LEAD: ICD-10-PCS | Mod: ,,, | Performed by: INTERNAL MEDICINE

## 2019-11-26 PROCEDURE — 99285 EMERGENCY DEPT VISIT HI MDM: CPT | Mod: 25

## 2019-11-26 PROCEDURE — 83880 ASSAY OF NATRIURETIC PEPTIDE: CPT

## 2019-11-26 PROCEDURE — 85610 PROTHROMBIN TIME: CPT

## 2019-11-26 PROCEDURE — G0378 HOSPITAL OBSERVATION PER HR: HCPCS

## 2019-11-26 PROCEDURE — 99220 PR INITIAL OBSERVATION CARE,LEVL III: CPT | Mod: ,,, | Performed by: HOSPITALIST

## 2019-11-26 PROCEDURE — 93010 ELECTROCARDIOGRAM REPORT: CPT | Mod: ,,, | Performed by: INTERNAL MEDICINE

## 2019-11-26 PROCEDURE — 99285 PR EMERGENCY DEPT VISIT,LEVEL V: ICD-10-PCS | Mod: ,,, | Performed by: EMERGENCY MEDICINE

## 2019-11-26 PROCEDURE — 25000003 PHARM REV CODE 250: Performed by: STUDENT IN AN ORGANIZED HEALTH CARE EDUCATION/TRAINING PROGRAM

## 2019-11-26 PROCEDURE — 25000003 PHARM REV CODE 250: Performed by: HOSPITALIST

## 2019-11-26 PROCEDURE — 86850 RBC ANTIBODY SCREEN: CPT

## 2019-11-26 PROCEDURE — 93005 ELECTROCARDIOGRAM TRACING: CPT

## 2019-11-26 PROCEDURE — 84484 ASSAY OF TROPONIN QUANT: CPT

## 2019-11-26 PROCEDURE — 85025 COMPLETE CBC W/AUTO DIFF WBC: CPT

## 2019-11-26 PROCEDURE — 25000003 PHARM REV CODE 250: Performed by: EMERGENCY MEDICINE

## 2019-11-26 PROCEDURE — 99220 PR INITIAL OBSERVATION CARE,LEVL III: ICD-10-PCS | Mod: ,,, | Performed by: HOSPITALIST

## 2019-11-26 PROCEDURE — 80053 COMPREHEN METABOLIC PANEL: CPT

## 2019-11-26 PROCEDURE — 84484 ASSAY OF TROPONIN QUANT: CPT | Mod: 91

## 2019-11-26 PROCEDURE — 80307 DRUG TEST PRSMV CHEM ANLYZR: CPT

## 2019-11-26 PROCEDURE — 63600175 PHARM REV CODE 636 W HCPCS: Performed by: EMERGENCY MEDICINE

## 2019-11-26 PROCEDURE — 63600175 PHARM REV CODE 636 W HCPCS: Performed by: INTERNAL MEDICINE

## 2019-11-26 PROCEDURE — 99285 EMERGENCY DEPT VISIT HI MDM: CPT | Mod: ,,, | Performed by: EMERGENCY MEDICINE

## 2019-11-26 RX ORDER — ASPIRIN 81 MG/1
81 TABLET ORAL DAILY
Status: DISCONTINUED | OUTPATIENT
Start: 2019-11-27 | End: 2019-11-26 | Stop reason: HOSPADM

## 2019-11-26 RX ORDER — MIRTAZAPINE 30 MG/1
30 TABLET, FILM COATED ORAL NIGHTLY
Status: DISCONTINUED | OUTPATIENT
Start: 2019-11-26 | End: 2019-11-26 | Stop reason: HOSPADM

## 2019-11-26 RX ORDER — ACETAMINOPHEN 325 MG/1
650 TABLET ORAL
Status: COMPLETED | OUTPATIENT
Start: 2019-11-26 | End: 2019-11-26

## 2019-11-26 RX ORDER — CLOPIDOGREL 300 MG/1
600 TABLET, FILM COATED ORAL ONCE
Status: COMPLETED | OUTPATIENT
Start: 2019-11-26 | End: 2019-11-26

## 2019-11-26 RX ORDER — LISINOPRIL 20 MG/1
20 TABLET ORAL DAILY
Status: DISCONTINUED | OUTPATIENT
Start: 2019-11-26 | End: 2019-11-26 | Stop reason: HOSPADM

## 2019-11-26 RX ORDER — NITROGLYCERIN 0.4 MG/1
0.4 TABLET SUBLINGUAL EVERY 5 MIN PRN
Status: COMPLETED | OUTPATIENT
Start: 2019-11-26 | End: 2019-11-26

## 2019-11-26 RX ORDER — CLOPIDOGREL BISULFATE 75 MG/1
75 TABLET ORAL DAILY
Status: DISCONTINUED | OUTPATIENT
Start: 2019-11-27 | End: 2019-11-26 | Stop reason: HOSPADM

## 2019-11-26 RX ORDER — ATORVASTATIN CALCIUM 10 MG/1
40 TABLET, FILM COATED ORAL NIGHTLY
Status: DISCONTINUED | OUTPATIENT
Start: 2019-11-26 | End: 2019-11-26 | Stop reason: HOSPADM

## 2019-11-26 RX ORDER — ACETAMINOPHEN 325 MG/1
650 TABLET ORAL EVERY 6 HOURS PRN
Status: DISCONTINUED | OUTPATIENT
Start: 2019-11-26 | End: 2019-11-26 | Stop reason: HOSPADM

## 2019-11-26 RX ORDER — ASPIRIN 325 MG
325 TABLET ORAL
Status: COMPLETED | OUTPATIENT
Start: 2019-11-26 | End: 2019-11-26

## 2019-11-26 RX ORDER — SODIUM CHLORIDE 0.9 % (FLUSH) 0.9 %
10 SYRINGE (ML) INJECTION
Status: DISCONTINUED | OUTPATIENT
Start: 2019-11-26 | End: 2019-11-26 | Stop reason: HOSPADM

## 2019-11-26 RX ORDER — CARVEDILOL 3.12 MG/1
6.25 TABLET ORAL 2 TIMES DAILY
Status: DISCONTINUED | OUTPATIENT
Start: 2019-11-26 | End: 2019-11-26 | Stop reason: HOSPADM

## 2019-11-26 RX ORDER — WARFARIN 7.5 MG/1
7.5 TABLET ORAL DAILY
Status: DISCONTINUED | OUTPATIENT
Start: 2019-11-26 | End: 2019-11-26

## 2019-11-26 RX ADMIN — NITROGLYCERIN 0.4 MG: 0.4 TABLET SUBLINGUAL at 02:11

## 2019-11-26 RX ADMIN — CLOPIDOGREL BISULFATE 600 MG: 300 TABLET, FILM COATED ORAL at 04:11

## 2019-11-26 RX ADMIN — LISINOPRIL 20 MG: 20 TABLET ORAL at 08:11

## 2019-11-26 RX ADMIN — CARVEDILOL 6.25 MG: 3.12 TABLET, FILM COATED ORAL at 08:11

## 2019-11-26 RX ADMIN — ASPIRIN 325 MG ORAL TABLET 325 MG: 325 PILL ORAL at 02:11

## 2019-11-26 RX ADMIN — HUMAN ALBUMIN MICROSPHERES AND PERFLUTREN 0.66 MG: 10; .22 INJECTION, SOLUTION INTRAVENOUS at 08:11

## 2019-11-26 RX ADMIN — ACETAMINOPHEN 650 MG: 325 TABLET ORAL at 02:11

## 2019-11-26 NOTE — ASSESSMENT & PLAN NOTE
Could pursue stress test but does not necessarily need to be done in hospital, depending on reliability of follow-up.

## 2019-11-26 NOTE — SUBJECTIVE & OBJECTIVE
Past Medical History:   Diagnosis Date    Acute renal insufficiency 6/21/2015    Cocaine abuse     Depression     History of psychiatric care     HTN (hypertension) 1/25/2014    Stroke        Past Surgical History:   Procedure Laterality Date    NECK SURGERY         Review of patient's allergies indicates:  No Known Allergies    No current facility-administered medications on file prior to encounter.      Current Outpatient Medications on File Prior to Encounter   Medication Sig    aspirin 81 MG Chew Take 1 tablet (81 mg total) by mouth once daily.    atorvastatin (LIPITOR) 40 MG tablet Take 1 tablet (40 mg total) by mouth every evening.    carvedilol (COREG) 6.25 MG tablet Take 1 tablet (6.25 mg total) by mouth 2 (two) times daily.    lisinopril (PRINIVIL,ZESTRIL) 20 MG tablet Take 1 tablet (20 mg total) by mouth once daily.    mirtazapine (REMERON) 30 MG tablet Take 1 tablet (30 mg total) by mouth every evening.    spironolactone (ALDACTONE) 25 MG tablet Take 1 tablet (25 mg total) by mouth once daily.    warfarin (COUMADIN) 7.5 MG tablet Take 1 tablet (7.5 mg total) by mouth Daily.     Family History     Problem Relation (Age of Onset)    Diabetes Mother    Heart disease Mother    Hypertension Mother        Tobacco Use    Smoking status: Current Some Day Smoker     Packs/day: 0.25     Types: Cigarettes    Smokeless tobacco: Never Used   Substance and Sexual Activity    Alcohol use: Yes     Comment: 6pk/week    Drug use: Yes     Types: Cocaine     Comment: last used a few days ago    Sexual activity: Yes     Partners: Female     Birth control/protection: Condom     Review of Systems   Constitution: Negative for chills and fever.   HENT: Negative for congestion.    Eyes: Negative for blurred vision.   Cardiovascular: Positive for chest pain. Negative for dyspnea on exertion and irregular heartbeat.   Respiratory: Positive for shortness of breath. Negative for cough, sputum production and  wheezing.    Gastrointestinal: Negative for abdominal pain, nausea and vomiting.   Neurological: Negative for dizziness, focal weakness, headaches, light-headedness and weakness.   Psychiatric/Behavioral: Negative for altered mental status.     Objective:     Vital Signs (Most Recent):  Temp: 98.4 °F (36.9 °C) (11/26/19 0115)  Pulse: 67 (11/26/19 0316)  Resp: 15 (11/26/19 0316)  BP: (!) 109/53 (11/26/19 0316)  SpO2: 97 % (11/26/19 0316) Vital Signs (24h Range):  Temp:  [98.4 °F (36.9 °C)] 98.4 °F (36.9 °C)  Pulse:  [67-90] 67  Resp:  [15-24] 15  SpO2:  [94 %-98 %] 97 %  BP: (106-162)/() 109/53     Weight: 94.3 kg (208 lb)  Body mass index is 29.01 kg/m².    SpO2: 97 %  O2 Device (Oxygen Therapy): room air    No intake or output data in the 24 hours ending 11/26/19 0333    Lines/Drains/Airways     Peripheral Intravenous Line                 Peripheral IV - Single Lumen 11/26/19 0115 20 G Right Wrist less than 1 day                Physical Exam   Constitutional: He is oriented to person, place, and time. No distress.   HENT:   Mouth/Throat: Oropharynx is clear and moist.   Eyes: Pupils are equal, round, and reactive to light.   Neck: Neck supple. No JVD present.   Cardiovascular: Normal rate, regular rhythm and normal heart sounds.   Pulmonary/Chest: Effort normal and breath sounds normal.   Abdominal: Soft. Bowel sounds are normal.   Musculoskeletal: He exhibits no edema.   Neurological: He is alert and oriented to person, place, and time.   Skin: Skin is warm and dry. He is not diaphoretic.   Psychiatric: He has a normal mood and affect. His behavior is normal.   Vitals reviewed.      Significant Labs: All pertinent lab results from the last 24 hours have been reviewed.    Significant Imaging: Echocardiogram:   2D echo with color flow doppler:   Results for orders placed or performed during the hospital encounter of 08/20/18   2D echo with color flow doppler   Result Value Ref Range    QEF 40 (A) 55 - 65     Mitral Valve Regurgitation TRIVIAL     Diastolic Dysfunction Yes (A)     Est. PA Systolic Pressure 26.23     Mitral Valve Mobility NORMAL     Tricuspid Valve Regurgitation TRIVIAL     Narrative    Date of Procedure: 08/21/2018        TEST DESCRIPTION   Technical Quality: This is a technically adequate study. This study was performed in conjunction with a 3ml intravenous injection of Optison contrast agent.     Aorta: The aortic root is normal in size, measuring 2.4 cm at sinotubular junction and 3.0 cm at Sinuses of Valsalva. The proximal ascending aorta is normal in size, measuring 2.7 cm across.     Left Atrium: The left atrial volume index is normal, measuring 22.61 cc/m2.     Left Ventricle: The left ventricle is normal in size, with an end-diastolic diameter of 5.0 cm, and an end-systolic diameter of 3.7 cm. LV wall thickness is normal, with the septum and the posterior wall each measuring 0.7 cm across. Relative wall   thickness was normal at 0.28, and the LV mass index was 60.6 g/m2 consistent with normal left ventricular mass. The apex is aneurysmal.   The following segments were akinetic: mid anteroseptum.  The following segments were severely hypokinetic: apical septum, apical lateral wall, apical inferior wall, apical anterior wall.  Left ventricular systolic function appears mildly to moderately depressed. Visually estimated ejection fraction is 40-45%. The LV Doppler derived stroke volume equals 56.0 ccs.     Diastolic indices: E wave velocity 0.7 m/s, E/A ratio 0.9,  msec., E/e' ratio(avg) 9. There is diastolic dysfunction secondary to relaxation abnormality.     Right Atrium: The right atrium is enlarged, measuring 4.4 cm in length and 4.7 cm in width in the apical view.     Right Ventricle: The right ventricle is normal in size measuring 3.5 cm at the base in the apical right ventricle-focused view. Global right ventricular systolic function appears normal. Tricuspid annular plane systolic  excursion (TAPSE) is 2.0 cm.   Tissue Doppler-derived tricuspid annular peak systolic velocity (S prime) is 11.2 cm/s. The estimated PA systolic pressure is 26 mmHg.     Aortic Valve:  The aortic valve is mildly sclerotic with normal leaflet mobility. The aortic valve is tri-leaflet in structure.     Mitral Valve:  The mitral valve is normal in structure with normal leaflet mobility. There is trivial mitral regurgitation.     Tricuspid Valve:  The tricuspid valve is normal in structure with normal leaflet mobility. There is trivial tricuspid regurgitation.     Pulmonary Valve:  Pulmonary valve is normal in structure with normal leaflet mobility.     IVC: IVC is normal in size and collapses > 50% with a sniff, suggesting normal right atrial pressure of 3 mmHg.     Intracavitary: There is thrombus in the cardiac apex. There is no evidence of pericardial effusion or vegetation.         CONCLUSIONS     1 - Mildly to moderately depressed left ventricular systolic function (EF 40-45%). LV  apex is aneurysmal. There is a large apical LV clot seen attached to the inferoapical segment measuring 1.5 x 1.2 cm.    2 - Impaired LV relaxation, normal LAP (grade 1 diastolic dysfunction).     3 - Right atrial enlargement.     4 - Normal right ventricular systolic function .     5 - The estimated PA systolic pressure is 26 mmHg.     6 - Trivial mitral regurgitation.     7 - Trivial tricuspid regurgitation.     8 - There is thrombus in the cardiac apex.     9 - Wall motion abnormalities.             This document has been electronically    SIGNED BY: Judy Wilson MD On: 08/21/2018 11:16

## 2019-11-26 NOTE — ED NOTES
Awaiting Dr. Marie to come speak with patient prior to discharge. Patient aware. Dr. Marie aware of patient location in ED.

## 2019-11-26 NOTE — HPI
64 year old male with PMHx significant for CVA (with left sided mild weakness), HTN, cocaine abuse, non-obstructive CAD, LV mural thrombus on coumadin, depression with psychiatric hospitalizations and CHF (EF 40-45%) who presents with acute onset substernal chest pain. Patient reports that he uses cocaine whenever he gets depressed and his last use was three days ago. Yesterday night, he was resting when he developed chest pain and some shortness of breath. He denied experiencing vision changes, diaphoresis, N/V, palpitations or numbness/tingling of his arms. He reports being complaint with taking his home medications. When arrived to the ER, his CP was 7/10. His BP was 160/100. He was given SL nitro with alleviation of his CP. EKG did not show acute ST/T wave changes. Labs were drawn and first set of troponin's were negative. UDS is pending. CXR did not show an acute cardiopulmonary process. He denies experiencing angina symptoms in the past week. He lives independently and performs his own ADL's. He is retired.     TTE (2018):     1 - Mildly to moderately depressed left ventricular systolic function (EF 40-45%). LV  apex is aneurysmal. There is a large apical LV clot seen attached to the inferoapical segment measuring 1.5 x 1.2 cm.    2 - Impaired LV relaxation, normal LAP (grade 1 diastolic dysfunction).     3 - Right atrial enlargement.     4 - Normal right ventricular systolic function .     5 - The estimated PA systolic pressure is 26 mmHg.     6 - Trivial mitral regurgitation.     7 - Trivial tricuspid regurgitation.     8 - There is thrombus in the cardiac apex.     9 - Wall motion abnormalities.      Coronary angiogram (2018):        Patient has a right dominant coronary artery.        The coronary vessels have luminal irregularities.        - Left Main Coronary Artery:             The LM has luminal irregularities. There is CLEMENCIA 3 flow.     - Left Anterior Descending Artery:             The LAD is patent.  There is CLEMENCIA 3 flow. The remaining portion of the vessel has luminal irregularities (10).     - D1:             The D1 has luminal irregularities. There is CLEMENCIA 3 flow.     - Left Circumflex Artery:             The LCX has luminal irregularities. There is CLEMENCIA 3 flow.     - Right Coronary Artery:             The proximal RCA has a 30% stenosis. There is CLEMENCIA 3 flow. The remaining portion of the vessel has luminal irregularities (10).

## 2019-11-26 NOTE — HPI
64M with CAD, STEMI in 2018 with resulting mural ventricular thrombus on coumadin, and cocaine abuse presented to ED complaining of chest pain which occurred while sitting on his bed.  He describes it as left-sided, pressure-like, and identical in quality to the MI he had last year.  He has not been seen here for Cardiology-related services since that event last year but has had multiple ED presentations for suicidal ideation which led to psychiatric hospitalization, most recently on 11/8, which was his second presentation to the ED that week for that complaint, and for the prior few months had been to the ED 1-2 times a month for that.  He endorses using cocaine when depressed, with his most recent use being 3 days prior.  He had not had any chest pain lately, which frightened him and prompted him to present to the ED.  He also endorsed feeling short of breath as well.  His chest pain subsided on its own but then recurred in the ED, and was relieved with nitroglycerin, and has not since recurred.

## 2019-11-26 NOTE — TELEPHONE ENCOUNTER
Blood pressure 115/167. Advised not possible check machine. 161/100 just now. Was 167/115.    Reason for Disposition   [1] Systolic BP  >= 160 OR Diastolic >= 100 AND [2] cardiac or neurologic symptoms (e.g., chest pain, difficulty breathing, unsteady gait, blurred vision)    Additional Information   Negative: Difficult to awaken or acting confused (e.g., disoriented, slurred speech)   Negative: Severe difficulty breathing (e.g., struggling for each breath, speaks in single words)   Negative: [1] Weakness of the face, arm or leg on one side of the body AND [2] new onset   Negative: [1] Numbness (i.e., loss of sensation) of the face, arm or leg on one side of the body AND [2] new onset   Negative: [1] Chest pain lasts > 5 minutes AND [2] history of heart disease  (i.e., heart attack, bypass surgery, angina, angioplasty, CHF)   Negative: [1] Chest pain AND [2] took nitrogylcerin AND [3] pain was not relieved   Negative: Sounds like a life-threatening emergency to the triager   Negative: Symptom is main concern  (e.g., headache, chest pain)   Negative: Low blood pressure is main concern    Protocols used: HIGH BLOOD PRESSURE-A-AH

## 2019-11-26 NOTE — H&P
[facility]  Cardiology Consult    Patient Name: Damir Faria  MRN: [id number]  Admission Date: 11/26/2019  Code Status: Prior   Attending Provider: Juan Camacho MD   Primary Care Physician: [facility]    Patient information was obtained from patient and ER records.     Subjective:     Chief Complaint:  Chest Pain     HPI: 64 year old male with PMHx significant for CVA (with left sided mild weakness), HTN, cocaine abuse, CAD s/p STEMI with PCI and NAM to mid LAD and balloon angioplasty of ostial D1 (2017), LV mural thrombus on coumadin, depression with psychiatric hospitalizations and CHF (EF 40-45%) who presents with acute onset substernal chest pain. Patient reports that he uses cocaine whenever he gets depressed and his last use was three days ago. Yesterday night, he was resting when he developed chest pain and some shortness of breath. He denied experiencing vision changes, diaphoresis, N/V, palpitations or numbness/tingling of his arms. He reports being complaint with taking his home medications. When arrived to the ER, his CP was 7/10. His BP was 160/100. He was given SL nitro with alleviation of his CP. EKG did not show acute ST/T wave changes. Labs were drawn and first set of troponin's were negative. UDS is pending. CXR did not show an acute cardiopulmonary process. He denies experiencing angina symptoms in the past week. He lives independently and performs his own ADL's. He is retired.     TTE (2018):     1 - Mildly to moderately depressed left ventricular systolic function (EF 40-45%). LV  apex is aneurysmal. There is a large apical LV clot seen attached to the inferoapical segment measuring 1.5 x 1.2 cm.    2 - Impaired LV relaxation, normal LAP (grade 1 diastolic dysfunction).     3 - Right atrial enlargement.     4 - Normal right ventricular systolic function .     5 - The estimated PA systolic pressure is 26 mmHg.     6 - Trivial mitral  regurgitation.     7 - Trivial tricuspid regurgitation.     8 - There is thrombus in the cardiac apex.     9 - Wall motion abnormalities.      Coronary angiogram (2018):        Patient has a right dominant coronary artery.        The coronary vessels have luminal irregularities.        - Left Main Coronary Artery:             The LM has luminal irregularities. There is CLEMENCIA 3 flow.     - Left Anterior Descending Artery:             The LAD is patent. There is CLEMENCIA 3 flow. The remaining portion of the vessel has luminal irregularities (10).     - D1:             The D1 has luminal irregularities. There is CLEMENCIA 3 flow.     - Left Circumflex Artery:             The LCX has luminal irregularities. There is CLEMENCIA 3 flow.     - Right Coronary Artery:             The proximal RCA has a 30% stenosis. There is CLEMENCIA 3 flow. The remaining portion of the vessel has luminal irregularities (10).    Past Medical History:   Diagnosis Date    Acute renal insufficiency 6/21/2015    Cocaine abuse     Depression     History of psychiatric care     HTN (hypertension) 1/25/2014    Stroke        Past Surgical History:   Procedure Laterality Date    NECK SURGERY         Review of patient's allergies indicates:  No Known Allergies    No current facility-administered medications on file prior to encounter.      Current Outpatient Medications on File Prior to Encounter   Medication Sig    aspirin 81 MG Chew Take 1 tablet (81 mg total) by mouth once daily.    atorvastatin (LIPITOR) 40 MG tablet Take 1 tablet (40 mg total) by mouth every evening.    carvedilol (COREG) 6.25 MG tablet Take 1 tablet (6.25 mg total) by mouth 2 (two) times daily.    lisinopril (PRINIVIL,ZESTRIL) 20 MG tablet Take 1 tablet (20 mg total) by mouth once daily.    mirtazapine (REMERON) 30 MG tablet Take 1 tablet (30 mg total) by mouth every evening.    spironolactone (ALDACTONE) 25 MG tablet Take 1 tablet (25 mg total) by mouth once daily.    warfarin  (COUMADIN) 7.5 MG tablet Take 1 tablet (7.5 mg total) by mouth Daily.     Family History     Problem Relation (Age of Onset)    Diabetes Mother    Heart disease Mother    Hypertension Mother        Tobacco Use    Smoking status: Current Some Day Smoker     Packs/day: 0.25     Types: Cigarettes    Smokeless tobacco: Never Used   Substance and Sexual Activity    Alcohol use: Yes     Comment: 6pk/week    Drug use: Yes     Types: Cocaine     Comment: last used a few days ago    Sexual activity: Yes     Partners: Female     Birth control/protection: Condom     Review of Systems   Constitution: Negative for chills and fever.   HENT: Negative for congestion.    Eyes: Negative for blurred vision.   Cardiovascular: Positive for chest pain. Negative for dyspnea on exertion and irregular heartbeat.   Respiratory: Positive for shortness of breath. Negative for cough, sputum production and wheezing.    Gastrointestinal: Negative for abdominal pain, nausea and vomiting.   Neurological: Negative for dizziness, focal weakness, headaches, light-headedness and weakness.   Psychiatric/Behavioral: Negative for altered mental status.     Objective:     Vital Signs (Most Recent):  Temp: 98.4 °F (36.9 °C) (11/26/19 0115)  Pulse: 67 (11/26/19 0316)  Resp: 15 (11/26/19 0316)  BP: (!) 109/53 (11/26/19 0316)  SpO2: 97 % (11/26/19 0316) Vital Signs (24h Range):  Temp:  [98.4 °F (36.9 °C)] 98.4 °F (36.9 °C)  Pulse:  [67-90] 67  Resp:  [15-24] 15  SpO2:  [94 %-98 %] 97 %  BP: (106-162)/() 109/53     Weight: 94.3 kg (208 lb)  Body mass index is 29.01 kg/m².    SpO2: 97 %  O2 Device (Oxygen Therapy): room air    No intake or output data in the 24 hours ending 11/26/19 0333    Lines/Drains/Airways     Peripheral Intravenous Line                 Peripheral IV - Single Lumen 11/26/19 0115 20 G Right Wrist less than 1 day                Physical Exam   Constitutional: He is oriented to person, place, and time. No distress.   HENT:    Mouth/Throat: Oropharynx is clear and moist.   Eyes: Pupils are equal, round, and reactive to light.   Neck: Neck supple. No JVD present.   Cardiovascular: Normal rate, regular rhythm and normal heart sounds.   Pulmonary/Chest: Effort normal and breath sounds normal.   Abdominal: Soft. Bowel sounds are normal.   Musculoskeletal: He exhibits no edema.   Neurological: He is alert and oriented to person, place, and time.   Skin: Skin is warm and dry. He is not diaphoretic.   Psychiatric: He has a normal mood and affect. His behavior is normal.   Vitals reviewed.      Significant Labs: All pertinent lab results from the last 24 hours have been reviewed.    Significant Imaging: Echocardiogram:   2D echo with color flow doppler:   Results for orders placed or performed during the hospital encounter of 08/20/18   2D echo with color flow doppler   Result Value Ref Range    QEF 40 (A) 55 - 65    Mitral Valve Regurgitation TRIVIAL     Diastolic Dysfunction Yes (A)     Est. PA Systolic Pressure 26.23     Mitral Valve Mobility NORMAL     Tricuspid Valve Regurgitation TRIVIAL     Narrative    Date of Procedure: 08/21/2018        TEST DESCRIPTION   Technical Quality: This is a technically adequate study. This study was performed in conjunction with a 3ml intravenous injection of Optison contrast agent.     Aorta: The aortic root is normal in size, measuring 2.4 cm at sinotubular junction and 3.0 cm at Sinuses of Valsalva. The proximal ascending aorta is normal in size, measuring 2.7 cm across.     Left Atrium: The left atrial volume index is normal, measuring 22.61 cc/m2.     Left Ventricle: The left ventricle is normal in size, with an end-diastolic diameter of 5.0 cm, and an end-systolic diameter of 3.7 cm. LV wall thickness is normal, with the septum and the posterior wall each measuring 0.7 cm across. Relative wall   thickness was normal at 0.28, and the LV mass index was 60.6 g/m2 consistent with normal left ventricular  mass. The apex is aneurysmal.   The following segments were akinetic: mid anteroseptum.  The following segments were severely hypokinetic: apical septum, apical lateral wall, apical inferior wall, apical anterior wall.  Left ventricular systolic function appears mildly to moderately depressed. Visually estimated ejection fraction is 40-45%. The LV Doppler derived stroke volume equals 56.0 ccs.     Diastolic indices: E wave velocity 0.7 m/s, E/A ratio 0.9,  msec., E/e' ratio(avg) 9. There is diastolic dysfunction secondary to relaxation abnormality.     Right Atrium: The right atrium is enlarged, measuring 4.4 cm in length and 4.7 cm in width in the apical view.     Right Ventricle: The right ventricle is normal in size measuring 3.5 cm at the base in the apical right ventricle-focused view. Global right ventricular systolic function appears normal. Tricuspid annular plane systolic excursion (TAPSE) is 2.0 cm.   Tissue Doppler-derived tricuspid annular peak systolic velocity (S prime) is 11.2 cm/s. The estimated PA systolic pressure is 26 mmHg.     Aortic Valve:  The aortic valve is mildly sclerotic with normal leaflet mobility. The aortic valve is tri-leaflet in structure.     Mitral Valve:  The mitral valve is normal in structure with normal leaflet mobility. There is trivial mitral regurgitation.     Tricuspid Valve:  The tricuspid valve is normal in structure with normal leaflet mobility. There is trivial tricuspid regurgitation.     Pulmonary Valve:  Pulmonary valve is normal in structure with normal leaflet mobility.     IVC: IVC is normal in size and collapses > 50% with a sniff, suggesting normal right atrial pressure of 3 mmHg.     Intracavitary: There is thrombus in the cardiac apex. There is no evidence of pericardial effusion or vegetation.         CONCLUSIONS     1 - Mildly to moderately depressed left ventricular systolic function (EF 40-45%). LV  apex is aneurysmal. There is a large apical LV  clot seen attached to the inferoapical segment measuring 1.5 x 1.2 cm.    2 - Impaired LV relaxation, normal LAP (grade 1 diastolic dysfunction).     3 - Right atrial enlargement.     4 - Normal right ventricular systolic function .     5 - The estimated PA systolic pressure is 26 mmHg.     6 - Trivial mitral regurgitation.     7 - Trivial tricuspid regurgitation.     8 - There is thrombus in the cardiac apex.     9 - Wall motion abnormalities.             This document has been electronically    SIGNED BY: Judy Wilson MD On: 08/21/2018 11:16     Assessment and Plan:     Chest pain  Patient with risk factors for CAD including known CAD, CVA and drug use who presents with acute onset CP. EKG did not show acute ischemic changes. Troponin x1 is negative. Patient is CP free.     Recommendations:   -admit to hospital medicine  -check INR, if pt is not in therapeutic range (2-3) would recommend starting heparin drip while dose adjusting his home coumadin  -obtain 2D echo with CFD in the AM   -serial trend cardiac enzymes for three sets  -monitor on telemetry  -PRN EKG's  -continue with ASA/statin therapy  -will load with Plavix 600 mg x1 and then start 75 mg QD  -follow up UDS  -if patient develops recurrent CP please notify Cardiology on call  -will re-assess patient in AM to determine timing of future diagnostic cardiovascular workup    Assessment/Plan was discussed with Cardiology Attending on Call, Dr. Cage who is in agreement.     Charla Ngo MD  Cardiology Fellow, PGY-5   [facility]

## 2019-11-26 NOTE — ASSESSMENT & PLAN NOTE
In interim since past year could have resolved so will reassess with echocardiogram.  INR subtherapeutic but he could have been so for weeks so no real haste in starting heparin prior to evaluating with echo to see if he even needs anticoagulation anymore.

## 2019-11-26 NOTE — DISCHARGE SUMMARY
Discharge Summary  Hospital Medicine    Attending Provider on Discharge: Nilesh Marie     Discharging Team:    [facility] HOSP MED C  [facility] CARDIOLOGY TEAM A - CARDIOLOGY CONSULT STEPDOWN     Date of Admission:  11/26/2019         Date of Discharge:  11/26/201911/26/2019 (Observation Care Same Day Discharge)      Diagnoses:     Principal Problem(s):   Chest pain     Secondary Problems:  Active Hospital Problems    Diagnosis    *Chest pain    Coronary artery disease involving native coronary artery of native heart without angina pectoris    Current use of long term anticoagulation    LV (left ventricular) mural thrombus following MI    Cocaine abuse        Hospital Course:    HPI:  64M with CAD, STEMI in 2018 with resulting mural ventricular thrombus on coumadin, and cocaine abuse presented to ED complaining of chest pain which occurred while sitting on his bed.  He describes it as left-sided, pressure-like, and identical in quality to the MI he had last year.  He has not been seen here for Cardiology-related services since that event last year but has had multiple ED presentations for suicidal ideation which led to psychiatric hospitalization, most recently on 11/8, which was his second presentation to the ED that week for that complaint, and for the prior few months had been to the ED 1-2 times a month for that.  He endorses using cocaine when depressed, with his most recent use being 3 days prior.  He had not had any chest pain lately, which frightened him and prompted him to present to the ED.  He also endorsed feeling short of breath as well.  His chest pain subsided on its own but then recurred in the ED, and was relieved with nitroglycerin, and has not since recurred.       TTE 11/26/2019  · Mildly decreased left ventricular systolic function.   · The estimated ejection fraction is 40%  · Local segmental wall motion abnormalities.  · Concentric left ventricular remodeling.  · Grade I (mild) left ventricular  diastolic dysfunction consistent with impaired relaxation.  · Normal right ventricular systolic function.  · Mild mitral regurgitation.  · Mild tricuspid regurgitation.  · The estimated PA systolic pressure is 23 mm Hg  · Normal central venous pressure (3 mm Hg).  · Large spherical solid left ventricular thrombus present. The thrombus is located in the apex and protrudes inot the LV. It is partially calcified.    Hospital course:  Patient with negative Sarah x 3 and non-dynamic ECG changes. The patient is found to be cocaine positive on UTOX. An ECHO is performed which is relatively unchanged from prior and does confirm the LV thrombus. Given the patient's inability to f/u with coumadin clinic, we will prescribe apixiban (with $8.50 co-pay - acceptable to patient) which has less data to support use in LV thrombus, which was d/w patient. The patient reports that coumadin use was a contraindication to placement in a drug rehab center and that eliquis was not, so he was additionally pleased with this fact. I explained that cocaine cessation is the most integral component to his care. The patient was deemed stable for discharge with close f/u with PCP at [facility].  Please see below for further details:    Review of Systems   Constitutional: Negative for chills and fever.   HENT: Negative for congestion and sore throat.    Eyes: Negative for photophobia, pain and discharge.   Respiratory: Negative for cough, hemoptysis, sputum production and shortness of breath.    Cardiovascular: Negative for chest pain, palpitations and leg swelling.   Gastrointestinal: Negative for abdominal pain, diarrhea, nausea and vomiting.   Genitourinary: Negative for dysuria and urgency.   Musculoskeletal: Negative for myalgias and neck pain.   Skin: Negative for itching and rash.   Neurological: Negative for sensory change, focal weakness and headaches.   Endo/Heme/Allergies: Negative for polydipsia. Does not bruise/bleed easily.    Psychiatric/Behavioral: Negative for depression and suicidal ideas.     Physical Exam   Constitutional: He is oriented to person, place, and time. No distress.   HENT:   Head: Normocephalic and atraumatic.   Eyes: Pupils are equal, round, and reactive to light. Conjunctivae are normal.   Neck: Normal range of motion. Neck supple. No JVD present. No tracheal deviation present.   Cardiovascular: Normal rate, regular rhythm, normal heart sounds and intact distal pulses.   No murmur heard.  Pulmonary/Chest: Effort normal and breath sounds normal. No respiratory distress. He has no wheezes.   Abdominal: Soft. Bowel sounds are normal. He exhibits no distension. There is no tenderness.   Musculoskeletal: Normal range of motion. He exhibits no edema or tenderness.   Neurological: He is alert and oriented to person, place, and time. He exhibits normal muscle tone.   Skin: Skin is warm and dry. He is not diaphoretic. No erythema.     Significant Diagnostic Studies:   Labs:   Recent Labs     11/26/19  0152   WBC 5.13   RBC 4.34*   HGB 13.4*   HCT 42.4      MCV 98   MCH 30.9   MCHC 31.6*   GRAN 37.0*  1.9   LYMPH 44.1  2.3   MONO 14.6  0.8   EOS 0.2      Recent Labs     11/26/19  0152   GLU 93      K 3.6   *   CO2 23   BUN 15   CREATININE 1.1   CALCIUM 8.6*   ANIONGAP 6*     Radiology:   Results for orders placed during the hospital encounter of 02/10/17   X-Ray Chest 1 View    Narrative Chest AP portable    Indication:Chest pain    Comparison:9/27/2016    Findings:  The cardiomediastinal silhouette is not enlarged noting ossification of the aortic arch.  There is no pleural effusion.  The trachea is midline.  The lungs are symmetrically expanded bilaterally with coarse interstitial attenuation in bilateral basilar subsegmental atelectasis. No large focal consolidation seen.  There is no pneumothorax.  The osseous structures are remarkable for degenerative changes of the spine and shoulders noted  postsurgical changes of the lower cervical spine.    Impression Mildly coarse interstitial attenuation in bilateral basilar subsegmental atelectasis, this is likely on the basis of shallow inspiratory effort however differential would include minimal interstitial edema.        Electronically signed by: CHRISTOPHER STRAUSS MD  Date:     02/10/17  Time:    23:27       Results for orders placed during the hospital encounter of 08/07/16   X-Ray Chest PA And Lateral    Narrative Chest PA and Lateral    Indication:.    Comparison:May 25, 2016.    Findings:     The cardiomediastinal silhouette is within normal limits.  There is no pleural effusion.  The trachea is midline.  The lungs are hyperinflated with increased AP diameter of the chest and increased retrosternal air space without evidence of acute parenchymal process.  There is no pneumothorax.  The osseous structures demonstrate fixation hardware in the lower cervical spine.    Impression      No acute findings in the chest.      Hyperinflation suggesting possible COPD.    ______________________________________     Electronically signed by: CARRINGTON AC MD  Date:     08/07/16  Time:    22:25       Results for orders placed during the hospital encounter of 09/27/16   CT Abdomen Pelvis With Contrast    Narrative Technique: 5 mm axial images were obtained through the abdomen and pelvis following administration of 75 cc along the dictated CT IV contrast on 900 cc of contrast according to the CT triple phase protocol.  Coronal and sagittal reformats were performed.    Comparison CT 09/27/2016.    Findings:    Heart is normal in size.  No pericardial effusion.    Visualized lungs are clear.  No pleural effusions.    Liver is normal in size.  There is a 3 cm mass within the right hepatic lobe at the dome that demonstrates heterogeneous nodular enhancement on early arterial and portal venous phases which appears to fill in on delayed phase.  Remainder of the hepatic parenchyma  demonstrates homogeneous enhancement without focal abnormalities.  No intrahepatic biliary ductal dilatation.  Portal vein, splenic vein, and SMV are patent.    Gallbladder, common bile duct, spleen, pancreas and adrenal glands are normal.    Kidneys are normal in size and location.  There is a nonenhancing low attenuation lesion within the left renal cortex, likely a cyst.  Calcified nonobstructing stones are noted within the right renal collecting system.  No hydronephrosis or hydroureter.  Bladder is fluid-filled and unremarkable.  Prostate is slightly enlarged.    Small bowel is normal in caliber.  There is a moderate amount of retained fecal material throughout the visualized colon and rectum.  There is either a short appendix or appendiceal stump which appears normal.  No obstruction or inflammatory changes.    Abdominal aorta tapers normally with mild atherosclerotic calcification.  No aneurysmal dilatation or dissection.  IVC and common iliac veins are patent.    No ascites or transabdominal free air.  No abdominal or pelvic lymph node enlargement.  Small bowel mesentery is unremarkable.    Subcutaneous soft tissues are within normal limits.    Bones demonstrate no acute abnormalities.  There is a persistent sclerotic focus within the posterior aspect of the left iliac bone.    Impression Right hepatic lobe lesion that demonstrates imaging characteristics consistent a hemangioma.    Right-sided nonobstructive nephrolithiasis.    Sclerotic focus within the posterior aspect of the left iliac bone.  Further evaluation as previously recommended.      Electronically signed by: DAKOTA LUGO MD  Date:     09/29/16  Time:    00:17       Results for orders placed during the hospital encounter of 09/27/16   CT Head Without Contrast    Narrative Comparison: MRI 5/25/2016, CT 5/25/2016    Clinical history: Syncope and collapse    Technique:    Axial images of the brain were obtained at 5-mm intervals from the skull  base to the vertex without the administration of contrast.    Findings:    There is generalized cerebral volume loss.  There is hypoattenuation in a periventricular fashion, likely sequela of chronic microvascular ischemic change.There is a punctate focus of hypoattenuation within the lateral aspect of the right thalamus, consistent with remote lacunar infarct an additional punctate focus of hypoattenuation is noted within the left basal ganglia, also likely remote lacunar infarct there is encephalomalacia adjacent to the temporal horn of the right lateral ventricle.  There is no evidence of acute major vascular territory infarct, hemorrhage, or mass.  The ventricular system is within normal limits and demonstrates no distortion by mass-effect or evidence of hydrocephalus.  There are no abnormal extra-axial fluid collections.  There is fluid within the right sphenoid sinus, and patchy opacification the ethmoid sinuses, otherwise the visualized paranasal sinuses and mastoid air cells are clear, and there is no evidence of osseous fracture.  The visualized soft tissues are unremarkable.    Impression No acute intracranial abnormalities.    Stable sequela of remote basal ganglia infarctions bilaterally.    Sinus disease.            Electronically signed by: CHRISTOPHER STRAUSS MD  Date:     09/27/16  Time:    23:54       Cardiac Studies: 2D ECHO    Significant Treatments/Procedures:   None    Consults while in Hospital:   None    Discharge Medications:      Discharge Medication List as of 11/26/2019 12:36 PM      START taking these medications    Details   apixaban (ELIQUIS) 5 mg Tab Take 1 tablet (5 mg total) by mouth 2 (two) times daily., Starting Tue 11/26/2019, Normal         CONTINUE these medications which have NOT CHANGED    Details   aspirin 81 MG Chew Take 1 tablet (81 mg total) by mouth once daily., Starting Tue 7/16/2019, Until Mon 11/4/2019, Print      atorvastatin (LIPITOR) 40 MG tablet Take 1 tablet (40 mg  total) by mouth every evening., Starting Mon 7/15/2019, Until Mon 11/4/2019, Print      carvedilol (COREG) 6.25 MG tablet Take 1 tablet (6.25 mg total) by mouth 2 (two) times daily., Starting Mon 7/15/2019, Until Mon 11/4/2019, Print      lisinopril (PRINIVIL,ZESTRIL) 20 MG tablet Take 1 tablet (20 mg total) by mouth once daily., Starting Tue 7/16/2019, Until Mon 11/4/2019, Print      mirtazapine (REMERON) 30 MG tablet Take 1 tablet (30 mg total) by mouth every evening., Starting Fri 11/8/2019, Until Sun 12/8/2019, Print      spironolactone (ALDACTONE) 25 MG tablet Take 1 tablet (25 mg total) by mouth once daily., Starting Mon 7/15/2019, Until Mon 11/4/2019, Print         STOP taking these medications       warfarin (COUMADIN) 7.5 MG tablet Comments:   Reason for Stopping:              Discharge Diet:cardiac diet with Normal Fluid intake of 1500 - 2000 mL per day    Activity: activity as tolerated    Discharged Condition: Stable    Discharge Disposition: Home or Self Care    Follow-up:   F/u with DOC suggested.  Patient reports ability to make appointment within this week or early next week (on account of holiday).    Studies/Results pending on discharge:   None    Nilesh Marie MD  [facility]

## 2019-11-26 NOTE — PROGRESS NOTES
Per matt garcia sonographer, optison given ivp via left hand sl for imaging . Denies transfusion rxn. Tolerated well. Sl flushed before and after with 10 cc ns.

## 2019-11-26 NOTE — ED NOTES
Assumed patient care.  Pt resting on stretcher in NAD, respirations even and unlabored. Stretcher locked and in lowest position, side rails x 2, call bell within reach. Cardiac monitor, pulse ox and BP cuff applied cycling Q 30 minute vitals. Pt offered restroom assistance, pt states no needs at this time. Will continue to monitor and update pt on plan of care.    LOC: The patient is awake, alert and aware of environment with an appropriate affect, the patient is oriented x 3 and speaking appropriately.  APPEARANCE: Patient resting comfortably and in no acute distress, patient is clean and well groomed  SKIN: The skin is warm and dry, color consistent with ethnicity, patient has normal skin turgor and moist mucus membranes, skin intact  MUSCULOSKELETAL: Patient moving all extremities well, no obvious swelling or deformities noted.   RESPIRATORY: Airway is open and patent; respirations are spontaneous, patient has a normal effort and rate, no accessory muscle use noted.   CARDIAC: No complaints of chest pain at this time.    ABDOMEN: Soft and non tender to palpation, no distention noted. Bowel sounds present x 4  NEUROLOGIC: Eyes open spontaneously, behavior appropriate to situation, follows commands, facial expression symmetrical, purposeful motor response noted, normal sensation in all extremities when touched with a finger.

## 2019-11-26 NOTE — ASSESSMENT & PLAN NOTE
Possibly related to recent cocaine use, especially if patient may not be entirely forthcoming about how recently he last used.  Second troponin negative so far, so ACS ruled out.  No apparent stigmata of decompensated heart failure so that would not be a possible explanation for his symptoms; rather something non-cardiac.

## 2019-11-26 NOTE — ED NOTES
"Pt states, "pressure was up a couple hours ago." pt reports chest pain onset yesterday and states, "I thought it was gas and it went away and then about 15 minutes ago, it started hurting again." no acute distress noted. Stable condition.    "

## 2019-11-26 NOTE — ED PROVIDER NOTES
Encounter Date: 11/26/2019       History     Chief Complaint   Patient presents with    Hypertension     63 yo M with pmhx CVA, HTN, cocaine abuse, non-obstructive CAD with history of STEMI ?s/p stent, LV mural thrombus on coumadin, depression with psychiatric hospitalizations, CHF (EF 40-45%), CKD presents with a chief complaint of chest pain. Patient reports an episode of chest pain earlier today.  Episode lasted several minutes and then resolved spontaneously.  Episode occurred at rest.  Left side of the chest.  Symptoms were similar in quality to previous heart attack.  Patient reports that he checks his blood pressure and he had elevated readings in the 160s over 100s.  During that time he reported a generalized headache.  Headache is still present.  He subsequently called EMS.  No weakness or paresthesias.  While waiting to be seen by Grey, patient reports chest pain returned.  It is currently present in left chest. Currently 6-7/10. There is some associated shortness of breath.  No nausea or diaphoresis.  Patient reports compliance with all his blood pressure medications.  He reports that he last used cocaine approximately 3 days prior.        Review of patient's allergies indicates:  No Known Allergies  Past Medical History:   Diagnosis Date    Acute renal insufficiency 6/21/2015    Cocaine abuse     Depression     History of psychiatric care     HTN (hypertension) 1/25/2014    Stroke      Past Surgical History:   Procedure Laterality Date    NECK SURGERY       Family History   Problem Relation Age of Onset    Diabetes Mother     Heart disease Mother     Hypertension Mother      Social History     Tobacco Use    Smoking status: Current Some Day Smoker     Packs/day: 0.25     Types: Cigarettes    Smokeless tobacco: Never Used   Substance Use Topics    Alcohol use: Yes     Comment: 6pk/week    Drug use: Yes     Types: Cocaine     Comment: last used a few days ago     Review of Systems    Constitutional: Negative for fever.   HENT: Negative for sore throat.    Respiratory: Positive for shortness of breath.    Cardiovascular: Positive for chest pain.   Gastrointestinal: Negative for nausea.   Genitourinary: Negative for dysuria.   Musculoskeletal: Negative for back pain.   Skin: Negative for rash.   Neurological: Positive for headaches. Negative for weakness.   Hematological: Does not bruise/bleed easily.       Physical Exam     Initial Vitals [11/26/19 0115]   BP Pulse Resp Temp SpO2   (!) 162/105 90 16 98.4 °F (36.9 °C) 98 %      MAP       --         Physical Exam    Nursing note and vitals reviewed.  Constitutional: He appears well-developed and well-nourished. He is not diaphoretic. No distress.   HENT:   Head: Normocephalic and atraumatic.   Eyes: Conjunctivae and EOM are normal.   Neck: Normal range of motion. Neck supple. No JVD present.   Cardiovascular: Normal rate, regular rhythm, normal heart sounds and intact distal pulses. Exam reveals no gallop and no friction rub.    No murmur heard.  Pulmonary/Chest: Breath sounds normal. No respiratory distress. He has no wheezes. He has no rhonchi. He has no rales. He exhibits no tenderness.   Abdominal: Soft. Bowel sounds are normal. He exhibits no distension and no mass. There is no tenderness. There is no rebound and no guarding.   Musculoskeletal: Normal range of motion. He exhibits no tenderness.   Lymphadenopathy:     He has no cervical adenopathy.   Neurological: He is alert and oriented to person, place, and time. He has normal strength. No sensory deficit.   Skin: Skin is warm and dry. Capillary refill takes less than 2 seconds.   Psychiatric:   Flat affect but interactive         ED Course   Procedures  Labs Reviewed   CBC W/ AUTO DIFFERENTIAL - Abnormal; Notable for the following components:       Result Value    RBC 4.34 (*)     Hemoglobin 13.4 (*)     Mean Corpuscular Hemoglobin Conc 31.6 (*)     MPV 8.7 (*)     Gran% 37.0 (*)     All  other components within normal limits   COMPREHENSIVE METABOLIC PANEL - Abnormal; Notable for the following components:    Chloride 112 (*)     Calcium 8.6 (*)     Anion Gap 6 (*)     All other components within normal limits   TROPONIN I   B-TYPE NATRIURETIC PEPTIDE   TROPONIN I   TROPONIN I   TOXICOLOGY SCREEN, URINE, RANDOM (COMPLIANCE)   TYPE & SCREEN     EKG Readings: (Independently Interpreted)   Previous EKG: Compared with most recent EKG Rhythm: Normal Sinus Rhythm. Heart Rate: 74. ST Segments: Normal ST Segments. T Waves Flipped: V3. Axis: Right Axis Deviation. Other Impression: low voltage QRS       Imaging Results          X-Ray Chest AP Portable (In process)                  Medical Decision Making:   History:   I obtained history from: EMS provider.  Old Medical Records: I decided to obtain old medical records.  Initial Assessment:   65 yo M with pmhx CVA, HTN, cocaine abuse, non-obstructive CAD with history of STEMI ?s/p stent, LV mural thrombus on coumadin, depression with psychiatric hospitalizations, CHF (EF 40-45%), CKD presents with a chief complaint of chest pain.   Differential Diagnosis:   This patient's differential diagnosis includes, but is not limited to: acute myocardial infarction (AMI), acute coronary syndrome, cocaine vaso-spasm, pneumothorax, pleurisy, pericarditis, hypertensive emergency, pneumonia, muscular pain, headache, diaphragmatic irritation.      Clinical Tests:   Lab Tests: Ordered  Radiological Study: Ordered  Medical Tests: Ordered  ED Management:  Given active chest pain and hypertension, will administer nitro to see if that affects pain.  Will administer aspirin.  Will administer Tylenol for headache.  Will obtain labs and chest x-ray.    Reassessment:  Chest x-ray with no acute process.  Patient reports pain improved 3/10 prior to nitro.    Reassessment:  CBC without leukocytosis or anemia.  CMP normal. BNP 68.  Troponin normal. On reassessment, status post nitro x3,  patient reports pain improved to 1/10.  According to nurses, he reported pain improved successively from a 6 to a 5 to a 4.  Uncertain exact pain but patient remains resting comfortably.  Will consult cardiology for recommendations.    Reassessment:  Cardiology feels presentation is inconsistent with unstable angina.  History is somewhat vague.  They recommend observation for serial troponins.  Recommend cardiology consultant in the morning to see if he is a candidate for provocative testing.  Patient is chest pain-free.  Given elevated HEART score of 4, will place in obs.         Additional MDM:   Heart Score:    History:          Slightly suspicious.  ECG:             Normal  Age:               >65 years  Risk factors: >= 3 risk factors or history of atherosclerotic disease  Troponin:       Less than or equal to normal limit  Final Score: 4                                  Clinical Impression:       ICD-10-CM ICD-9-CM   1. Chest pain R07.9 786.50         Disposition:   Disposition: Placed in Observation                     Juan Camacho MD  11/26/19 0301

## 2019-11-26 NOTE — H&P
[facility] Hospital Medicine  History & Physical    Patient Name: Damir Faria  MRN: [id number]  Admission Date: 11/26/2019  Attending Physician: Nilesh Marie MD   Primary Care Provider: [facility] Hospital Medicine Team: [facility] HOSP MED C Lex Espino MD     Patient information was obtained from patient, past medical records and ER records.     Subjective:     Principal Problem:Chest pain    Chief Complaint:   Chief Complaint   Patient presents with    Hypertension        HPI: 64M with CAD, STEMI in 2018 with resulting mural ventricular thrombus on coumadin, and cocaine abuse presented to ED complaining of chest pain which occurred while sitting on his bed.  He describes it as left-sided, pressure-like, and identical in quality to the MI he had last year.  He has not been seen here for Cardiology-related services since that event last year but has had multiple ED presentations for suicidal ideation which led to psychiatric hospitalization, most recently on 11/8, which was his second presentation to the ED that week for that complaint, and for the prior few months had been to the ED 1-2 times a month for that.  He endorses using cocaine when depressed, with his most recent use being 3 days prior.  He had not had any chest pain lately, which frightened him and prompted him to present to the ED.  He also endorsed feeling short of breath as well.  His chest pain subsided on its own but then recurred in the ED, and was relieved with nitroglycerin, and has not since recurred.      Past Medical History:   Diagnosis Date    Acute renal insufficiency 6/21/2015    Cocaine abuse     Depression     History of psychiatric care     HTN (hypertension) 1/25/2014    STEMI (ST elevation myocardial infarction) 2/12/2017    Stroke        Past Surgical History:   Procedure Laterality Date    NECK SURGERY         Review of patient's allergies indicates:  No Known  Allergies    No current facility-administered medications on file prior to encounter.      Current Outpatient Medications on File Prior to Encounter   Medication Sig    aspirin 81 MG Chew Take 1 tablet (81 mg total) by mouth once daily.    atorvastatin (LIPITOR) 40 MG tablet Take 1 tablet (40 mg total) by mouth every evening.    carvedilol (COREG) 6.25 MG tablet Take 1 tablet (6.25 mg total) by mouth 2 (two) times daily.    lisinopril (PRINIVIL,ZESTRIL) 20 MG tablet Take 1 tablet (20 mg total) by mouth once daily.    mirtazapine (REMERON) 30 MG tablet Take 1 tablet (30 mg total) by mouth every evening.    spironolactone (ALDACTONE) 25 MG tablet Take 1 tablet (25 mg total) by mouth once daily.    warfarin (COUMADIN) 7.5 MG tablet Take 1 tablet (7.5 mg total) by mouth Daily.     Family History     Problem Relation (Age of Onset)    Diabetes Mother    Heart disease Mother    Hypertension Mother        Tobacco Use    Smoking status: Current Some Day Smoker     Packs/day: 0.25     Types: Cigarettes    Smokeless tobacco: Never Used   Substance and Sexual Activity    Alcohol use: Yes     Comment: 6pk/week    Drug use: Yes     Types: Cocaine     Comment: last used a few days ago    Sexual activity: Yes     Partners: Female     Birth control/protection: Condom     Review of Systems   Constitutional: Negative for activity change and diaphoresis.   HENT: Negative for congestion and facial swelling.    Eyes: Negative for photophobia and visual disturbance.   Respiratory: Positive for chest tightness and shortness of breath.    Cardiovascular: Positive for chest pain. Negative for leg swelling.   Gastrointestinal: Negative for abdominal pain, nausea and vomiting.   Endocrine: Negative for polydipsia and polyuria.   Genitourinary: Negative for dysuria and hematuria.   Musculoskeletal: Negative for arthralgias and myalgias.   Skin: Negative for color change and rash.   Neurological: Negative for dizziness, syncope and  light-headedness.     Objective:     Vital Signs (Most Recent):  Temp: 98.4 °F (36.9 °C) (11/26/19 0115)  Pulse: 71 (11/26/19 0616)  Resp: 18 (11/26/19 0616)  BP: (!) 107/58 (11/26/19 0616)  SpO2: 95 % (11/26/19 0616) Vital Signs (24h Range):  Temp:  [98.4 °F (36.9 °C)] 98.4 °F (36.9 °C)  Pulse:  [63-90] 71  Resp:  [15-24] 18  SpO2:  [94 %-98 %] 95 %  BP: (103-162)/() 107/58     Weight: 94.3 kg (208 lb)  Body mass index is 29.01 kg/m².    Physical Exam   Constitutional: He is oriented to person, place, and time. He appears well-developed and well-nourished. No distress.   HENT:   Head: Normocephalic and atraumatic.   Poor dentition   Eyes: Pupils are equal, round, and reactive to light. Conjunctivae and EOM are normal. No scleral icterus.   Neck: Normal range of motion. Neck supple. No JVD present.   Cardiovascular: Normal rate, regular rhythm, normal heart sounds and intact distal pulses. Exam reveals no gallop and no friction rub.   No murmur heard.  Pulmonary/Chest: Effort normal and breath sounds normal.   Abdominal: Soft. Bowel sounds are normal. He exhibits no distension. There is no tenderness. There is no guarding.   Musculoskeletal: Normal range of motion. He exhibits no edema, tenderness or deformity.   Lymphadenopathy:     He has no cervical adenopathy.   Neurological: He is alert and oriented to person, place, and time. No cranial nerve deficit.   Skin: Skin is warm and dry. No rash noted. He is not diaphoretic. No erythema.   Psychiatric:   Somewhat withdrawn   Nursing note and vitals reviewed.        CRANIAL NERVES     CN III, IV, VI   Pupils are equal, round, and reactive to light.  Extraocular motions are normal.        Significant Labs:   CBC:   Recent Labs   Lab 11/26/19 0152   WBC 5.13   HGB 13.4*   HCT 42.4        CMP:   Recent Labs   Lab 11/26/19 0152      K 3.6   *   CO2 23   GLU 93   BUN 15   CREATININE 1.1   CALCIUM 8.6*   PROT 7.3   ALBUMIN 3.8   BILITOT 0.8    ALKPHOS 67   AST 21   ALT 20   ANIONGAP 6*   EGFRNONAA >60.0     Cardiac Markers:   Recent Labs   Lab 11/26/19  0152   BNP 68       Significant Imaging: CXR: I have reviewed all pertinent results/findings within the past 24 hours and my personal findings are:  no acute cardiopulmonary process  EKG: I have reviewed all pertinent results/findings within the past 24 hours and my personal findings are: NSR, no ischemic changes    Assessment/Plan:     * Chest pain  Possibly related to recent cocaine use, especially if patient may not be entirely forthcoming about how recently he last used.  Second troponin negative so far, so ACS ruled out.  No apparent stigmata of decompensated heart failure so that would not be a possible explanation for his symptoms; rather something non-cardiac.      Coronary artery disease involving native coronary artery of native heart without angina pectoris  Could pursue stress test but does not necessarily need to be done in hospital, depending on reliability of follow-up.      LV (left ventricular) mural thrombus following MI  In interim since past year could have resolved so will reassess with echocardiogram.  INR subtherapeutic but he could have been so for weeks so no real haste in starting heparin prior to evaluating with echo to see if he even needs anticoagulation anymore.      Current use of long term anticoagulation  On coumadin 7.5mg daily for history of mural thrombus.  INR subtherapeutic.      Cocaine abuse  Continued use will negatively impact patient's cardiovascular outcomes and likely related to mood-related disorders prompting frequent psychiatric hospitalizations.  Cessation should continue to be stressed.        VTE Risk Mitigation (From admission, onward)         Ordered     warfarin (COUMADIN) tablet 7.5 mg  Daily      11/26/19 0534     IP VTE LOW RISK PATIENT  Once      11/26/19 0534     Place DEENA hose  Until discontinued      11/26/19 0534     Place sequential  compression device  Until discontinued      11/26/19 0534     IP VTE LOW RISK PATIENT  Once      11/26/19 0534                   Lex Espino MD  Department of Hospital Medicine   [facility]

## 2019-11-26 NOTE — ED NOTES
[facility] med team C paged for further orders in regards to patient discharge

## 2019-11-26 NOTE — ASSESSMENT & PLAN NOTE
Continued use will negatively impact patient's cardiovascular outcomes and likely related to mood-related disorders prompting frequent psychiatric hospitalizations.  Cessation should continue to be stressed.

## 2019-11-26 NOTE — ASSESSMENT & PLAN NOTE
Patient with risk factors for CAD including CVA/drug use who presents with acute onset CP. EKG did not show acute ischemic changes. Troponin x1 is negative. Patient is CP free.     Recommendations:   -check INR, if pt is not in therapeutic range (2-3) would recommend starting heparin drip while dose adjusting his home coumadin  -obtain 2D echo with CFD in the AM   -serial trend cardiac enzymes for three sets  -monitor on telemetry  -PRN EKG's  -continue with ASA/statin therapy  -follow up UDS  -if patient develops recurrent CP please notify Cardiology on call    Assessment/Plan was discussed with Cardiology Attending on Call, Dr. Cage who is in agreement.

## 2020-01-09 ENCOUNTER — HOSPITAL ENCOUNTER (EMERGENCY)
Facility: HOSPITAL | Age: 65
Discharge: PSYCHIATRIC OR SUBSTANCE USE FACILITY | End: 2020-01-09
Attending: EMERGENCY MEDICINE
Payer: MEDICARE

## 2020-01-09 VITALS
DIASTOLIC BLOOD PRESSURE: 86 MMHG | HEIGHT: 70 IN | OXYGEN SATURATION: 100 % | BODY MASS INDEX: 29.84 KG/M2 | SYSTOLIC BLOOD PRESSURE: 132 MMHG | TEMPERATURE: 98 F | RESPIRATION RATE: 16 BRPM | HEART RATE: 80 BPM

## 2020-01-09 DIAGNOSIS — F14.20 COCAINE USE DISORDER, SEVERE, DEPENDENCE: Chronic | ICD-10-CM

## 2020-01-09 DIAGNOSIS — R82.4 URINE KETONES: ICD-10-CM

## 2020-01-09 DIAGNOSIS — E86.0 DEHYDRATION: ICD-10-CM

## 2020-01-09 DIAGNOSIS — R79.89 ELEVATED SERUM CREATININE: ICD-10-CM

## 2020-01-09 DIAGNOSIS — R45.851 DEPRESSION WITH SUICIDAL IDEATION: Primary | ICD-10-CM

## 2020-01-09 DIAGNOSIS — F32.A DEPRESSION WITH SUICIDAL IDEATION: Primary | ICD-10-CM

## 2020-01-09 DIAGNOSIS — R07.9 CHEST PAIN: ICD-10-CM

## 2020-01-09 DIAGNOSIS — F14.90 COCAINE USE: ICD-10-CM

## 2020-01-09 DIAGNOSIS — N17.9 AKI (ACUTE KIDNEY INJURY): ICD-10-CM

## 2020-01-09 LAB
ALBUMIN SERPL BCP-MCNC: 4 G/DL (ref 3.5–5.2)
ALP SERPL-CCNC: 74 U/L (ref 55–135)
ALT SERPL W/O P-5'-P-CCNC: 16 U/L (ref 10–44)
AMPHET+METHAMPHET UR QL: NEGATIVE
ANION GAP SERPL CALC-SCNC: 9 MMOL/L (ref 8–16)
APAP SERPL-MCNC: <3 UG/ML (ref 10–20)
AST SERPL-CCNC: 27 U/L (ref 10–40)
BARBITURATES UR QL SCN>200 NG/ML: NEGATIVE
BASOPHILS # BLD AUTO: 0.04 K/UL (ref 0–0.2)
BASOPHILS NFR BLD: 0.9 % (ref 0–1.9)
BENZODIAZ UR QL SCN>200 NG/ML: NEGATIVE
BILIRUB SERPL-MCNC: 0.8 MG/DL (ref 0.1–1)
BILIRUB UR QL STRIP: NEGATIVE
BUN SERPL-MCNC: 16 MG/DL (ref 8–23)
BZE UR QL SCN: NORMAL
CALCIUM SERPL-MCNC: 9.1 MG/DL (ref 8.7–10.5)
CANNABINOIDS UR QL SCN: NEGATIVE
CHLORIDE SERPL-SCNC: 111 MMOL/L (ref 95–110)
CLARITY UR REFRACT.AUTO: CLEAR
CO2 SERPL-SCNC: 20 MMOL/L (ref 23–29)
COLOR UR AUTO: YELLOW
CREAT SERPL-MCNC: 1.6 MG/DL (ref 0.5–1.4)
CREAT UR-MCNC: 356 MG/DL (ref 23–375)
DIFFERENTIAL METHOD: ABNORMAL
EOSINOPHIL # BLD AUTO: 0.1 K/UL (ref 0–0.5)
EOSINOPHIL NFR BLD: 2.6 % (ref 0–8)
ERYTHROCYTE [DISTWIDTH] IN BLOOD BY AUTOMATED COUNT: 13.2 % (ref 11.5–14.5)
EST. GFR  (AFRICAN AMERICAN): 51.5 ML/MIN/1.73 M^2
EST. GFR  (NON AFRICAN AMERICAN): 44.5 ML/MIN/1.73 M^2
ETHANOL SERPL-MCNC: <10 MG/DL
GLUCOSE SERPL-MCNC: 89 MG/DL (ref 70–110)
GLUCOSE UR QL STRIP: ABNORMAL
HCT VFR BLD AUTO: 46.8 % (ref 40–54)
HGB BLD-MCNC: 15 G/DL (ref 14–18)
HGB UR QL STRIP: ABNORMAL
IMM GRANULOCYTES # BLD AUTO: 0.01 K/UL (ref 0–0.04)
IMM GRANULOCYTES NFR BLD AUTO: 0.2 % (ref 0–0.5)
INR PPP: 1.2 (ref 0.8–1.2)
KETONES UR QL STRIP: NEGATIVE
LEUKOCYTE ESTERASE UR QL STRIP: NEGATIVE
LYMPHOCYTES # BLD AUTO: 2.3 K/UL (ref 1–4.8)
LYMPHOCYTES NFR BLD: 49.1 % (ref 18–48)
MCH RBC QN AUTO: 30.8 PG (ref 27–31)
MCHC RBC AUTO-ENTMCNC: 32.1 G/DL (ref 32–36)
MCV RBC AUTO: 96 FL (ref 82–98)
METHADONE UR QL SCN>300 NG/ML: NEGATIVE
MICROSCOPIC COMMENT: NORMAL
MONOCYTES # BLD AUTO: 0.6 K/UL (ref 0.3–1)
MONOCYTES NFR BLD: 12.6 % (ref 4–15)
NEUTROPHILS # BLD AUTO: 1.6 K/UL (ref 1.8–7.7)
NEUTROPHILS NFR BLD: 34.6 % (ref 38–73)
NITRITE UR QL STRIP: NEGATIVE
NRBC BLD-RTO: 0 /100 WBC
OPIATES UR QL SCN: NEGATIVE
PCP UR QL SCN>25 NG/ML: NEGATIVE
PH UR STRIP: 5 [PH] (ref 5–8)
PLATELET # BLD AUTO: 283 K/UL (ref 150–350)
PMV BLD AUTO: 8.9 FL (ref 9.2–12.9)
POTASSIUM SERPL-SCNC: 3.9 MMOL/L (ref 3.5–5.1)
PROT SERPL-MCNC: 7.4 G/DL (ref 6–8.4)
PROT UR QL STRIP: NEGATIVE
PROTHROMBIN TIME: 12 SEC (ref 9–12.5)
RBC # BLD AUTO: 4.87 M/UL (ref 4.6–6.2)
RBC #/AREA URNS AUTO: 2 /HPF (ref 0–4)
SODIUM SERPL-SCNC: 140 MMOL/L (ref 136–145)
SP GR UR STRIP: 1.03 (ref 1–1.03)
SQUAMOUS #/AREA URNS AUTO: 0 /HPF
TOXICOLOGY INFORMATION: NORMAL
TROPONIN I SERPL DL<=0.01 NG/ML-MCNC: <0.006 NG/ML (ref 0–0.03)
TSH SERPL DL<=0.005 MIU/L-ACNC: 0.72 UIU/ML (ref 0.4–4)
URN SPEC COLLECT METH UR: ABNORMAL
WBC # BLD AUTO: 4.62 K/UL (ref 3.9–12.7)
WBC #/AREA URNS AUTO: 1 /HPF (ref 0–5)

## 2020-01-09 PROCEDURE — 90792 PR PSYCHIATRIC DIAGNOSTIC EVALUATION W/MEDICAL SERVICES: ICD-10-PCS | Mod: ,,, | Performed by: PSYCHIATRY & NEUROLOGY

## 2020-01-09 PROCEDURE — 80307 DRUG TEST PRSMV CHEM ANLYZR: CPT

## 2020-01-09 PROCEDURE — 84484 ASSAY OF TROPONIN QUANT: CPT

## 2020-01-09 PROCEDURE — 85610 PROTHROMBIN TIME: CPT

## 2020-01-09 PROCEDURE — 90792 PSYCH DIAG EVAL W/MED SRVCS: CPT | Mod: ,,, | Performed by: PSYCHIATRY & NEUROLOGY

## 2020-01-09 PROCEDURE — 80053 COMPREHEN METABOLIC PANEL: CPT

## 2020-01-09 PROCEDURE — 99285 EMERGENCY DEPT VISIT HI MDM: CPT | Mod: 25

## 2020-01-09 PROCEDURE — 84443 ASSAY THYROID STIM HORMONE: CPT

## 2020-01-09 PROCEDURE — 85025 COMPLETE CBC W/AUTO DIFF WBC: CPT

## 2020-01-09 PROCEDURE — 80329 ANALGESICS NON-OPIOID 1 OR 2: CPT

## 2020-01-09 PROCEDURE — 93005 ELECTROCARDIOGRAM TRACING: CPT

## 2020-01-09 PROCEDURE — 93010 EKG 12-LEAD: ICD-10-PCS | Mod: ,,, | Performed by: INTERNAL MEDICINE

## 2020-01-09 PROCEDURE — 93010 ELECTROCARDIOGRAM REPORT: CPT | Mod: ,,, | Performed by: INTERNAL MEDICINE

## 2020-01-09 PROCEDURE — 99285 PR EMERGENCY DEPT VISIT,LEVEL V: ICD-10-PCS | Mod: ,,, | Performed by: NURSE PRACTITIONER

## 2020-01-09 PROCEDURE — 99285 EMERGENCY DEPT VISIT HI MDM: CPT | Mod: ,,, | Performed by: NURSE PRACTITIONER

## 2020-01-09 PROCEDURE — 81001 URINALYSIS AUTO W/SCOPE: CPT

## 2020-01-09 PROCEDURE — 80320 DRUG SCREEN QUANTALCOHOLS: CPT

## 2020-01-09 NOTE — ED NOTES
Pt transferred to the [facility], he is calm and cooperative following directions. Pt checked for contraband and wearing paper scrubs. Pt belongings secured. Pt endorses feelings of depression and SI's without a plan. Pt instructed on the PEC placement process. He verbalizes understanding. Pt denies AVH's/HI.

## 2020-01-09 NOTE — ED NOTES
Pt escorted to the [facility] transportation vehicle. PEC and 3 bags of patient belongings given to [facility] staff. Pt remains calm and cooperative.

## 2020-01-09 NOTE — ED PROVIDER NOTES
"Encounter Date: 1/9/2020       History     Chief Complaint   Patient presents with    Psychiatric Evaluation     hx of major depression.  SI with no specific plan.  calm and cooperative with EMS     63 yo M with pmhx CVA, HTN, cocaine abuse, non-obstructive CAD with history of STEMI ?s/p stent, LV mural thrombus on coumadin, depression with psychiatric hospitalizations, CHF (EF 40-45%), CKD presents with a chief complaint of suicidal ideations.  Patient states that he began to have "crazy thoughts" last night which when he woke up this morning were worse.  He is concerned that he is going do something to himself.  He states that he used cocaine a couple days ago to help treat his depression.  He states that he does not use cocaine every day nor does he use any other illicit drugs.  He denies alcohol use.  Patient denies having a plan for suicide.  Homicidal ideations, visual hallucinations or auditory hallucinations.  He states that he has been hospitalized several times and he feels that the hospitalizations help him.  He does not see a counselor on a regular basis.  He does get his psychiatric care from [facility].  He cannot remember the name of his psychiatrist.  Patient states he is currently taking Coumadin.    The history is provided by the patient and the EMS personnel.     Review of patient's allergies indicates:  No Known Allergies  Past Medical History:   Diagnosis Date    Acute renal insufficiency 6/21/2015    Cocaine abuse     Depression     History of psychiatric care     HTN (hypertension) 1/25/2014    STEMI (ST elevation myocardial infarction) 2/12/2017    Stroke      Past Surgical History:   Procedure Laterality Date    NECK SURGERY       Family History   Problem Relation Age of Onset    Diabetes Mother     Heart disease Mother     Hypertension Mother      Social History     Tobacco Use    Smoking status: Current Some Day Smoker     Packs/day: 0.25     Types: Cigarettes    Smokeless " tobacco: Never Used   Substance Use Topics    Alcohol use: Yes     Comment: 6pk/week    Drug use: Yes     Types: Cocaine     Comment: last used a few days ago     Review of Systems   Constitutional: Negative for fever.   HENT: Negative for sore throat.    Respiratory: Negative for shortness of breath.    Cardiovascular: Negative for chest pain.   Gastrointestinal: Negative for nausea.   Genitourinary: Negative for dysuria.   Musculoskeletal: Negative for back pain.   Skin: Negative for rash.   Neurological: Negative for weakness.   Hematological: Does not bruise/bleed easily.   Psychiatric/Behavioral: Positive for suicidal ideas. Negative for dysphoric mood and hallucinations.   All other systems reviewed and are negative.    Physical Exam     Initial Vitals [01/09/20 0652]   BP Pulse Resp Temp SpO2   (!) 138/90 84 18 98.1 °F (36.7 °C) 100 %      MAP       --         Physical Exam    Vitals reviewed.  Constitutional: He appears well-developed and well-nourished.   HENT:   Head: Normocephalic and atraumatic.   Multiple missing teeth along with decaying teeth   Eyes: Conjunctivae and EOM are normal. Pupils are equal, round, and reactive to light.   Neck: Normal range of motion.   Cardiovascular: Normal rate, regular rhythm, normal heart sounds and intact distal pulses. Exam reveals no gallop and no friction rub.    No murmur heard.  Pulmonary/Chest: Breath sounds normal. No respiratory distress. He has no wheezes. He has no rhonchi. He has no rales. He exhibits no tenderness.   Abdominal: Soft. Bowel sounds are normal. He exhibits no distension and no mass. There is no tenderness. There is no rebound and no guarding.   Musculoskeletal: Normal range of motion. He exhibits no edema or tenderness.   Neurological: He is alert and oriented to person, place, and time. He has normal strength. No cranial nerve deficit or sensory deficit. GCS score is 15. GCS eye subscore is 4. GCS verbal subscore is 5. GCS motor subscore is  6.   Skin: Skin is warm. Capillary refill takes less than 2 seconds. No erythema.   Psychiatric: He has a normal mood and affect. His speech is normal and behavior is normal. Judgment normal. Thought content is not paranoid and not delusional. Cognition and memory are normal. He expresses suicidal ideation. He expresses no homicidal ideation. He expresses no suicidal plans and no homicidal plans.   Patient is laughing and making jokes during the evaluation       ED Course   Procedures  Labs Reviewed   CBC W/ AUTO DIFFERENTIAL - Abnormal; Notable for the following components:       Result Value    MPV 8.9 (*)     Gran # (ANC) 1.6 (*)     Gran% 34.6 (*)     Lymph% 49.1 (*)     All other components within normal limits   COMPREHENSIVE METABOLIC PANEL - Abnormal; Notable for the following components:    Chloride 111 (*)     CO2 20 (*)     Creatinine 1.6 (*)     eGFR if  51.5 (*)     eGFR if non  44.5 (*)     All other components within normal limits   URINALYSIS, REFLEX TO URINE CULTURE - Abnormal; Notable for the following components:    Glucose, UA 1+ (*)     Occult Blood UA 1+ (*)     All other components within normal limits    Narrative:     Preferred Collection Type->Urine, Clean Catch   ACETAMINOPHEN LEVEL - Abnormal; Notable for the following components:    Acetaminophen (Tylenol), Serum <3.0 (*)     All other components within normal limits   TSH   DRUG SCREEN PANEL, URINE EMERGENCY    Narrative:     Preferred Collection Type->Urine, Clean Catch   ALCOHOL,MEDICAL (ETHANOL)   TROPONIN I   PROTIME-INR   URINALYSIS MICROSCOPIC    Narrative:     Preferred Collection Type->Urine, Clean Catch        ECG Results          EKG 12-lead (In process)  Result time 01/09/20 13:30:02    In process by Interface, Lab In [facility] (01/09/20 13:30:02)                 Narrative:    Test Reason : R07.9,    Vent. Rate : 070 BPM     Atrial Rate : 070 BPM     P-R Int : 164 ms          QRS Dur : 094 ms       QT Int : 382 ms       P-R-T Axes : 077 120 -79 degrees     QTc Int : 412 ms    Normal sinus rhythm  Possible Inferior infarct ,age undetermined  Anterolateral infarct (cited on or before 11-FEB-2017)  Abnormal ECG  When compared with ECG of 26-NOV-2019 01:43,  Significant changes have occurred    Referred By: NADER   SELF           Confirmed By:                              Medical Decision Making:   Initial Assessment:   64-year-old male with severe depression which presents with suicidal ideations.  Differential Diagnosis:   Severe depression, self harm behavior  Clinical Tests:   Lab Tests: Ordered and Reviewed       <> Summary of Lab: Elevated creatinine 1.6 and ketones and urine consistent with dehydration-patient is not have any complaints at this time and he will be advised to orally hydrate  ED Management:  Patient examined and has a normal exam. Creatinine elevated with ketones in urine consistent with dehydration. Patient does not have a plan for suicide but per psych they recommend that he get PEC'd for placement. Patient feels that if he goes home he will not be safe. Patient has been medically cleared and will be transferred to the [facility]. Pt does need to hydrate secondary to his signs of dehydration and this information has been relayed to the staff of [facility]. Pt has been accepted to [facility].               Attending Attestation:     Physician Attestation Statement for NP/PA:   I discussed this assessment and plan of this patient with the NP/PA, but I did not personally examine the patient. The face to face encounter was performed by the NP/PA.                  ED Course as of Jan 09 1355   Thu Jan 09, 2020   0702 BP(!): 138/90 [AT]   0702 Temp: 98.1 °F (36.7 °C) [AT]   0702 Temp src: Oral [AT]   0702 Pulse: 84 [AT]   0702 Resp: 18 [AT]   0702 SpO2: 100 % [AT]   0746 Psychiatry consulted    [AT]   0927 Acetaminophen (Tylenol), Serum(!): <3.0 [AT]   0928 Creatinine(!): 1.6 [AT]    0928 Troponin I: <0.006 [AT]   0928 TSH: 0.719 [AT]   0928 Alcohol, Medical, Serum: <10 [AT]   0928 Coumadin Monitoring INR: 1.2 [AT]   0928 Pt with elevated creatinine- we will orally hydrate him to help with the mild CARTER    [AT]   1010 Cocaine (Metab.): Presumptive Positive [AT]   1019 Psych returned call and they will evaluate patient    [AT]   1129 Psychiatry feels that the patient should have a PEC- paperwork completed    [AT]      ED Course User Index  [AT] PEDRO Silveira        Clinical Impression:       ICD-10-CM ICD-9-CM   1. Depression with suicidal ideation F32.9 311    R45.851 V62.84   2. Cocaine use F14.90 305.60   3. Chest pain R07.9 786.50   4. Elevated serum creatinine R79.89 790.99   5. CARTER (acute kidney injury) N17.9 584.9   6. Urine ketones R82.4 791.6   7. Dehydration E86.0 276.51   8. Cocaine use disorder, severe, dependence F14.20 304.20                         Rosalie Jimenez, PEDRO  01/09/20 1355       Rosalie Jimenez, PEDRO  01/09/20 1432       Megan Steiner MD  01/09/20 9825

## 2020-01-09 NOTE — HPI
"Emergency Psychiatry Consult Note    Chief Complaint / Reason for Consult:     SI    Subjective:     History of Present Illness:   Damir Faria is a 64 y.o. male with a past psychiatric history of depression and cocaine abuse who presented to [facility]9/20 due to SI.     Per chart review,   Pt has numerous previous psych hospitalizations for similar symptoms to current presentation. Most recently:  - Nov 2019- [facility]. Discharged on Remeron 30mg qPM  - Sept 2019-  [facility]  - July 2019- [facility]    Per ED MD:  65 yo M with pmhx CVA, HTN, cocaine abuse, non-obstructive CAD with history of STEMI ?s/p stent, LV mural thrombus on coumadin, depression with psychiatric hospitalizations, CHF (EF 40-45%), CKD presents with a chief complaint of suicidal ideations.  Patient states that he began to have "crazy thoughts" last night which when he woke up this morning were worse.  He is concerned that he is going do something to himself.  He states that he used cocaine a couple days ago to help treat his depression.  He states that he does not use cocaine every day nor does he use any other illicit drugs.  He denies alcohol use.  Patient denies having a plan for suicide.  Homicidal ideations, visual hallucinations or auditory hallucinations.  He states that he has been hospitalized several times and he feels that the hospitalizations help him.  He does not see a counselor on a regular basis.  He does get his psychiatric care from [facility].  He cannot remember the name of his psychiatrist.  Patient states he is currently taking Coumadin    On my interview,  Pt reclining in bed in NAD. Calm, cooperative, pleasant but with sad affect. Pt currently endorsing SI but denies having a plan. Pt states he has bouts of these "crazy thoughts"; current episode started last night. Pt cannot identify trigger for his SI. Pt also endorses using cocaine a couple of days ago; reports he only uses cocaine to self " "medicate when he is feeling low. Reports he only uses cocaine a couple times a month but interested in rehab. However, pt at this time desires inpt psych hospitalization as he feels like it would be the most helpful for him. Pt lives in an apartment by himself; reports this is a safe place for him to return at time of discharge. Pt does have a sister and friends in [facility] but does not feel like he has anyone to talk to when he feels low. Endorses depressive symptoms of feelings of hopelessness, decreased concentration, decreased energy. Pt states he has been compliant with his home med of remeron and finds somewhat helpful.    Collateral:   Pt refuses- "I don't want to bother my sister about this."    Medical Review of Systems:  History obtained from the patient   1) General : NO chills or fever  2) Eyes: NO  visual changes  3) ENT: NO hearing change, nasal discharge or sore throat  4) Endocrine: NO weight changes or polydipsia/polyuria  5) Dermatological: NO rashes  6) Respiratory: NO cough, shortness of breath  7) Cardiovascular: NO chest pain, palpitations or racing heart  8) Gastrointestinal: NO nausea, vomiting, constipation or diarrhea  9) Musculoskeletal: NO muscle pain or stiffness  10) Neurological: NO confusion, dizziness, headaches or tremors  11) Psychiatric: please see HPI    Psychiatric Review of Systems:  sleep: no  appetite: no  weight: no  energy/anergy: yes  interest/pleasure/anhedonia: yes  somatic symptoms: no  anxiety/panic: no  guilty/hopelessness: yes  concentration: yes  S.I.B.s/risky behavior: yes  any drugs: yes  alcohol: no     Allergies:  Patient has no known allergies.    Past Medical/Surgical History:  Past Medical History:   Diagnosis Date    Acute renal insufficiency 6/21/2015    Cocaine abuse     Depression     History of psychiatric care     HTN (hypertension) 1/25/2014    STEMI (ST elevation myocardial infarction) 2/12/2017    Stroke      Past Surgical History:   Procedure " "Laterality Date    NECK SURGERY       Per Chart Review and Updated as Appropriate:    Past Psychiatric History:  Previous Medication Trials: yes   Previous Psychiatric Hospitalizations: yes   Previous Suicide Attempts:Per pt, yes, attempted to jump out in front of open car couple of years ago. per chart review, no , has "thought about it before" but never attempted   History of Violence: no  Outpatient Psychiatrist: no     Social History:  Marital Status:   Children: 1 son - killed at age 17- "that's when things went downhill"  Employment Status/Info: on disability  Education: 10th grade  Special Ed: no  Housing Status: apartment alone  History of phys/sexual abuse: no  Access to gun: nephew keeps his gun because he does not feel safe with it around     Substance Abuse History:  Recreational Drugs: cocaine. Utox + cocaine on admission  Use of Alcohol: denied  Rehab History:yes - has been to rehab 3 times for cocaine use   Tobacco Use:Yes 1 pack per week   Use of OTC: no      Legal History:  Past Charges/Incarcerations:no  Pending charges:no      Family Psychiatric History:   No     "

## 2020-01-09 NOTE — ED NOTES
Pt report called to Laura at [facility] in [place]. Pt informed of his pending transfer, he has no questions at this time.

## 2020-01-09 NOTE — CONSULTS
"Emergency Psychiatry Consult Note    Chief Complaint / Reason for Consult:     SI    Subjective:     History of Present Illness:   Damir Faria is a 64 y.o. male with a past psychiatric history of depression and cocaine abuse who [place] [facility] [place] due to SI.     Per chart review,   Pt has numerous previous psych hospitalizations for similar symptoms to current presentation. Most recently:  - Nov 2019- [facility]. Discharged on Remeron 30mg qPM  - Sept 2019-  [facility]  - July 2019- [facility]    Per ED MD:  65 yo M with pmhx CVA, HTN, cocaine abuse, non-obstructive CAD with history of STEMI ?s/p stent, LV mural thrombus on coumadin, depression with psychiatric hospitalizations, CHF (EF 40-45%), CKD presents with a chief complaint of suicidal ideations.  Patient states that he began to have "crazy thoughts" last night which when he woke up this morning were worse.  He is concerned that he is going do something to himself.  He states that he used cocaine a couple days ago to help treat his depression.  He states that he does not use cocaine every day nor does he use any other illicit drugs.  He denies alcohol use.  Patient denies having a plan for suicide.  Homicidal ideations, visual hallucinations or auditory hallucinations.  He states that he has been hospitalized several times and he feels that the hospitalizations help him.  He does not see a counselor on a regular basis.  He does get his psychiatric care from [facility].  He cannot remember the name of his psychiatrist.  Patient states he is currently taking Coumadin    On my interview,  Pt reclining in bed in NAD. Calm, cooperative, pleasant but with sad affect. Pt currently endorsing SI but denies having a plan. Pt states he has bouts of these "crazy thoughts"; current episode started last night. Pt cannot identify trigger for his SI. Pt also endorses using cocaine a couple of days ago; reports he only uses cocaine to self " "medicate when he is feeling low. Reports he only uses cocaine a couple times a month but interested in rehab. However, pt at this time desires inpt psych hospitalization as he feels like it would be the most helpful for him. Pt lives in an apartment by himself; reports this is a safe place for him to return at time of discharge. Pt does have a sister and friends in [place] but does not feel like he has anyone to talk to when he feels low. Endorses depressive symptoms of feelings of hopelessness, decreased concentration, decreased energy. Pt states he has been compliant with his home med of remeron and finds somewhat helpful.    Collateral:   Pt refuses- "I don't want to bother my sister about this."    Medical Review of Systems:  History obtained from the patient   1) General : NO chills or fever  2) Eyes: NO  visual changes  3) ENT: NO hearing change, nasal discharge or sore throat  4) Endocrine: NO weight changes or polydipsia/polyuria  5) Dermatological: NO rashes  6) Respiratory: NO cough, shortness of breath  7) Cardiovascular: NO chest pain, palpitations or racing heart  8) Gastrointestinal: NO nausea, vomiting, constipation or diarrhea  9) Musculoskeletal: NO muscle pain or stiffness  10) Neurological: NO confusion, dizziness, headaches or tremors  11) Psychiatric: please see HPI    Psychiatric Review of Systems:  sleep: no  appetite: no  weight: no  energy/anergy: yes  interest/pleasure/anhedonia: yes  somatic symptoms: no  anxiety/panic: no  guilty/hopelessness: yes  concentration: yes  S.I.B.s/risky behavior: yes  any drugs: yes  alcohol: no     Allergies:  Patient has no known allergies.    Past Medical/Surgical History:  Past Medical History:   Diagnosis Date    Acute renal insufficiency 6/21/2015    Cocaine abuse     Depression     History of psychiatric care     HTN (hypertension) 1/25/2014    STEMI (ST elevation myocardial infarction) 2/12/2017    Stroke      Past Surgical History:   Procedure " "Laterality Date    NECK SURGERY       Per Chart Review and Updated as Appropriate:    Past Psychiatric History:  Previous Medication Trials: yes   Previous Psychiatric Hospitalizations: yes   Previous Suicide Attempts:Per pt, yes, attempted to jump out in front of open car couple of years ago. per chart review, no , has "thought about it before" but never attempted   History of Violence: no  Outpatient Psychiatrist: no     Social History:  Marital Status:   Children: 1 son - killed at age 17- "that's when things went downhill"  Employment Status/Info: on disability  Education: 10th grade  Special Ed: no  Housing Status: apartment alone  History of phys/sexual abuse: no  Access to gun: nephew keeps his gun because he does not feel safe with it around     Substance Abuse History:  Recreational Drugs: cocaine. Utox + cocaine on admission  Use of Alcohol: denied  Rehab History:yes - has been to rehab 3 times for cocaine use   Tobacco Use:Yes 1 pack per week   Use of OTC: no      Legal History:  Past Charges/Incarcerations:no  Pending charges:no      Family Psychiatric History:   No     Objective:     Current Medications:  Infusions:    Scheduled:    PRN:      Home Medications:  Prior to Admission medications    Medication Sig Start Date End Date Taking? Authorizing Provider   aspirin 81 MG Chew Take 1 tablet (81 mg total) by mouth once daily. 19 Yes Silvia Larson NP   apixaban (ELIQUIS) 5 mg Tab Take 1 tablet (5 mg total) by mouth 2 (two) times daily. 19   Nilesh Marie MD   atorvastatin (LIPITOR) 40 MG tablet Take 1 tablet (40 mg total) by mouth every evening. 7/15/19 11/4/19  Silvia Larson NP   carvedilol (COREG) 6.25 MG tablet Take 1 tablet (6.25 mg total) by mouth 2 (two) times daily. 7/15/19 11/4/19  Silvia Larson NP   lisinopril (PRINIVIL,ZESTRIL) 20 MG tablet Take 1 tablet (20 mg total) by mouth once daily. 19  Silvia Larson NP   mirtazapine (REMERON) 30 MG " "tablet Take 1 tablet (30 mg total) by mouth every evening. 11/8/19 12/8/19  Nupur Stevens NP   spironolactone (ALDACTONE) 25 MG tablet Take 1 tablet (25 mg total) by mouth once daily. 7/15/19 11/4/19  Silvia Larson NP     Vital Signs:  Temp:  [98.1 °F (36.7 °C)]   Pulse:  [84]   Resp:  [18]   BP: (138)/(90)   SpO2:  [100 %]     Mental Status Exam:  Appearance: unremarkable, age appropriate, normal weight   Behavior: normal, cooperative, eye contact normal   Speech/Language: normal tone, normal rate, normal pitch, normal volume   Mood: "depressed"   Affect: Somewhat reactive but sad   Thought Process:  normal and logical   Thought Content:  Positive for SI with no plan. Denies HI/AVH. Denies paranoia or delusions   Orientation: grossly intact, person, place, situation, time/date, month of year, year   Cognition: grossly intact. Registers 3/3, Recalls 3/3. Able to spell HOUSE forwards, has difficulty spelling backwards   Insight: fair   Judgment: fair     Laboratory Data:  Recent Results (from the past 48 hour(s))   CBC auto differential    Collection Time: 01/09/20  7:13 AM   Result Value Ref Range    WBC 4.62 3.90 - 12.70 K/uL    RBC 4.87 4.60 - 6.20 M/uL    Hemoglobin 15.0 14.0 - 18.0 g/dL    Hematocrit 46.8 40.0 - 54.0 %    Mean Corpuscular Volume 96 82 - 98 fL    Mean Corpuscular Hemoglobin 30.8 27.0 - 31.0 pg    Mean Corpuscular Hemoglobin Conc 32.1 32.0 - 36.0 g/dL    RDW 13.2 11.5 - 14.5 %    Platelets 283 150 - 350 K/uL    MPV 8.9 (L) 9.2 - 12.9 fL    Immature Granulocytes 0.2 0.0 - 0.5 %    Gran # (ANC) 1.6 (L) 1.8 - 7.7 K/uL    Immature Grans (Abs) 0.01 0.00 - 0.04 K/uL    Lymph # 2.3 1.0 - 4.8 K/uL    Mono # 0.6 0.3 - 1.0 K/uL    Eos # 0.1 0.0 - 0.5 K/uL    Baso # 0.04 0.00 - 0.20 K/uL    nRBC 0 0 /100 WBC    Gran% 34.6 (L) 38.0 - 73.0 %    Lymph% 49.1 (H) 18.0 - 48.0 %    Mono% 12.6 4.0 - 15.0 %    Eosinophil% 2.6 0.0 - 8.0 %    Basophil% 0.9 0.0 - 1.9 %    Differential Method Automated  "   Comprehensive metabolic panel    Collection Time: 01/09/20  7:13 AM   Result Value Ref Range    Sodium 140 136 - 145 mmol/L    Potassium 3.9 3.5 - 5.1 mmol/L    Chloride 111 (H) 95 - 110 mmol/L    CO2 20 (L) 23 - 29 mmol/L    Glucose 89 70 - 110 mg/dL    BUN, Bld 16 8 - 23 mg/dL    Creatinine 1.6 (H) 0.5 - 1.4 mg/dL    Calcium 9.1 8.7 - 10.5 mg/dL    Total Protein 7.4 6.0 - 8.4 g/dL    Albumin 4.0 3.5 - 5.2 g/dL    Total Bilirubin 0.8 0.1 - 1.0 mg/dL    Alkaline Phosphatase 74 55 - 135 U/L    AST 27 10 - 40 U/L    ALT 16 10 - 44 U/L    Anion Gap 9 8 - 16 mmol/L    eGFR if African American 51.5 (A) >60 mL/min/1.73 m^2    eGFR if non African American 44.5 (A) >60 mL/min/1.73 m^2   TSH    Collection Time: 01/09/20  7:13 AM   Result Value Ref Range    TSH 0.719 0.400 - 4.000 uIU/mL   Ethanol    Collection Time: 01/09/20  7:13 AM   Result Value Ref Range    Alcohol, Medical, Serum <10 <10 mg/dL   Acetaminophen level    Collection Time: 01/09/20  7:13 AM   Result Value Ref Range    Acetaminophen (Tylenol), Serum <3.0 (L) 10.0 - 20.0 ug/mL   Troponin I    Collection Time: 01/09/20  7:13 AM   Result Value Ref Range    Troponin I <0.006 0.000 - 0.026 ng/mL   Protime-INR    Collection Time: 01/09/20  7:13 AM   Result Value Ref Range    Prothrombin Time 12.0 9.0 - 12.5 sec    INR 1.2 0.8 - 1.2   Urinalysis, Reflex to Urine Culture Urine, Clean Catch    Collection Time: 01/09/20  8:51 AM   Result Value Ref Range    Specimen UA Urine, Clean Catch     Color, UA Yellow Yellow, Straw, Shivani    Appearance, UA Clear Clear    pH, UA 5.0 5.0 - 8.0    Specific Gravity, UA 1.030 1.005 - 1.030    Protein, UA Negative Negative    Glucose, UA 1+ (A) Negative    Ketones, UA Negative Negative    Bilirubin (UA) Negative Negative    Occult Blood UA 1+ (A) Negative    Nitrite, UA Negative Negative    Leukocytes, UA Negative Negative   Drug screen panel, emergency    Collection Time: 01/09/20  8:51 AM   Result Value Ref Range    Benzodiazepines  Negative     Methadone metabolites Negative     Cocaine (Metab.) Presumptive Positive     Opiate Scrn, Ur Negative     Barbiturate Screen, Ur Negative     Amphetamine Screen, Ur Negative     THC Negative     Phencyclidine Negative     Creatinine, Random Ur 356.0 23.0 - 375.0 mg/dL    Toxicology Information SEE COMMENT    Urinalysis Microscopic    Collection Time: 01/09/20  8:51 AM   Result Value Ref Range    RBC, UA 2 0 - 4 /hpf    WBC, UA 1 0 - 5 /hpf    Squam Epithel, UA 0 /hpf    Microscopic Comment SEE COMMENT       No results found for: PHENYTOIN, PHENOBARB, VALPROATE, CBMZ  Imaging:  Imaging Results    None         Assessment:     Damir Faria is a 64 y.o. male with a past psychiatric history of depression and cocaine abuse who presented [facility] [place] on [place]9/[place] due to SI. Pt continues to actively endorse SI with no plan at this time. Pt has multiple risk factors including poor social support, single male, previous suicide attempt, and substance use.    Impression:  MDD vs SIMD  Cocaine Use Disorder  R/o feigning/ exaggerating sxs for secondary gain; however unclear what secondary gain may be at present as patient has stable housing and source of income    Recommendations:     · Continue PEC/CEC as pt is a danger to himself at this time. Seek inpt psych hospitalization after medical clearance. Pt amenable to inpt psych hospitalization at this time  · Continue home Remeron q      Case discussed with Dr. Fuller.        Heeryoung Kim, MD [facility] Adult Psychiatry Residency  PGY-2

## 2020-01-09 NOTE — ASSESSMENT & PLAN NOTE
Assessment:     Damir Faria is a 64 y.o. male with a past psychiatric history of depression and cocaine abuse who [place] [facility] [place]9/[place] due to SI. Pt continues to actively endorse SI with no plan at this time. Pt has multiple risk factors including poor social support, single male, previous suicide attempt, and substance use.    Impression:  MDD vs SIMD  Cocaine Use Disorder  R/o feigning/ exaggerating sxs for secondary gain; however unclear what secondary gain may be at present as patient has stable housing and source of income    Recommendations:     · Continue PEC/CEC as pt is a danger to himself at this time. Seek inpt psych hospitalization after medical clearance. Pt amenable to inpt psych hospitalization at this time  · Continue home Remeron \[facility] [place] [facility]\""      Case discussed with Dr. Fuller.        Heeryoung Kim, MD [facility] Adult Psychiatry Residency  PGY-2

## 2020-01-09 NOTE — ED TRIAGE NOTES
Pt reports he suffers with depression and has been feeling depressed the past 2 days. Pt reports SI without a plan and reports he used cocaine 2 days ago.

## 2020-01-09 NOTE — SUBJECTIVE & OBJECTIVE
Patient History           Medical as of 1/9/2020     Past Medical History     Diagnosis Date Comments Source    Acute renal insufficiency 6/21/2015 -- Provider    Cocaine abuse -- -- Provider    Depression -- -- Provider    History of psychiatric care -- -- Provider    HTN (hypertension) 1/25/2014 -- Provider    STEMI (ST elevation myocardial infarction) 2/12/2017 -- Provider    Stroke -- -- Provider                  Surgical as of 1/9/2020     Past Surgical History     Procedure Laterality Date Comments Source    NECK SURGERY -- -- -- Provider                  Family as of 1/9/2020     Problem Relation Name Age of Onset Comments Source    Diabetes Mother -- -- -- Provider    Heart disease Mother -- -- -- Provider    Hypertension Mother -- -- -- Provider            Tobacco Use as of 1/9/2020     Smoking Status Smoking Start Date Smoking Quit Date Packs/Day Years Used    Current Some Day Smoker -- -- 0.25 --    Types Comments Smokeless Tobacco Status Smokeless Tobacco Quit Date Source     Cigarettes -- Never Used -- Provider            Alcohol Use as of 1/9/2020     Alcohol Use Drinks/Week Alcohol/Week Comments Source    Yes -- -- 6pk/week Provider    Frequency Standard Drinks Binge Drinking        -- -- --              Drug Use as of 1/9/2020     Drug Use Types Frequency Comments Source    Yes  Cocaine -- last used a few days ago Provider            Sexual Activity as of 1/9/2020     Sexually Active Birth Control Partners Comments Source    Yes Condom Female -- Provider            Activities of Daily Living as of 1/9/2020     Activities of Daily Living Question Response Comments Source    Patient feels they ought to cut down on drinking/drug use No -- Provider    Patient annoyed by others criticizing their drinking/drug use No -- Provider    Patient has felt bad or guilty about drinking/drug use No -- Provider    Patient has had a drink/used drugs as an eye opener in the AM No -- Provider            Social  Documentation as of 1/9/2020    None           Occupational as of 1/9/2020     Occupation Employer Comments Source    -- [employer] -- Provider            Socioeconomic as of 1/9/2020     Marital Status Spouse Name Number of Children Years Education Education Level Preferred Language Ethnicity Race Source    Single -- -- -- -- English /Black Black or  Provider    Financial Resource Strain Food Insecurity: Worry Food Insecurity: Inability Transportation Needs: Medical Transportation Needs: Non-medical    -- -- -- -- --            Pertinent History     Question Response Comments    Lives with alone --    Place in Birth Order 2nd --    Lives in shelter --    Number of Siblings 1 --    Raised by biological parents --    Legal Involvement none --    Childhood Trauma uneventful --    Criminal History of arrest --    Financial Status unemployed --    Highest Level of Education unfinished highschool --    Does patient have access to a firearm? No --     Service Yes --    Primary Leisure Activity shopping --    Spirituality non-practicing --        Past Medical History:   Diagnosis Date    Acute renal insufficiency 6/21/2015    Cocaine abuse     Depression     History of psychiatric care     HTN (hypertension) 1/25/2014    STEMI (ST elevation myocardial infarction) 2/12/2017    Stroke      Past Surgical History:   Procedure Laterality Date    NECK SURGERY       Family History     Problem Relation (Age of Onset)    Diabetes Mother    Heart disease Mother    Hypertension Mother        Tobacco Use    Smoking status: Current Some Day Smoker     Packs/day: 0.25     Types: Cigarettes    Smokeless tobacco: Never Used   Substance and Sexual Activity    Alcohol use: Yes     Comment: 6pk/week    Drug use: Yes     Types: Cocaine     Comment: last used a few days ago    Sexual activity: Yes     Partners: Female     Birth control/protection: Condom     Review of patient's  "allergies indicates:  No Known Allergies    No current facility-administered medications on file prior to encounter.      Current Outpatient Medications on File Prior to Encounter   Medication Sig    aspirin 81 MG Chew Take 1 tablet (81 mg total) by mouth once daily.    apixaban (ELIQUIS) 5 mg Tab Take 1 tablet (5 mg total) by mouth 2 (two) times daily.    atorvastatin (LIPITOR) 40 MG tablet Take 1 tablet (40 mg total) by mouth every evening.    carvedilol (COREG) 6.25 MG tablet Take 1 tablet (6.25 mg total) by mouth 2 (two) times daily.    lisinopril (PRINIVIL,ZESTRIL) 20 MG tablet Take 1 tablet (20 mg total) by mouth once daily.    mirtazapine (REMERON) 30 MG tablet Take 1 tablet (30 mg total) by mouth every evening.    spironolactone (ALDACTONE) 25 MG tablet Take 1 tablet (25 mg total) by mouth once daily.     Psychotherapeutics (From admission, onward)    None        Review of Systems  Strengths and Liabilities: {PSY STRENGTHS/LIABILITIES:66861}    Objective:     Vital Signs (Most Recent):  Temp: 98.1 °F (36.7 °C) (01/09/20 0652)  Pulse: 84 (01/09/20 0652)  Resp: 18 (01/09/20 0652)  BP: (!) 138/90 (01/09/20 0652)  SpO2: 100 % (01/09/20 0652) Vital Signs (24h Range):  Temp:  [98.1 °F (36.7 °C)] 98.1 °F (36.7 °C)  Pulse:  [84] 84  Resp:  [18] 18  SpO2:  [100 %] 100 %  BP: (138)/(90) 138/90     Height: 5' 10" (177.8 cm)     Body mass index is 29.84 kg/m².    No intake or output data in the 24 hours ending 01/09/20 1131    Physical Exam   Psychiatric:   Mental Status Exam:  Appearance: unremarkable, age appropriate, normal weight  Behavior: normal, cooperative, eye contact normal  Speech/Language: normal tone, normal rate, normal pitch, normal volume  Mood: "depressed"  Affect: Somewhat reactive but sad  Thought Process:  normal and logical  Thought Content:  Positive for SI with no plan. Denies HI/AVH. Denies paranoia or delusions  Orientation: grossly intact, person, place, situation, time/date, month of " year, year  Cognition: grossly intact. Registers 3/3, Recalls 3/3. Able to spell HOUSE forwards, has difficulty spelling backwards  Insight: fair  Judgment: fair     Nursing note and vitals reviewed.       Significant Labs:   Last 24 Hours:   Recent Lab Results       01/09/20  0851   01/09/20  0713        Benzodiazepines Negative       Methadone metabolites Negative       Phencyclidine Negative       Acetaminophen (Tylenol), Serum   <3.0  Comment:  Toxic Levels:  Adults (4 hr post-ingestion).........>150 ug/mL  Adults (12 hr post-ingestion)........>40 ug/mL  Peds (2 hr post-ingestion, liquid)...>225 ug/mL       Albumin   4.0     Alcohol, Medical, Serum   <10     Alkaline Phosphatase   74     ALT   16     Amphetamine Screen, Ur Negative       Anion Gap   9     Appearance, UA Clear       AST   27     Barbiturate Screen, Ur Negative       Baso #   0.04     Basophil%   0.9     Bilirubin (UA) Negative       BILIRUBIN TOTAL   0.8  Comment:  For infants and newborns, interpretation of results should be based  on gestational age, weight and in agreement with clinical  observations.  Premature Infant recommended reference ranges:  Up to 24 hours.............<8.0 mg/dL  Up to 48 hours............<12.0 mg/dL  3-5 days..................<15.0 mg/dL  6-29 days.................<15.0 mg/dL       BUN, Bld   16     Calcium   9.1     Chloride   111     CO2   20     Cocaine (Metab.) Presumptive Positive       Color, UA Yellow       Creatinine   1.6     Creatinine, Random Ur 356.0  Comment:  The random urine reference ranges provided were established   for 24 hour urine collections.  No reference ranges exist for  random urine specimens.  Correlate clinically.         Differential Method   Automated     eGFR if African American   51.5     eGFR if non    44.5  Comment:  Calculation used to obtain the estimated glomerular filtration  rate (eGFR) is the CKD-EPI equation.        Eos #   0.1     Eosinophil%   2.6     Glucose    89     Glucose, UA 1+       Gran # (ANC)   1.6     Gran%   34.6     Hematocrit   46.8     Hemoglobin   15.0     Immature Grans (Abs)   0.01  Comment:  Mild elevation in immature granulocytes is non specific and   can be seen in a variety of conditions including stress response,   acute inflammation, trauma and pregnancy. Correlation with other   laboratory and clinical findings is essential.       Immature Granulocytes   0.2     Coumadin Monitoring INR   1.2  Comment:  Coumadin Therapy:  2.0 - 3.0 for INR for all indicators except mechanical heart valves  and antiphospholipid syndromes which should use 2.5 - 3.5.       Ketones, UA Negative       Leukocytes, UA Negative       Lymph #   2.3     Lymph%   49.1     MCH   30.8     MCHC   32.1     MCV   96     Microscopic Comment SEE COMMENT  Comment:  Other formed elements not mentioned in the report are not   present in the microscopic examination.          Mono #   0.6     Mono%   12.6     MPV   8.9     NITRITE UA Negative       nRBC   0     Occult Blood UA 1+       Opiate Scrn, Ur Negative       pH, UA 5.0       Platelets   283     Potassium   3.9     PROTEIN TOTAL   7.4     Protein, UA Negative  Comment:  Recommend a 24 hour urine protein or a urine   protein/creatinine ratio if globulin induced proteinuria is  clinically suspected.         Protime   12.0     RBC   4.87     RBC, UA 2       RDW   13.2     Sodium   140     Specific Gravity, UA 1.030       Specimen UA Urine, Clean Catch       Squam Epithel, UA 0       Marijuana (THC) Metabolite Negative       Toxicology Information SEE COMMENT  Comment:  This screen includes the following classes of drugs at the   listed cut-off:  Benzodiazepines                  200 ng/ml  Methadone                        300 ng/ml  Cocaine metabolite               300 ng/ml  Opiates                          300 ng/ml  Barbiturates                     200 ng/ml  Amphetamines                    1000 ng/ml  Marijuana metabs (THC)             50 ng/ml  Phencyclidine (PCP)               25 ng/ml  High concentrations of Diphenhydramine may cross-react with  Phencyclidine PCP screening immunoassay giving a false   positive result.  High concentrations of Methylenedioxymethamphetamine (MDMA aka  Ectasy) and other structurally similar compounds may cross-   react with the Amphetamine/Methamphetamine screening   immunoassay giving a false positive result.  A metabolite of the anti-HIV drug Sustiva () may cause  false positive results in the Marijuana metabolite (THC)   screening assay.  Note: This exception list includes only more common   interferants in toxicology screen testing.  Because of many   cross-reactantspositive results on toxicology drug screens   should be confirmed whenever results do not correlate with   clinical presentation.  This report is intended for use in clinical monitoring and  management of patients. It is not intended for use in   employment related drug testing.  Because of any cross-reactants, positive results on toxicology  drug screens should be confirmed whenever results do not  correlate with clinical presentation.  Presumptive positive results are unconfirmed and may be used   only for medical purposes.  Assay Intended Use: This assay provides only a preliminary analytical  test result. A more specific alternate chemical method must be used  to obtain a confirmed analytical result. Gas chromatography/mass  spectrometry (GS/MS)is the preferred confirmatory method. Clinical  consideration and professional judgement should be applied to any   drug of abuse test result, particularly when preliminary results  are used.         Troponin I   <0.006  Comment:  The reference interval for Troponin I represents the 99th percentile   cutoff   for our facility and is consistent with 3rd generation assay   performance.       TSH   0.719     WBC, UA 1       WBC   4.62           Significant Imaging: I have reviewed all pertinent  imaging results/findings within the past 24 hours.

## 2020-04-12 ENCOUNTER — NURSE TRIAGE (OUTPATIENT)
Dept: ADMINISTRATIVE | Facility: CLINIC | Age: 65
End: 2020-04-12

## 2020-04-13 NOTE — TELEPHONE ENCOUNTER
Concerned about blood pressure 152/92    Reason for Disposition   [1] Systolic BP  >= 130 OR Diastolic >= 80 AND [2] taking BP medications    Additional Information   Negative: Difficult to awaken or acting confused (e.g., disoriented, slurred speech)   Negative: Severe difficulty breathing (e.g., struggling for each breath, speaks in single words)   Negative: [1] Weakness of the face, arm or leg on one side of the body AND [2] new onset   Negative: [1] Numbness (i.e., loss of sensation) of the face, arm or leg on one side of the body AND [2] new onset   Negative: [1] Chest pain lasts > 5 minutes AND [2] history of heart disease  (i.e., heart attack, bypass surgery, angina, angioplasty, CHF)   Negative: [1] Chest pain AND [2] took nitrogylcerin AND [3] pain was not relieved   Negative: Sounds like a life-threatening emergency to the triager   Negative: Symptom is main concern  (e.g., headache, chest pain)   Negative: Low blood pressure is main concern   Negative: [1] Systolic BP  >= 160 OR Diastolic >= 100 AND [2] cardiac or neurologic symptoms (e.g., chest pain, difficulty breathing, unsteady gait, blurred vision)   Negative: [1] Pregnant > 20 weeks AND [2] new hand or face swelling   Negative: [1] Pregnant > 20 weeks AND [2] BP Systolic BP  >= 140 OR Diastolic >= 90   Negative: [1] Systolic BP  >= 200 OR Diastolic >= 120  AND [2] having NO cardiac or neurologic symptoms   Negative: [1] Postpartum < 6 weeks AND [2] BP Systolic BP  >= 140 OR Diastolic >= 90   Negative: [1] Systolic BP  >= 180 OR Diastolic >= 110 AND [2] missed most recent dose of blood pressure medication   Negative: Systolic BP  >= 180 OR Diastolic >= 110   Negative: Ran out of BP medications   Negative: Systolic BP  >= 160 OR Diastolic >= 100   Negative: [1] Taking BP medications AND [2] feels is having side effects (e.g., impotence, cough, dizzy upon standing)   Negative: [1] Systolic BP  >= 130 OR Diastolic >= 80 AND [2]  pregnant    Protocols used: HIGH BLOOD PRESSURE-A-AH

## 2020-04-28 ENCOUNTER — NURSE TRIAGE (OUTPATIENT)
Dept: ADMINISTRATIVE | Facility: CLINIC | Age: 65
End: 2020-04-28

## 2020-04-29 NOTE — TELEPHONE ENCOUNTER
Pt blood pressure 174/111. Pt takes BP meds in the AM.     Reason for Disposition   [1] Systolic BP  >= 180 OR Diastolic >= 110 AND [2] missed most recent dose of blood pressure medication    Additional Information   Negative: Difficult to awaken or acting confused (e.g., disoriented, slurred speech)   Negative: Severe difficulty breathing (e.g., struggling for each breath, speaks in single words)   Negative: [1] Weakness of the face, arm or leg on one side of the body AND [2] new onset   Negative: [1] Numbness (i.e., loss of sensation) of the face, arm or leg on one side of the body AND [2] new onset   Negative: [1] Chest pain lasts > 5 minutes AND [2] history of heart disease  (i.e., heart attack, bypass surgery, angina, angioplasty, CHF)   Negative: [1] Chest pain AND [2] took nitrogylcerin AND [3] pain was not relieved   Negative: Sounds like a life-threatening emergency to the triager   Negative: [1] Systolic BP  >= 160 OR Diastolic >= 100 AND [2] cardiac or neurologic symptoms (e.g., chest pain, difficulty breathing, unsteady gait, blurred vision)   Negative: [1] Pregnant > 20 weeks AND [2] new hand or face swelling   Negative: [1] Pregnant > 20 weeks AND [2] BP Systolic BP  >= 140 OR Diastolic >= 90   Negative: [1] Systolic BP  >= 200 OR Diastolic >= 120  AND [2] having NO cardiac or neurologic symptoms   Negative: [1] Postpartum < 6 weeks AND [2] BP Systolic BP  >= 140 OR Diastolic >= 90    Protocols used: HIGH BLOOD PRESSURE-A-AH

## 2020-05-02 ENCOUNTER — HOSPITAL ENCOUNTER (EMERGENCY)
Facility: HOSPITAL | Age: 65
Discharge: PSYCHIATRIC OR SUBSTANCE USE FACILITY | End: 2020-05-02
Attending: EMERGENCY MEDICINE
Payer: MEDICARE

## 2020-05-02 VITALS
DIASTOLIC BLOOD PRESSURE: 70 MMHG | HEART RATE: 70 BPM | SYSTOLIC BLOOD PRESSURE: 133 MMHG | RESPIRATION RATE: 14 BRPM | TEMPERATURE: 98 F | OXYGEN SATURATION: 100 %

## 2020-05-02 DIAGNOSIS — Z00.8 MEDICAL CLEARANCE FOR PSYCHIATRIC ADMISSION: ICD-10-CM

## 2020-05-02 DIAGNOSIS — R45.851 SUICIDAL IDEATION: Primary | ICD-10-CM

## 2020-05-02 LAB
ALBUMIN SERPL BCP-MCNC: 4.6 G/DL (ref 3.5–5.2)
ALP SERPL-CCNC: 87 U/L (ref 55–135)
ALT SERPL W/O P-5'-P-CCNC: 18 U/L (ref 10–44)
AMPHET+METHAMPHET UR QL: NEGATIVE
ANION GAP SERPL CALC-SCNC: 9 MMOL/L (ref 8–16)
APAP SERPL-MCNC: <3 UG/ML (ref 10–20)
AST SERPL-CCNC: 25 U/L (ref 10–40)
BARBITURATES UR QL SCN>200 NG/ML: NEGATIVE
BASOPHILS # BLD AUTO: 0.03 K/UL (ref 0–0.2)
BASOPHILS NFR BLD: 0.5 % (ref 0–1.9)
BENZODIAZ UR QL SCN>200 NG/ML: NEGATIVE
BILIRUB SERPL-MCNC: 1.5 MG/DL (ref 0.1–1)
BILIRUB UR QL STRIP: NEGATIVE
BUN SERPL-MCNC: 18 MG/DL (ref 8–23)
BZE UR QL SCN: ABNORMAL
CALCIUM SERPL-MCNC: 9.8 MG/DL (ref 8.7–10.5)
CANNABINOIDS UR QL SCN: NEGATIVE
CHLORIDE SERPL-SCNC: 106 MMOL/L (ref 95–110)
CLARITY UR REFRACT.AUTO: ABNORMAL
CO2 SERPL-SCNC: 24 MMOL/L (ref 23–29)
COLOR UR AUTO: YELLOW
CREAT SERPL-MCNC: 1.8 MG/DL (ref 0.5–1.4)
CREAT UR-MCNC: 390 MG/DL (ref 23–375)
DIFFERENTIAL METHOD: ABNORMAL
EOSINOPHIL # BLD AUTO: 0.1 K/UL (ref 0–0.5)
EOSINOPHIL NFR BLD: 1.7 % (ref 0–8)
ERYTHROCYTE [DISTWIDTH] IN BLOOD BY AUTOMATED COUNT: 13.5 % (ref 11.5–14.5)
EST. GFR  (AFRICAN AMERICAN): 44.7 ML/MIN/1.73 M^2
EST. GFR  (NON AFRICAN AMERICAN): 38.6 ML/MIN/1.73 M^2
ETHANOL SERPL-MCNC: <10 MG/DL
GLUCOSE SERPL-MCNC: 91 MG/DL (ref 70–110)
GLUCOSE UR QL STRIP: NEGATIVE
HCT VFR BLD AUTO: 48.7 % (ref 40–54)
HGB BLD-MCNC: 15.8 G/DL (ref 14–18)
HGB UR QL STRIP: ABNORMAL
IMM GRANULOCYTES # BLD AUTO: 0.02 K/UL (ref 0–0.04)
IMM GRANULOCYTES NFR BLD AUTO: 0.3 % (ref 0–0.5)
KETONES UR QL STRIP: ABNORMAL
LEUKOCYTE ESTERASE UR QL STRIP: ABNORMAL
LYMPHOCYTES # BLD AUTO: 1.9 K/UL (ref 1–4.8)
LYMPHOCYTES NFR BLD: 32.8 % (ref 18–48)
MCH RBC QN AUTO: 31.3 PG (ref 27–31)
MCHC RBC AUTO-ENTMCNC: 32.4 G/DL (ref 32–36)
MCV RBC AUTO: 97 FL (ref 82–98)
METHADONE UR QL SCN>300 NG/ML: NEGATIVE
MICROSCOPIC COMMENT: ABNORMAL
MONOCYTES # BLD AUTO: 0.7 K/UL (ref 0.3–1)
MONOCYTES NFR BLD: 11.8 % (ref 4–15)
NEUTROPHILS # BLD AUTO: 3.1 K/UL (ref 1.8–7.7)
NEUTROPHILS NFR BLD: 52.9 % (ref 38–73)
NITRITE UR QL STRIP: NEGATIVE
NRBC BLD-RTO: 0 /100 WBC
OPIATES UR QL SCN: NEGATIVE
PCP UR QL SCN>25 NG/ML: NEGATIVE
PH UR STRIP: 5 [PH] (ref 5–8)
PLATELET # BLD AUTO: 284 K/UL (ref 150–350)
PMV BLD AUTO: 9.1 FL (ref 9.2–12.9)
POTASSIUM SERPL-SCNC: 4.2 MMOL/L (ref 3.5–5.1)
PROT SERPL-MCNC: 8.7 G/DL (ref 6–8.4)
PROT UR QL STRIP: NEGATIVE
RBC # BLD AUTO: 5.04 M/UL (ref 4.6–6.2)
RBC #/AREA URNS AUTO: 3 /HPF (ref 0–4)
SARS-COV-2 RDRP RESP QL NAA+PROBE: NEGATIVE
SODIUM SERPL-SCNC: 139 MMOL/L (ref 136–145)
SP GR UR STRIP: 1.02 (ref 1–1.03)
SQUAMOUS #/AREA URNS AUTO: 0 /HPF
TOXICOLOGY INFORMATION: ABNORMAL
TSH SERPL DL<=0.005 MIU/L-ACNC: 1.04 UIU/ML (ref 0.4–4)
URN SPEC COLLECT METH UR: ABNORMAL
WBC # BLD AUTO: 5.77 K/UL (ref 3.9–12.7)
WBC #/AREA URNS AUTO: 13 /HPF (ref 0–5)

## 2020-05-02 PROCEDURE — 99285 PR EMERGENCY DEPT VISIT,LEVEL V: ICD-10-PCS | Mod: ,,, | Performed by: EMERGENCY MEDICINE

## 2020-05-02 PROCEDURE — 93010 EKG 12-LEAD: ICD-10-PCS | Mod: ,,, | Performed by: INTERNAL MEDICINE

## 2020-05-02 PROCEDURE — 80307 DRUG TEST PRSMV CHEM ANLYZR: CPT

## 2020-05-02 PROCEDURE — 93005 ELECTROCARDIOGRAM TRACING: CPT

## 2020-05-02 PROCEDURE — 99285 EMERGENCY DEPT VISIT HI MDM: CPT | Mod: ,,, | Performed by: EMERGENCY MEDICINE

## 2020-05-02 PROCEDURE — 99285 EMERGENCY DEPT VISIT HI MDM: CPT | Mod: 25

## 2020-05-02 PROCEDURE — 81001 URINALYSIS AUTO W/SCOPE: CPT | Mod: 59

## 2020-05-02 PROCEDURE — 93010 ELECTROCARDIOGRAM REPORT: CPT | Mod: ,,, | Performed by: INTERNAL MEDICINE

## 2020-05-02 PROCEDURE — 80053 COMPREHEN METABOLIC PANEL: CPT

## 2020-05-02 PROCEDURE — 85025 COMPLETE CBC W/AUTO DIFF WBC: CPT

## 2020-05-02 PROCEDURE — 84443 ASSAY THYROID STIM HORMONE: CPT

## 2020-05-02 PROCEDURE — 80320 DRUG SCREEN QUANTALCOHOLS: CPT

## 2020-05-02 PROCEDURE — 80329 ANALGESICS NON-OPIOID 1 OR 2: CPT

## 2020-05-02 PROCEDURE — 87086 URINE CULTURE/COLONY COUNT: CPT

## 2020-05-02 PROCEDURE — U0002 COVID-19 LAB TEST NON-CDC: HCPCS

## 2020-05-02 NOTE — ED TRIAGE NOTES
Pt presents to the ED via EMS c/o depression x 1 day. Pt reports he called EMS bc he had CP but thinks it's r/t anxiety.  Hx MDD. +SI. Denies HI/AH/VH. Pt states he used cocaine 7-8 hours ago.

## 2020-05-02 NOTE — ED PROVIDER NOTES
Source of History:  Patient    Chief complaint:  Suicidal      HPI:  Damir Faria is a 64 y.o. male presenting with suicidal ideation that scared him today.  He does not have a specific plan, but this the 1st time he has ever felt this way.  He has never attempted suicide before.  He has dealt with major depression for years.  He lives alone and does have a history of drug use, recently using cocaine.  He denies any somatic complaints at this time.    ROS: As per HPI and below:  General: No fever.  No chills.  Eyes: No visual changes.  Head: No headache.    Integument: No rashes or lesions.  Chest: No shortness of breath.  Cardiovascular: No chest pain.  Abdomen: No abdominal pain.  No nausea or vomiting.  Urinary: No abnormal urination.  Neurologic: No focal weakness.  No numbness.  Hematologic: No easy bruising.  Endocrine: No excessive thirst or urination.      Review of patient's allergies indicates:  No Known Allergies    No current facility-administered medications on file prior to encounter.      Current Outpatient Medications on File Prior to Encounter   Medication Sig Dispense Refill    apixaban (ELIQUIS) 5 mg Tab Take 1 tablet (5 mg total) by mouth 2 (two) times daily. 60 tablet 11    aspirin 81 MG Chew Take 1 tablet (81 mg total) by mouth once daily. 30 tablet 0    atorvastatin (LIPITOR) 40 MG tablet Take 1 tablet (40 mg total) by mouth every evening. 30 tablet 00    carvedilol (COREG) 6.25 MG tablet Take 1 tablet (6.25 mg total) by mouth 2 (two) times daily. 60 tablet 0    lisinopril (PRINIVIL,ZESTRIL) 20 MG tablet Take 1 tablet (20 mg total) by mouth once daily. 30 tablet 0    mirtazapine (REMERON) 30 MG tablet Take 1 tablet (30 mg total) by mouth every evening. 30 tablet 0    spironolactone (ALDACTONE) 25 MG tablet Take 1 tablet (25 mg total) by mouth once daily. 30 tablet 0       PMH:  As per HPI and below:  Past Medical History:   Diagnosis Date    Acute renal insufficiency 6/21/2015     Cocaine abuse     Depression     History of psychiatric care     HTN (hypertension) 1/25/2014    STEMI (ST elevation myocardial infarction) 2/12/2017    Stroke      Past Surgical History:   Procedure Laterality Date    CARDIAC SURGERY      stents    NECK SURGERY         Social History     Socioeconomic History    Marital status: Single     Spouse name: Not on file    Number of children: Not on file    Years of education: Not on file    Highest education level: Not on file   Occupational History     Employer: [employer]   Social Needs    Financial resource strain: Not on file    Food insecurity:     Worry: Not on file     Inability: Not on file    Transportation needs:     Medical: Not on file     Non-medical: Not on file   Tobacco Use    Smoking status: Current Some Day Smoker     Packs/day: 0.25     Types: Cigarettes    Smokeless tobacco: Never Used   Substance and Sexual Activity    Alcohol use: Yes     Comment: social    Drug use: Yes     Types: Cocaine     Comment: 7-8 houors ago    Sexual activity: Yes     Partners: Female     Birth control/protection: Condom   Lifestyle    Physical activity:     Days per week: Not on file     Minutes per session: Not on file    Stress: Not on file   Relationships    Social connections:     Talks on phone: Not on file     Gets together: Not on file     Attends Nondenominational service: Not on file     Active member of club or organization: Not on file     Attends meetings of clubs or organizations: Not on file     Relationship status: Not on file   Other Topics Concern    Patient feels they ought to cut down on drinking/drug use No    Patient annoyed by others criticizing their drinking/drug use No    Patient has felt bad or guilty about drinking/drug use No    Patient has had a drink/used drugs as an eye opener in the AM No   Social History Narrative    Not on file       Family History   Problem Relation Age of Onset    Diabetes Mother     Heart  disease Mother     Hypertension Mother        Physical Exam:    Vitals:    05/02/20 0823   BP: (!) 170/100   BP Location: Right arm   Patient Position: Sitting   Pulse: 68   Resp: 16   Temp: 97 °F (36.1 °C)   TempSrc: Oral   SpO2: 99%     Appearance:  No acute distress.  Comfortable.  Skin:  No rashes.  Good turgor.  Head: Normocephalic.  Atraumatic.    Eyes: No conjunctival injection.  No swelling.  Chest: Normal work of breathing.  No retractions.  No stridor.  Cardiovascular: Regular rhythm.  No appearance of shock.  No edema.  Abdomen:  No distention.    Musculoskeletal:  No deformities.  Normal range of motion.    Neurologic:  Normal movement and ambulation.  Mental Status:  Alert and oriented x 3.  Appropriate, conversant.      Initial Impression:  Suicidal ideation, no specific plan, but given age, gender and social factors, likely will need admission.  Will do standard psych clearance workup.    Laboratory Studies:  Labs Reviewed   CBC W/ AUTO DIFFERENTIAL - Abnormal; Notable for the following components:       Result Value    Mean Corpuscular Hemoglobin 31.3 (*)     MPV 9.1 (*)     All other components within normal limits   COMPREHENSIVE METABOLIC PANEL - Abnormal; Notable for the following components:    Creatinine 1.8 (*)     Total Protein 8.7 (*)     Total Bilirubin 1.5 (*)     eGFR if  44.7 (*)     eGFR if non  38.6 (*)     All other components within normal limits   URINALYSIS, REFLEX TO URINE CULTURE - Abnormal; Notable for the following components:    Appearance, UA Hazy (*)     Ketones, UA Trace (*)     Occult Blood UA 1+ (*)     Leukocytes, UA Trace (*)     All other components within normal limits    Narrative:     Preferred Collection Type->Urine, Clean Catch   DRUG SCREEN PANEL, URINE EMERGENCY - Abnormal; Notable for the following components:    Creatinine, Random Ur 390.0 (*)     All other components within normal limits    Narrative:     Preferred Collection  Type->Urine, Clean Catch   ACETAMINOPHEN LEVEL - Abnormal; Notable for the following components:    Acetaminophen (Tylenol), Serum <3.0 (*)     All other components within normal limits   URINALYSIS MICROSCOPIC - Abnormal; Notable for the following components:    WBC, UA 13 (*)     All other components within normal limits    Narrative:     Preferred Collection Type->Urine, Clean Catch   CULTURE, URINE   TSH   ALCOHOL,MEDICAL (ETHANOL)   SARS-COV-2 RNA AMPLIFICATION, QUAL       Chart reviewed.     Imaging Results    None         Medications Given:  Medications - No data to display    ED Course:  ED Course as of May 02 1043   Sat May 02, 2020   0925 SARS-CoV-2 RNA, Amplification, Qual: Negative [DC]   1025 WBC, UA(!): 13 [DC]   1026 NITRITE UA: Negative [DC]   1026 Alcohol, Medical, Serum: <10 [DC]   1026 Slightly worse than 3 months ago at 1.6   Creatinine(!): 1.8 [DC]   1026 Cocaine (Metab.): Presumptive Positive [DC]      ED Course User Index  [DC] Nilesh Kohler MD       Medically cleared for psychiatry placement: 5/2/2020 10:41 AM    MDM:    64 y.o. male with suicidal ideation.  Medically stable in his workup.  Thirteen white blood cells with negative nitrite and no symptoms is unlikely to be UTI, we will await the culture.  I would not start antibiotics at this time.  PEC executed, will start transfer process.    Diagnostic Impression:    1. Suicidal ideation    2. Medical clearance for psychiatric admission         ED Disposition Condition    Transfer to Psych Facility         ED Prescriptions     None        Follow-up Information    None                        Nilesh Kohler MD  05/02/20 1049

## 2020-05-03 LAB — BACTERIA UR CULT: NO GROWTH

## 2020-06-08 ENCOUNTER — HOSPITAL ENCOUNTER (EMERGENCY)
Facility: HOSPITAL | Age: 65
Discharge: PSYCHIATRIC OR SUBSTANCE USE FACILITY | End: 2020-06-08
Attending: EMERGENCY MEDICINE
Payer: MEDICARE

## 2020-06-08 VITALS
OXYGEN SATURATION: 98 % | HEIGHT: 72 IN | WEIGHT: 200 LBS | HEART RATE: 74 BPM | BODY MASS INDEX: 27.09 KG/M2 | SYSTOLIC BLOOD PRESSURE: 142 MMHG | RESPIRATION RATE: 16 BRPM | DIASTOLIC BLOOD PRESSURE: 80 MMHG | TEMPERATURE: 99 F

## 2020-06-08 DIAGNOSIS — Z00.8 MEDICAL CLEARANCE FOR PSYCHIATRIC ADMISSION: ICD-10-CM

## 2020-06-08 DIAGNOSIS — R45.851 SUICIDAL IDEATIONS: Primary | ICD-10-CM

## 2020-06-08 LAB
ALBUMIN SERPL BCP-MCNC: 4 G/DL (ref 3.5–5.2)
ALP SERPL-CCNC: 104 U/L (ref 55–135)
ALT SERPL W/O P-5'-P-CCNC: 26 U/L (ref 10–44)
AMPHET+METHAMPHET UR QL: NEGATIVE
ANION GAP SERPL CALC-SCNC: 10 MMOL/L (ref 8–16)
APAP SERPL-MCNC: <3 UG/ML (ref 10–20)
AST SERPL-CCNC: 38 U/L (ref 10–40)
BACTERIA #/AREA URNS AUTO: NORMAL /HPF
BARBITURATES UR QL SCN>200 NG/ML: NEGATIVE
BASOPHILS # BLD AUTO: 0.03 K/UL (ref 0–0.2)
BASOPHILS NFR BLD: 0.6 % (ref 0–1.9)
BENZODIAZ UR QL SCN>200 NG/ML: NEGATIVE
BILIRUB SERPL-MCNC: 2 MG/DL (ref 0.1–1)
BILIRUB UR QL STRIP: NEGATIVE
BUN SERPL-MCNC: 17 MG/DL (ref 8–23)
BZE UR QL SCN: NORMAL
CALCIUM SERPL-MCNC: 9.1 MG/DL (ref 8.7–10.5)
CANNABINOIDS UR QL SCN: NEGATIVE
CHLORIDE SERPL-SCNC: 106 MMOL/L (ref 95–110)
CLARITY UR REFRACT.AUTO: CLEAR
CO2 SERPL-SCNC: 21 MMOL/L (ref 23–29)
COLOR UR AUTO: YELLOW
CREAT SERPL-MCNC: 1.5 MG/DL (ref 0.5–1.4)
CREAT UR-MCNC: 85 MG/DL (ref 23–375)
DIFFERENTIAL METHOD: ABNORMAL
EOSINOPHIL # BLD AUTO: 0.2 K/UL (ref 0–0.5)
EOSINOPHIL NFR BLD: 3.5 % (ref 0–8)
ERYTHROCYTE [DISTWIDTH] IN BLOOD BY AUTOMATED COUNT: 13.6 % (ref 11.5–14.5)
EST. GFR  (AFRICAN AMERICAN): 55.3 ML/MIN/1.73 M^2
EST. GFR  (NON AFRICAN AMERICAN): 47.8 ML/MIN/1.73 M^2
ETHANOL SERPL-MCNC: <10 MG/DL
GLUCOSE SERPL-MCNC: 69 MG/DL (ref 70–110)
GLUCOSE UR QL STRIP: NEGATIVE
HCT VFR BLD AUTO: 48.3 % (ref 40–54)
HGB BLD-MCNC: 15.9 G/DL (ref 14–18)
HGB UR QL STRIP: ABNORMAL
IMM GRANULOCYTES # BLD AUTO: 0.02 K/UL (ref 0–0.04)
IMM GRANULOCYTES NFR BLD AUTO: 0.4 % (ref 0–0.5)
KETONES UR QL STRIP: NEGATIVE
LEUKOCYTE ESTERASE UR QL STRIP: NEGATIVE
LYMPHOCYTES # BLD AUTO: 1.9 K/UL (ref 1–4.8)
LYMPHOCYTES NFR BLD: 37.4 % (ref 18–48)
MCH RBC QN AUTO: 31.4 PG (ref 27–31)
MCHC RBC AUTO-ENTMCNC: 32.9 G/DL (ref 32–36)
MCV RBC AUTO: 95 FL (ref 82–98)
METHADONE UR QL SCN>300 NG/ML: NEGATIVE
MICROSCOPIC COMMENT: NORMAL
MONOCYTES # BLD AUTO: 0.7 K/UL (ref 0.3–1)
MONOCYTES NFR BLD: 12.7 % (ref 4–15)
NEUTROPHILS # BLD AUTO: 2.4 K/UL (ref 1.8–7.7)
NEUTROPHILS NFR BLD: 45.4 % (ref 38–73)
NITRITE UR QL STRIP: NEGATIVE
NRBC BLD-RTO: 0 /100 WBC
OPIATES UR QL SCN: NEGATIVE
PCP UR QL SCN>25 NG/ML: NEGATIVE
PH UR STRIP: 6 [PH] (ref 5–8)
PLATELET # BLD AUTO: 223 K/UL (ref 150–350)
PMV BLD AUTO: 9 FL (ref 9.2–12.9)
POTASSIUM SERPL-SCNC: 3.3 MMOL/L (ref 3.5–5.1)
PROT SERPL-MCNC: 7.6 G/DL (ref 6–8.4)
PROT UR QL STRIP: NEGATIVE
RBC # BLD AUTO: 5.07 M/UL (ref 4.6–6.2)
RBC #/AREA URNS AUTO: 1 /HPF (ref 0–4)
SARS-COV-2 RDRP RESP QL NAA+PROBE: NEGATIVE
SODIUM SERPL-SCNC: 137 MMOL/L (ref 136–145)
SP GR UR STRIP: 1.01 (ref 1–1.03)
TOXICOLOGY INFORMATION: NORMAL
TSH SERPL DL<=0.005 MIU/L-ACNC: 1.21 UIU/ML (ref 0.4–4)
URN SPEC COLLECT METH UR: ABNORMAL
WBC # BLD AUTO: 5.19 K/UL (ref 3.9–12.7)
WBC #/AREA URNS AUTO: 1 /HPF (ref 0–5)

## 2020-06-08 PROCEDURE — 99285 EMERGENCY DEPT VISIT HI MDM: CPT | Mod: 25

## 2020-06-08 PROCEDURE — 81001 URINALYSIS AUTO W/SCOPE: CPT

## 2020-06-08 PROCEDURE — 80329 ANALGESICS NON-OPIOID 1 OR 2: CPT

## 2020-06-08 PROCEDURE — 99285 PR EMERGENCY DEPT VISIT,LEVEL V: ICD-10-PCS | Mod: ,,, | Performed by: EMERGENCY MEDICINE

## 2020-06-08 PROCEDURE — 84443 ASSAY THYROID STIM HORMONE: CPT

## 2020-06-08 PROCEDURE — 85025 COMPLETE CBC W/AUTO DIFF WBC: CPT

## 2020-06-08 PROCEDURE — 80320 DRUG SCREEN QUANTALCOHOLS: CPT

## 2020-06-08 PROCEDURE — 93010 ELECTROCARDIOGRAM REPORT: CPT | Mod: ,,, | Performed by: INTERNAL MEDICINE

## 2020-06-08 PROCEDURE — 80053 COMPREHEN METABOLIC PANEL: CPT

## 2020-06-08 PROCEDURE — 93005 ELECTROCARDIOGRAM TRACING: CPT

## 2020-06-08 PROCEDURE — 99285 EMERGENCY DEPT VISIT HI MDM: CPT | Mod: ,,, | Performed by: EMERGENCY MEDICINE

## 2020-06-08 PROCEDURE — U0002 COVID-19 LAB TEST NON-CDC: HCPCS

## 2020-06-08 PROCEDURE — 93010 EKG 12-LEAD: ICD-10-PCS | Mod: ,,, | Performed by: INTERNAL MEDICINE

## 2020-06-08 PROCEDURE — 80307 DRUG TEST PRSMV CHEM ANLYZR: CPT

## 2020-06-08 NOTE — ED NOTES
Pt belongings: 6 shirts, 5 pair of pants, 1 bag with various toiletries. 1 security envelope with 1 wallet, 1 cell phone, $ 11.13. Pt signed belongings sheet and patient rights.

## 2020-06-08 NOTE — ASSESSMENT & PLAN NOTE
Assessment:   Damir Faria is a 65 y.o. male with past psychiatric history of depression with SI, who presented to the [facility] due to suicidal ideation.  Psychiatry service consulted due to concerns about suicidality.  Per chart review, pt with pattern of presenting to ED with questionable SI, pt admitted after most (if not all) encounters in system.  Pt presents again today with similar story.  Pt with numerous risk factors for suicidal behavior including history of attempt, poor social support, chronic medical illness, ongoing substance use, h/o psychiatric diagnosis.  Strong concern for manipulative behavior during my interview with patient, pt voiced conditional threat to engage in self-injurious behavior if not admitted.  Concern for exaggerating/feigning symptoms for secondary gain of psychiatric hospitalization.  Would recommend psychiatric admission at this time due to imminent danger to self.  Continue current regimen, defer changes to future inpatient provider.     Dx:   Major depressive disorder, recurrent  R/o substance induced mood disorder  R/o personality disorder (dependent?)  R/o feigning/exaggerating symptoms for secondary gain    Recommendations:   - Pt is an imminent danger to self due to an acute psychiatric illness.  Recommend to enact PEC if one is not already in place.  - Recommend to seek inpatient psychiatric hospitalization.  - Recommend to continue home regimen of remeron  - Recommend against PRNs at this time  - Additional labs/imaging: per ED provider  - Further medical workup per primary team, defer non-psychiatric medications    Case discussed with ED MD and psychiatry staff on call Dr. Barajas.

## 2020-06-08 NOTE — HPI
"Per Primary MD:  Patient is a 65 year old male with PMH of cocaine abuse, depression, HTN, CAD who presents to the ED with complaints of depression and suicidal ideation that began two days ago. Denies new life stressors. Denies active plan for suicide but has passive thoughts of suicide that scare him. Used cocaine two days ago. Denies other drugs or alcohol. Denies HI, auditory or visual hallucinations.  Also reports decreased appetite and inability to sleep.   Reports that he was last hospitalized in psych facility about one month ago. Discharged with Remeron, reports 100% compliance with medication. Denies CP, SOB, NVD, dysuria, fevers, chills, cough. This is the extent of the patient's complaints.    Per Psychiatry MD:   Damir Faria is a 65 y.o. male with a past psychiatric history of depression with SI and PMHx of CVA, HTN, CAD, STEMI, CHF, currently presenting with depression.  Psychiatry was consulted to address the patient's symptoms of suicidal ideation.     Pt reports long history of depressive symptoms.  Reports that symptoms occur intermittently.  Episodes typically last "weeks" if unaddressed.  Reports 1-2 nights ago, experienced recurrence of depressed mood.  Expressed having "bad thoughts."  Endorsed suicidal thinking but denied any suicidal intention or plan.  Tried to "treat my depression with cocaine," reported temporary relief but symptoms gradually returned.  Presented today by EMS, says "I need to be admitted."  Denies suicidal desire or plan during visit.  Endorses h/o suicidal behavior, endorses trying to run into traffic "a couple years ago."  Endorses numerous prior admission, last 1 month ago at [place].  Reports receiving benefit from "the groups."  Is followed by an outpatient psychiatrist with [facility], last seen "a while ago," has upcoming appointment 6/19/2020.  Prescribed remeron, endorses adherence, denies SE.  Unsure if remeron helps with depression but does endorse benefit for " "sleep.  Denies h/o german, psychosis, obsessive thinking/compulsions, post traumatic symptoms.  Endorses occasional cocaine use, "only when I'm depressed."  Has difficulty quantifying frequency of his use.  Endorses benefit from substance treatment, specifically residential rehab.  Voices recently being in  ([place]) 1 month ago.  Open to considering return to rehab, will not consider admission in [facility], says "it's too easy for me to walk out."  When treatment options are discussed with patient, pt adamant about being admitted.  Says that he will attempt to kill himself if not admitted today.  Does not specifically reference plan after threatening self harm.     Collateral:   "I don't want you bothering my sister with this."      SUBJECTIVE   Currently, the patient is endorsing the following:    Medical Review Of Systems:  MEDICAL REVIEW OF SYSTEMS  History obtained from the patient   General : NO chills or fever   Eyes: NO  visual changes   ENT: NO hearing change, nasal discharge or sore throat   Endocrine: NO weight changes or polydipsia/polyuria   Dermatological: NO rashes   Respiratory: NO cough, shortness of breath   Cardiovascular: NO chest pain, palpitations or racing heart   Gastrointestinal: NO nausea, vomiting, constipation or diarrhea   Musculoskeletal: NO muscle pain or stiffness   Neurological: NO confusion, dizziness, headaches or tremors   Psychiatric: please see HPI    Psychiatric Review Of Systems - Is patient experiencing or having changes in:  sleep: no  appetite: no  weight: no  energy/anergy: no  interest/pleasure/anhedonia: no  somatic symptoms: no  guilty/hopelessness: no  concentration: no  S.I.B.s/risky behavior: no  SI/SA:  yes    anxiety/panic: no  Agoraphobia:  no  Social phobia:  no  Recurrent nightmares:  no  hyper startle response:  no  Avoidance: no  Recurrent thoughts:  no  Recurrent behaviors:  no    Irritability: no  Racing thoughts: no  Impulsive " behaviors: no  Pressured speech:  no    Paranoia:no  Delusions: no  AVH:no    Past Medical/Surgical History:  Past Medical History:   Diagnosis Date    Acute renal insufficiency 2015    Cocaine abuse     Depression     History of psychiatric care     HTN (hypertension) 2014    STEMI (ST elevation myocardial infarction) 2017    Stroke       has a past medical history of Acute renal insufficiency (2015), Cocaine abuse, Depression, History of psychiatric care, HTN (hypertension) (2014), STEMI (ST elevation myocardial infarction) (2017), and Stroke.  Past Surgical History:   Procedure Laterality Date    CARDIAC SURGERY      stents    NECK SURGERY         Past Psychiatric History:  Previous Medication Trials: yes, wellbutrin and abilify per chart review, endorses currently taking remeron  Previous Psychiatric Hospitalizations: Yes - multiple at various facilities, endorses last admission 1 month ago, [facility] [place] [facility] [place]  Previous Suicide Attempts: endorses attempting to run into traffic 2 yrs ago, otherwise reports multiple admission for SI without plan  History of Violence: No  Outpatient Psychiatrist: Yes - [facility]  Outpatient Psychotherapist: No    Social History:  Marital Status:   Children: 1  son (shot 20 yrs ago)   Employment Status/Info: on disability  Education: 9th grade  Special Ed: no  Housing/Lives with: alone  History of phys/sexual abuse: No  Access to gun: No    Substance Abuse History:  Recreational Drugs: occasional cocaine  Use of Alcohol: denied  Rehab History:yes   Tobacco Use: ppd  Use of OTC: no  Legal consequences of chemical use: no    Legal History:  Past Charges/Incarcerations:denies  Pending charges:denies     Family Psychiatric History:   Denies

## 2020-06-08 NOTE — ED NOTES
"Damir Faria, a 65 y.o. male presents to the ED w/ complaint of SI without a plan. Pt has hx of depression, had stressful weekend, coped with cocaine, had a thought last night, "I always think about it, it's just a thought." Pt reports increased depression following cocaine use. Denies hallucinations or HI. Denies other complaints including CP, SOB, NVD, fever, urinary symptoms, headahce.    Triage note:  Chief Complaint   Patient presents with    Suicidal     depressive thoughts over the past week. Recently in psych facility about 1 month ago. Not eating or sleeping.      Review of patient's allergies indicates:  No Known Allergies  Past Medical History:   Diagnosis Date    Acute renal insufficiency 6/21/2015    Cocaine abuse     Depression     History of psychiatric care     HTN (hypertension) 1/25/2014    STEMI (ST elevation myocardial infarction) 2/12/2017    Stroke      Adult Physical Assessment  LOC: Damir Faria, 65 y.o. male verified via two identifiers.  The patient is awake, alert, oriented and speaking appropriately at this time.  APPEARANCE: Patient resting comfortably and appears to be in no acute distress at this time. Patient is clean and well groomed, patient's clothing is properly fastened.  SKIN:The skin is warm and dry, color consistent with ethnicity, patient has normal skin turgor and moist mucus membranes, skin intact, no breakdown or brusing noted.  MUSCULOSKELETAL: Patient moving all extremities well, no obvious swelling or deformities noted.  RESPIRATORY: Airway is open and patent, respirations are spontaneous, patient has a normal effort and rate, no accessory muscle use noted.  CARDIAC: Patient has a normal rate and rhythm, no periphreal edema noted in any extremity, capillary refill < 3 seconds in all extremities  ABDOMEN: Soft and non tender to palpation, no abdominal distention noted. Bowel sounds present in all four quadrants.  NEUROLOGIC: Eyes open spontaneously, " behavior appropriate to situation, follows commands, facial expression symmetrical, bilateral hand grasp equal and even, purposeful motor response noted, normal sensation in all extremities when touched with a finger.

## 2020-06-08 NOTE — ED NOTES
Pt transferred to  2, he is calm and cooperative. Pt checked for contraband and wearing paper scrubs. Pt belongings checked in and secured. [facility] notified of the PEC and a fax copy was sent. Pt appearance is well kept. Mood depressed, affect flat. Pt denies HI/AVH's, he does endorse SI's if discharged to home. Pt does not have a plan. Pt endorses poor appetite and difficulty with sleep. Pt instructed on the PEC placement process, he verbalizes understanding. Pt is lying in bed NAD noted, DVC maintained.

## 2020-06-08 NOTE — ED NOTES
Report called to Taco at [facility] in [place]. Pt is aware of his pending transfer, dinner ordered.

## 2020-06-08 NOTE — ED PROVIDER NOTES
Encounter Date: 6/8/2020       History     Chief Complaint   Patient presents with    Suicidal     depressive thoughts over the past week. Recently in psych facility about 1 month ago. Not eating or sleeping.      Patient is a 65 year old male with PMH of cocaine abuse, depression, HTN, CAD who presents to the ED with complaints of depression and suicidal ideation that began two days ago. Denies new life stressors. Denies active plan for suicide but has passive thoughts of suicide that scare him. Used cocaine two days ago. Denies other drugs or alcohol. Denies HI, auditory or visual hallucinations.  Also reports decreased appetite and inability to sleep.   Reports that he was last hospitalized in psych facility about one month ago. Discharged with Remeron, reports 100% compliance with medication. Denies CP, SOB, NVD, dysuria, fevers, chills, cough. This is the extent of the patient's complaints.         Review of patient's allergies indicates:  No Known Allergies  Past Medical History:   Diagnosis Date    Acute renal insufficiency 6/21/2015    Cocaine abuse     Depression     History of psychiatric care     HTN (hypertension) 1/25/2014    STEMI (ST elevation myocardial infarction) 2/12/2017    Stroke      Past Surgical History:   Procedure Laterality Date    CARDIAC SURGERY      stents    NECK SURGERY       Family History   Problem Relation Age of Onset    Diabetes Mother     Heart disease Mother     Hypertension Mother      Social History     Tobacco Use    Smoking status: Current Some Day Smoker     Packs/day: 0.25     Types: Cigarettes    Smokeless tobacco: Never Used   Substance Use Topics    Alcohol use: Not Currently     Comment: social    Drug use: Yes     Types: Cocaine     Comment: this weekend     Review of Systems   Constitutional: Positive for appetite change. Negative for fever.   HENT: Negative for congestion and sore throat.    Eyes: Negative for visual disturbance.   Respiratory:  Negative for cough and shortness of breath.    Cardiovascular: Negative for chest pain.   Gastrointestinal: Negative for diarrhea, nausea and vomiting.   Genitourinary: Negative for dysuria.   Musculoskeletal: Negative for back pain.   Skin: Negative for rash.   Neurological: Negative for weakness.   Hematological: Does not bruise/bleed easily.   Psychiatric/Behavioral: Positive for dysphoric mood, sleep disturbance and suicidal ideas. Negative for hallucinations.       Physical Exam     Initial Vitals [06/08/20 1059]   BP Pulse Resp Temp SpO2   (!) 147/78 78 16 98.9 °F (37.2 °C) 98 %      MAP       --         Physical Exam    Nursing note and vitals reviewed.  Constitutional: He appears well-developed and well-nourished. He is not diaphoretic. No distress.   HENT:   Head: Normocephalic and atraumatic.   Mouth/Throat: Oropharynx is clear and moist.   Poor dentition   Eyes: Conjunctivae and EOM are normal. Pupils are equal, round, and reactive to light.   Neck: Normal range of motion. Neck supple.   Cardiovascular: Normal rate, regular rhythm, normal heart sounds and intact distal pulses. Exam reveals no gallop and no friction rub.    No murmur heard.  Pulmonary/Chest: Breath sounds normal. He has no wheezes. He has no rhonchi. He has no rales.   Abdominal: Soft. Bowel sounds are normal. There is no tenderness.   Musculoskeletal: Normal range of motion.   Neurological: He is alert and oriented to person, place, and time. He has normal strength. No cranial nerve deficit or sensory deficit. GCS score is 15. GCS eye subscore is 4. GCS verbal subscore is 5. GCS motor subscore is 6.   Skin: Skin is warm and dry. Capillary refill takes less than 2 seconds.   Psychiatric: His speech is normal and behavior is normal. Cognition and memory are normal. He exhibits a depressed mood. He expresses suicidal ideation. He expresses no suicidal plans.         ED Course   Procedures  Labs Reviewed   CBC W/ AUTO DIFFERENTIAL - Abnormal;  Notable for the following components:       Result Value    Mean Corpuscular Hemoglobin 31.4 (*)     MPV 9.0 (*)     All other components within normal limits   COMPREHENSIVE METABOLIC PANEL - Abnormal; Notable for the following components:    Potassium 3.3 (*)     CO2 21 (*)     Glucose 69 (*)     Creatinine 1.5 (*)     Total Bilirubin 2.0 (*)     eGFR if  55.3 (*)     eGFR if non  47.8 (*)     All other components within normal limits   URINALYSIS, REFLEX TO URINE CULTURE - Abnormal; Notable for the following components:    Occult Blood UA 1+ (*)     All other components within normal limits    Narrative:     Preferred Collection Type->Urine, Clean Catch   ACETAMINOPHEN LEVEL - Abnormal; Notable for the following components:    Acetaminophen (Tylenol), Serum <3.0 (*)     All other components within normal limits   TSH   DRUG SCREEN PANEL, URINE EMERGENCY    Narrative:     Preferred Collection Type->Urine, Clean Catch   ALCOHOL,MEDICAL (ETHANOL)   SARS-COV-2 RNA AMPLIFICATION, QUAL   URINALYSIS MICROSCOPIC    Narrative:     Preferred Collection Type->Urine, Clean Catch     EKG Readings: (Independently Interpreted)   Initial Reading: No STEMI. Rhythm: Normal Sinus Rhythm. Heart Rate: 85.     ECG Results          EKG 12-lead (Final result)  Result time 06/09/20 06:58:48    Final result by Interface, Lab In [facility] (06/09/20 06:58:48)                 Narrative:    Test Reason : Z00.8,    Vent. Rate : 085 BPM     Atrial Rate : 085 BPM     P-R Int : 144 ms          QRS Dur : 088 ms      QT Int : 320 ms       P-R-T Axes : 080 266 -22 degrees     QTc Int : 380 ms    Normal sinus rhythm  Possible Left atrial enlargement  Inferior infarct (cited on or before 11-FEB-2017)  Anterolateral infarct (cited on or before 11-FEB-2017)  Abnormal ECG  When compared with ECG of 02-MAY-2020 09:03,  ST no longer elevated in Anterior leads  QT has shortened  Confirmed by AMIRA PECK MD ([id number]) on 6/9/2020  6:58:38 AM    Referred By:             Confirmed By:AMIRA PECK MD                                 Medical Decision Making:   History:   Old Medical Records: I decided to obtain old medical records.  Initial Assessment:   Emergent evaluation of a 65 year old male who presents to the ED with complaints of suicidal ideation and depression that began two days ago. Cocaine use two days ago. Also reports decreased appetite and inability to sleep. Patient is afebrile, hemodynamically stable, and non-toxic appearing. Will order labs for psychiatric clearance and continue to monitor.   Differential Diagnosis:   DDx includes but is not limited to depression, suicidal ideation, personality disorder, german, bipolar disorder.   Independently Interpreted Test(s):   I have ordered and independently interpreted EKG Reading(s) - see prior notes  Clinical Tests:   Lab Tests: Ordered and Reviewed  Medical Tests: Ordered and Reviewed  ED Management:  As patient expressed passive suicidal ideation and denies plan, will discuss patient with Psychiatry for recommendations.   3:30 PM psychiatry recommends inpatient admission. Patient is now medically cleared. Labs and urinalysis unremarkable. Psych recommends continue Remeron. Does not recommend PRNs at this time.   Will execute PEC as patient is danger to himself. Awaiting Psych placement.                Attending Attestation:     Physician Attestation Statement for NP/PA:   I have conducted a face to face encounter with this patient in addition to the NP/PA, due to Medical Complexity                  ED Course as of Todd 10 1730   Mon Jun 08, 2020   1237 WBC: 5.19 [JM]   1238 Hemoglobin: 15.9 [JM]   1238 Hematocrit: 48.3 [JM]   1238 Platelets: 223 [JM]   1238 SARS-CoV-2 RNA, Amplification, Qual: Negative [JM]   1239 RBC, UA: 1 [JM]   1239 WBC, UA: 1 [JM]   1239 Bacteria, UA: Rare [JM]   1239 Occult Blood UA(!): 1+ [JM]   1239 NITRITE UA: Negative [JM]   1239 Leukocytes, UA: Negative  [JM]   1256 Benzodiazepines: Negative [JM]   1256 Cocaine (Metab.): Presumptive Positive [JM]   1256 Alcohol, Medical, Serum: <10 [JM]   1321 TSH: 1.211 [JM]   1321 Potassium(!): 3.3 [JM]   1322 Glucose(!): 69 [JM]   1322 BUN, Bld: 17 [JM]   1322 Creatinine(!): 1.5 [JM]   1322 BILIRUBIN TOTAL(!): 2.0 [JM]      ED Course User Index  [JM] Marilyn Ferrell PA-C       Patient Condition: Patient stabilized  Reason for Transfer: Qualified clinical personnel or service unavailable, Hospital resources not available  Accepting Physician: Zoey Bailey MD: [place]        Clinical Impression:     1. Suicidal ideations    2. Medical clearance for psychiatric admission            Disposition:   Disposition: Transferred  Condition: Stable     ED Disposition Condition    Transfer to Psych Facility         ED Prescriptions     None        Follow-up Information    None                                    Marilyn Ferrell PA-C  06/08/20 1720       Margarita Sandra MD  06/10/20 1735

## 2020-06-08 NOTE — CONSULTS
"[facility]  Psychiatry  Consult Note    Patient Name: Damir Faria  MRN: [id number]   Code Status: Prior  Admission Date: 6/8/2020  Hospital Length of Stay: 0 days  Attending Physician: Margarita Sandra MD  Primary Care Provider: [facility]    Current Legal Status: N/A    Patient information was obtained from patient and ER records.   Consults  Subjective:     Principal Problem:<principal problem not specified>    Chief Complaint:  depression     HPI:   Per Primary MD:  Patient is a 65 year old male with PMH of cocaine abuse, depression, HTN, CAD who presents to the ED with complaints of depression and suicidal ideation that began two days ago. Denies new life stressors. Denies active plan for suicide but has passive thoughts of suicide that scare him. Used cocaine two days ago. Denies other drugs or alcohol. Denies HI, auditory or visual hallucinations.  Also reports decreased appetite and inability to sleep.   Reports that he was last hospitalized in psych facility about one month ago. Discharged with Remeron, reports 100% compliance with medication. Denies CP, SOB, NVD, dysuria, fevers, chills, cough. This is the extent of the patient's complaints.    Per Psychiatry MD:   Damir Faria is a 65 y.o. male with a past psychiatric history of depression with SI and PMHx of CVA, HTN, CAD, STEMI, CHF, currently presenting with depression.  Psychiatry was consulted to address the patient's symptoms of suicidal ideation.     Pt reports long history of depressive symptoms.  Reports that symptoms occur intermittently.  Episodes typically last "weeks" if unaddressed.  Reports 1-2 nights ago, experienced recurrence of depressed mood.  Expressed having "bad thoughts."  Endorsed suicidal thinking but denied any suicidal intention or plan.  Tried to "treat my depression with cocaine," reported temporary relief but symptoms gradually returned.  Presented today by EMS, says "I " "need to be admitted."  Denies suicidal desire or plan during visit.  Endorses h/o suicidal behavior, endorses trying to run into traffic "a couple years ago."  Endorses numerous prior admission, last 1 month ago at [place].  Reports receiving benefit from "the groups."  Is followed by an outpatient psychiatrist with [facility], last seen "a while ago," has upcoming appointment 6/19/2020.  Prescribed remeron, endorses adherence, denies SE.  Unsure if remeron helps with depression but does endorse benefit for sleep.  Denies h/o german, psychosis, obsessive thinking/compulsions, post traumatic symptoms.  Endorses occasional cocaine use, "only when I'm depressed."  Has difficulty quantifying frequency of his use.  Endorses benefit from substance treatment, specifically residential rehab.  Voices recently being in  ([facility] 1 month ago.  Open to considering return to rehab, will not consider admission in [facility], says "it's too easy for me to walk out."  When treatment options are discussed with patient, pt adamant about being admitted.  Says that he will attempt to kill himself if not admitted today.  Does not specifically reference plan after threatening self harm.     Collateral:   "I don't want you bothering my sister with this."      SUBJECTIVE   Currently, the patient is endorsing the following:    Medical Review Of Systems:  MEDICAL REVIEW OF SYSTEMS  History obtained from the patient   General : NO chills or fever   Eyes: NO  visual changes   ENT: NO hearing change, nasal discharge or sore throat   Endocrine: NO weight changes or polydipsia/polyuria   Dermatological: NO rashes   Respiratory: NO cough, shortness of breath   Cardiovascular: NO chest pain, palpitations or racing heart   Gastrointestinal: NO nausea, vomiting, constipation or diarrhea   Musculoskeletal: NO muscle pain or stiffness   Neurological: NO confusion, dizziness, headaches or tremors   Psychiatric: please see " HPI    Psychiatric Review Of Systems - Is patient experiencing or having changes in:  sleep: no  appetite: no  weight: no  energy/anergy: no  interest/pleasure/anhedonia: no  somatic symptoms: no  guilty/hopelessness: no  concentration: no  S.I.B.s/risky behavior: no  SI/SA:  yes    anxiety/panic: no  Agoraphobia:  no  Social phobia:  no  Recurrent nightmares:  no  hyper startle response:  no  Avoidance: no  Recurrent thoughts:  no  Recurrent behaviors:  no    Irritability: no  Racing thoughts: no  Impulsive behaviors: no  Pressured speech:  no    Paranoia:no  Delusions: no  AVH:no    Past Medical/Surgical History:  Past Medical History:   Diagnosis Date    Acute renal insufficiency 2015    Cocaine abuse     Depression     History of psychiatric care     HTN (hypertension) 2014    STEMI (ST elevation myocardial infarction) 2017    Stroke       has a past medical history of Acute renal insufficiency (2015), Cocaine abuse, Depression, History of psychiatric care, HTN (hypertension) (2014), STEMI (ST elevation myocardial infarction) (2017), and Stroke.  Past Surgical History:   Procedure Laterality Date    CARDIAC SURGERY      stents    NECK SURGERY         Past Psychiatric History:  Previous Medication Trials: yes, wellbutrin and abilify per chart review, endorses currently taking remeron  Previous Psychiatric Hospitalizations: Yes - multiple at various facilities, endorses last admission 1 month ago, [place] [facility] [place]  Previous Suicide Attempts: endorses attempting to run into traffic 2 yrs ago, otherwise reports multiple admission for SI without plan  History of Violence: No  Outpatient Psychiatrist: Yes - [facility]  Outpatient Psychotherapist: No    Social History:  Marital Status:   Children: 1  son (shot 20 yrs ago)   Employment Status/Info: on disability  Education: 9th grade  Special Ed: no  Housing/Lives with: alone  History of  phys/sexual abuse: No  Access to gun: No    Substance Abuse History:  Recreational Drugs: occasional cocaine  Use of Alcohol: denied  Rehab History:yes   Tobacco Use:1/4 ppd  Use of OTC: no  Legal consequences of chemical use: no    Legal History:  Past Charges/Incarcerations:denies  Pending charges:denies     Family Psychiatric History:   Denies    Hospital Course: See HPI above         Patient History           Medical as of 6/8/2020     Past Medical History     Diagnosis Date Comments Source    Acute renal insufficiency 6/21/2015 -- Provider    Cocaine abuse -- -- Provider    Depression -- -- Provider    History of psychiatric care -- -- Provider    HTN (hypertension) 1/25/2014 -- Provider    STEMI (ST elevation myocardial infarction) 2/12/2017 -- Provider    Stroke -- -- Provider                  Surgical as of 6/8/2020     Past Surgical History     Procedure Laterality Date Comments Source    NECK SURGERY -- -- -- Provider    CARDIAC SURGERY -- -- stents Provider                  Family as of 6/8/2020     Problem Relation Name Age of Onset Comments Source    Diabetes Mother -- -- -- Provider    Heart disease Mother -- -- -- Provider    Hypertension Mother -- -- -- Provider            Tobacco Use as of 6/8/2020     Smoking Status Smoking Start Date Smoking Quit Date Packs/Day Years Used    Current Some Day Smoker -- -- 0.25 --    Types Comments Smokeless Tobacco Status Smokeless Tobacco Quit Date Source     Cigarettes -- Never Used -- Provider            Alcohol Use as of 6/8/2020     Alcohol Use Drinks/Week Alcohol/Week Comments Source    Not Currently -- -- social Provider    Frequency Standard Drinks Binge Drinking        -- -- --              Drug Use as of 6/8/2020     Drug Use Types Frequency Comments Source    Yes  Cocaine -- this weekend Provider            Sexual Activity as of 6/8/2020     Sexually Active Birth Control Partners Comments Source    Yes Condom Female -- Provider            Activities of  Daily Living as of 6/8/2020     Activities of Daily Living Question Response Comments Source    Patient feels they ought to cut down on drinking/drug use No -- Provider    Patient annoyed by others criticizing their drinking/drug use No -- Provider    Patient has felt bad or guilty about drinking/drug use No -- Provider    Patient has had a drink/used drugs as an eye opener in the AM No -- Provider            Social Documentation as of 6/8/2020    None           Occupational as of 6/8/2020     Occupation Employer Comments Source    -- [employer] -- Provider            Socioeconomic as of 6/8/2020     Marital Status Spouse Name Number of Children Years Education Education Level Preferred Language Ethnicity Race Source    Single -- -- -- -- English /Black Black or  Provider    Financial Resource Strain Food Insecurity: Worry Food Insecurity: Inability Transportation Needs: Medical Transportation Needs: Non-medical    -- -- -- -- --            Pertinent History     Question Response Comments    Lives with alone --    Place in Birth Order 2nd --    Lives in shelter --    Number of Siblings 1 --    Raised by biological parents --    Legal Involvement none --    Childhood Trauma uneventful --    Criminal History of arrest --    Financial Status unemployed --    Highest Level of Education unfinished highschool --    Does patient have access to a firearm? No --     Service Yes --    Primary Leisure Activity shopping --    Spirituality non-practicing --        Past Medical History:   Diagnosis Date    Acute renal insufficiency 6/21/2015    Cocaine abuse     Depression     History of psychiatric care     HTN (hypertension) 1/25/2014    STEMI (ST elevation myocardial infarction) 2/12/2017    Stroke      Past Surgical History:   Procedure Laterality Date    CARDIAC SURGERY      stents    NECK SURGERY       Family History     Problem Relation (Age of Onset)    Diabetes  Mother    Heart disease Mother    Hypertension Mother        Tobacco Use    Smoking status: Current Some Day Smoker     Packs/day: 0.25     Types: Cigarettes    Smokeless tobacco: Never Used   Substance and Sexual Activity    Alcohol use: Not Currently     Comment: social    Drug use: Yes     Types: Cocaine     Comment: this weekend    Sexual activity: Yes     Partners: Female     Birth control/protection: Condom     Review of patient's allergies indicates:  No Known Allergies    No current facility-administered medications on file prior to encounter.      Current Outpatient Medications on File Prior to Encounter   Medication Sig    apixaban (ELIQUIS) 5 mg Tab Take 1 tablet (5 mg total) by mouth 2 (two) times daily.    aspirin 81 MG Chew Take 1 tablet (81 mg total) by mouth once daily.    atorvastatin (LIPITOR) 40 MG tablet Take 1 tablet (40 mg total) by mouth every evening.    carvedilol (COREG) 6.25 MG tablet Take 1 tablet (6.25 mg total) by mouth 2 (two) times daily.    lisinopril (PRINIVIL,ZESTRIL) 20 MG tablet Take 1 tablet (20 mg total) by mouth once daily.    mirtazapine (REMERON) 30 MG tablet Take 1 tablet (30 mg total) by mouth every evening.    spironolactone (ALDACTONE) 25 MG tablet Take 1 tablet (25 mg total) by mouth once daily.     Psychotherapeutics (From admission, onward)    None        Review of Systems  Strengths and Liabilities: Strength: Patient is expressive/articulate., Strength: Patient is stable., Liability: Patient is dependent., Liability: Patient has poor judgment, Liability: Patient lacks coping skills.    Objective:     Vital Signs (Most Recent):  Temp: 98.9 °F (37.2 °C) (06/08/20 1059)  Pulse: 78 (06/08/20 1059)  Resp: 16 (06/08/20 1059)  BP: (!) 147/78 (06/08/20 1059)  SpO2: 98 % (06/08/20 1059) Vital Signs (24h Range):  Temp:  [98.9 °F (37.2 °C)] 98.9 °F (37.2 °C)  Pulse:  [78] 78  Resp:  [16] 16  SpO2:  [98 %] 98 %  BP: (147)/(78) 147/78     Height: 6' (182.9  "cm)  Weight: 90.7 kg (200 lb)  Body mass index is 27.12 kg/m².    No intake or output data in the 24 hours ending 06/08/20 1451    Physical Exam   Psychiatric:   Mental Status Exam:  Appearance: no eye contact, calm, coopererative, dressed in hospital scrubs  Level of Consciousness: awake, alert  Behavior/Cooperation: normal, cooperative  Psychomotor: unremarkable   Speech: normal tone, normal rate, normal pitch, normal volume  Language: english, fluid  Orientation: person, place, day of week, month of year, year  Attention Span/Concentration: spelled "HOUSE" forwards and backwards  Memory: Registers 3/3, recalls 1/3   Mood: "not good"  Affect: constricted  Thought Process: linear, goal-directed  Associations: normal and logical  Thought Content: endorses "bad thoughts" and suicidal thinking, denies plan or desire for self harm or harm to others  Fund of Knowledge: Aware of current events  Abstraction: proverbs were abstract, similarities were abstract  Insight: poor  Judgment: poor          Significant Labs: All pertinent labs within the past 24 hours have been reviewed.    Significant Imaging: I have reviewed all pertinent imaging results/findings within the past 24 hours.    Assessment/Plan:     Suicidal ideation  Assessment:   Damir Faria is a 65 y.o. male with past psychiatric history of depression with SI, who presented to the [facility] [place] [place] due to suicidal ideation.  Psychiatry service consulted due to concerns about suicidality.  Per chart review, pt with pattern of presenting to ED with questionable SI, pt admitted after most (if not all) encounters in system.  Pt presents again today with similar story.  Pt with numerous risk factors for suicidal behavior including history of attempt, poor social support, chronic medical illness, ongoing substance use, h/o psychiatric diagnosis.  Strong concern for manipulative behavior during my interview with patient, pt voiced conditional threat to engage in self-injurious " behavior if not admitted.  Concern for exaggerating/feigning symptoms for secondary gain of psychiatric hospitalization.  Would recommend psychiatric admission at this time due to imminent danger to self.  Continue current regimen, defer changes to future inpatient provider.     Dx:   Major depressive disorder, recurrent  R/o substance induced mood disorder  R/o personality disorder (dependent?)  R/o feigning/exaggerating symptoms for secondary gain    Recommendations:   - Pt is an imminent danger to self due to an acute psychiatric illness.  Recommend to enact PEC if one is not already in place.  - Recommend to seek inpatient psychiatric hospitalization.  - Recommend to continue home regimen of remeron  - Recommend against PRNs at this time  - Additional labs/imaging: per ED provider  - Further medical workup per primary team, defer non-psychiatric medications    Case discussed with ED MD and psychiatry staff on call Dr. Barajas.           Total Time:  60 minutes      Rashawn Sofia MD   Psychiatry  [facility]

## 2020-06-08 NOTE — SUBJECTIVE & OBJECTIVE
Patient History           Medical as of 6/8/2020     Past Medical History     Diagnosis Date Comments Source    Acute renal insufficiency 6/21/2015 -- Provider    Cocaine abuse -- -- Provider    Depression -- -- Provider    History of psychiatric care -- -- Provider    HTN (hypertension) 1/25/2014 -- Provider    STEMI (ST elevation myocardial infarction) 2/12/2017 -- Provider    Stroke -- -- Provider                  Surgical as of 6/8/2020     Past Surgical History     Procedure Laterality Date Comments Source    NECK SURGERY -- -- -- Provider    CARDIAC SURGERY -- -- stents Provider                  Family as of 6/8/2020     Problem Relation Name Age of Onset Comments Source    Diabetes Mother -- -- -- Provider    Heart disease Mother -- -- -- Provider    Hypertension Mother -- -- -- Provider            Tobacco Use as of 6/8/2020     Smoking Status Smoking Start Date Smoking Quit Date Packs/Day Years Used    Current Some Day Smoker -- -- 0.25 --    Types Comments Smokeless Tobacco Status Smokeless Tobacco Quit Date Source     Cigarettes -- Never Used -- Provider            Alcohol Use as of 6/8/2020     Alcohol Use Drinks/Week Alcohol/Week Comments Source    Not Currently -- -- social Provider    Frequency Standard Drinks Binge Drinking        -- -- --              Drug Use as of 6/8/2020     Drug Use Types Frequency Comments Source    Yes  Cocaine -- this weekend Provider            Sexual Activity as of 6/8/2020     Sexually Active Birth Control Partners Comments Source    Yes Condom Female -- Provider            Activities of Daily Living as of 6/8/2020     Activities of Daily Living Question Response Comments Source    Patient feels they ought to cut down on drinking/drug use No -- Provider    Patient annoyed by others criticizing their drinking/drug use No -- Provider    Patient has felt bad or guilty about drinking/drug use No -- Provider    Patient has had a drink/used drugs as an eye opener in the AM  No -- Provider            Social Documentation as of 6/8/2020    None           Occupational as of 6/8/2020     Occupation Employer Comments Source    -- [employer] -- Provider            Socioeconomic as of 6/8/2020     Marital Status Spouse Name Number of Children Years Education Education Level Preferred Language Ethnicity Race Source    Single -- -- -- -- English /Black Black or  Provider    Financial Resource Strain Food Insecurity: Worry Food Insecurity: Inability Transportation Needs: Medical Transportation Needs: Non-medical    -- -- -- -- --            Pertinent History     Question Response Comments    Lives with alone --    Place in Birth Order 2nd --    Lives in shelter --    Number of Siblings 1 --    Raised by biological parents --    Legal Involvement none --    Childhood Trauma uneventful --    Criminal History of arrest --    Financial Status unemployed --    Highest Level of Education unfinished highschool --    Does patient have access to a firearm? No --     Service Yes --    Primary Leisure Activity shopping --    Spirituality non-practicing --        Past Medical History:   Diagnosis Date    Acute renal insufficiency 6/21/2015    Cocaine abuse     Depression     History of psychiatric care     HTN (hypertension) 1/25/2014    STEMI (ST elevation myocardial infarction) 2/12/2017    Stroke      Past Surgical History:   Procedure Laterality Date    CARDIAC SURGERY      stents    NECK SURGERY       Family History     Problem Relation (Age of Onset)    Diabetes Mother    Heart disease Mother    Hypertension Mother        Tobacco Use    Smoking status: Current Some Day Smoker     Packs/day: 0.25     Types: Cigarettes    Smokeless tobacco: Never Used   Substance and Sexual Activity    Alcohol use: Not Currently     Comment: social    Drug use: Yes     Types: Cocaine     Comment: this weekend    Sexual activity: Yes     Partners: Female      Birth control/protection: Condom     Review of patient's allergies indicates:  No Known Allergies    No current facility-administered medications on file prior to encounter.      Current Outpatient Medications on File Prior to Encounter   Medication Sig    apixaban (ELIQUIS) 5 mg Tab Take 1 tablet (5 mg total) by mouth 2 (two) times daily.    aspirin 81 MG Chew Take 1 tablet (81 mg total) by mouth once daily.    atorvastatin (LIPITOR) 40 MG tablet Take 1 tablet (40 mg total) by mouth every evening.    carvedilol (COREG) 6.25 MG tablet Take 1 tablet (6.25 mg total) by mouth 2 (two) times daily.    lisinopril (PRINIVIL,ZESTRIL) 20 MG tablet Take 1 tablet (20 mg total) by mouth once daily.    mirtazapine (REMERON) 30 MG tablet Take 1 tablet (30 mg total) by mouth every evening.    spironolactone (ALDACTONE) 25 MG tablet Take 1 tablet (25 mg total) by mouth once daily.     Psychotherapeutics (From admission, onward)    None        Review of Systems  Strengths and Liabilities: Strength: Patient is expressive/articulate., Strength: Patient is stable., Liability: Patient is dependent., Liability: Patient has poor judgment, Liability: Patient lacks coping skills.    Objective:     Vital Signs (Most Recent):  Temp: 98.9 °F (37.2 °C) (06/08/20 1059)  Pulse: 78 (06/08/20 1059)  Resp: 16 (06/08/20 1059)  BP: (!) 147/78 (06/08/20 1059)  SpO2: 98 % (06/08/20 1059) Vital Signs (24h Range):  Temp:  [98.9 °F (37.2 °C)] 98.9 °F (37.2 °C)  Pulse:  [78] 78  Resp:  [16] 16  SpO2:  [98 %] 98 %  BP: (147)/(78) 147/78     Height: 6' (182.9 cm)  Weight: 90.7 kg (200 lb)  Body mass index is 27.12 kg/m².    No intake or output data in the 24 hours ending 06/08/20 1451    Physical Exam   Psychiatric:   Mental Status Exam:  Appearance: no eye contact, calm, coopererative, dressed in hospital scrubs  Level of Consciousness: awake, alert  Behavior/Cooperation: normal, cooperative  Psychomotor: unremarkable   Speech: normal tone, normal  "rate, normal pitch, normal volume  Language: english, fluid  Orientation: person, place, day of week, month of year, year  Attention Span/Concentration: spelled "HOUSE" forwards and backwards  Memory: Registers 3/3, recalls 1/3   Mood: "not good"  Affect: constricted  Thought Process: linear, goal-directed  Associations: normal and logical  Thought Content: endorses "bad thoughts" and suicidal thinking, denies plan or desire for self harm or harm to others  Fund of Knowledge: Aware of current events  Abstraction: proverbs were abstract, similarities were abstract  Insight: poor  Judgment: poor          Significant Labs: All pertinent labs within the past 24 hours have been reviewed.    Significant Imaging: I have reviewed all pertinent imaging results/findings within the past 24 hours.  "

## 2020-06-08 NOTE — ED NOTES
Pt escorted to the [facility] transportation vehicle. [facility] staff given PEC, 3 bags of pt belongings, and 1 security envelope. Pt remained calm and cooperative for the transfer.

## 2020-06-08 NOTE — ED NOTES
Bed: 18  Expected date: 6/8/20  Expected time: 10:44 AM  Means of arrival:   Comments:  EMS depression

## 2020-06-08 NOTE — CONSULTS
Please refer to my consult note from 3:11pm on 6/8/2020.    Rashawn Sofia MD  06/08/2020   3:12 PM

## 2020-06-15 ENCOUNTER — HOSPITAL ENCOUNTER (EMERGENCY)
Facility: HOSPITAL | Age: 65
Discharge: PSYCHIATRIC OR SUBSTANCE USE FACILITY | End: 2020-06-15
Attending: EMERGENCY MEDICINE
Payer: MEDICARE

## 2020-06-15 VITALS
RESPIRATION RATE: 18 BRPM | SYSTOLIC BLOOD PRESSURE: 168 MMHG | HEIGHT: 71 IN | HEART RATE: 71 BPM | OXYGEN SATURATION: 100 % | TEMPERATURE: 98 F | WEIGHT: 203 LBS | DIASTOLIC BLOOD PRESSURE: 80 MMHG | BODY MASS INDEX: 28.42 KG/M2

## 2020-06-15 DIAGNOSIS — R45.851 SUICIDAL IDEATION: Primary | ICD-10-CM

## 2020-06-15 LAB
ALBUMIN SERPL BCP-MCNC: 4.2 G/DL (ref 3.5–5.2)
ALP SERPL-CCNC: 100 U/L (ref 55–135)
ALT SERPL W/O P-5'-P-CCNC: 32 U/L (ref 10–44)
AMPHET+METHAMPHET UR QL: NEGATIVE
ANION GAP SERPL CALC-SCNC: 7 MMOL/L (ref 8–16)
APAP SERPL-MCNC: <3 UG/ML (ref 10–20)
AST SERPL-CCNC: 30 U/L (ref 10–40)
BACTERIA #/AREA URNS AUTO: NORMAL /HPF
BARBITURATES UR QL SCN>200 NG/ML: NEGATIVE
BASOPHILS # BLD AUTO: 0.04 K/UL (ref 0–0.2)
BASOPHILS NFR BLD: 0.8 % (ref 0–1.9)
BENZODIAZ UR QL SCN>200 NG/ML: NEGATIVE
BILIRUB SERPL-MCNC: 0.4 MG/DL (ref 0.1–1)
BILIRUB UR QL STRIP: NEGATIVE
BUN SERPL-MCNC: 20 MG/DL (ref 8–23)
BZE UR QL SCN: NEGATIVE
CALCIUM SERPL-MCNC: 9.3 MG/DL (ref 8.7–10.5)
CANNABINOIDS UR QL SCN: NEGATIVE
CHLORIDE SERPL-SCNC: 106 MMOL/L (ref 95–110)
CLARITY UR REFRACT.AUTO: CLEAR
CO2 SERPL-SCNC: 27 MMOL/L (ref 23–29)
COLOR UR AUTO: ABNORMAL
CREAT SERPL-MCNC: 1.4 MG/DL (ref 0.5–1.4)
CREAT UR-MCNC: 82 MG/DL (ref 23–375)
DIFFERENTIAL METHOD: ABNORMAL
EOSINOPHIL # BLD AUTO: 0.2 K/UL (ref 0–0.5)
EOSINOPHIL NFR BLD: 4.7 % (ref 0–8)
ERYTHROCYTE [DISTWIDTH] IN BLOOD BY AUTOMATED COUNT: 13.4 % (ref 11.5–14.5)
EST. GFR  (AFRICAN AMERICAN): >60 ML/MIN/1.73 M^2
EST. GFR  (NON AFRICAN AMERICAN): 52 ML/MIN/1.73 M^2
ETHANOL SERPL-MCNC: <10 MG/DL
GLUCOSE SERPL-MCNC: 84 MG/DL (ref 70–110)
GLUCOSE UR QL STRIP: NEGATIVE
HCT VFR BLD AUTO: 46.7 % (ref 40–54)
HGB BLD-MCNC: 14.8 G/DL (ref 14–18)
HGB UR QL STRIP: ABNORMAL
IMM GRANULOCYTES # BLD AUTO: 0.03 K/UL (ref 0–0.04)
IMM GRANULOCYTES NFR BLD AUTO: 0.6 % (ref 0–0.5)
KETONES UR QL STRIP: NEGATIVE
LEUKOCYTE ESTERASE UR QL STRIP: NEGATIVE
LYMPHOCYTES # BLD AUTO: 2.3 K/UL (ref 1–4.8)
LYMPHOCYTES NFR BLD: 44.7 % (ref 18–48)
MCH RBC QN AUTO: 30.7 PG (ref 27–31)
MCHC RBC AUTO-ENTMCNC: 31.7 G/DL (ref 32–36)
MCV RBC AUTO: 97 FL (ref 82–98)
METHADONE UR QL SCN>300 NG/ML: NEGATIVE
MICROSCOPIC COMMENT: NORMAL
MONOCYTES # BLD AUTO: 0.7 K/UL (ref 0.3–1)
MONOCYTES NFR BLD: 13.5 % (ref 4–15)
NEUTROPHILS # BLD AUTO: 1.8 K/UL (ref 1.8–7.7)
NEUTROPHILS NFR BLD: 35.7 % (ref 38–73)
NITRITE UR QL STRIP: NEGATIVE
NRBC BLD-RTO: 0 /100 WBC
OPIATES UR QL SCN: NEGATIVE
PCP UR QL SCN>25 NG/ML: NEGATIVE
PH UR STRIP: 6 [PH] (ref 5–8)
PLATELET # BLD AUTO: 250 K/UL (ref 150–350)
PMV BLD AUTO: 9.1 FL (ref 9.2–12.9)
POTASSIUM SERPL-SCNC: 4.1 MMOL/L (ref 3.5–5.1)
PROT SERPL-MCNC: 7.9 G/DL (ref 6–8.4)
PROT UR QL STRIP: NEGATIVE
RBC # BLD AUTO: 4.82 M/UL (ref 4.6–6.2)
RBC #/AREA URNS AUTO: 1 /HPF (ref 0–4)
SARS-COV-2 RDRP RESP QL NAA+PROBE: NEGATIVE
SODIUM SERPL-SCNC: 140 MMOL/L (ref 136–145)
SP GR UR STRIP: 1.01 (ref 1–1.03)
TOXICOLOGY INFORMATION: NORMAL
TSH SERPL DL<=0.005 MIU/L-ACNC: 1.28 UIU/ML (ref 0.4–4)
URN SPEC COLLECT METH UR: ABNORMAL
WBC # BLD AUTO: 5.12 K/UL (ref 3.9–12.7)
WBC #/AREA URNS AUTO: 2 /HPF (ref 0–5)

## 2020-06-15 PROCEDURE — 85025 COMPLETE CBC W/AUTO DIFF WBC: CPT

## 2020-06-15 PROCEDURE — 99284 EMERGENCY DEPT VISIT MOD MDM: CPT | Mod: ,,, | Performed by: EMERGENCY MEDICINE

## 2020-06-15 PROCEDURE — 84443 ASSAY THYROID STIM HORMONE: CPT

## 2020-06-15 PROCEDURE — 80053 COMPREHEN METABOLIC PANEL: CPT

## 2020-06-15 PROCEDURE — 81001 URINALYSIS AUTO W/SCOPE: CPT | Mod: 59

## 2020-06-15 PROCEDURE — 80307 DRUG TEST PRSMV CHEM ANLYZR: CPT

## 2020-06-15 PROCEDURE — 99285 EMERGENCY DEPT VISIT HI MDM: CPT

## 2020-06-15 PROCEDURE — 99284 PR EMERGENCY DEPT VISIT,LEVEL IV: ICD-10-PCS | Mod: ,,, | Performed by: EMERGENCY MEDICINE

## 2020-06-15 PROCEDURE — 25000003 PHARM REV CODE 250: Performed by: EMERGENCY MEDICINE

## 2020-06-15 PROCEDURE — U0002 COVID-19 LAB TEST NON-CDC: HCPCS

## 2020-06-15 PROCEDURE — 80329 ANALGESICS NON-OPIOID 1 OR 2: CPT

## 2020-06-15 PROCEDURE — 80320 DRUG SCREEN QUANTALCOHOLS: CPT

## 2020-06-15 RX ORDER — MIRTAZAPINE 30 MG/1
30 TABLET, FILM COATED ORAL NIGHTLY
Status: DISCONTINUED | OUTPATIENT
Start: 2020-06-15 | End: 2020-06-16 | Stop reason: HOSPADM

## 2020-06-15 RX ADMIN — MIRTAZAPINE 30 MG: 30 TABLET, FILM COATED ORAL at 09:06

## 2020-06-15 NOTE — ED PROVIDER NOTES
Source of History:  Patient    Chief complaint:  Suicidal (very depressed, just dc'd from [facility] wanting rehab for cocaine)      HPI:  Damir Faria is a 65 y.o. male presenting with suicidal ideation.  The patient was just discharged this afternoon from a psychiatric facility.  He states that there were no cocaine treatment options lined up for him so he is thinks he is going to kill himself.  He denies any somatic complaints.  He says he needs to go to a different psych hospital that will help him deal with his cocaine addiction more.    ROS: As per HPI and below:  General: No fever.  No chills.  Eyes: No visual changes.  Head: No headache.    Integument: No rashes or lesions.  Chest: No shortness of breath.  Cardiovascular: No chest pain.  Abdomen: No abdominal pain.  No nausea or vomiting.  Urinary: No abnormal urination.  Neurologic: No focal weakness.  No numbness.  Hematologic: No easy bruising.  Endocrine: No excessive thirst or urination.      Review of patient's allergies indicates:  No Known Allergies    No current facility-administered medications on file prior to encounter.      Current Outpatient Medications on File Prior to Encounter   Medication Sig Dispense Refill    apixaban (ELIQUIS) 5 mg Tab Take 1 tablet (5 mg total) by mouth 2 (two) times daily. 60 tablet 11    aspirin 81 MG Chew Take 1 tablet (81 mg total) by mouth once daily. 30 tablet 0    atorvastatin (LIPITOR) 40 MG tablet Take 1 tablet (40 mg total) by mouth every evening. 30 tablet 00    carvedilol (COREG) 6.25 MG tablet Take 1 tablet (6.25 mg total) by mouth 2 (two) times daily. 60 tablet 0    lisinopril (PRINIVIL,ZESTRIL) 20 MG tablet Take 1 tablet (20 mg total) by mouth once daily. 30 tablet 0    mirtazapine (REMERON) 30 MG tablet Take 1 tablet (30 mg total) by mouth every evening. 30 tablet 0    spironolactone (ALDACTONE) 25 MG tablet Take 1 tablet (25 mg total) by mouth once daily. 30 tablet 0       PMH:  As per  HPI and below:  Past Medical History:   Diagnosis Date    Acute renal insufficiency 6/21/2015    Cocaine abuse     Depression     History of psychiatric care     HTN (hypertension) 1/25/2014    STEMI (ST elevation myocardial infarction) 2/12/2017    Stroke      Past Surgical History:   Procedure Laterality Date    CARDIAC SURGERY      stents    NECK SURGERY         Social History     Socioeconomic History    Marital status: Single     Spouse name: Not on file    Number of children: Not on file    Years of education: Not on file    Highest education level: Not on file   Occupational History     Employer: [employer]   Social Needs    Financial resource strain: Not on file    Food insecurity     Worry: Not on file     Inability: Not on file    Transportation needs     Medical: Not on file     Non-medical: Not on file   Tobacco Use    Smoking status: Current Some Day Smoker     Packs/day: 0.25     Types: Cigarettes    Smokeless tobacco: Never Used   Substance and Sexual Activity    Alcohol use: Yes     Comment: social    Drug use: Yes     Types: Cocaine     Comment: this weekend    Sexual activity: Yes     Partners: Female     Birth control/protection: Condom   Lifestyle    Physical activity     Days per week: Not on file     Minutes per session: Not on file    Stress: Not on file   Relationships    Social connections     Talks on phone: Not on file     Gets together: Not on file     Attends [employer] service: Not on file     Active member of club or organization: Not on file     Attends meetings of clubs or organizations: Not on file     Relationship status: Not on file   Other Topics Concern    Patient feels they ought to cut down on drinking/drug use No    Patient annoyed by others criticizing their drinking/drug use No    Patient has felt bad or guilty about drinking/drug use No    Patient has had a drink/used drugs as an eye opener in the AM No   Social History Narrative    Not  "on file       Family History   Problem Relation Age of Onset    Diabetes Mother     Heart disease Mother     Hypertension Mother        Physical Exam:    Vitals:    06/15/20 1545   BP: (!) 164/80   Pulse: 79   Resp: 18   Temp: 98.6 °F (37 °C)   TempSrc: Oral   SpO2: 100%   Weight: 92.1 kg (203 lb)   Height: 5' 11" (1.803 m)     Appearance:  No acute distress.  Comfortable.  Skin:  No rashes.  Good turgor.  Head: Normocephalic.  Atraumatic.    Eyes: No conjunctival injection.  No swelling.  Chest: Normal work of breathing.  No retractions.  No stridor.  Cardiovascular: Regular rhythm.  No appearance of shock.  No edema.  Abdomen:  No distention.    Musculoskeletal:  No deformities.  Normal range of motion.    Neurologic:  Normal movement and ambulation.  Mental Status:  Alert and oriented x 3.  Appropriate, conversant.      Initial Impression:  Suicidal ideation, recent discharge for same.  Will consult Psychiatry to determine if they feel he needs to be readmitted.  He is medically clear on exam, but will send usual labs.    Laboratory Studies:  Labs Reviewed   CBC W/ AUTO DIFFERENTIAL - Abnormal; Notable for the following components:       Result Value    Mean Corpuscular Hemoglobin Conc 31.7 (*)     MPV 9.1 (*)     Immature Granulocytes 0.6 (*)     Gran% 35.7 (*)     All other components within normal limits   COMPREHENSIVE METABOLIC PANEL - Abnormal; Notable for the following components:    Anion Gap 7 (*)     eGFR if non  52.0 (*)     All other components within normal limits   URINALYSIS, REFLEX TO URINE CULTURE - Abnormal; Notable for the following components:    Occult Blood UA 1+ (*)     All other components within normal limits    Narrative:     Preferred Collection Type->Urine, Clean Catch  Specimen Source->Urine   ACETAMINOPHEN LEVEL - Abnormal; Notable for the following components:    Acetaminophen (Tylenol), Serum <3.0 (*)     All other components within normal limits   TSH   DRUG " SCREEN PANEL, URINE EMERGENCY    Narrative:     Preferred Collection Type->Urine, Clean Catch  Specimen Source->Urine   ALCOHOL,MEDICAL (ETHANOL)   SARS-COV-2 RNA AMPLIFICATION, QUAL   URINALYSIS MICROSCOPIC    Narrative:     Preferred Collection Type->Urine, Clean Catch  Specimen Source->Urine       Chart reviewed.     Imaging Results    None         Medications Given:  Medications   mirtazapine tablet 30 mg (has no administration in time range)       ED Course:  ED Course as of Todd 15 1817   Mon Todd 15, 2020   1733 SARS-CoV-2 RNA, Amplification, Qual: Negative [DC]   1733 Alcohol, Medical, Serum: <10 [DC]      ED Course User Index  [DC] Nilesh Kohler MD       Medically clear for psych transfer.    MDM:    65 y.o. male with suicidal ideation.  Psychiatry recommends the patient be admitted.  Will engage transfer process.    Diagnostic Impression:    1. Suicidal ideation         ED Disposition Condition    Transfer to Psych Facility         ED Prescriptions     None        Follow-up Information    None                        Nilesh Kohler MD  06/15/20 1817

## 2020-06-15 NOTE — ED NOTES
Patient identifiers verified and correct for Damir Faria 65 y.o. male  Chief Complaint   Patient presents with    Suicidal     very depressed, just dc'd from [facility] wanting rehab for cocaine     Past Medical History:   Diagnosis Date    Acute renal insufficiency 6/21/2015    Cocaine abuse     Depression     History of psychiatric care     HTN (hypertension) 1/25/2014    STEMI (ST elevation myocardial infarction) 2/12/2017    Stroke      Allergies reported Review of patient's allergies indicates:  No Known Allergies    LOC: The patient is awake, alert and aware of environment with an inappropriate affect, the patient is oriented x 3 and speaking appropriately.   APPEARANCE: Patient appears comfortable and in no acute distress, patient is clean and well groomed.  SKIN: The skin is warm and dry, color consistent with ethnicity, patient has normal skin turgor and moist mucus membranes, skin intact, no breakdown or bruising noted.   MUSCULOSKELETAL: Patient moving all extremities spontaneously, no swelling noted.  RESPIRATORY: Airway is open and patent, respirations are spontaneous, patient has a normal effort and rate, no accessory muscle use noted, pt placed on continuous pulse ox with O2 sats noted at 97% on room air.  CARDIAC: Pt placed on cardiac monitor. Patient has a normal rate and regular rhythm, no edema noted, capillary refill < 3 seconds.   GASTRO: Soft and non tender to palpation, no distention noted, normoactive bowel sounds present in all four quadrants. Pt states bowel movements have been regular.  : Pt denies any pain or frequency with urination.  NEURO: Pt opens eyes spontaneously, behavior appropriate to situation, follows commands, facial expression symmetrical, bilateral hand grasp equal and even, purposeful motor response noted, normal sensation in all extremities when touched with a finger.

## 2020-06-15 NOTE — ED NOTES
Pt transferred [facility], he is calm and cooperative. Pt checked for contraband and his belongings secured. Pt has a bright affect and is well kept. Pt denies HI/AVH's Pt states he is depressed and will attempt suicide if discharged. Pt instructed on the PEC process, he verbalizes understanding. Pt lying in bed resting DVC maintained.
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
## 2020-06-15 NOTE — ED TRIAGE NOTES
Suicidal (very depressed, just dc'd from [facility] wanting rehab for cocaine). Pt states that he wishes he was dead but does not have an actual plan to kill himself. Also denies HI

## 2020-06-15 NOTE — ED NOTES
Pt belongings (shirt, pants, shoes) placed in pt belonging bag and labeled with pt sticker. Remains at bedside w/ sitter. Security envelope ([id number]) contains $10.93 a brownm wallet with IDs, blue [employer] android phone, credit cards, and various other cards.

## 2020-06-15 NOTE — ED NOTES
Pt resting quietly on stretcher; remains calm and cooperative.  Sitter remains at bedside.  Pt denies needs or complaints at this time.  Bed locked in lowest position; side rails up and locked x 2.  Pt instructed to alert sitter or nurse for assistance and before attempting to get out of bed; verbalizes understanding.  Will continue to monitor pt.

## 2020-06-16 NOTE — ED NOTES
Patient transferred to [facility] by [facility] transportation with 4 bags of belongings with security present. Report was called to Nurse Anyi with [facility]. Pec sent with patient. Patient did not want to call . DVC maintained.

## 2020-07-20 ENCOUNTER — HOSPITAL ENCOUNTER (EMERGENCY)
Facility: HOSPITAL | Age: 65
Discharge: PSYCHIATRIC OR SUBSTANCE USE FACILITY | End: 2020-07-21
Attending: EMERGENCY MEDICINE
Payer: MEDICARE

## 2020-07-20 DIAGNOSIS — R45.851 SUICIDAL IDEATIONS: Primary | ICD-10-CM

## 2020-07-20 LAB
ALBUMIN SERPL BCP-MCNC: 4.1 G/DL (ref 3.5–5.2)
ALP SERPL-CCNC: 91 U/L (ref 55–135)
ALT SERPL W/O P-5'-P-CCNC: 31 U/L (ref 10–44)
ANION GAP SERPL CALC-SCNC: 9 MMOL/L (ref 8–16)
APAP SERPL-MCNC: <3 UG/ML (ref 10–20)
AST SERPL-CCNC: 47 U/L (ref 10–40)
BACTERIA #/AREA URNS AUTO: ABNORMAL /HPF
BASOPHILS # BLD AUTO: 0.02 K/UL (ref 0–0.2)
BASOPHILS NFR BLD: 0.4 % (ref 0–1.9)
BILIRUB SERPL-MCNC: 1.1 MG/DL (ref 0.1–1)
BILIRUB UR QL STRIP: NEGATIVE
BUN SERPL-MCNC: 29 MG/DL (ref 8–23)
CALCIUM SERPL-MCNC: 9.3 MG/DL (ref 8.7–10.5)
CHLORIDE SERPL-SCNC: 110 MMOL/L (ref 95–110)
CLARITY UR REFRACT.AUTO: ABNORMAL
CO2 SERPL-SCNC: 19 MMOL/L (ref 23–29)
COLOR UR AUTO: YELLOW
CREAT SERPL-MCNC: 1.6 MG/DL (ref 0.5–1.4)
DIFFERENTIAL METHOD: ABNORMAL
EOSINOPHIL # BLD AUTO: 0.1 K/UL (ref 0–0.5)
EOSINOPHIL NFR BLD: 2.7 % (ref 0–8)
ERYTHROCYTE [DISTWIDTH] IN BLOOD BY AUTOMATED COUNT: 14.1 % (ref 11.5–14.5)
EST. GFR  (AFRICAN AMERICAN): 51.1 ML/MIN/1.73 M^2
EST. GFR  (NON AFRICAN AMERICAN): 44.2 ML/MIN/1.73 M^2
ETHANOL SERPL-MCNC: <10 MG/DL
GLUCOSE SERPL-MCNC: 80 MG/DL (ref 70–110)
GLUCOSE UR QL STRIP: NEGATIVE
HCT VFR BLD AUTO: 44.3 % (ref 40–54)
HGB BLD-MCNC: 14.6 G/DL (ref 14–18)
HGB UR QL STRIP: ABNORMAL
HYALINE CASTS UR QL AUTO: 5 /LPF
IMM GRANULOCYTES # BLD AUTO: 0.02 K/UL (ref 0–0.04)
IMM GRANULOCYTES NFR BLD AUTO: 0.4 % (ref 0–0.5)
KETONES UR QL STRIP: NEGATIVE
LEUKOCYTE ESTERASE UR QL STRIP: NEGATIVE
LYMPHOCYTES # BLD AUTO: 2.2 K/UL (ref 1–4.8)
LYMPHOCYTES NFR BLD: 45.5 % (ref 18–48)
MCH RBC QN AUTO: 30.8 PG (ref 27–31)
MCHC RBC AUTO-ENTMCNC: 33 G/DL (ref 32–36)
MCV RBC AUTO: 94 FL (ref 82–98)
MICROSCOPIC COMMENT: ABNORMAL
MONOCYTES # BLD AUTO: 0.9 K/UL (ref 0.3–1)
MONOCYTES NFR BLD: 19 % (ref 4–15)
NEUTROPHILS # BLD AUTO: 1.6 K/UL (ref 1.8–7.7)
NEUTROPHILS NFR BLD: 32 % (ref 38–73)
NITRITE UR QL STRIP: NEGATIVE
NRBC BLD-RTO: 0 /100 WBC
PH UR STRIP: 5 [PH] (ref 5–8)
PLATELET # BLD AUTO: 229 K/UL (ref 150–350)
PMV BLD AUTO: 9.1 FL (ref 9.2–12.9)
POTASSIUM SERPL-SCNC: 3.7 MMOL/L (ref 3.5–5.1)
PROT SERPL-MCNC: 7.8 G/DL (ref 6–8.4)
PROT UR QL STRIP: ABNORMAL
RBC # BLD AUTO: 4.74 M/UL (ref 4.6–6.2)
RBC #/AREA URNS AUTO: 1 /HPF (ref 0–4)
SARS-COV-2 RDRP RESP QL NAA+PROBE: NEGATIVE
SODIUM SERPL-SCNC: 138 MMOL/L (ref 136–145)
SP GR UR STRIP: 1.03 (ref 1–1.03)
SQUAMOUS #/AREA URNS AUTO: 0 /HPF
T4 FREE SERPL-MCNC: 1.02 NG/DL (ref 0.71–1.51)
TSH SERPL DL<=0.005 MIU/L-ACNC: 0.3 UIU/ML (ref 0.4–4)
URN SPEC COLLECT METH UR: ABNORMAL
WBC # BLD AUTO: 4.84 K/UL (ref 3.9–12.7)
WBC #/AREA URNS AUTO: 2 /HPF (ref 0–5)

## 2020-07-20 PROCEDURE — 99285 EMERGENCY DEPT VISIT HI MDM: CPT

## 2020-07-20 PROCEDURE — 99284 PR EMERGENCY DEPT VISIT,LEVEL IV: ICD-10-PCS | Mod: ,,, | Performed by: PHYSICIAN ASSISTANT

## 2020-07-20 PROCEDURE — U0002 COVID-19 LAB TEST NON-CDC: HCPCS

## 2020-07-20 PROCEDURE — 80320 DRUG SCREEN QUANTALCOHOLS: CPT

## 2020-07-20 PROCEDURE — 85025 COMPLETE CBC W/AUTO DIFF WBC: CPT

## 2020-07-20 PROCEDURE — 80053 COMPREHEN METABOLIC PANEL: CPT

## 2020-07-20 PROCEDURE — 80307 DRUG TEST PRSMV CHEM ANLYZR: CPT

## 2020-07-20 PROCEDURE — 84443 ASSAY THYROID STIM HORMONE: CPT

## 2020-07-20 PROCEDURE — 84439 ASSAY OF FREE THYROXINE: CPT

## 2020-07-20 PROCEDURE — 80329 ANALGESICS NON-OPIOID 1 OR 2: CPT

## 2020-07-20 PROCEDURE — 99284 EMERGENCY DEPT VISIT MOD MDM: CPT | Mod: ,,, | Performed by: PHYSICIAN ASSISTANT

## 2020-07-20 PROCEDURE — 81001 URINALYSIS AUTO W/SCOPE: CPT
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
## 2020-07-20 NOTE — ED NOTES
LOC: The patient is awake, alert, and oriented to place, time, situation. Affect is appropriate.  Speech is appropriate and clear.     APPEARANCE: Patient resting comfortably in no acute distress.  Patient is clean and well groomed.    SKIN: The skin is warm and dry; color consistent with ethnicity.  Patient has normal skin turgor and moist mucus membranes.  Skin intact; no breakdown or bruising noted.     MUSCULOSKELETAL: Patient moving upper and lower extremities without difficulty.  Denies weakness.     RESPIRATORY: Airway is open and patent. Respirations spontaneous, even, easy, and non-labored.  Patient has a normal effort and rate.  No accessory muscle use noted. Denies cough.     CARDIAC: No peripheral edema noted. No complaints of chest pain.      ABDOMEN: Soft and non tender to palpation.  No distention noted.     NEUROLOGIC: Eyes open spontaneously.  Behavior appropriate to situation.  Follows commands; facial expression symmetrical.  Purposeful motor response noted; normal sensation in all extremities.

## 2020-07-20 NOTE — ED PROVIDER NOTES
Encounter Date: 7/20/2020       History     Chief Complaint   Patient presents with    Suicidal     very depressed thought about hurting myself no plan     Patient is a 65 year old male with PMHx of c-CHF with 40%EF, HTN, HLD, hx of CVA on eliquis, and depression. He presents to the ED for suicidal ideations. He reports having increased suicidal ideations for two days. Reports feelings increased after friend dying in his arms from gun shot. Patient reports to abusing cocaine. Patient denies suicidal plan at this time. Reports associated depression. Patient is fearful of harming himself. Hx of self harming behavior. Patient previously attempted to kill self by walking into oncoming traffic. Denies HI or AVH. Hx of inpatient hospitalizations. He denies fever,chills, nausea, vomiting, sob, chest pain, abd pain, dysuria, diarrhea, or constipation. He is a current everyday smoker and denies alcohol use. Reports cocaine use. Last used two days ago.    The history is provided by the patient and medical records. No  was used.     Review of patient's allergies indicates:  No Known Allergies  Past Medical History:   Diagnosis Date    Acute renal insufficiency 6/21/2015    Cocaine abuse     Depression     History of psychiatric care     HTN (hypertension) 1/25/2014    STEMI (ST elevation myocardial infarction) 2/12/2017    Stroke      Past Surgical History:   Procedure Laterality Date    CARDIAC SURGERY      stents    NECK SURGERY       Family History   Problem Relation Age of Onset    Diabetes Mother     Heart disease Mother     Hypertension Mother      Social History     Tobacco Use    Smoking status: Current Some Day Smoker     Packs/day: 0.25     Types: Cigarettes    Smokeless tobacco: Never Used   Substance Use Topics    Alcohol use: Not Currently     Comment: social    Drug use: Yes     Types: Cocaine     Comment: this weekend     Review of Systems   Constitutional: Negative for  fever.   HENT: Negative for sore throat.    Respiratory: Negative for shortness of breath.    Cardiovascular: Negative for chest pain.   Gastrointestinal: Negative for abdominal pain, nausea and vomiting.   Genitourinary: Negative for dysuria.   Musculoskeletal: Negative for back pain.   Skin: Negative for rash.   Neurological: Negative for weakness.   Hematological: Does not bruise/bleed easily.   Psychiatric/Behavioral: Positive for dysphoric mood and suicidal ideas. Negative for hallucinations and self-injury.       Physical Exam     Initial Vitals [07/20/20 1508]   BP Pulse Resp Temp SpO2   136/84 104 18 98.3 °F (36.8 °C) 100 %      MAP       --         Physical Exam    Vitals reviewed.  Constitutional: He appears well-developed and well-nourished. No distress.   HENT:   Head: Normocephalic.   Eyes: Conjunctivae are normal.   Neck: Normal range of motion.   Cardiovascular: Normal rate and regular rhythm.   No murmur heard.  Pulmonary/Chest: Breath sounds normal. No respiratory distress. He has no wheezes. He has no rales.   Abdominal: Soft. Bowel sounds are normal. He exhibits no distension. There is no abdominal tenderness.   Musculoskeletal: Normal range of motion.   Neurological: He is alert and oriented to person, place, and time.   Skin: Skin is warm and dry. No erythema.   Psychiatric: His affect is blunt. He exhibits a depressed mood. He expresses suicidal ideation. He expresses no homicidal ideation. He expresses no suicidal plans and no homicidal plans.         ED Course   Procedures  Labs Reviewed   URINALYSIS, REFLEX TO URINE CULTURE - Abnormal; Notable for the following components:       Result Value    Appearance, UA Hazy (*)     Protein, UA 1+ (*)     Occult Blood UA 1+ (*)     All other components within normal limits    Narrative:     Specimen Source->Urine   CBC W/ AUTO DIFFERENTIAL - Abnormal; Notable for the following components:    MPV 9.1 (*)     Gran # (ANC) 1.6 (*)     Gran% 32.0 (*)      Mono% 19.0 (*)     All other components within normal limits   COMPREHENSIVE METABOLIC PANEL - Abnormal; Notable for the following components:    CO2 19 (*)     BUN, Bld 29 (*)     Creatinine 1.6 (*)     Total Bilirubin 1.1 (*)     AST 47 (*)     eGFR if  51.1 (*)     eGFR if non  44.2 (*)     All other components within normal limits   ACETAMINOPHEN LEVEL - Abnormal; Notable for the following components:    Acetaminophen (Tylenol), Serum <3.0 (*)     All other components within normal limits   URINALYSIS MICROSCOPIC - Abnormal; Notable for the following components:    Hyaline Casts, UA 5 (*)     All other components within normal limits    Narrative:     Specimen Source->Urine   SARS-COV-2 RNA AMPLIFICATION, QUAL   ALCOHOL,MEDICAL (ETHANOL)   DRUG SCREEN PANEL, URINE EMERGENCY   TSH             Medical Decision Making:   History:   Old Medical Records: I decided to obtain old medical records.  Clinical Tests:   Lab Tests: Ordered and Reviewed       APC / Resident Notes:   Patient is a 65 year old male presents to the ED for emergent evaluation of suicidal ideations.     Patient presents to the emergency department with an acute psychiatric illness warranting emergent evaluation as I believe the patient to be a danger to self and others. I believe the patient warrants a physician's emergency certificate.  I will order labs to medically clear the patient for admission to a psychiatric facility. Will continue to monitor.     Differential diagnoses include, but are not limited to: major depressive disorder, grief, adjustment disorder, or electrolyte imbalance.     COVID negative. No leukocytosis. Hemodynamically stable. UA unremarkable for infectious process. UDS pending.     6:45 PM  Case signed out to attending, pending laboratory analysis for medical clearance. Anticipate transfer to psychiatric facility for continued care.     I have discussed and reviewed with my supervising  physician.        Clinical Impression:       ICD-10-CM ICD-9-CM   1. Suicidal ideations  R45.851 V62.84         Disposition:   Disposition: Transferred  Condition: Fair

## 2020-07-21 VITALS
HEART RATE: 77 BPM | TEMPERATURE: 98 F | WEIGHT: 203 LBS | RESPIRATION RATE: 18 BRPM | SYSTOLIC BLOOD PRESSURE: 148 MMHG | BODY MASS INDEX: 28.42 KG/M2 | DIASTOLIC BLOOD PRESSURE: 72 MMHG | OXYGEN SATURATION: 98 % | HEIGHT: 71 IN

## 2020-07-21 LAB
AMPHET+METHAMPHET UR QL: NEGATIVE
BARBITURATES UR QL SCN>200 NG/ML: NEGATIVE
BENZODIAZ UR QL SCN>200 NG/ML: NEGATIVE
BZE UR QL SCN: ABNORMAL
CANNABINOIDS UR QL SCN: NEGATIVE
CREAT UR-MCNC: 434 MG/DL (ref 23–375)
METHADONE UR QL SCN>300 NG/ML: NEGATIVE
OPIATES UR QL SCN: NEGATIVE
PCP UR QL SCN>25 NG/ML: NEGATIVE
TOXICOLOGY INFORMATION: ABNORMAL

## 2020-07-21 RX ORDER — MIRTAZAPINE 15 MG/1
30 TABLET, FILM COATED ORAL NIGHTLY
Status: DISCONTINUED | OUTPATIENT
Start: 2020-07-21 | End: 2020-07-21 | Stop reason: HOSPADM

## 2020-07-21 RX ORDER — CARVEDILOL 6.25 MG/1
6.25 TABLET ORAL 2 TIMES DAILY
Status: DISCONTINUED | OUTPATIENT
Start: 2020-07-21 | End: 2020-07-21 | Stop reason: HOSPADM

## 2020-07-21 RX ORDER — SPIRONOLACTONE 25 MG/1
25 TABLET ORAL DAILY
Status: DISCONTINUED | OUTPATIENT
Start: 2020-07-21 | End: 2020-07-21 | Stop reason: HOSPADM

## 2020-07-21 RX ORDER — LISINOPRIL 20 MG/1
20 TABLET ORAL DAILY
Status: DISCONTINUED | OUTPATIENT
Start: 2020-07-21 | End: 2020-07-21 | Stop reason: HOSPADM

## 2020-07-21 RX ORDER — NAPROXEN SODIUM 220 MG/1
81 TABLET, FILM COATED ORAL DAILY
Status: DISCONTINUED | OUTPATIENT
Start: 2020-07-21 | End: 2020-07-21 | Stop reason: HOSPADM

## 2020-07-21 NOTE — ED NOTES
Patient transferred to [facility] by [facility]'s transportation with 2 bags of belongings with security present. Report was called to Nurse Mary  With [facility]. Patient did not want to call . DVC maintained. PEC sent with patient.

## 2020-07-21 NOTE — ED NOTES
Patient transferred to [facility] with ER staff with 2 bags of belongings.  called and informed of bed transfer to  2. Spoke with Nandini with  . DVC maintained.

## 2020-10-27 NOTE — ED NOTES
Problem: Occupational Therapy Goal  Goal: Occupational Therapy Goal  Description: Goals to be met by: d/c     Patient will increase functional independence with ADLs by performing:    UE Dressing with Supervision.  LE Dressing with Supervision and Assistive Devices as needed.  Grooming while standing at sink with Supervision.  Toileting from toilet with Supervision for hygiene and clothing management.   Toilet transfer to toilet with Supervision.    Outcome: Ongoing, Progressing      Pt informed of his pending transfer to [facility], pt does not want to notify any friends or family of his pending transfer.

## 2020-11-24 ENCOUNTER — HOSPITAL ENCOUNTER (EMERGENCY)
Facility: HOSPITAL | Age: 65
Discharge: HOME | End: 2020-11-24
Attending: EMERGENCY MEDICINE
Payer: MEDICARE

## 2020-11-24 VITALS
BODY MASS INDEX: 29.82 KG/M2 | DIASTOLIC BLOOD PRESSURE: 90 MMHG | TEMPERATURE: 98 F | HEART RATE: 76 BPM | RESPIRATION RATE: 18 BRPM | OXYGEN SATURATION: 97 % | WEIGHT: 213 LBS | HEIGHT: 71 IN | SYSTOLIC BLOOD PRESSURE: 152 MMHG

## 2020-11-24 DIAGNOSIS — R19.7 DIARRHEA, UNSPECIFIED TYPE: ICD-10-CM

## 2020-11-24 DIAGNOSIS — R10.9 ABDOMINAL PAIN, UNSPECIFIED ABDOMINAL LOCATION: ICD-10-CM

## 2020-11-24 DIAGNOSIS — R11.2 NON-INTRACTABLE VOMITING WITH NAUSEA, UNSPECIFIED VOMITING TYPE: ICD-10-CM

## 2020-11-24 DIAGNOSIS — K52.9 COLITIS: Primary | ICD-10-CM

## 2020-11-24 DIAGNOSIS — R10.9 ABDOMINAL PAIN: ICD-10-CM

## 2020-11-24 LAB
ALBUMIN SERPL BCP-MCNC: 4.1 G/DL (ref 3.5–5.2)
ALP SERPL-CCNC: 107 U/L (ref 55–135)
ALT SERPL W/O P-5'-P-CCNC: 24 U/L (ref 10–44)
ANION GAP SERPL CALC-SCNC: 7 MMOL/L (ref 8–16)
AST SERPL-CCNC: 24 U/L (ref 10–40)
BASOPHILS # BLD AUTO: 0.02 K/UL (ref 0–0.2)
BASOPHILS NFR BLD: 0.5 % (ref 0–1.9)
BILIRUB SERPL-MCNC: 0.9 MG/DL (ref 0.1–1)
BILIRUB UR QL STRIP: NEGATIVE
BUN SERPL-MCNC: 12 MG/DL (ref 8–23)
CALCIUM SERPL-MCNC: 9 MG/DL (ref 8.7–10.5)
CHLORIDE SERPL-SCNC: 105 MMOL/L (ref 95–110)
CLARITY UR REFRACT.AUTO: CLEAR
CO2 SERPL-SCNC: 25 MMOL/L (ref 23–29)
COLOR UR AUTO: YELLOW
CREAT SERPL-MCNC: 1.4 MG/DL (ref 0.5–1.4)
CTP QC/QA: YES
DIFFERENTIAL METHOD: ABNORMAL
EOSINOPHIL # BLD AUTO: 0 K/UL (ref 0–0.5)
EOSINOPHIL NFR BLD: 0.7 % (ref 0–8)
ERYTHROCYTE [DISTWIDTH] IN BLOOD BY AUTOMATED COUNT: 13.2 % (ref 11.5–14.5)
EST. GFR  (AFRICAN AMERICAN): >60 ML/MIN/1.73 M^2
EST. GFR  (NON AFRICAN AMERICAN): 52 ML/MIN/1.73 M^2
GLUCOSE SERPL-MCNC: 107 MG/DL (ref 70–110)
GLUCOSE UR QL STRIP: NEGATIVE
HCT VFR BLD AUTO: 49.2 % (ref 40–54)
HGB BLD-MCNC: 15.8 G/DL (ref 14–18)
HGB UR QL STRIP: ABNORMAL
IMM GRANULOCYTES # BLD AUTO: 0.01 K/UL (ref 0–0.04)
IMM GRANULOCYTES NFR BLD AUTO: 0.2 % (ref 0–0.5)
KETONES UR QL STRIP: ABNORMAL
LACTATE SERPL-SCNC: 1.1 MMOL/L (ref 0.5–2.2)
LEUKOCYTE ESTERASE UR QL STRIP: NEGATIVE
LIPASE SERPL-CCNC: 19 U/L (ref 4–60)
LYMPHOCYTES # BLD AUTO: 1.5 K/UL (ref 1–4.8)
LYMPHOCYTES NFR BLD: 35.4 % (ref 18–48)
MCH RBC QN AUTO: 31.2 PG (ref 27–31)
MCHC RBC AUTO-ENTMCNC: 32.1 G/DL (ref 32–36)
MCV RBC AUTO: 97 FL (ref 82–98)
MICROSCOPIC COMMENT: NORMAL
MONOCYTES # BLD AUTO: 0.6 K/UL (ref 0.3–1)
MONOCYTES NFR BLD: 15 % (ref 4–15)
NEUTROPHILS # BLD AUTO: 2 K/UL (ref 1.8–7.7)
NEUTROPHILS NFR BLD: 48.2 % (ref 38–73)
NITRITE UR QL STRIP: NEGATIVE
NRBC BLD-RTO: 0 /100 WBC
PH UR STRIP: 5 [PH] (ref 5–8)
PLATELET # BLD AUTO: 245 K/UL (ref 150–350)
PMV BLD AUTO: 9.1 FL (ref 9.2–12.9)
POTASSIUM SERPL-SCNC: 4.2 MMOL/L (ref 3.5–5.1)
PROT SERPL-MCNC: 8.2 G/DL (ref 6–8.4)
PROT UR QL STRIP: NEGATIVE
RBC # BLD AUTO: 5.06 M/UL (ref 4.6–6.2)
RBC #/AREA URNS AUTO: 3 /HPF (ref 0–4)
SARS-COV-2 RDRP RESP QL NAA+PROBE: NEGATIVE
SODIUM SERPL-SCNC: 137 MMOL/L (ref 136–145)
SP GR UR STRIP: >=1.03 (ref 1–1.03)
SQUAMOUS #/AREA URNS AUTO: 0 /HPF
URN SPEC COLLECT METH UR: ABNORMAL
WBC # BLD AUTO: 4.21 K/UL (ref 3.9–12.7)
WBC #/AREA URNS AUTO: 1 /HPF (ref 0–5)

## 2020-11-24 PROCEDURE — 81001 URINALYSIS AUTO W/SCOPE: CPT

## 2020-11-24 PROCEDURE — 93010 ELECTROCARDIOGRAM REPORT: CPT | Mod: ,,, | Performed by: INTERNAL MEDICINE

## 2020-11-24 PROCEDURE — 99285 EMERGENCY DEPT VISIT HI MDM: CPT | Mod: CS,,, | Performed by: EMERGENCY MEDICINE

## 2020-11-24 PROCEDURE — 96374 THER/PROPH/DIAG INJ IV PUSH: CPT | Mod: 59

## 2020-11-24 PROCEDURE — 83690 ASSAY OF LIPASE: CPT

## 2020-11-24 PROCEDURE — 25000003 PHARM REV CODE 250: Performed by: STUDENT IN AN ORGANIZED HEALTH CARE EDUCATION/TRAINING PROGRAM

## 2020-11-24 PROCEDURE — 93005 ELECTROCARDIOGRAM TRACING: CPT

## 2020-11-24 PROCEDURE — 63600175 PHARM REV CODE 636 W HCPCS: Performed by: STUDENT IN AN ORGANIZED HEALTH CARE EDUCATION/TRAINING PROGRAM

## 2020-11-24 PROCEDURE — U0002 COVID-19 LAB TEST NON-CDC: HCPCS | Performed by: STUDENT IN AN ORGANIZED HEALTH CARE EDUCATION/TRAINING PROGRAM

## 2020-11-24 PROCEDURE — 96361 HYDRATE IV INFUSION ADD-ON: CPT

## 2020-11-24 PROCEDURE — 83605 ASSAY OF LACTIC ACID: CPT

## 2020-11-24 PROCEDURE — 80053 COMPREHEN METABOLIC PANEL: CPT

## 2020-11-24 PROCEDURE — 93010 EKG 12-LEAD: ICD-10-PCS | Mod: ,,, | Performed by: INTERNAL MEDICINE

## 2020-11-24 PROCEDURE — 25500020 PHARM REV CODE 255: Performed by: EMERGENCY MEDICINE

## 2020-11-24 PROCEDURE — 99285 EMERGENCY DEPT VISIT HI MDM: CPT | Mod: 25

## 2020-11-24 PROCEDURE — 99285 PR EMERGENCY DEPT VISIT,LEVEL V: ICD-10-PCS | Mod: CS,,, | Performed by: EMERGENCY MEDICINE

## 2020-11-24 PROCEDURE — 96375 TX/PRO/DX INJ NEW DRUG ADDON: CPT

## 2020-11-24 PROCEDURE — 85025 COMPLETE CBC W/AUTO DIFF WBC: CPT

## 2020-11-24 RX ORDER — DICYCLOMINE HYDROCHLORIDE 10 MG/1
20 CAPSULE ORAL
Status: COMPLETED | OUTPATIENT
Start: 2020-11-24 | End: 2020-11-24

## 2020-11-24 RX ORDER — MORPHINE SULFATE 4 MG/ML
4 INJECTION, SOLUTION INTRAMUSCULAR; INTRAVENOUS
Status: COMPLETED | OUTPATIENT
Start: 2020-11-24 | End: 2020-11-24

## 2020-11-24 RX ORDER — DICYCLOMINE HYDROCHLORIDE 20 MG/1
20 TABLET ORAL 3 TIMES DAILY PRN
Qty: 30 TABLET | Refills: 0 | Status: ON HOLD | OUTPATIENT
Start: 2020-11-24 | End: 2022-09-14

## 2020-11-24 RX ORDER — ONDANSETRON 2 MG/ML
4 INJECTION INTRAMUSCULAR; INTRAVENOUS
Status: COMPLETED | OUTPATIENT
Start: 2020-11-24 | End: 2020-11-24

## 2020-11-24 RX ADMIN — ONDANSETRON 4 MG: 2 INJECTION INTRAMUSCULAR; INTRAVENOUS at 08:11

## 2020-11-24 RX ADMIN — MORPHINE SULFATE 4 MG: 4 INJECTION, SOLUTION INTRAMUSCULAR; INTRAVENOUS at 10:11

## 2020-11-24 RX ADMIN — SODIUM CHLORIDE 1000 ML: 0.9 INJECTION, SOLUTION INTRAVENOUS at 08:11

## 2020-11-24 RX ADMIN — DICYCLOMINE HYDROCHLORIDE 20 MG: 10 CAPSULE ORAL at 11:11

## 2020-11-24 RX ADMIN — IOHEXOL 100 ML: 350 INJECTION, SOLUTION INTRAVENOUS at 10:11

## 2020-11-24 NOTE — ED NOTES
LOC: The patient is awake and alert; oriented x 3 and speaking appropriately.  APPEARANCE: Patient resting comfortably, patient is clean and well groomed  SKIN: warm and dry, normal skin turgor & moist mucus membranes, skin intact, no breakdown noted.  MUSCULOSKELETAL: Patient moving all extremities well, no obvious swelling or deformities noted  RESPIRATORY: Airway is open and patent, respirations are spontaneous, normal effort and rate  CARDIAC: Patient has a normal rate, no peripheral edema noted, capillary refill < 3 seconds; No complaints of chest pain   ABDOMEN: Soft and tender to palpation, c/o feeling distented. Bowel sounds present x 4, c/o n/v/d.

## 2020-11-24 NOTE — ED TRIAGE NOTES
Onset abdominal pain at [place] last night.  Having n/v/d. Has had  Several stools last night and this am .  Threw up this am.  Denies blood in stool/ emesis.  Denies fever.  Denies dysuria.

## 2020-11-24 NOTE — ED PROVIDER NOTES
Encounter Date: 11/24/2020       History     Chief Complaint   Patient presents with    Abdominal Pain     LOWER ABDOMINAL PAIN; 1 EPISODE LOOSE STOOL AND EMESIS; DENIES CHEST PAIN, FEVER     Mr. Faria is a 65-year-old male with PMH CHF with 40% EF, hypertension, and history of CVA on Eliquis who presents with lower abdominal pain.  The patient states that yesterday around midnight he began having 10/10, achy, fluctuant abdominal pain that occurs and episodes of approximately 15 min and is associated with 2 episodes of diarrhea and 1 episode of vomiting.  Patient states he last ate around 7:00 p.m. yesterday.  He denies previous abdominal surgeries.  He denies headache, dizziness, lightheadedness, chest pain, shortness of breath, dysuria, and constipation.  He denies fevers and chills.        Review of patient's allergies indicates:  No Known Allergies  Past Medical History:   Diagnosis Date    Acute renal insufficiency 6/21/2015    Cocaine abuse     Depression     History of psychiatric care     HTN (hypertension) 1/25/2014    STEMI (ST elevation myocardial infarction) 2/12/2017    Stroke      Past Surgical History:   Procedure Laterality Date    CARDIAC SURGERY      stents    NECK SURGERY       Family History   Problem Relation Age of Onset    Diabetes Mother     Heart disease Mother     Hypertension Mother      Social History     Tobacco Use    Smoking status: Current Some Day Smoker     Packs/day: 0.25     Types: Cigarettes    Smokeless tobacco: Never Used   Substance Use Topics    Alcohol use: Not Currently     Comment: social    Drug use: Not Currently     Types: Cocaine     Comment: this weekend     Review of Systems   Constitutional: Negative for chills and fever.   HENT: Negative for congestion and sore throat.    Eyes: Negative for pain and visual disturbance.   Respiratory: Negative for cough and shortness of breath.    Cardiovascular: Negative for chest pain and leg swelling.    Gastrointestinal: Positive for abdominal pain, diarrhea, nausea and vomiting. Negative for constipation.   Endocrine: Negative for polydipsia and polyuria.   Genitourinary: Negative for dysuria and hematuria.   Musculoskeletal: Negative for arthralgias and back pain.   Skin: Negative for color change and rash.   Neurological: Negative for dizziness, syncope, weakness and headaches.       Physical Exam     Initial Vitals [11/24/20 0806]   BP Pulse Resp Temp SpO2   (!) 152/90 76 16 97.9 °F (36.6 °C) 97 %      MAP       --         Physical Exam    Nursing note and vitals reviewed.  Constitutional: He appears well-developed and well-nourished. He is not diaphoretic. He appears distressed.   HENT:   Head: Normocephalic and atraumatic.   Eyes: Conjunctivae and EOM are normal. Pupils are equal, round, and reactive to light.   Neck: Normal range of motion. Neck supple.   Cardiovascular: Normal rate, regular rhythm, normal heart sounds and intact distal pulses.   No murmur heard.  Pulmonary/Chest: Breath sounds normal. No respiratory distress. He has no wheezes. He has no rhonchi. He has no rales.   Abdominal: Bowel sounds are normal.   Grossly distended, tender to palpation diffusely but worse in the lower quadrants, no rebound or guarding   Musculoskeletal: Normal range of motion. No tenderness or edema.   Neurological: He is alert and oriented to person, place, and time.   Slight weakness of left side compared to right, residual from CVA   Skin: Skin is warm and dry. Capillary refill takes less than 2 seconds.         ED Course   Procedures  Labs Reviewed   CBC W/ AUTO DIFFERENTIAL - Abnormal; Notable for the following components:       Result Value    MCH 31.2 (*)     MPV 9.1 (*)     All other components within normal limits   COMPREHENSIVE METABOLIC PANEL - Abnormal; Notable for the following components:    Anion Gap 7 (*)     eGFR if non  52.0 (*)     All other components within normal limits    URINALYSIS, REFLEX TO URINE CULTURE - Abnormal; Notable for the following components:    Specific Gravity, UA >=1.030 (*)     Ketones, UA Trace (*)     Occult Blood UA 2+ (*)     All other components within normal limits    Narrative:     Specimen Source->Urine   LACTIC ACID, PLASMA   LIPASE   URINALYSIS MICROSCOPIC    Narrative:     Specimen Source->Urine   SARS-COV-2 RDRP GENE    Narrative:     This test utilizes isothermal nucleic acid amplification   technology to detect the SARS-CoV-2 RdRp nucleic acid segment.   The analytical sensitivity (limit of detection) is 125 genome   equivalents/mL.   A POSITIVE result implies infection with the SARS-CoV-2 virus;   the patient is presumed to be contagious.     A NEGATIVE result means that SARS-CoV-2 nucleic acids are not   present above the limit of detection. A NEGATIVE result should be   treated as presumptive. It does not rule out the possibility of   COVID-19 and should not be the sole basis for treatment decisions.   If COVID-19 is strongly suspected based on clinical and exposure   history, re-testing using an alternate molecular assay should be   considered.   This test is only for use under the Food and Drug   Administration s Emergency Use Authorization (EUA).   Commercial kits are provided by [employer].   Performance characteristics of the EUA have been independently   verified by [facility] Department of   Pathology and Laboratory Medicine.   _________________________________________________________________   The authorized Fact Sheet for Healthcare Providers and the authorized Fact   Sheet for Patients of the ID NOW COVID-19 are available on the FDA   website:     https://www.fda.gov/media/197956/download  https://www.fda.gov/media/643914/download           EKG Readings: (Independently Interpreted)   Initial Reading: No STEMI. Previous EKG: Compared with most recent EKG Rhythm: Normal Sinus Rhythm. Heart Rate: 76. ST Segments: Normal  ST Segments.   Sinus rhythm at a rate of 76, no ST elevation or depression noted, normal intervals     ECG Results          EKG 12-lead (Final result)  Result time 11/24/20 11:47:48    Final result by Gabriel, Lab In [facility] (11/24/20 11:47:48)                 Narrative:    Test Reason : R10.9,    Vent. Rate : 076 BPM     Atrial Rate : 076 BPM     P-R Int : 160 ms          QRS Dur : 080 ms      QT Int : 368 ms       P-R-T Axes : 080 221 024 degrees     QTc Int : 414 ms    Normal sinus rhythm  Inferior infarct (cited on or before 11-FEB-2017)  Anterolateral infarct (cited on or before 11-FEB-2017)  Abnormal ECG  When compared with ECG of 08-JUN-2020 11:21,  T wave inversion now evident in Lateral leads  Confirmed by Shabnam Koo MD ([id number]) on 11/24/2020 11:47:36 AM    Referred By: [email or web address]   SELF           Confirmed By:Shabnam Koo MD                            Imaging Results          CT Abdomen Pelvis With Contrast (Final result)  Result time 11/24/20 10:50:00    Final result by Tucker Guzmán MD (11/24/20 10:50:00)                 Impression:      Findings which may suggest a low-grade non-specific pancolitis in the appropriate clinical setting.  Small amount of liquid stool in the colon.    Nonobstructing right-sided renal stone.    Hepatic hemangioma and simple left renal cyst.    Moderate aortoiliac atherosclerosis.      Electronically signed by: Tucker Guzmán MD  Date:    11/24/2020  Time:    10:50             Narrative:    EXAMINATION:  CT ABDOMEN PELVIS WITH CONTRAST    CLINICAL HISTORY:  Abdominal distension;    TECHNIQUE:  Low dose axial images, sagittal and coronal reformations were obtained from the lung bases to the pubic symphysis following the IV administration of 100 mL of Omnipaque 350 .  Oral contrast was not given.    COMPARISON:  CT abdomen and pelvis, 09/28/2016.    FINDINGS:  Lower Chest:    Linear subsegmental atelectasis in the right lung base.  Heart size is normal.  No pleural or  pericardial effusion.    Abdomen:    Liver is normal in size and contour.  Focal hypoattenuating lesion in the right hepatic dome is most consistent with a hemangioma as seen on the prior study.  No worrisome hepatic mass.  Gallbladder is unremarkable. No intrahepatic biliary ductal dilatation.    Spleen is normal in size.  Adrenal glands and pancreas are unremarkable.    Kidneys are symmetric.  Simple appearing cyst in the superior pole of the right kidney.  Punctate nonobstructing stone in the lower pole of the right kidney.  No hydronephrosis.    No small bowel obstruction.  Small duodenal diverticulum is noted.  There is diffuse submucosal fat deposition throughout the colon with minimal pericolonic inflammatory changes.  Small amount of liquid stool in the colon.  The appendix is not well delineated, but there are no secondary findings of acute appendicitis.    No pneumoperitoneum or organized fluid collection.    No bulky lymphadenopathy.    Abdominal aorta is normal in caliber with mild to moderate calcific atherosclerosis.  Moderate atherosclerosis involving the iliofemoral arteries.    Portal, splenic, and superior mesenteric veins are patent.    Pelvis:    Urinary bladder, pelvic organs, and rectum are unremarkable.  No free fluid in the pelvis.  No pelvic lymphadenopathy.    Bones and soft tissues:    No aggressive osseous lesions.  Extraperitoneal soft tissues are negative for acute finding.                                 Medical Decision Making:   History:   Old Medical Records: I decided to obtain old medical records.  Old Records Summarized: other records.       <> Summary of Records: Patient has been seen in the ED multiple times in the past, most commonly for suicidal ideation  Initial Assessment:   65-year-old male with CHF, HTN, and history of CVA presents with lower abdominal pain x8 hours associated with diarrhea, vomiting, and distended abdomen, vital stable but hypertensive, PE reveals  distended and tender abdomen without rebound or guarding  Differential Diagnosis:   SBO, LBO, mesenteric ischemia, AAA, gastroenteritis, constipation  Clinical Tests:   Lab Tests: Ordered and Reviewed  Radiological Study: Ordered and Reviewed  Medical Tests: Ordered and Reviewed  ED Management:  Initial labs include CBC, CMP, lactate, lipase, EKG, and CT abdomen pelvis with contrast.  Patient was given 1 L NS and Zofran and made NPO until SBO is ruled out.    Patient still with significant pain following IV fluids and zofran, so I will give morphine 4mg for pain control.     10:58 AM  CT abdomen negative for acute processes including obstruction.  It showed evidence of colitis.  I will give patient Bentyl 20 mg. I will also assess the patient for COVID given his diarrhea.  Given patient's normal lactate and lack of bloody diarrhea, I have low suspicion for mesenteric ischemia. CT shows no evidence of AAA. Patient possibly has infectious colitis vs gastroenteritis. I will discharge him home with rx for bentyl and referral to internal medicine for primary care follow-up.                              Clinical Impression:       ICD-10-CM ICD-9-CM   1. Colitis  K52.9 558.9   2. Abdominal pain  R10.9 789.00   3. Abdominal pain, unspecified abdominal location  R10.9 789.00   4. Non-intractable vomiting with nausea, unspecified vomiting type  R11.2 787.01   5. Diarrhea, unspecified type  R19.7 787.91                          ED Disposition Condition    Discharge Stable        ED Prescriptions     Medication Sig Dispense Start Date End Date Auth. Provider    dicyclomine (BENTYL) 20 mg tablet Take 1 tablet (20 mg total) by mouth 3 (three) times daily as needed (for abdominal pain). 30 tablet 11/24/2020  Kamlesh Gama MD        Follow-up Information     Follow up With Specialties Details Why Contact Info Additional Information    [facility] Emergency Medicine Go to  go to the ER if you experience  worsening of symptoms or inability to drink fluids without vomiting [address]  [facility] [id number] [phone]     [facility] Schedule an appointment as soon as possible for a visit in 1 week to discuss your recent ER visit [address]  [facility] [id number] [phone] [facility] Please park in surface lot and check in at central registration desk                                       Kamlesh Gama MD  Resident  11/24/20 1213

## 2020-11-24 NOTE — ED NOTES
Pt ao4, ambulatory and in NAD. Pt restated discharge instructions in own words and demonstrated an understanding of follow up care. Pt wanted a sandwich ptd. This RN was going to get a sandwich for pt. Pt changed his mindand wanted to leave immediately. Pt exited ED w/o further incident.

## 2020-11-24 NOTE — DISCHARGE INSTRUCTIONS
It was a pleasure taking care of you today!     Please follow up with your primary care physician this week to discuss your recent ER visit, your abdominal pain, and any additional concerns you have.     Please return to the ER if you experience persistent severe abdominal pain, especially if associated with the inability to drink water without vomiting.

## 2021-04-21 ENCOUNTER — HOSPITAL ENCOUNTER (EMERGENCY)
Facility: HOSPITAL | Age: 66
Discharge: HOME | End: 2021-04-21
Attending: EMERGENCY MEDICINE
Payer: MEDICARE

## 2021-04-21 VITALS
TEMPERATURE: 99 F | SYSTOLIC BLOOD PRESSURE: 178 MMHG | OXYGEN SATURATION: 100 % | HEIGHT: 71 IN | BODY MASS INDEX: 28 KG/M2 | RESPIRATION RATE: 20 BRPM | WEIGHT: 200 LBS | HEART RATE: 65 BPM | DIASTOLIC BLOOD PRESSURE: 107 MMHG

## 2021-04-21 DIAGNOSIS — M96.89 POSTOPERATIVE SURGICAL COMPLICATION INVOLVING MUSCULOSKELETAL SYSTEM ASSOCIATED WITH MUSCULOSKELETAL PROCEDURE, UNSPECIFIED COMPLICATION: Primary | ICD-10-CM

## 2021-04-21 LAB
BUN SERPL-MCNC: 12 MG/DL (ref 6–30)
CHLORIDE SERPL-SCNC: 105 MMOL/L (ref 95–110)
CREAT SERPL-MCNC: 1.3 MG/DL (ref 0.5–1.4)
GLUCOSE SERPL-MCNC: 79 MG/DL (ref 70–110)
HCT VFR BLD CALC: 42 %PCV (ref 36–54)
POC IONIZED CALCIUM: 1.2 MMOL/L (ref 1.06–1.42)
POC TCO2 (MEASURED): 26 MMOL/L (ref 23–29)
POTASSIUM BLD-SCNC: 4.2 MMOL/L (ref 3.5–5.1)
SAMPLE: NORMAL
SODIUM BLD-SCNC: 143 MMOL/L (ref 136–145)

## 2021-04-21 PROCEDURE — 80047 BASIC METABLC PNL IONIZED CA: CPT

## 2021-04-21 PROCEDURE — 12002 PR RESUP NPTERF WND BODY 2.6-7.5 CM: ICD-10-PCS | Mod: LT,,, | Performed by: EMERGENCY MEDICINE

## 2021-04-21 PROCEDURE — 25000003 PHARM REV CODE 250: Performed by: PHYSICIAN ASSISTANT

## 2021-04-21 PROCEDURE — 12002 RPR S/N/AX/GEN/TRNK2.6-7.5CM: CPT | Mod: LT,,, | Performed by: EMERGENCY MEDICINE

## 2021-04-21 PROCEDURE — 99284 PR EMERGENCY DEPT VISIT,LEVEL IV: ICD-10-PCS | Mod: 25,,, | Performed by: EMERGENCY MEDICINE

## 2021-04-21 PROCEDURE — 12002 RPR S/N/AX/GEN/TRNK2.6-7.5CM: CPT

## 2021-04-21 PROCEDURE — 99283 EMERGENCY DEPT VISIT LOW MDM: CPT | Mod: 25

## 2021-04-21 PROCEDURE — 99284 EMERGENCY DEPT VISIT MOD MDM: CPT | Mod: 25,,, | Performed by: EMERGENCY MEDICINE

## 2021-04-21 RX ORDER — CARVEDILOL 3.12 MG/1
6.25 TABLET ORAL
Status: COMPLETED | OUTPATIENT
Start: 2021-04-21 | End: 2021-04-21

## 2021-04-21 RX ORDER — LISINOPRIL 10 MG/1
10 TABLET ORAL
Status: COMPLETED | OUTPATIENT
Start: 2021-04-21 | End: 2021-04-21

## 2021-04-21 RX ORDER — LIDOCAINE HYDROCHLORIDE AND EPINEPHRINE 10; 10 MG/ML; UG/ML
10 INJECTION, SOLUTION INFILTRATION; PERINEURAL
Status: COMPLETED | OUTPATIENT
Start: 2021-04-21 | End: 2021-04-21

## 2021-04-21 RX ORDER — LIDOCAINE HYDROCHLORIDE 10 MG/ML
10 INJECTION INFILTRATION; PERINEURAL
Status: DISCONTINUED | OUTPATIENT
Start: 2021-04-21 | End: 2021-04-21

## 2021-04-21 RX ADMIN — LIDOCAINE HYDROCHLORIDE AND EPINEPHRINE 10 ML: 10; 10 INJECTION, SOLUTION INFILTRATION; PERINEURAL at 09:04

## 2021-04-21 RX ADMIN — CARVEDILOL 6.25 MG: 3.12 TABLET, FILM COATED ORAL at 07:04

## 2021-04-21 RX ADMIN — LISINOPRIL 10 MG: 10 TABLET ORAL at 07:04

## 2021-04-21 NOTE — ED NOTES
Patient scheduled for lab appointment on 4/21/2021 ordered by Dion Contreras. Orders are missing. Please enter or extend lab orders prior to the appointment noted above.     Thank you     Pt reports being hungry. Food given.

## 2021-09-04 NOTE — HOSPITAL COURSE
27.1 He was admitted to the CCU and placed on nitro ggt where he remained CP free. Troponin's x2 (0.006). LHC found 30% RCA stenosis no intervention. 2D echo with reduced EF 40%, +WMA, LV aneurysmal with LV thrombus. Patient restarted on Warfarin for LV thrombus. Patient with no further chest pain, ambulating without difficulty. Encouraged cocaine cessation and follow up with [facility].

## 2021-12-27 ENCOUNTER — NURSE TRIAGE (OUTPATIENT)
Dept: ADMINISTRATIVE | Facility: CLINIC | Age: 66
End: 2021-12-27
Payer: MEDICARE

## 2022-05-05 ENCOUNTER — HOSPITAL ENCOUNTER (EMERGENCY)
Facility: HOSPITAL | Age: 67
Discharge: PSYCHIATRIC OR SUBSTANCE USE FACILITY | End: 2022-05-06
Attending: EMERGENCY MEDICINE
Payer: MEDICARE

## 2022-05-05 DIAGNOSIS — Z00.8 MEDICAL CLEARANCE FOR PSYCHIATRIC ADMISSION: ICD-10-CM

## 2022-05-05 DIAGNOSIS — R45.851 SUICIDAL IDEATION: Primary | ICD-10-CM

## 2022-05-05 PROCEDURE — 99285 EMERGENCY DEPT VISIT HI MDM: CPT | Mod: 25

## 2022-05-05 PROCEDURE — U0002 COVID-19 LAB TEST NON-CDC: HCPCS | Performed by: EMERGENCY MEDICINE

## 2022-05-05 PROCEDURE — 80053 COMPREHEN METABOLIC PANEL: CPT | Performed by: EMERGENCY MEDICINE

## 2022-05-05 PROCEDURE — 99285 EMERGENCY DEPT VISIT HI MDM: CPT | Mod: GC,CS,, | Performed by: EMERGENCY MEDICINE

## 2022-05-05 PROCEDURE — 82077 ASSAY SPEC XCP UR&BREATH IA: CPT | Performed by: EMERGENCY MEDICINE

## 2022-05-05 PROCEDURE — 99285 PR EMERGENCY DEPT VISIT,LEVEL V: ICD-10-PCS | Mod: GC,CS,, | Performed by: EMERGENCY MEDICINE

## 2022-05-05 PROCEDURE — 84443 ASSAY THYROID STIM HORMONE: CPT | Performed by: EMERGENCY MEDICINE

## 2022-05-05 PROCEDURE — 85025 COMPLETE CBC W/AUTO DIFF WBC: CPT | Performed by: EMERGENCY MEDICINE

## 2022-05-05 PROCEDURE — 80143 DRUG ASSAY ACETAMINOPHEN: CPT | Performed by: EMERGENCY MEDICINE

## 2022-05-05 PROCEDURE — 81001 URINALYSIS AUTO W/SCOPE: CPT | Performed by: EMERGENCY MEDICINE

## 2022-05-05 PROCEDURE — 82962 GLUCOSE BLOOD TEST: CPT

## 2022-05-05 PROCEDURE — 80307 DRUG TEST PRSMV CHEM ANLYZR: CPT | Performed by: EMERGENCY MEDICINE

## 2022-05-06 VITALS
BODY MASS INDEX: 25.2 KG/M2 | WEIGHT: 180 LBS | TEMPERATURE: 98 F | SYSTOLIC BLOOD PRESSURE: 147 MMHG | HEART RATE: 70 BPM | HEIGHT: 71 IN | OXYGEN SATURATION: 98 % | RESPIRATION RATE: 16 BRPM | DIASTOLIC BLOOD PRESSURE: 69 MMHG

## 2022-05-06 LAB
ALBUMIN SERPL BCP-MCNC: 4.1 G/DL (ref 3.5–5.2)
ALP SERPL-CCNC: 69 U/L (ref 55–135)
ALT SERPL W/O P-5'-P-CCNC: 32 U/L (ref 10–44)
AMPHET+METHAMPHET UR QL: NEGATIVE
ANION GAP SERPL CALC-SCNC: 10 MMOL/L (ref 8–16)
APAP SERPL-MCNC: <3 UG/ML (ref 10–20)
AST SERPL-CCNC: 43 U/L (ref 10–40)
BACTERIA #/AREA URNS AUTO: ABNORMAL /HPF
BARBITURATES UR QL SCN>200 NG/ML: NEGATIVE
BASOPHILS # BLD AUTO: 0.03 K/UL (ref 0–0.2)
BASOPHILS NFR BLD: 0.5 % (ref 0–1.9)
BENZODIAZ UR QL SCN>200 NG/ML: NEGATIVE
BILIRUB SERPL-MCNC: 1.6 MG/DL (ref 0.1–1)
BILIRUB UR QL STRIP: NEGATIVE
BUN SERPL-MCNC: 19 MG/DL (ref 8–23)
BZE UR QL SCN: ABNORMAL
CALCIUM SERPL-MCNC: 9.2 MG/DL (ref 8.7–10.5)
CANNABINOIDS UR QL SCN: NEGATIVE
CHLORIDE SERPL-SCNC: 110 MMOL/L (ref 95–110)
CLARITY UR REFRACT.AUTO: ABNORMAL
CO2 SERPL-SCNC: 21 MMOL/L (ref 23–29)
COLOR UR AUTO: ABNORMAL
CREAT SERPL-MCNC: 1.8 MG/DL (ref 0.5–1.4)
CREAT UR-MCNC: 391 MG/DL (ref 23–375)
CTP QC/QA: YES
DIFFERENTIAL METHOD: ABNORMAL
EOSINOPHIL # BLD AUTO: 0.1 K/UL (ref 0–0.5)
EOSINOPHIL NFR BLD: 1.5 % (ref 0–8)
ERYTHROCYTE [DISTWIDTH] IN BLOOD BY AUTOMATED COUNT: 13.2 % (ref 11.5–14.5)
EST. GFR  (AFRICAN AMERICAN): 44 ML/MIN/1.73 M^2
EST. GFR  (NON AFRICAN AMERICAN): 38.1 ML/MIN/1.73 M^2
ETHANOL SERPL-MCNC: <10 MG/DL
GLUCOSE SERPL-MCNC: 104 MG/DL (ref 70–110)
GLUCOSE SERPL-MCNC: 95 MG/DL (ref 70–110)
GLUCOSE UR QL STRIP: NEGATIVE
HCT VFR BLD AUTO: 44.6 % (ref 40–54)
HGB BLD-MCNC: 14.9 G/DL (ref 14–18)
HGB UR QL STRIP: NEGATIVE
HYALINE CASTS UR QL AUTO: 31 /LPF
IMM GRANULOCYTES # BLD AUTO: 0.01 K/UL (ref 0–0.04)
IMM GRANULOCYTES NFR BLD AUTO: 0.2 % (ref 0–0.5)
KETONES UR QL STRIP: ABNORMAL
LEUKOCYTE ESTERASE UR QL STRIP: NEGATIVE
LYMPHOCYTES # BLD AUTO: 2.3 K/UL (ref 1–4.8)
LYMPHOCYTES NFR BLD: 37.5 % (ref 18–48)
MCH RBC QN AUTO: 32.1 PG (ref 27–31)
MCHC RBC AUTO-ENTMCNC: 33.4 G/DL (ref 32–36)
MCV RBC AUTO: 96 FL (ref 82–98)
METHADONE UR QL SCN>300 NG/ML: NEGATIVE
MICROSCOPIC COMMENT: ABNORMAL
MONOCYTES # BLD AUTO: 0.8 K/UL (ref 0.3–1)
MONOCYTES NFR BLD: 12.5 % (ref 4–15)
NEUTROPHILS # BLD AUTO: 2.9 K/UL (ref 1.8–7.7)
NEUTROPHILS NFR BLD: 47.8 % (ref 38–73)
NITRITE UR QL STRIP: NEGATIVE
NRBC BLD-RTO: 0 /100 WBC
OPIATES UR QL SCN: NEGATIVE
PCP UR QL SCN>25 NG/ML: NEGATIVE
PH UR STRIP: 5 [PH] (ref 5–8)
PLATELET # BLD AUTO: 248 K/UL (ref 150–450)
PMV BLD AUTO: 8.9 FL (ref 9.2–12.9)
POCT GLUCOSE: 104 MG/DL (ref 70–110)
POTASSIUM SERPL-SCNC: 3.6 MMOL/L (ref 3.5–5.1)
PROT SERPL-MCNC: 7.6 G/DL (ref 6–8.4)
PROT UR QL STRIP: ABNORMAL
RBC # BLD AUTO: 4.64 M/UL (ref 4.6–6.2)
RBC #/AREA URNS AUTO: 5 /HPF (ref 0–4)
SARS-COV-2 RDRP RESP QL NAA+PROBE: NEGATIVE
SODIUM SERPL-SCNC: 141 MMOL/L (ref 136–145)
SP GR UR STRIP: 1.02 (ref 1–1.03)
TOXICOLOGY INFORMATION: ABNORMAL
TSH SERPL DL<=0.005 MIU/L-ACNC: 1.84 UIU/ML (ref 0.4–4)
URN SPEC COLLECT METH UR: ABNORMAL
WBC # BLD AUTO: 6.06 K/UL (ref 3.9–12.7)
WBC #/AREA URNS AUTO: 2 /HPF (ref 0–5)

## 2022-05-06 NOTE — ED TRIAGE NOTES
"Damir Faria, an 66 y.o. male presents to the ED with c/o of depression. Pt states he is been having depression for couple of days now. Pt presents SI without plan. Denies hi/avh.       Review of patient's allergies indicates:  No Known Allergies  Chief Complaint   Patient presents with    Suicidal     Pt states he feels depressed and "hasn't been right". Pt states he feels like hurting himself. Pt states he has been thinking about hurting himself "for the last couple of days".      Past Medical History:   Diagnosis Date    Acute renal insufficiency 6/21/2015    Cocaine abuse     Depression     History of psychiatric care     HTN (hypertension) 1/25/2014    STEMI (ST elevation myocardial infarction) 2/12/2017    Stroke      "

## 2022-05-06 NOTE — ED PROVIDER NOTES
"Encounter date: 11:12 PM 05/06/2022    Source of History   Patient    Chief Complaint   Pt presents with:   Suicidal (Pt states he feels depressed and "hasn't been right". Pt states he feels like hurting himself. Pt states he has been thinking about hurting himself "for the last couple of days". )      History Of Present Illness   Damir Faria is a 66 y.o. male with history of PMHx of c-CHF with 40%EF, HTN, HLD, hx of CVA on eliquis, and depression STEMI in 2017, stroke, depression who presents to the ED with chief complaint of suicidal ideation x3 days.  Patient states that over the last 3 days he has had worsening depression and thoughts of self-harm.  He states he has not done anything to harm himself.  He has no physical complaints at this time specifically denies chest pain or shortness of breath.  He denies auditory or visual hallucinations.  He does state that he has used cocaine in the last 3 days.  He denies any dysuria recent falls or trauma or any other complaints at this time.      This is the extent to the patients complaints today here in the emergency department.    Review Of Systems   As per HPI and below:  Positive for:  Suicidal ideation  Constitutional: No fever.   HEENT: No voice change.  Eyes:  No visual disturbances. No eye pain.   Respiratory:  No shortness of breath. No wheezing. No cough.   Cardio:  No chest pain. No leg swelling. No palpitations.   GI:  No abdominal pain. No nausea or vomiting. No diarrhea.   :  No blood in urine. No difficulty urinating.   MSK:  No back pain. No neck pain.   Skin: No rash.   Neurologic: No headache. No dizziness.       Review of patient's allergies indicates:  No Known Allergies    No current facility-administered medications on file prior to encounter.     Current Outpatient Medications on File Prior to Encounter   Medication Sig Dispense Refill    apixaban (ELIQUIS) 5 mg Tab Take 1 tablet (5 mg total) by mouth 2 (two) times daily. 60 tablet 11    " aspirin 81 MG Chew Take 1 tablet (81 mg total) by mouth once daily. 30 tablet 0    atorvastatin (LIPITOR) 40 MG tablet Take 1 tablet (40 mg total) by mouth every evening. 30 tablet 00    carvedilol (COREG) 6.25 MG tablet Take 1 tablet (6.25 mg total) by mouth 2 (two) times daily. 60 tablet 0    dicyclomine (BENTYL) 20 mg tablet Take 1 tablet (20 mg total) by mouth 3 (three) times daily as needed (for abdominal pain). 30 tablet 0    lisinopril (PRINIVIL,ZESTRIL) 20 MG tablet Take 1 tablet (20 mg total) by mouth once daily. 30 tablet 0    mirtazapine (REMERON) 30 MG tablet Take 1 tablet (30 mg total) by mouth every evening. 30 tablet 0    spironolactone (ALDACTONE) 25 MG tablet Take 1 tablet (25 mg total) by mouth once daily. 30 tablet 0       Past History   As per HPI and below:  Past Medical History:   Diagnosis Date    Acute renal insufficiency 6/21/2015    Cocaine abuse     Depression     History of psychiatric care     HTN (hypertension) 1/25/2014    STEMI (ST elevation myocardial infarction) 2/12/2017    Stroke      Past Surgical History:   Procedure Laterality Date    CARDIAC SURGERY      stents    NECK SURGERY         Social History     Socioeconomic History    Marital status: Single   Occupational History     Employer: [email or web address]   Tobacco Use    Smoking status: Current Some Day Smoker     Packs/day: 0.25     Types: Cigarettes    Smokeless tobacco: Never Used   Substance and Sexual Activity    Alcohol use: Not Currently     Comment: social    Drug use: Not Currently     Types: Cocaine     Comment: this weekend    Sexual activity: Yes     Partners: Female     Birth control/protection: Condom   Other Topics Concern    Patient feels they ought to cut down on drinking/drug use No    Patient annoyed by others criticizing their drinking/drug use No    Patient has felt bad or guilty about drinking/drug use No    Patient has had a drink/used drugs as an eye opener in the AM No  "      Family History   Problem Relation Age of Onset    Diabetes Mother     Heart disease Mother     Hypertension Mother        Physical Exam     Vitals:    05/05/22 2306   BP: (!) 174/97   Pulse: 93   Resp: 16   Temp: 98.6 °F (37 °C)   TempSrc: Oral   SpO2: 100%   Weight: 81.6 kg (180 lb)   Height: 5' 11" (1.803 m)     Physical Exam:   Nursing note and vitals reviewed.  Appearance:  Well-appearing, nontoxic adult male in no acute respiratory distress.  Making purposeful movements.  Speaking in full sentences.  Skin: No rashes seen.  Good turgor.  No abrasions.  No ecchymoses.  Eyes: No conjunctival injection. EOMI, PERRL.  Head: NC/AT  ENT: Oropharynx clear.    Chest: CTAB. No increased work of breathing, bilateral chest rise.  Cardiovascular: Regular rate and rhythm.  Normal equal bilateral radial pulses.  Abdomen: Soft.  Not distended.  Nontender.  No guarding.  No rebound. No Masses  Musculoskeletal: Good range of motion all joints.  No deformities.  Neck supple, full range of motion, no obvious deformity.  Neurologic: Moves all extremities.  Normal sensation.  No facial droop.  Normal speech.    Mental Status:  Alert and oriented x 3.  Appropriate, conversant.      Results and Medications    Procedures    Labs Reviewed   URINALYSIS, REFLEX TO URINE CULTURE - Abnormal; Notable for the following components:       Result Value    Appearance, UA Hazy (*)     Protein, UA 1+ (*)     Ketones, UA Trace (*)     All other components within normal limits    Narrative:     Specimen Source->Urine   ACETAMINOPHEN LEVEL - Abnormal; Notable for the following components:    Acetaminophen (Tylenol), Serum <3.0 (*)     All other components within normal limits   COMPREHENSIVE METABOLIC PANEL - Abnormal; Notable for the following components:    CO2 21 (*)     Creatinine 1.8 (*)     Total Bilirubin 1.6 (*)     AST 43 (*)     eGFR if  44.0 (*)     eGFR if non  38.1 (*)     All other components " within normal limits   CBC W/ AUTO DIFFERENTIAL - Abnormal; Notable for the following components:    MCH 32.1 (*)     MPV 8.9 (*)     All other components within normal limits   DRUG SCREEN PANEL, URINE EMERGENCY - Abnormal; Notable for the following components:    Cocaine (Metab.) Presumptive Positive (*)     Creatinine, Urine 391.0 (*)     All other components within normal limits    Narrative:     Specimen Source->Urine   URINALYSIS MICROSCOPIC - Abnormal; Notable for the following components:    RBC, UA 5 (*)     Hyaline Casts, UA 31 (*)     All other components within normal limits    Narrative:     Specimen Source->Urine   ALCOHOL,MEDICAL (ETHANOL)   TSH   SARS-COV-2 RDRP GENE    Narrative:     This test utilizes isothermal nucleic acid amplification   technology to detect the SARS-CoV-2 RdRp nucleic acid segment.   The analytical sensitivity (limit of detection) is 125 genome   equivalents/mL.   A POSITIVE result implies infection with the SARS-CoV-2 virus;   the patient is presumed to be contagious.     A NEGATIVE result means that SARS-CoV-2 nucleic acids are not   present above the limit of detection. A NEGATIVE result should be   treated as presumptive. It does not rule out the possibility of   COVID-19 and should not be the sole basis for treatment decisions.   If COVID-19 is strongly suspected based on clinical and exposure   history, re-testing using an alternate molecular assay should be   considered.   This test is only for use under the Food and Drug   Administration s Emergency Use Authorization (EUA).   Commercial kits are provided by [employer].   Performance characteristics of the EUA have been independently   verified by [facility] Department of   Pathology and Laboratory Medicine.   _________________________________________________________________   The authorized Fact Sheet for Healthcare Providers and the authorized Fact   Sheet for Patients of the ID NOW COVID-19 are  available on the FDA   website:     https://www.fda.gov/media/985295/download  https://www.fda.gov/media/162572/download          POCT GLUCOSE   POCT GLUCOSE MONITORING CONTINUOUS       Imaging Results    None             Medications - No data to display    MDM, Impression and Plan   Previous Records:  I decided to obtain old medical records which showed:  History of suicidal ideation requiring PEC previously    Initial Assessment:   Urgent evaluation of 66 y.o. male with history as above who presents the ED with chief complaint of worsening depression and suicidal ideation for the last 3 days.  One hundred the ED he is afebrile, hemodynamically stable and in no acute respiratory distress.  Denies chest pain or shortness of breath on my exam.  Differential Diagnosis:   -Psychiatric disorder  -Electrolyte abnormality  -Metabolic abnormality  -Infection  -Drug intoxication  -Polypharmacy    Clinical Tests:   Lab Tests: Ordered and Reviewed  Radiological Study: Ordered and Reviewed  Medical Tests: Ordered and Reviewed    ED Management:    Patient PEC'd due to suicidal ideation.  Vital stable.  Labs reviewed.  No chest pain or shortness of breath.  He states he has done nothing to harm himself prior to coming in.  No physical complaints at this time.  Patient is medically cleared for transportation to inpatient psychiatry due to suicidal ideation.  Patient updated on plans.  Please see ED course for discussion of labs.              Final diagnoses:  [R45.851] Suicidal ideation (Primary)  [Z00.8] Medical clearance for psychiatric admission          ED Disposition Condition    Transfer to Psych Facility         ED Prescriptions     None        Follow-up Information    None         ED Course as of 05/06/22 0133   Fri May 06, 2022   0115 Creatinine(!): 1.8  Near baseline [HM]   0115 Anion Gap: 10 [HM]   0116 Hemoglobin: 14.9 [HM]   0116 WBC: 6.06 [HM]   0116 Alcohol, Serum: <10 [HM]   0116 Cocaine (Metab.)(!): Presumptive  Positive [HM]      ED Course User Index  [HM] MD Gerardo William MD   Emergency Resident   [phone] (spectra)    Please note that this dictation was completed with computer voicerecognition software.  Quite often unanticipated grammatical, syntax, homophones, and other interpretive errors are inadvertently transcribed by the computer software.  Please disregard these errors.  Please excuse any errors that have escaped final proofreading.       Gerardo Rodriges MD  Resident  05/06/22 0134

## 2022-05-06 NOTE — ED NOTES
Patients belongings;    Brown crocs  Black pants   Green shirt    Baseball cap  Blue backpack  Small brown suitcase

## 2022-06-05 ENCOUNTER — HOSPITAL ENCOUNTER (INPATIENT)
Facility: HOSPITAL | Age: 67
LOS: 2 days | Discharge: HOME | DRG: 557 | End: 2022-06-07
Attending: EMERGENCY MEDICINE | Admitting: STUDENT IN AN ORGANIZED HEALTH CARE EDUCATION/TRAINING PROGRAM
Payer: MEDICARE

## 2022-06-05 DIAGNOSIS — R09.89 PULSE IRREGULARITY: ICD-10-CM

## 2022-06-05 DIAGNOSIS — R40.4 TRANSIENT ALTERATION OF AWARENESS: Primary | ICD-10-CM

## 2022-06-05 DIAGNOSIS — M62.82 NON-TRAUMATIC RHABDOMYOLYSIS: ICD-10-CM

## 2022-06-05 DIAGNOSIS — R07.9 CHEST PAIN: ICD-10-CM

## 2022-06-05 DIAGNOSIS — N17.9 AKI (ACUTE KIDNEY INJURY): ICD-10-CM

## 2022-06-05 DIAGNOSIS — I50.9 CHF (CONGESTIVE HEART FAILURE): ICD-10-CM

## 2022-06-05 PROBLEM — I82.90: Status: ACTIVE | Noted: 2022-06-05

## 2022-06-05 PROBLEM — F33.41 RECURRENT MAJOR DEPRESSIVE DISORDER, IN PARTIAL REMISSION: Status: ACTIVE | Noted: 2022-06-05

## 2022-06-05 PROBLEM — Z91.148 NONCOMPLIANCE WITH MEDICATION REGIMEN: Status: ACTIVE | Noted: 2019-11-05

## 2022-06-05 PROBLEM — F14.20 COCAINE USE DISORDER, SEVERE, DEPENDENCE: Status: ACTIVE | Noted: 2020-01-09

## 2022-06-05 PROBLEM — I50.42 CHRONIC COMBINED SYSTOLIC AND DIASTOLIC HEART FAILURE: Status: ACTIVE | Noted: 2022-06-05

## 2022-06-05 PROBLEM — N18.9 CHRONIC RENAL INSUFFICIENCY: Status: ACTIVE | Noted: 2019-07-07

## 2022-06-05 LAB
ALBUMIN SERPL BCP-MCNC: 4.1 G/DL (ref 3.5–5.2)
ALP SERPL-CCNC: 78 U/L (ref 55–135)
ALT SERPL W/O P-5'-P-CCNC: 48 U/L (ref 10–44)
AMPHET+METHAMPHET UR QL: NEGATIVE
ANION GAP SERPL CALC-SCNC: 16 MMOL/L (ref 8–16)
APAP SERPL-MCNC: <3 UG/ML (ref 10–20)
AST SERPL-CCNC: 90 U/L (ref 10–40)
BARBITURATES UR QL SCN>200 NG/ML: NEGATIVE
BASOPHILS # BLD AUTO: 0.02 K/UL (ref 0–0.2)
BASOPHILS NFR BLD: 0.3 % (ref 0–1.9)
BENZODIAZ UR QL SCN>200 NG/ML: NEGATIVE
BILIRUB SERPL-MCNC: 2 MG/DL (ref 0.1–1)
BILIRUB UR QL STRIP: NEGATIVE
BNP SERPL-MCNC: 78 PG/ML (ref 0–99)
BUN SERPL-MCNC: 38 MG/DL (ref 8–23)
BZE UR QL SCN: ABNORMAL
CALCIUM SERPL-MCNC: 9.3 MG/DL (ref 8.7–10.5)
CANNABINOIDS UR QL SCN: NEGATIVE
CHLORIDE SERPL-SCNC: 105 MMOL/L (ref 95–110)
CK SERPL-CCNC: 4460 U/L (ref 20–200)
CLARITY UR REFRACT.AUTO: CLEAR
CO2 SERPL-SCNC: 17 MMOL/L (ref 23–29)
COLOR UR AUTO: YELLOW
CREAT SERPL-MCNC: 2.4 MG/DL (ref 0.5–1.4)
CREAT UR-MCNC: 203 MG/DL (ref 23–375)
CTP QC/QA: YES
DIFFERENTIAL METHOD: ABNORMAL
EOSINOPHIL # BLD AUTO: 0.3 K/UL (ref 0–0.5)
EOSINOPHIL NFR BLD: 4.4 % (ref 0–8)
ERYTHROCYTE [DISTWIDTH] IN BLOOD BY AUTOMATED COUNT: 13.1 % (ref 11.5–14.5)
EST. GFR  (AFRICAN AMERICAN): 30.9 ML/MIN/1.73 M^2
EST. GFR  (NON AFRICAN AMERICAN): 26.7 ML/MIN/1.73 M^2
ETHANOL SERPL-MCNC: <10 MG/DL
GLUCOSE SERPL-MCNC: 95 MG/DL (ref 70–110)
GLUCOSE UR QL STRIP: NEGATIVE
HCT VFR BLD AUTO: 45.5 % (ref 40–54)
HGB BLD-MCNC: 16 G/DL (ref 14–18)
HGB UR QL STRIP: ABNORMAL
IMM GRANULOCYTES # BLD AUTO: 0.02 K/UL (ref 0–0.04)
IMM GRANULOCYTES NFR BLD AUTO: 0.3 % (ref 0–0.5)
KETONES UR QL STRIP: NEGATIVE
LEUKOCYTE ESTERASE UR QL STRIP: NEGATIVE
LYMPHOCYTES # BLD AUTO: 2 K/UL (ref 1–4.8)
LYMPHOCYTES NFR BLD: 34.2 % (ref 18–48)
MAGNESIUM SERPL-MCNC: 2.2 MG/DL (ref 1.6–2.6)
MCH RBC QN AUTO: 31.9 PG (ref 27–31)
MCHC RBC AUTO-ENTMCNC: 35.2 G/DL (ref 32–36)
MCV RBC AUTO: 91 FL (ref 82–98)
METHADONE UR QL SCN>300 NG/ML: NEGATIVE
MONOCYTES # BLD AUTO: 1 K/UL (ref 0.3–1)
MONOCYTES NFR BLD: 15.9 % (ref 4–15)
NEUTROPHILS # BLD AUTO: 2.7 K/UL (ref 1.8–7.7)
NEUTROPHILS NFR BLD: 44.9 % (ref 38–73)
NITRITE UR QL STRIP: NEGATIVE
NRBC BLD-RTO: 0 /100 WBC
OPIATES UR QL SCN: NEGATIVE
PCP UR QL SCN>25 NG/ML: NEGATIVE
PH UR STRIP: 6 [PH] (ref 5–8)
PHOSPHATE SERPL-MCNC: 3.8 MG/DL (ref 2.7–4.5)
PLATELET # BLD AUTO: 246 K/UL (ref 150–450)
PMV BLD AUTO: 9.1 FL (ref 9.2–12.9)
POTASSIUM SERPL-SCNC: 3.6 MMOL/L (ref 3.5–5.1)
PROT SERPL-MCNC: 7.7 G/DL (ref 6–8.4)
PROT UR QL STRIP: NEGATIVE
RBC # BLD AUTO: 5.01 M/UL (ref 4.6–6.2)
SARS-COV-2 RDRP RESP QL NAA+PROBE: NEGATIVE
SODIUM SERPL-SCNC: 138 MMOL/L (ref 136–145)
SP GR UR STRIP: 1.02 (ref 1–1.03)
TOXICOLOGY INFORMATION: ABNORMAL
TROPONIN I SERPL DL<=0.01 NG/ML-MCNC: 0.02 NG/ML (ref 0–0.03)
TSH SERPL DL<=0.005 MIU/L-ACNC: 0.55 UIU/ML (ref 0.4–4)
URN SPEC COLLECT METH UR: ABNORMAL
WBC # BLD AUTO: 5.97 K/UL (ref 3.9–12.7)

## 2022-06-05 PROCEDURE — 99223 1ST HOSP IP/OBS HIGH 75: CPT | Mod: AI,,, | Performed by: STUDENT IN AN ORGANIZED HEALTH CARE EDUCATION/TRAINING PROGRAM

## 2022-06-05 PROCEDURE — 25000003 PHARM REV CODE 250: Performed by: STUDENT IN AN ORGANIZED HEALTH CARE EDUCATION/TRAINING PROGRAM

## 2022-06-05 PROCEDURE — 84443 ASSAY THYROID STIM HORMONE: CPT | Performed by: STUDENT IN AN ORGANIZED HEALTH CARE EDUCATION/TRAINING PROGRAM

## 2022-06-05 PROCEDURE — 96360 HYDRATION IV INFUSION INIT: CPT

## 2022-06-05 PROCEDURE — 82077 ASSAY SPEC XCP UR&BREATH IA: CPT | Performed by: STUDENT IN AN ORGANIZED HEALTH CARE EDUCATION/TRAINING PROGRAM

## 2022-06-05 PROCEDURE — 84484 ASSAY OF TROPONIN QUANT: CPT | Performed by: STUDENT IN AN ORGANIZED HEALTH CARE EDUCATION/TRAINING PROGRAM

## 2022-06-05 PROCEDURE — 93010 ELECTROCARDIOGRAM REPORT: CPT | Mod: ,,, | Performed by: INTERNAL MEDICINE

## 2022-06-05 PROCEDURE — 80307 DRUG TEST PRSMV CHEM ANLYZR: CPT | Performed by: STUDENT IN AN ORGANIZED HEALTH CARE EDUCATION/TRAINING PROGRAM

## 2022-06-05 PROCEDURE — 1158F PR ADVANCE CARE PLANNING DISCUSS DOCUMENTED IN MEDICAL RECORD: ICD-10-PCS | Mod: CPTII,,, | Performed by: STUDENT IN AN ORGANIZED HEALTH CARE EDUCATION/TRAINING PROGRAM

## 2022-06-05 PROCEDURE — 1158F ADVNC CARE PLAN TLK DOCD: CPT | Mod: CPTII,,, | Performed by: STUDENT IN AN ORGANIZED HEALTH CARE EDUCATION/TRAINING PROGRAM

## 2022-06-05 PROCEDURE — 83880 ASSAY OF NATRIURETIC PEPTIDE: CPT | Performed by: STUDENT IN AN ORGANIZED HEALTH CARE EDUCATION/TRAINING PROGRAM

## 2022-06-05 PROCEDURE — 84100 ASSAY OF PHOSPHORUS: CPT | Performed by: STUDENT IN AN ORGANIZED HEALTH CARE EDUCATION/TRAINING PROGRAM

## 2022-06-05 PROCEDURE — 99285 EMERGENCY DEPT VISIT HI MDM: CPT | Mod: 25

## 2022-06-05 PROCEDURE — 12000002 HC ACUTE/MED SURGE SEMI-PRIVATE ROOM

## 2022-06-05 PROCEDURE — 99223 PR INITIAL HOSPITAL CARE,LEVL III: ICD-10-PCS | Mod: AI,,, | Performed by: STUDENT IN AN ORGANIZED HEALTH CARE EDUCATION/TRAINING PROGRAM

## 2022-06-05 PROCEDURE — 93010 EKG 12-LEAD: ICD-10-PCS | Mod: ,,, | Performed by: INTERNAL MEDICINE

## 2022-06-05 PROCEDURE — 63600175 PHARM REV CODE 636 W HCPCS: Performed by: STUDENT IN AN ORGANIZED HEALTH CARE EDUCATION/TRAINING PROGRAM

## 2022-06-05 PROCEDURE — 80143 DRUG ASSAY ACETAMINOPHEN: CPT | Performed by: STUDENT IN AN ORGANIZED HEALTH CARE EDUCATION/TRAINING PROGRAM

## 2022-06-05 PROCEDURE — 85025 COMPLETE CBC W/AUTO DIFF WBC: CPT | Performed by: STUDENT IN AN ORGANIZED HEALTH CARE EDUCATION/TRAINING PROGRAM

## 2022-06-05 PROCEDURE — 83735 ASSAY OF MAGNESIUM: CPT | Performed by: STUDENT IN AN ORGANIZED HEALTH CARE EDUCATION/TRAINING PROGRAM

## 2022-06-05 PROCEDURE — 93005 ELECTROCARDIOGRAM TRACING: CPT

## 2022-06-05 PROCEDURE — U0002 COVID-19 LAB TEST NON-CDC: HCPCS | Performed by: STUDENT IN AN ORGANIZED HEALTH CARE EDUCATION/TRAINING PROGRAM

## 2022-06-05 PROCEDURE — 81003 URINALYSIS AUTO W/O SCOPE: CPT | Mod: 59 | Performed by: STUDENT IN AN ORGANIZED HEALTH CARE EDUCATION/TRAINING PROGRAM

## 2022-06-05 PROCEDURE — 80053 COMPREHEN METABOLIC PANEL: CPT | Performed by: STUDENT IN AN ORGANIZED HEALTH CARE EDUCATION/TRAINING PROGRAM

## 2022-06-05 PROCEDURE — 99285 PR EMERGENCY DEPT VISIT,LEVEL V: ICD-10-PCS | Mod: GC,CS,, | Performed by: EMERGENCY MEDICINE

## 2022-06-05 PROCEDURE — 99285 EMERGENCY DEPT VISIT HI MDM: CPT | Mod: GC,CS,, | Performed by: EMERGENCY MEDICINE

## 2022-06-05 PROCEDURE — 82550 ASSAY OF CK (CPK): CPT | Performed by: STUDENT IN AN ORGANIZED HEALTH CARE EDUCATION/TRAINING PROGRAM

## 2022-06-05 RX ORDER — TALC
6 POWDER (GRAM) TOPICAL NIGHTLY PRN
Status: DISCONTINUED | OUTPATIENT
Start: 2022-06-05 | End: 2022-06-07 | Stop reason: HOSPADM

## 2022-06-05 RX ORDER — ATORVASTATIN CALCIUM 20 MG/1
40 TABLET, FILM COATED ORAL NIGHTLY
Status: DISCONTINUED | OUTPATIENT
Start: 2022-06-05 | End: 2022-06-07 | Stop reason: HOSPADM

## 2022-06-05 RX ORDER — ONDANSETRON 2 MG/ML
4 INJECTION INTRAMUSCULAR; INTRAVENOUS EVERY 8 HOURS PRN
Status: DISCONTINUED | OUTPATIENT
Start: 2022-06-05 | End: 2022-06-07 | Stop reason: HOSPADM

## 2022-06-05 RX ORDER — IPRATROPIUM BROMIDE AND ALBUTEROL SULFATE 2.5; .5 MG/3ML; MG/3ML
3 SOLUTION RESPIRATORY (INHALATION) EVERY 6 HOURS PRN
Status: DISCONTINUED | OUTPATIENT
Start: 2022-06-05 | End: 2022-06-07 | Stop reason: HOSPADM

## 2022-06-05 RX ORDER — POLYETHYLENE GLYCOL 3350 17 G/17G
17 POWDER, FOR SOLUTION ORAL DAILY PRN
Status: DISCONTINUED | OUTPATIENT
Start: 2022-06-05 | End: 2022-06-07 | Stop reason: HOSPADM

## 2022-06-05 RX ORDER — PROCHLORPERAZINE EDISYLATE 5 MG/ML
5 INJECTION INTRAMUSCULAR; INTRAVENOUS EVERY 6 HOURS PRN
Status: DISCONTINUED | OUTPATIENT
Start: 2022-06-05 | End: 2022-06-07 | Stop reason: HOSPADM

## 2022-06-05 RX ORDER — LISINOPRIL 20 MG/1
20 TABLET ORAL DAILY
Status: DISCONTINUED | OUTPATIENT
Start: 2022-06-06 | End: 2022-06-07 | Stop reason: HOSPADM

## 2022-06-05 RX ORDER — SIMETHICONE 80 MG
1 TABLET,CHEWABLE ORAL 4 TIMES DAILY PRN
Status: DISCONTINUED | OUTPATIENT
Start: 2022-06-05 | End: 2022-06-07 | Stop reason: HOSPADM

## 2022-06-05 RX ORDER — ACETAMINOPHEN 325 MG/1
650 TABLET ORAL EVERY 4 HOURS PRN
Status: DISCONTINUED | OUTPATIENT
Start: 2022-06-05 | End: 2022-06-07 | Stop reason: HOSPADM

## 2022-06-05 RX ORDER — MAG HYDROX/ALUMINUM HYD/SIMETH 200-200-20
30 SUSPENSION, ORAL (FINAL DOSE FORM) ORAL 4 TIMES DAILY PRN
Status: DISCONTINUED | OUTPATIENT
Start: 2022-06-05 | End: 2022-06-07 | Stop reason: HOSPADM

## 2022-06-05 RX ORDER — CARVEDILOL 6.25 MG/1
6.25 TABLET ORAL 2 TIMES DAILY
Status: DISCONTINUED | OUTPATIENT
Start: 2022-06-05 | End: 2022-06-07 | Stop reason: HOSPADM

## 2022-06-05 RX ORDER — SODIUM CHLORIDE, SODIUM LACTATE, POTASSIUM CHLORIDE, CALCIUM CHLORIDE 600; 310; 30; 20 MG/100ML; MG/100ML; MG/100ML; MG/100ML
INJECTION, SOLUTION INTRAVENOUS CONTINUOUS
Status: DISCONTINUED | OUTPATIENT
Start: 2022-06-05 | End: 2022-06-07 | Stop reason: HOSPADM

## 2022-06-05 RX ORDER — SPIRONOLACTONE 25 MG/1
25 TABLET ORAL DAILY
Status: DISCONTINUED | OUTPATIENT
Start: 2022-06-06 | End: 2022-06-07 | Stop reason: HOSPADM

## 2022-06-05 RX ORDER — SODIUM CHLORIDE 0.9 % (FLUSH) 0.9 %
10 SYRINGE (ML) INJECTION EVERY 8 HOURS
Status: DISCONTINUED | OUTPATIENT
Start: 2022-06-05 | End: 2022-06-07 | Stop reason: HOSPADM

## 2022-06-05 RX ORDER — NALOXONE HCL 0.4 MG/ML
0.02 VIAL (ML) INJECTION
Status: DISCONTINUED | OUTPATIENT
Start: 2022-06-05 | End: 2022-06-07 | Stop reason: HOSPADM

## 2022-06-05 RX ORDER — NAPROXEN SODIUM 220 MG/1
81 TABLET, FILM COATED ORAL DAILY
Status: DISCONTINUED | OUTPATIENT
Start: 2022-06-06 | End: 2022-06-07 | Stop reason: HOSPADM

## 2022-06-05 RX ORDER — MIRTAZAPINE 15 MG/1
30 TABLET, FILM COATED ORAL NIGHTLY
Status: DISCONTINUED | OUTPATIENT
Start: 2022-06-05 | End: 2022-06-07 | Stop reason: HOSPADM

## 2022-06-05 RX ADMIN — ATORVASTATIN CALCIUM 40 MG: 40 TABLET, FILM COATED ORAL at 09:06

## 2022-06-05 RX ADMIN — MIRTAZAPINE 30 MG: 30 TABLET, FILM COATED ORAL at 09:06

## 2022-06-05 RX ADMIN — CARVEDILOL 6.25 MG: 6.25 TABLET, FILM COATED ORAL at 09:06

## 2022-06-05 RX ADMIN — SODIUM CHLORIDE, SODIUM LACTATE, POTASSIUM CHLORIDE, AND CALCIUM CHLORIDE: .6; .31; .03; .02 INJECTION, SOLUTION INTRAVENOUS at 05:06

## 2022-06-05 RX ADMIN — APIXABAN 5 MG: 5 TABLET, FILM COATED ORAL at 09:06

## 2022-06-05 RX ADMIN — SODIUM CHLORIDE 1000 ML: 0.9 INJECTION, SOLUTION INTRAVENOUS at 02:06

## 2022-06-05 NOTE — ACP (ADVANCE CARE PLANNING)
[facility] Code Status Documentation Note     Spoke with Mr. Faria regarding their end of life intentions and goals of care. Discussed code status and explained differences between full code and DNR, and answered all questions to the pt's satisfaction.      At this time, pt desires to be full code. Order has been placed in chart to reflect their wishes.     Jeff Cameron MD  Attending Physician  Department of [facility]  [employer] secure chat preferred, or ext. [phone]  6/5/2022        Advance Care Planning     Date: 06/05/2022    Code Status  I engaged the the patient in a conversation about the patient's preferences for care  at the very end of life. The patient wishes to CPR or other invasive treatments performed when his heart and/or breathing stops. I communicated to the patient that a full code order would be placed in his medical record to reflect this preference.  I spent a total of 15 minutes engaging the patient in this advance care planning discussion.
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
## 2022-06-05 NOTE — ASSESSMENT & PLAN NOTE
- Cr 2.4 at admission; baseline WNL, though does appear to have some degree of renal insufficiency based on prior labs  - UA and urine studies ordered, pending  - IVF LR at 100cc/hr provided  - Renally dosing all medications  - Avoid nephrotoxins  - Will continue to monitor on daily labs

## 2022-06-05 NOTE — ASSESSMENT & PLAN NOTE
- Asymptomatic, not in acute exacerbation  - EF 40% with G1DD per echo November 2019  - IVF as above, cautious for volume overload  - Continue home cardioprudent regimen  - Will continue to monitor on tele

## 2022-06-05 NOTE — PLAN OF CARE
[facility] Code Status Documentation Note    Spoke with Mr. Faria regarding their end of life intentions and goals of care. Discussed code status and explained differences between full code and DNR, and answered all questions to the pt's satisfaction.     At this time, pt desires to be full code. Order has been placed in chart to reflect their wishes.    Jeff Cameron MD  Attending Physician  Department of [facility]  [employer] secure chat preferred, or ext. [phone]  6/5/2022      Advance Care Planning     Date: 06/05/2022    Code Status  I engaged the the patient in a conversation about the patient's preferences for care  at the very end of life. The patient wishes to have CPR or other invasive treatments performed when his heart and/or breathing stops. I communicated to the patient that a full code order would be placed in his medical record to reflect this preference.  I spent a total of 15 minutes engaging the patient in this advance care planning discussion.

## 2022-06-05 NOTE — ASSESSMENT & PLAN NOTE
"- Pt states he had a "blood clot in the heart or chest"  - Currently uncomplicated  - Continue home eliquis regimen  "
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
## 2022-06-05 NOTE — ASSESSMENT & PLAN NOTE
- Pt with 0.25-0.50 ppd smoking history for most of adult life  - Currently without plans to quit  - Counseled on the importance of smoking cessation in relation to health
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
## 2022-06-05 NOTE — ASSESSMENT & PLAN NOTE
- Working to optimize BP control   - Continue home cardioprudent regimen  - Will continue to monitor and further titrate antihypertensives as clinically indicated

## 2022-06-05 NOTE — SUBJECTIVE & OBJECTIVE
Past Medical History:   Diagnosis Date    Acute renal insufficiency 6/21/2015    Cocaine abuse     Depression     History of psychiatric care     HTN (hypertension) 1/25/2014    STEMI (ST elevation myocardial infarction) 2/12/2017    Stroke        Past Surgical History:   Procedure Laterality Date    CARDIAC SURGERY      stents    NECK SURGERY         Review of patient's allergies indicates:  No Known Allergies    No current facility-administered medications on file prior to encounter.     Current Outpatient Medications on File Prior to Encounter   Medication Sig    apixaban (ELIQUIS) 5 mg Tab Take 1 tablet (5 mg total) by mouth 2 (two) times daily.    aspirin 81 MG Chew Take 1 tablet (81 mg total) by mouth once daily.    atorvastatin (LIPITOR) 40 MG tablet Take 1 tablet (40 mg total) by mouth every evening.    carvedilol (COREG) 6.25 MG tablet Take 1 tablet (6.25 mg total) by mouth 2 (two) times daily.    dicyclomine (BENTYL) 20 mg tablet Take 1 tablet (20 mg total) by mouth 3 (three) times daily as needed (for abdominal pain).    lisinopril (PRINIVIL,ZESTRIL) 20 MG tablet Take 1 tablet (20 mg total) by mouth once daily.    mirtazapine (REMERON) 30 MG tablet Take 1 tablet (30 mg total) by mouth every evening.    spironolactone (ALDACTONE) 25 MG tablet Take 1 tablet (25 mg total) by mouth once daily.     Family History       Problem Relation (Age of Onset)    Diabetes Mother    Heart disease Mother    Hypertension Mother          Tobacco Use    Smoking status: Current Some Day Smoker     Packs/day: 0.25     Types: Cigarettes    Smokeless tobacco: Never Used   Substance and Sexual Activity    Alcohol use: Not Currently     Comment: social    Drug use: Not Currently     Types: Cocaine     Comment: this weekend    Sexual activity: Yes     Partners: Female     Birth control/protection: Condom       ROS  General ROS: See HPI  ENT ROS: negative for epistaxis, headaches or sinus pain  Ophthalmic ROS: negative for blurry  vision, decreased vision, double vision or itchy eyes  Cardiovascular ROS: negative for chest pain or dyspnea on exertion  Respiratory ROS: negative for cough, shortness of breath, or wheezing  Gastrointestinal ROS: negative for abdominal pain, change in bowel habits, black or bloody stools, nausea, emesis, or bloating  Genito-Urinary ROS: negative for dysuria, trouble voiding, or hematuria  Neurological ROS: negative for TIA or stroke symptoms  Endocrine ROS: negative for hair pattern changes or unexpected weight changes  Musculoskeletal ROS: negative for muscle pain, weakness, joint pain or joint swelling  Skin: negative for rash, wounds, skin breakdown, or issues otherwise  Hematological and Lymphatic ROS: negative for bleeding, bruising, swollen lymph nodes  Psychological ROS: negative for anxiety or depression      Objective:     Vital Signs (Most Recent):  Temp: 98.6 °F (37 °C) (06/05/22 1149)  Pulse: 84 (06/05/22 1358)  Resp: 16 (06/05/22 1302)  BP: 139/78 (06/05/22 1358)  SpO2: 97 % (06/05/22 1358) Vital Signs (24h Range):  Temp:  [98.6 °F (37 °C)] 98.6 °F (37 °C)  Pulse:  [77-94] 84  Resp:  [15-19] 16  SpO2:  [95 %-99 %] 97 %  BP: (130-139)/(77-86) 139/78        There is no height or weight on file to calculate BMI.      Physical Exam  Gen: in NAD, appears stated age  Neuro: AAOx4, CN2-12 grossly intact BL; motor, sensory, and strength grossly intact BL  HEENT: NTNC, EOMI, PERRLA, MMM; no thyromegaly or lymphadenopathy; no JVD appreciated  CVS: RRR, no m/r/g; S1/S2 auscultated with no S3 or S4; capillary refill < 2 sec  Resp: lungs CTAB, no w/r/r; no belabored breathing or accessory muscle use appreciated   Abd: BS+ in all 4 quadrants; NTND, soft to palpation; no organomegaly appreciated   Extrem: pulses full, equal, and regular over all 4 extremities; no UE or LE edema BL       Significant Labs: All pertinent labs within the past 24 hours have been reviewed.    Significant Imaging: I have reviewed all  pertinent imaging results/findings within the past 24 hours.

## 2022-06-05 NOTE — ASSESSMENT & PLAN NOTE
- Last admits use 3 days prior, denies any other recreational drug use  - UDS pending  - Counseled on cessation
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
## 2022-06-05 NOTE — ASSESSMENT & PLAN NOTE
- Interval history and physical exam findings as described above  - CK 4460 on admission, also with CARTER as below  - Likely related to ongoing drug use  - IVF LR at 100cc/hr: cautious fluids given known HFrEF history  - Monitoring CK on daily labs

## 2022-06-05 NOTE — ASSESSMENT & PLAN NOTE
- Remains grossly asymptomatic  - Continue home cardioprudent regimen  - Will continue to monitor on tele   EMT / Paramedic

## 2022-06-06 LAB
ALBUMIN SERPL BCP-MCNC: 3.4 G/DL (ref 3.5–5.2)
ALP SERPL-CCNC: 67 U/L (ref 55–135)
ALT SERPL W/O P-5'-P-CCNC: 34 U/L (ref 10–44)
ANION GAP SERPL CALC-SCNC: 11 MMOL/L (ref 8–16)
AST SERPL-CCNC: 55 U/L (ref 10–40)
BASOPHILS # BLD AUTO: 0.02 K/UL (ref 0–0.2)
BASOPHILS NFR BLD: 0.4 % (ref 0–1.9)
BILIRUB SERPL-MCNC: 1.3 MG/DL (ref 0.1–1)
BUN SERPL-MCNC: 29 MG/DL (ref 8–23)
CALCIUM SERPL-MCNC: 8.9 MG/DL (ref 8.7–10.5)
CHLORIDE SERPL-SCNC: 110 MMOL/L (ref 95–110)
CK SERPL-CCNC: 2401 U/L (ref 20–200)
CO2 SERPL-SCNC: 18 MMOL/L (ref 23–29)
CREAT SERPL-MCNC: 1.5 MG/DL (ref 0.5–1.4)
DIFFERENTIAL METHOD: ABNORMAL
EOSINOPHIL # BLD AUTO: 0.3 K/UL (ref 0–0.5)
EOSINOPHIL NFR BLD: 5.4 % (ref 0–8)
ERYTHROCYTE [DISTWIDTH] IN BLOOD BY AUTOMATED COUNT: 12.9 % (ref 11.5–14.5)
EST. GFR  (AFRICAN AMERICAN): 54.5 ML/MIN/1.73 M^2
EST. GFR  (NON AFRICAN AMERICAN): 47.2 ML/MIN/1.73 M^2
GLUCOSE SERPL-MCNC: 100 MG/DL (ref 70–110)
HCT VFR BLD AUTO: 44 % (ref 40–54)
HGB BLD-MCNC: 14.9 G/DL (ref 14–18)
IMM GRANULOCYTES # BLD AUTO: 0.01 K/UL (ref 0–0.04)
IMM GRANULOCYTES NFR BLD AUTO: 0.2 % (ref 0–0.5)
LYMPHOCYTES # BLD AUTO: 2.4 K/UL (ref 1–4.8)
LYMPHOCYTES NFR BLD: 46.7 % (ref 18–48)
MAGNESIUM SERPL-MCNC: 1.9 MG/DL (ref 1.6–2.6)
MCH RBC QN AUTO: 31.8 PG (ref 27–31)
MCHC RBC AUTO-ENTMCNC: 33.9 G/DL (ref 32–36)
MCV RBC AUTO: 94 FL (ref 82–98)
MONOCYTES # BLD AUTO: 0.9 K/UL (ref 0.3–1)
MONOCYTES NFR BLD: 16.7 % (ref 4–15)
NEUTROPHILS # BLD AUTO: 1.6 K/UL (ref 1.8–7.7)
NEUTROPHILS NFR BLD: 30.6 % (ref 38–73)
NRBC BLD-RTO: 0 /100 WBC
PHOSPHATE SERPL-MCNC: 3.2 MG/DL (ref 2.7–4.5)
PLATELET # BLD AUTO: 207 K/UL (ref 150–450)
PMV BLD AUTO: 9 FL (ref 9.2–12.9)
POTASSIUM SERPL-SCNC: 3.9 MMOL/L (ref 3.5–5.1)
PROT SERPL-MCNC: 6.3 G/DL (ref 6–8.4)
RBC # BLD AUTO: 4.68 M/UL (ref 4.6–6.2)
SODIUM SERPL-SCNC: 139 MMOL/L (ref 136–145)
WBC # BLD AUTO: 5.16 K/UL (ref 3.9–12.7)

## 2022-06-06 PROCEDURE — A4216 STERILE WATER/SALINE, 10 ML: HCPCS | Performed by: STUDENT IN AN ORGANIZED HEALTH CARE EDUCATION/TRAINING PROGRAM

## 2022-06-06 PROCEDURE — 25000003 PHARM REV CODE 250: Performed by: STUDENT IN AN ORGANIZED HEALTH CARE EDUCATION/TRAINING PROGRAM

## 2022-06-06 PROCEDURE — 12000002 HC ACUTE/MED SURGE SEMI-PRIVATE ROOM

## 2022-06-06 PROCEDURE — 36415 COLL VENOUS BLD VENIPUNCTURE: CPT | Performed by: STUDENT IN AN ORGANIZED HEALTH CARE EDUCATION/TRAINING PROGRAM

## 2022-06-06 PROCEDURE — 99232 SBSQ HOSP IP/OBS MODERATE 35: CPT | Mod: ,,, | Performed by: STUDENT IN AN ORGANIZED HEALTH CARE EDUCATION/TRAINING PROGRAM

## 2022-06-06 PROCEDURE — 80053 COMPREHEN METABOLIC PANEL: CPT | Performed by: STUDENT IN AN ORGANIZED HEALTH CARE EDUCATION/TRAINING PROGRAM

## 2022-06-06 PROCEDURE — 82550 ASSAY OF CK (CPK): CPT | Performed by: STUDENT IN AN ORGANIZED HEALTH CARE EDUCATION/TRAINING PROGRAM

## 2022-06-06 PROCEDURE — 85025 COMPLETE CBC W/AUTO DIFF WBC: CPT | Performed by: STUDENT IN AN ORGANIZED HEALTH CARE EDUCATION/TRAINING PROGRAM

## 2022-06-06 PROCEDURE — 84100 ASSAY OF PHOSPHORUS: CPT | Performed by: STUDENT IN AN ORGANIZED HEALTH CARE EDUCATION/TRAINING PROGRAM

## 2022-06-06 PROCEDURE — 83735 ASSAY OF MAGNESIUM: CPT | Performed by: STUDENT IN AN ORGANIZED HEALTH CARE EDUCATION/TRAINING PROGRAM

## 2022-06-06 PROCEDURE — 99232 PR SUBSEQUENT HOSPITAL CARE,LEVL II: ICD-10-PCS | Mod: ,,, | Performed by: STUDENT IN AN ORGANIZED HEALTH CARE EDUCATION/TRAINING PROGRAM

## 2022-06-06 PROCEDURE — 63600175 PHARM REV CODE 636 W HCPCS: Performed by: STUDENT IN AN ORGANIZED HEALTH CARE EDUCATION/TRAINING PROGRAM

## 2022-06-06 RX ADMIN — SODIUM CHLORIDE, SODIUM LACTATE, POTASSIUM CHLORIDE, AND CALCIUM CHLORIDE: .6; .31; .03; .02 INJECTION, SOLUTION INTRAVENOUS at 11:06

## 2022-06-06 RX ADMIN — LISINOPRIL 20 MG: 20 TABLET ORAL at 08:06

## 2022-06-06 RX ADMIN — CARVEDILOL 6.25 MG: 6.25 TABLET, FILM COATED ORAL at 08:06

## 2022-06-06 RX ADMIN — Medication 10 ML: at 09:06

## 2022-06-06 RX ADMIN — ATORVASTATIN CALCIUM 40 MG: 40 TABLET, FILM COATED ORAL at 09:06

## 2022-06-06 RX ADMIN — CARVEDILOL 6.25 MG: 6.25 TABLET, FILM COATED ORAL at 09:06

## 2022-06-06 RX ADMIN — SPIRONOLACTONE 25 MG: 25 TABLET, FILM COATED ORAL at 08:06

## 2022-06-06 RX ADMIN — SODIUM CHLORIDE, SODIUM LACTATE, POTASSIUM CHLORIDE, AND CALCIUM CHLORIDE: .6; .31; .03; .02 INJECTION, SOLUTION INTRAVENOUS at 02:06

## 2022-06-06 RX ADMIN — SODIUM CHLORIDE, SODIUM LACTATE, POTASSIUM CHLORIDE, AND CALCIUM CHLORIDE: .6; .31; .03; .02 INJECTION, SOLUTION INTRAVENOUS at 04:06

## 2022-06-06 RX ADMIN — ASPIRIN 81 MG CHEWABLE TABLET 81 MG: 81 TABLET CHEWABLE at 08:06

## 2022-06-06 RX ADMIN — MIRTAZAPINE 30 MG: 30 TABLET, FILM COATED ORAL at 09:06

## 2022-06-06 RX ADMIN — APIXABAN 5 MG: 5 TABLET, FILM COATED ORAL at 08:06

## 2022-06-06 RX ADMIN — Medication 10 ML: at 02:06

## 2022-06-06 RX ADMIN — APIXABAN 5 MG: 5 TABLET, FILM COATED ORAL at 09:06

## 2022-06-06 NOTE — ASSESSMENT & PLAN NOTE
- Interval history and physical exam findings as described above  - CK 4460 on admission, now downtrending  - CARTER improving as below  - Likely related to ongoing drug use  - Continue IVF LR at 100cc/hr: cautious fluids given known HFrEF history  - Monitoring CK on daily labs

## 2022-06-06 NOTE — HOSPITAL COURSE
Pt admitted to  team G, and was initiated on IVF for CARTER and rhabdomyolysis. Cr improved to 1.5 on 6/6, and CK downtrending as well. Remained grossly asymptomatic over 6/6 and 6/7. CARTER resolved and CK with significant downtrend; discussed with pt that he was stable for discharge with adequate PO hydration and follow-up with PCP and nephrology clinic (appears to be developing early stage CKD); also counseled on the importance of cocaine cessation. Pt deemed appropriate for discharge. Plan discussed with pt, who was agreeable and amenable; medications were discussed and reviewed, outpatient follow-up scheduled, ER precautions were given, all questions were answered to the pt's satisfaction, and Mr. Faria was subsequently discharged.

## 2022-06-06 NOTE — ASSESSMENT & PLAN NOTE
- Cr 2.4 at admission; baseline WNL, though does appear to have some degree of renal insufficiency based on prior labs  - Cr improved to 1.5 on AM labs  - UA and urine studies ordered, pending  - IVF LR at 100cc/hr provided  - Renally dosing all medications  - Avoid nephrotoxins  - Will continue to monitor on daily labs

## 2022-06-06 NOTE — PLAN OF CARE
[facility] ([facility]  Initial Discharge Assessment       Primary Care Provider: [facility]    Admission Diagnosis: Transient alteration of awareness [R40.4]  CHF (congestive heart failure) [I50.9]  Pulse irregularity [R09.89]  CARTER (acute kidney injury) [N17.9]  Chest pain [R07.9]  Non-traumatic rhabdomyolysis [M62.82]    Admission Date: 6/5/2022  Expected Discharge Date: 6/7/2022    Discharge Barriers Identified: None    Payor: [employer] MANAGED MEDICARE / Plan: [employer] MEDICARE HMO / Product Type: Capitation /     Extended Emergency Contact Information  Primary Emergency Contact: Hilda Faria   [facility]  Home Phone: [phone]  Mobile Phone: [phone]  Relation: Sister  Preferred language: English    Discharge Plan A: Home with family  Discharge Plan B: Home with family      [facility]  [address]  [facility] [address]  Phone: [phone] Fax: [phone]    [id number] - [facility] [address] AT [facility] OF [place] [address]  [address]  [facility] [address]  Phone: [phone] Fax: [phone]    [facility] #[id number] - [place], [place] - [address] AT [facility] OF [place] [address]  [address]  [facility] [address]  Phone: [phone] Fax: [phone]    SW met w/ patient at bedside for discharge planning assessment. Patient was alert and coherent and able to answer questions. Per pt, he lives with spouse in a one story home without steps to enter.  Per pt., he was independent with ADL's and used no DME for ambulation.  Patient will have assistance from ...upondischarge.  All questions were addressed and SW will f/u as needed.     Initial Assessment (most recent)     Adult Discharge Assessment - 06/06/22 1536        Discharge Assessment    Assessment Type Discharge Planning Assessment     Confirmed/corrected address, phone number and insurance Yes     Confirmed  Demographics Correct on Facesheet     Source of Information patient     Does patient/caregiver understand observation status Yes     Communicated DANELLE with patient/caregiver Yes     Reason For Admission Non-traumatic rhabdomyolysis     Lives With spouse     Do you expect to return to your current living situation? Yes     Prior to hospitilization cognitive status: Alert/Oriented     Current cognitive status: Alert/Oriented     Walking or Climbing Stairs Difficulty none     Dressing/Bathing Difficulty none     Home Layout Able to live on 1st floor     Equipment Currently Used at Home none     Readmission within 30 days? Yes     Who is going to help you get home at discharge? Hilda Faria [phone], Sister     How do you get to doctors appointments? family or friend will provide     Are you on dialysis? No     Do you take coumadin? No     Discharge Plan A Home with family     Discharge Plan B Home with family     DME Needed Upon Discharge  none     Discharge Plan discussed with: Patient     Discharge Barriers Identified None        Relationship/Environment    Name(s) of Who Lives With Patient Hilda Faria [phone], Sister

## 2022-06-06 NOTE — SUBJECTIVE & OBJECTIVE
Interval History: Pt seen and examined this morning on drea MARI. Updated on improved lab findings, and need for continued IVF. Remains grossly asymptomatic. Care plan reviewed. Otherwise, doing well and with no further complaints at this time.      Objective:     Vital Signs (Most Recent):  Temp: 96 °F (35.6 °C) (06/06/22 1141)  Pulse: 64 (06/06/22 1141)  Resp: 17 (06/06/22 1141)  BP: (!) 142/75 (06/06/22 1141)  SpO2: (!) 93 % (06/06/22 1141)   Vital Signs (24h Range):  Temp:  [96 °F (35.6 °C)-98.6 °F (37 °C)] 96 °F (35.6 °C)  Pulse:  [58-94] 64  Resp:  [14-19] 17  SpO2:  [93 %-99 %] 93 %  BP: (109-143)/(53-94) 142/75     Weight: 88.5 kg (195 lb 1.7 oz)  Body mass index is 27.21 kg/m².    Intake/Output Summary (Last 24 hours) at 6/6/2022 1143  Last data filed at 6/6/2022 1000  Gross per 24 hour   Intake 240 ml   Output 500 ml   Net -260 ml        Physical Exam  Gen: in NAD, appears stated age  Neuro: AAOx4, CN2-12 grossly intact BL; motor, sensory, and strength grossly intact BL  HEENT: NTNC, EOMI, PERRLA, MMM; no thyromegaly or lymphadenopathy; no JVD appreciated  CVS: RRR, no m/r/g; S1/S2 auscultated with no S3 or S4; capillary refill < 2 sec  Resp: lungs CTAB, no w/r/r; no belabored breathing or accessory muscle use appreciated   Abd: BS+ in all 4 quadrants; NTND, soft to palpation; no organomegaly appreciated   Extrem: pulses full, equal, and regular over all 4 extremities; no UE or LE edema BL      Significant Labs: All pertinent labs within the past 24 hours have been reviewed.    Significant Imaging: I have reviewed all pertinent imaging results/findings within the past 24 hours.
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
## 2022-06-06 NOTE — ASSESSMENT & PLAN NOTE
- Last admits use 3 days prior, denies any other recreational drug use  - UDS positive  - Counseled on cessation

## 2022-06-06 NOTE — PLAN OF CARE
Problem: Adult Inpatient Plan of Care  Goal: Plan of Care Review  Outcome: Ongoing, Progressing  Goal: Patient-Specific Goal (Individualized)  Outcome: Ongoing, Progressing  Goal: Absence of Hospital-Acquired Illness or Injury  Outcome: Ongoing, Progressing  Goal: Optimal Comfort and Wellbeing  Outcome: Ongoing, Progressing  Goal: Readiness for Transition of Care  Outcome: Ongoing, Progressing     Problem: Infection  Goal: Absence of Infection Signs and Symptoms  Outcome: Ongoing, Progressing     Problem: Diabetes Comorbidity  Goal: Blood Glucose Level Within Targeted Range  Outcome: Ongoing, Progressing     Problem: Fluid and Electrolyte Imbalance (Acute Kidney Injury/Impairment)  Goal: Fluid and Electrolyte Balance  Outcome: Ongoing, Progressing     Problem: Oral Intake Inadequate (Acute Kidney Injury/Impairment)  Goal: Optimal Nutrition Intake  Outcome: Ongoing, Progressing     Problem: Renal Function Impairment (Acute Kidney Injury/Impairment)  Goal: Effective Renal Function  Outcome: Ongoing, Progressing     Problem: Fall Injury Risk  Goal: Absence of Fall and Fall-Related Injury  Outcome: Ongoing, Progressing

## 2022-06-06 NOTE — ED NOTES
Pt resting in room on continuous cardiac monitoring, spo2, and cycling blood pressures. Vitals remain stable. Waiting for tele box to send pt up to ready bed.

## 2022-06-07 VITALS
DIASTOLIC BLOOD PRESSURE: 75 MMHG | HEART RATE: 64 BPM | BODY MASS INDEX: 27.32 KG/M2 | WEIGHT: 195.13 LBS | HEIGHT: 71 IN | SYSTOLIC BLOOD PRESSURE: 150 MMHG | OXYGEN SATURATION: 100 % | TEMPERATURE: 99 F | RESPIRATION RATE: 20 BRPM

## 2022-06-07 LAB
ANION GAP SERPL CALC-SCNC: 9 MMOL/L (ref 8–16)
BUN SERPL-MCNC: 19 MG/DL (ref 8–23)
CALCIUM SERPL-MCNC: 9.1 MG/DL (ref 8.7–10.5)
CHLORIDE SERPL-SCNC: 106 MMOL/L (ref 95–110)
CK SERPL-CCNC: 1184 U/L (ref 20–200)
CO2 SERPL-SCNC: 24 MMOL/L (ref 23–29)
CREAT SERPL-MCNC: 1.4 MG/DL (ref 0.5–1.4)
EST. GFR  (AFRICAN AMERICAN): 59.3 ML/MIN/1.73 M^2
EST. GFR  (NON AFRICAN AMERICAN): 51.3 ML/MIN/1.73 M^2
GLUCOSE SERPL-MCNC: 80 MG/DL (ref 70–110)
POTASSIUM SERPL-SCNC: 4.1 MMOL/L (ref 3.5–5.1)
SODIUM SERPL-SCNC: 139 MMOL/L (ref 136–145)

## 2022-06-07 PROCEDURE — 99239 PR HOSPITAL DISCHARGE DAY,>30 MIN: ICD-10-PCS | Mod: ,,, | Performed by: STUDENT IN AN ORGANIZED HEALTH CARE EDUCATION/TRAINING PROGRAM

## 2022-06-07 PROCEDURE — 1111F PR DISCHARGE MEDS RECONCILED W/ CURRENT OUTPATIENT MED LIST: ICD-10-PCS | Mod: CPTII,,, | Performed by: STUDENT IN AN ORGANIZED HEALTH CARE EDUCATION/TRAINING PROGRAM

## 2022-06-07 PROCEDURE — 82550 ASSAY OF CK (CPK): CPT | Performed by: STUDENT IN AN ORGANIZED HEALTH CARE EDUCATION/TRAINING PROGRAM

## 2022-06-07 PROCEDURE — 80048 BASIC METABOLIC PNL TOTAL CA: CPT | Performed by: STUDENT IN AN ORGANIZED HEALTH CARE EDUCATION/TRAINING PROGRAM

## 2022-06-07 PROCEDURE — 25000003 PHARM REV CODE 250: Performed by: STUDENT IN AN ORGANIZED HEALTH CARE EDUCATION/TRAINING PROGRAM

## 2022-06-07 PROCEDURE — 99239 HOSP IP/OBS DSCHRG MGMT >30: CPT | Mod: ,,, | Performed by: STUDENT IN AN ORGANIZED HEALTH CARE EDUCATION/TRAINING PROGRAM

## 2022-06-07 PROCEDURE — 1111F DSCHRG MED/CURRENT MED MERGE: CPT | Mod: CPTII,,, | Performed by: STUDENT IN AN ORGANIZED HEALTH CARE EDUCATION/TRAINING PROGRAM

## 2022-06-07 PROCEDURE — A4216 STERILE WATER/SALINE, 10 ML: HCPCS | Performed by: STUDENT IN AN ORGANIZED HEALTH CARE EDUCATION/TRAINING PROGRAM

## 2022-06-07 PROCEDURE — 36415 COLL VENOUS BLD VENIPUNCTURE: CPT | Performed by: STUDENT IN AN ORGANIZED HEALTH CARE EDUCATION/TRAINING PROGRAM

## 2022-06-07 RX ADMIN — SPIRONOLACTONE 25 MG: 25 TABLET, FILM COATED ORAL at 09:06

## 2022-06-07 RX ADMIN — ASPIRIN 81 MG CHEWABLE TABLET 81 MG: 81 TABLET CHEWABLE at 09:06

## 2022-06-07 RX ADMIN — Medication 10 ML: at 06:06

## 2022-06-07 RX ADMIN — LISINOPRIL 20 MG: 20 TABLET ORAL at 09:06

## 2022-06-07 RX ADMIN — APIXABAN 5 MG: 5 TABLET, FILM COATED ORAL at 09:06

## 2022-06-07 RX ADMIN — CARVEDILOL 6.25 MG: 6.25 TABLET, FILM COATED ORAL at 09:06
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
## 2022-06-07 NOTE — PLAN OF CARE
SW informed pt's nurse that a Lyft ride will be arranged for pt when he is ready to leave. Nurse requested transport about 1230 but will inform FLIP when ready.

## 2022-06-08 ENCOUNTER — PATIENT OUTREACH (OUTPATIENT)
Dept: ADMINISTRATIVE | Facility: CLINIC | Age: 67
End: 2022-06-08
Payer: MEDICARE

## 2022-06-08 NOTE — PROGRESS NOTES
C3 nurse spoke with Damir Faria  for a TCC post hospital discharge follow up call. The patient reports does not have a scheduled HOSFU appointment. C3 nurse was unable to schedule [facility] appointment [facility]. Patient advised to contact their PCP to schedule a HOSPFU within 7 days.

## 2022-09-13 ENCOUNTER — HOSPITAL ENCOUNTER (OUTPATIENT)
Facility: OTHER | Age: 67
Discharge: HOME | End: 2022-09-15
Attending: EMERGENCY MEDICINE | Admitting: EMERGENCY MEDICINE
Payer: MEDICARE

## 2022-09-13 DIAGNOSIS — Z86.73 HISTORY OF CVA (CEREBROVASCULAR ACCIDENT): ICD-10-CM

## 2022-09-13 DIAGNOSIS — R11.2 NAUSEA AND VOMITING IN ADULT: ICD-10-CM

## 2022-09-13 DIAGNOSIS — R51.9 GENERALIZED HEADACHE: ICD-10-CM

## 2022-09-13 DIAGNOSIS — R11.0 NAUSEA: ICD-10-CM

## 2022-09-13 DIAGNOSIS — R42 VERTIGO: Primary | ICD-10-CM

## 2022-09-13 LAB
ANION GAP SERPL CALC-SCNC: 8 MMOL/L (ref 8–16)
BASOPHILS # BLD AUTO: 0.02 K/UL (ref 0–0.2)
BASOPHILS NFR BLD: 0.4 % (ref 0–1.9)
BUN SERPL-MCNC: 12 MG/DL (ref 8–23)
CALCIUM SERPL-MCNC: 8.8 MG/DL (ref 8.7–10.5)
CHLORIDE SERPL-SCNC: 108 MMOL/L (ref 95–110)
CO2 SERPL-SCNC: 24 MMOL/L (ref 23–29)
CREAT SERPL-MCNC: 1.3 MG/DL (ref 0.5–1.4)
DIFFERENTIAL METHOD: ABNORMAL
EOSINOPHIL # BLD AUTO: 0.2 K/UL (ref 0–0.5)
EOSINOPHIL NFR BLD: 3.2 % (ref 0–8)
ERYTHROCYTE [DISTWIDTH] IN BLOOD BY AUTOMATED COUNT: 13 % (ref 11.5–14.5)
EST. GFR  (NO RACE VARIABLE): 60 ML/MIN/1.73 M^2
GLUCOSE SERPL-MCNC: 140 MG/DL (ref 70–110)
HCT VFR BLD AUTO: 43.2 % (ref 40–54)
HGB BLD-MCNC: 15 G/DL (ref 14–18)
IMM GRANULOCYTES # BLD AUTO: 0.02 K/UL (ref 0–0.04)
IMM GRANULOCYTES NFR BLD AUTO: 0.4 % (ref 0–0.5)
LYMPHOCYTES # BLD AUTO: 1.3 K/UL (ref 1–4.8)
LYMPHOCYTES NFR BLD: 26.5 % (ref 18–48)
MCH RBC QN AUTO: 32.5 PG (ref 27–31)
MCHC RBC AUTO-ENTMCNC: 34.7 G/DL (ref 32–36)
MCV RBC AUTO: 94 FL (ref 82–98)
MONOCYTES # BLD AUTO: 0.5 K/UL (ref 0.3–1)
MONOCYTES NFR BLD: 9.6 % (ref 4–15)
NEUTROPHILS # BLD AUTO: 3 K/UL (ref 1.8–7.7)
NEUTROPHILS NFR BLD: 59.9 % (ref 38–73)
NRBC BLD-RTO: 0 /100 WBC
PLATELET # BLD AUTO: 249 K/UL (ref 150–450)
PMV BLD AUTO: 8.6 FL (ref 9.2–12.9)
POCT GLUCOSE: 129 MG/DL (ref 70–110)
POTASSIUM SERPL-SCNC: 4 MMOL/L (ref 3.5–5.1)
RBC # BLD AUTO: 4.61 M/UL (ref 4.6–6.2)
SODIUM SERPL-SCNC: 140 MMOL/L (ref 136–145)
WBC # BLD AUTO: 4.99 K/UL (ref 3.9–12.7)

## 2022-09-13 PROCEDURE — 96376 TX/PRO/DX INJ SAME DRUG ADON: CPT

## 2022-09-13 PROCEDURE — 80048 BASIC METABOLIC PNL TOTAL CA: CPT | Performed by: EMERGENCY MEDICINE

## 2022-09-13 PROCEDURE — 82962 GLUCOSE BLOOD TEST: CPT

## 2022-09-13 PROCEDURE — 96375 TX/PRO/DX INJ NEW DRUG ADDON: CPT

## 2022-09-13 PROCEDURE — 96361 HYDRATE IV INFUSION ADD-ON: CPT

## 2022-09-13 PROCEDURE — 85025 COMPLETE CBC W/AUTO DIFF WBC: CPT | Performed by: EMERGENCY MEDICINE

## 2022-09-13 PROCEDURE — 99285 EMERGENCY DEPT VISIT HI MDM: CPT | Mod: 25,CS

## 2022-09-13 PROCEDURE — 25000003 PHARM REV CODE 250: Performed by: EMERGENCY MEDICINE

## 2022-09-13 PROCEDURE — 63600175 PHARM REV CODE 636 W HCPCS: Performed by: EMERGENCY MEDICINE

## 2022-09-13 PROCEDURE — 96374 THER/PROPH/DIAG INJ IV PUSH: CPT

## 2022-09-13 RX ORDER — ONDANSETRON 2 MG/ML
4 INJECTION INTRAMUSCULAR; INTRAVENOUS
Status: COMPLETED | OUTPATIENT
Start: 2022-09-13 | End: 2022-09-13

## 2022-09-13 RX ORDER — LABETALOL HYDROCHLORIDE 5 MG/ML
10 INJECTION, SOLUTION INTRAVENOUS
Status: COMPLETED | OUTPATIENT
Start: 2022-09-13 | End: 2022-09-13

## 2022-09-13 RX ORDER — MECLIZINE HYDROCHLORIDE 25 MG/1
50 TABLET ORAL
Status: COMPLETED | OUTPATIENT
Start: 2022-09-13 | End: 2022-09-13

## 2022-09-13 RX ADMIN — SODIUM CHLORIDE 250 ML: 0.9 INJECTION, SOLUTION INTRAVENOUS at 11:09

## 2022-09-13 RX ADMIN — MECLIZINE HYDROCHLORIDE 50 MG: 25 TABLET ORAL at 10:09

## 2022-09-13 RX ADMIN — LABETALOL HYDROCHLORIDE 10 MG: 5 INJECTION INTRAVENOUS at 11:09

## 2022-09-13 RX ADMIN — ONDANSETRON 4 MG: 2 INJECTION INTRAMUSCULAR; INTRAVENOUS at 11:09

## 2022-09-13 RX ADMIN — ONDANSETRON 4 MG: 2 INJECTION INTRAMUSCULAR; INTRAVENOUS at 10:09

## 2022-09-14 PROBLEM — R42 VERTIGO: Status: ACTIVE | Noted: 2022-09-14

## 2022-09-14 LAB
AMPHET+METHAMPHET UR QL: NEGATIVE
ANION GAP SERPL CALC-SCNC: 8 MMOL/L (ref 8–16)
BARBITURATES UR QL SCN>200 NG/ML: NEGATIVE
BASOPHILS # BLD AUTO: 0.02 K/UL (ref 0–0.2)
BASOPHILS NFR BLD: 0.3 % (ref 0–1.9)
BENZODIAZ UR QL SCN>200 NG/ML: ABNORMAL
BUN SERPL-MCNC: 13 MG/DL (ref 8–23)
BZE UR QL SCN: ABNORMAL
CALCIUM SERPL-MCNC: 9.2 MG/DL (ref 8.7–10.5)
CANNABINOIDS UR QL SCN: NEGATIVE
CHLORIDE SERPL-SCNC: 108 MMOL/L (ref 95–110)
CO2 SERPL-SCNC: 22 MMOL/L (ref 23–29)
CREAT SERPL-MCNC: 1.3 MG/DL (ref 0.5–1.4)
CREAT UR-MCNC: 330.2 MG/DL (ref 23–375)
CTP QC/QA: YES
DIFFERENTIAL METHOD: ABNORMAL
EOSINOPHIL # BLD AUTO: 0.1 K/UL (ref 0–0.5)
EOSINOPHIL NFR BLD: 0.7 % (ref 0–8)
ERYTHROCYTE [DISTWIDTH] IN BLOOD BY AUTOMATED COUNT: 13 % (ref 11.5–14.5)
EST. GFR  (NO RACE VARIABLE): 60 ML/MIN/1.73 M^2
ESTIMATED AVG GLUCOSE: 117 MG/DL (ref 68–131)
ETHANOL UR-MCNC: <10 MG/DL
GLUCOSE SERPL-MCNC: 114 MG/DL (ref 70–110)
HBA1C MFR BLD: 5.7 % (ref 4–5.6)
HCT VFR BLD AUTO: 47.5 % (ref 40–54)
HGB BLD-MCNC: 16.1 G/DL (ref 14–18)
IMM GRANULOCYTES # BLD AUTO: 0.02 K/UL (ref 0–0.04)
IMM GRANULOCYTES NFR BLD AUTO: 0.3 % (ref 0–0.5)
LYMPHOCYTES # BLD AUTO: 1.8 K/UL (ref 1–4.8)
LYMPHOCYTES NFR BLD: 26.8 % (ref 18–48)
MAGNESIUM SERPL-MCNC: 1.8 MG/DL (ref 1.6–2.6)
MCH RBC QN AUTO: 32.3 PG (ref 27–31)
MCHC RBC AUTO-ENTMCNC: 33.9 G/DL (ref 32–36)
MCV RBC AUTO: 95 FL (ref 82–98)
METHADONE UR QL SCN>300 NG/ML: NEGATIVE
MONOCYTES # BLD AUTO: 0.7 K/UL (ref 0.3–1)
MONOCYTES NFR BLD: 9.8 % (ref 4–15)
NEUTROPHILS # BLD AUTO: 4.2 K/UL (ref 1.8–7.7)
NEUTROPHILS NFR BLD: 62.1 % (ref 38–73)
NRBC BLD-RTO: 0 /100 WBC
OPIATES UR QL SCN: NEGATIVE
PCP UR QL SCN>25 NG/ML: NEGATIVE
PLATELET # BLD AUTO: 270 K/UL (ref 150–450)
PMV BLD AUTO: 8.7 FL (ref 9.2–12.9)
POCT GLUCOSE: 108 MG/DL (ref 70–110)
POCT GLUCOSE: 116 MG/DL (ref 70–110)
POCT GLUCOSE: 116 MG/DL (ref 70–110)
POCT GLUCOSE: 89 MG/DL (ref 70–110)
POTASSIUM SERPL-SCNC: 4 MMOL/L (ref 3.5–5.1)
RBC # BLD AUTO: 4.99 M/UL (ref 4.6–6.2)
SARS-COV-2 RDRP RESP QL NAA+PROBE: NEGATIVE
SODIUM SERPL-SCNC: 138 MMOL/L (ref 136–145)
TOXICOLOGY INFORMATION: ABNORMAL
WBC # BLD AUTO: 6.71 K/UL (ref 3.9–12.7)

## 2022-09-14 PROCEDURE — G0378 HOSPITAL OBSERVATION PER HR: HCPCS | Mod: CS

## 2022-09-14 PROCEDURE — 63600175 PHARM REV CODE 636 W HCPCS: Performed by: EMERGENCY MEDICINE

## 2022-09-14 PROCEDURE — 36415 COLL VENOUS BLD VENIPUNCTURE: CPT | Performed by: INTERNAL MEDICINE

## 2022-09-14 PROCEDURE — 97162 PT EVAL MOD COMPLEX 30 MIN: CPT

## 2022-09-14 PROCEDURE — 99220 PR INITIAL OBSERVATION CARE,LEVL III: CPT | Mod: ,,, | Performed by: PHYSICIAN ASSISTANT

## 2022-09-14 PROCEDURE — 85025 COMPLETE CBC W/AUTO DIFF WBC: CPT | Performed by: INTERNAL MEDICINE

## 2022-09-14 PROCEDURE — 96361 HYDRATE IV INFUSION ADD-ON: CPT

## 2022-09-14 PROCEDURE — 36415 COLL VENOUS BLD VENIPUNCTURE: CPT | Performed by: PHYSICIAN ASSISTANT

## 2022-09-14 PROCEDURE — A4216 STERILE WATER/SALINE, 10 ML: HCPCS | Performed by: PHYSICIAN ASSISTANT

## 2022-09-14 PROCEDURE — 83735 ASSAY OF MAGNESIUM: CPT | Performed by: PHYSICIAN ASSISTANT

## 2022-09-14 PROCEDURE — 95992 CANALITH REPOSITIONING PROC: CPT

## 2022-09-14 PROCEDURE — 96375 TX/PRO/DX INJ NEW DRUG ADDON: CPT

## 2022-09-14 PROCEDURE — 99220 PR INITIAL OBSERVATION CARE,LEVL III: ICD-10-PCS | Mod: ,,, | Performed by: PHYSICIAN ASSISTANT

## 2022-09-14 PROCEDURE — 63600175 PHARM REV CODE 636 W HCPCS: Performed by: PHYSICIAN ASSISTANT

## 2022-09-14 PROCEDURE — U0002 COVID-19 LAB TEST NON-CDC: HCPCS | Performed by: EMERGENCY MEDICINE

## 2022-09-14 PROCEDURE — 80048 BASIC METABOLIC PNL TOTAL CA: CPT | Performed by: PHYSICIAN ASSISTANT

## 2022-09-14 PROCEDURE — 83036 HEMOGLOBIN GLYCOSYLATED A1C: CPT | Performed by: PHYSICIAN ASSISTANT

## 2022-09-14 PROCEDURE — 80307 DRUG TEST PRSMV CHEM ANLYZR: CPT | Performed by: PHYSICIAN ASSISTANT

## 2022-09-14 PROCEDURE — 96376 TX/PRO/DX INJ SAME DRUG ADON: CPT

## 2022-09-14 PROCEDURE — 25000003 PHARM REV CODE 250: Performed by: PHYSICIAN ASSISTANT

## 2022-09-14 RX ORDER — SODIUM CHLORIDE 0.9 % (FLUSH) 0.9 %
10 SYRINGE (ML) INJECTION EVERY 8 HOURS
Status: DISCONTINUED | OUTPATIENT
Start: 2022-09-14 | End: 2022-09-15 | Stop reason: HOSPADM

## 2022-09-14 RX ORDER — MAG HYDROX/ALUMINUM HYD/SIMETH 200-200-20
30 SUSPENSION, ORAL (FINAL DOSE FORM) ORAL ONCE
Status: COMPLETED | OUTPATIENT
Start: 2022-09-14 | End: 2022-09-14

## 2022-09-14 RX ORDER — CARVEDILOL 6.25 MG/1
6.25 TABLET ORAL 2 TIMES DAILY
Status: DISCONTINUED | OUTPATIENT
Start: 2022-09-14 | End: 2022-09-15 | Stop reason: HOSPADM

## 2022-09-14 RX ORDER — ATORVASTATIN CALCIUM 20 MG/1
40 TABLET, FILM COATED ORAL NIGHTLY
Status: DISCONTINUED | OUTPATIENT
Start: 2022-09-14 | End: 2022-09-15 | Stop reason: HOSPADM

## 2022-09-14 RX ORDER — ONDANSETRON 2 MG/ML
4 INJECTION INTRAMUSCULAR; INTRAVENOUS EVERY 6 HOURS PRN
Status: DISCONTINUED | OUTPATIENT
Start: 2022-09-14 | End: 2022-09-15 | Stop reason: HOSPADM

## 2022-09-14 RX ORDER — NALOXONE HCL 0.4 MG/ML
0.02 VIAL (ML) INJECTION
Status: DISCONTINUED | OUTPATIENT
Start: 2022-09-14 | End: 2022-09-15 | Stop reason: HOSPADM

## 2022-09-14 RX ORDER — IBUPROFEN 200 MG
16 TABLET ORAL
Status: DISCONTINUED | OUTPATIENT
Start: 2022-09-14 | End: 2022-09-15 | Stop reason: HOSPADM

## 2022-09-14 RX ORDER — INSULIN ASPART 100 [IU]/ML
0-5 INJECTION, SOLUTION INTRAVENOUS; SUBCUTANEOUS
Status: DISCONTINUED | OUTPATIENT
Start: 2022-09-14 | End: 2022-09-15 | Stop reason: HOSPADM

## 2022-09-14 RX ORDER — AMOXICILLIN 250 MG
1 CAPSULE ORAL 2 TIMES DAILY PRN
Status: DISCONTINUED | OUTPATIENT
Start: 2022-09-14 | End: 2022-09-15 | Stop reason: HOSPADM

## 2022-09-14 RX ORDER — LISINOPRIL 20 MG/1
20 TABLET ORAL DAILY
Status: DISCONTINUED | OUTPATIENT
Start: 2022-09-14 | End: 2022-09-15 | Stop reason: HOSPADM

## 2022-09-14 RX ORDER — DICYCLOMINE HYDROCHLORIDE 10 MG/1
10 CAPSULE ORAL 4 TIMES DAILY PRN
Status: DISCONTINUED | OUTPATIENT
Start: 2022-09-14 | End: 2022-09-15 | Stop reason: HOSPADM

## 2022-09-14 RX ORDER — DICYCLOMINE HYDROCHLORIDE 10 MG/1
10 CAPSULE ORAL 4 TIMES DAILY
Status: DISCONTINUED | OUTPATIENT
Start: 2022-09-14 | End: 2022-09-14

## 2022-09-14 RX ORDER — DEXTROSE MONOHYDRATE 100 MG/ML
12.5 INJECTION, SOLUTION INTRAVENOUS
Status: DISCONTINUED | OUTPATIENT
Start: 2022-09-14 | End: 2022-09-15 | Stop reason: HOSPADM

## 2022-09-14 RX ORDER — IBUPROFEN 200 MG
24 TABLET ORAL
Status: DISCONTINUED | OUTPATIENT
Start: 2022-09-14 | End: 2022-09-15 | Stop reason: HOSPADM

## 2022-09-14 RX ORDER — ACETAMINOPHEN 325 MG/1
650 TABLET ORAL EVERY 6 HOURS PRN
Status: DISCONTINUED | OUTPATIENT
Start: 2022-09-14 | End: 2022-09-15 | Stop reason: HOSPADM

## 2022-09-14 RX ORDER — LIDOCAINE HYDROCHLORIDE 20 MG/ML
15 SOLUTION OROPHARYNGEAL ONCE
Status: COMPLETED | OUTPATIENT
Start: 2022-09-14 | End: 2022-09-14

## 2022-09-14 RX ORDER — TALC
6 POWDER (GRAM) TOPICAL NIGHTLY PRN
Status: DISCONTINUED | OUTPATIENT
Start: 2022-09-14 | End: 2022-09-15 | Stop reason: HOSPADM

## 2022-09-14 RX ORDER — MECLIZINE HYDROCHLORIDE 25 MG/1
25 TABLET ORAL 3 TIMES DAILY PRN
Status: DISCONTINUED | OUTPATIENT
Start: 2022-09-14 | End: 2022-09-15 | Stop reason: HOSPADM

## 2022-09-14 RX ORDER — DIAZEPAM 10 MG/2ML
2 INJECTION INTRAMUSCULAR
Status: COMPLETED | OUTPATIENT
Start: 2022-09-14 | End: 2022-09-14

## 2022-09-14 RX ORDER — NAPROXEN SODIUM 220 MG/1
81 TABLET, FILM COATED ORAL DAILY
Status: DISCONTINUED | OUTPATIENT
Start: 2022-09-14 | End: 2022-09-15 | Stop reason: HOSPADM

## 2022-09-14 RX ORDER — KETOROLAC TROMETHAMINE 30 MG/ML
10 INJECTION, SOLUTION INTRAMUSCULAR; INTRAVENOUS
Status: COMPLETED | OUTPATIENT
Start: 2022-09-14 | End: 2022-09-14

## 2022-09-14 RX ORDER — SODIUM CHLORIDE 9 MG/ML
INJECTION, SOLUTION INTRAVENOUS CONTINUOUS
Status: ACTIVE | OUTPATIENT
Start: 2022-09-14 | End: 2022-09-14

## 2022-09-14 RX ORDER — GLUCAGON 1 MG
1 KIT INJECTION
Status: DISCONTINUED | OUTPATIENT
Start: 2022-09-14 | End: 2022-09-15 | Stop reason: HOSPADM

## 2022-09-14 RX ORDER — SPIRONOLACTONE 25 MG/1
25 TABLET ORAL DAILY
Status: DISCONTINUED | OUTPATIENT
Start: 2022-09-14 | End: 2022-09-15 | Stop reason: HOSPADM

## 2022-09-14 RX ADMIN — CARVEDILOL 6.25 MG: 6.25 TABLET, FILM COATED ORAL at 08:09

## 2022-09-14 RX ADMIN — APIXABAN 5 MG: 2.5 TABLET, FILM COATED ORAL at 09:09

## 2022-09-14 RX ADMIN — KETOROLAC TROMETHAMINE 10 MG: 30 INJECTION, SOLUTION INTRAMUSCULAR; INTRAVENOUS at 01:09

## 2022-09-14 RX ADMIN — Medication 10 ML: at 10:09

## 2022-09-14 RX ADMIN — MECLIZINE HYDROCHLORIDE 25 MG: 25 TABLET ORAL at 05:09

## 2022-09-14 RX ADMIN — Medication 10 ML: at 06:09

## 2022-09-14 RX ADMIN — CARVEDILOL 6.25 MG: 6.25 TABLET, FILM COATED ORAL at 09:09

## 2022-09-14 RX ADMIN — ASPIRIN 81 MG CHEWABLE TABLET 81 MG: 81 TABLET CHEWABLE at 09:09

## 2022-09-14 RX ADMIN — LIDOCAINE HYDROCHLORIDE 15 ML: 20 SOLUTION ORAL; TOPICAL at 12:09

## 2022-09-14 RX ADMIN — ONDANSETRON 4 MG: 2 INJECTION INTRAMUSCULAR; INTRAVENOUS at 06:09

## 2022-09-14 RX ADMIN — SPIRONOLACTONE 25 MG: 25 TABLET, FILM COATED ORAL at 09:09

## 2022-09-14 RX ADMIN — ONDANSETRON 4 MG: 2 INJECTION INTRAMUSCULAR; INTRAVENOUS at 05:09

## 2022-09-14 RX ADMIN — LISINOPRIL 20 MG: 20 TABLET ORAL at 09:09

## 2022-09-14 RX ADMIN — ATORVASTATIN CALCIUM 40 MG: 20 TABLET, FILM COATED ORAL at 08:09

## 2022-09-14 RX ADMIN — DIAZEPAM 2 MG: 5 INJECTION, SOLUTION INTRAMUSCULAR; INTRAVENOUS at 01:09

## 2022-09-14 RX ADMIN — SODIUM CHLORIDE: 0.9 INJECTION, SOLUTION INTRAVENOUS at 06:09

## 2022-09-14 RX ADMIN — ALUMINUM HYDROXIDE, MAGNESIUM HYDROXIDE, AND SIMETHICONE 30 ML: 200; 200; 20 SUSPENSION ORAL at 12:09

## 2022-09-14 RX ADMIN — APIXABAN 5 MG: 2.5 TABLET, FILM COATED ORAL at 08:09

## 2022-09-14 NOTE — ED PROVIDER NOTES
Encounter Date: 9/13/2022       History     Chief Complaint   Patient presents with    Abdominal Pain     X1hour with associated n/v and lightheadedness     67-year-old male with history of hypertension, a previous CVA and MI presents complaining of dizziness which she describes as a spinning sensation that is aggravated by head movement.  The patient states he was in his usual state of health when he went to bed and the spinning will come up from sleep approximately an hour ago.  He reports associated nausea and vomiting and lightheadedness.  He denies any abdominal pain, chest pain, dyspnea, headache, difficulty speaking or difficulty using his arms or legs.  He denies any recent head injury or recent illness.  He denies any previous diagnosis of vertigo.  The patient is hypertensive.  He does report compliance with his medications states he has not had his evening dose of Coreg or Eliquis.    Review of patient's allergies indicates:  No Known Allergies  Past Medical History:   Diagnosis Date    Acute renal insufficiency 6/21/2015    Cocaine abuse     Depression     History of psychiatric care     HTN (hypertension) 1/25/2014    STEMI (ST elevation myocardial infarction) 2/12/2017    Stroke      Past Surgical History:   Procedure Laterality Date    CARDIAC SURGERY      stents    NECK SURGERY       Family History   Problem Relation Age of Onset    Diabetes Mother     Heart disease Mother     Hypertension Mother      Social History     Tobacco Use    Smoking status: Some Days     Packs/day: 0.25     Types: Cigarettes    Smokeless tobacco: Never   Substance Use Topics    Alcohol use: Not Currently     Comment: social    Drug use: Not Currently     Types: Cocaine     Comment: this weekend     Review of Systems   Constitutional:  Negative for chills and fever.   Respiratory:  Negative for cough, chest tightness and shortness of breath.    Cardiovascular:  Negative for chest pain.   Gastrointestinal:  Negative for  abdominal pain, diarrhea, nausea and vomiting.   Musculoskeletal:  Negative for back pain, myalgias and neck pain.   Neurological:  Positive for dizziness. Negative for tremors, syncope, speech difficulty, numbness and headaches.   All other systems reviewed and are negative.    Physical Exam     Initial Vitals [09/13/22 2135]   BP Pulse Resp Temp SpO2   (!) 184/100 60 18 98.2 °F (36.8 °C) 100 %      MAP       --         Physical Exam    Nursing note and vitals reviewed.  Constitutional: He appears well-developed and well-nourished. He is not diaphoretic. No distress.   HENT:   Head: Normocephalic and atraumatic.   Right Ear: External ear normal.   Left Ear: External ear normal.   Nose: Nose normal.   Mouth/Throat: No oropharyngeal exudate.   Eyes: Conjunctivae and EOM are normal. Pupils are equal, round, and reactive to light. Right eye exhibits no discharge. Left eye exhibits no discharge. No scleral icterus.   Horizontal nystagmus present, no vertical nystagmus   Neck: Neck supple. No tracheal deviation present. No JVD present.   Cardiovascular:  Normal rate, regular rhythm and normal heart sounds.     Exam reveals no gallop and no friction rub.       No murmur heard.  Pulmonary/Chest: Breath sounds normal. No stridor. No respiratory distress. He has no wheezes. He has no rhonchi. He has no rales.   Abdominal: Abdomen is soft. He exhibits no distension. There is no abdominal tenderness. There is no rebound and no guarding.   Musculoskeletal:         General: No tenderness. Normal range of motion.      Cervical back: Neck supple.     Neurological: He is alert and oriented to person, place, and time. He has normal strength. No cranial nerve deficit or sensory deficit. GCS score is 15. GCS eye subscore is 4. GCS verbal subscore is 5. GCS motor subscore is 6.   Strength 5/5 upper and lower extremities bilaterally, finger-to-nose and heel-to-shin normal, cranial nerves 3-12 grossly intact, speech normal, no pronator  drift   Skin: Skin is warm.   Psychiatric: He has a normal mood and affect.       ED Course   Procedures  Labs Reviewed   CBC W/ AUTO DIFFERENTIAL - Abnormal; Notable for the following components:       Result Value    MCH 32.5 (*)     MPV 8.6 (*)     All other components within normal limits   BASIC METABOLIC PANEL - Abnormal; Notable for the following components:    Glucose 140 (*)     All other components within normal limits   POCT GLUCOSE - Abnormal; Notable for the following components:    POCT Glucose 129 (*)     All other components within normal limits   SARS-COV-2 RDRP GENE   POCT GLUCOSE MONITORING CONTINUOUS          Imaging Results              CT Head Without Contrast (Final result)  Result time 09/14/22 00:17:42      Final result by Heidy Ortega MD (09/14/22 00:17:42)                   Impression:      No acute intracranial abnormality detected.  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    Sinus disease.      Electronically signed by: Heidy Ortega  Date:    09/14/2022  Time:    00:17               Narrative:    EXAMINATION:  CT OF THE HEAD WITHOUT    CLINICAL HISTORY:  Nausea, vomiting, and dizziness x1 hour.  Woke from sleep.    TECHNIQUE:  5 mm unenhanced axial images were obtained from the skull base to the vertex.    COMPARISON:  06/05/2022    FINDINGS:  Mild cerebral atrophy and chronic small vessel ischemic changes are present.  There is remote lacunar infarct in the right thalamus.  Focal encephalomalacia change is again seen involving the medial aspect of the right temporal lobe with resultant ex vacuole dilatation exerted upon the right temporal horn.  There is no acute intracranial hemorrhage, territorial infarct or mass effect, or midline shift. In the visualized paranasal sinuses, there is mild bilateral frontal, bilateral ethmoid and bilateral maxillary sinuses.  There is a small fluid level with an air bubble in the right sphenoid  sinus.                                       Medications   ondansetron injection 4 mg (4 mg Intravenous Given 9/13/22 2219)   meclizine tablet 50 mg (50 mg Oral Given 9/13/22 2254)   sodium chloride 0.9% bolus 250 mL (0 mLs Intravenous Stopped 9/14/22 0032)   ondansetron injection 4 mg (4 mg Intravenous Given 9/13/22 2332)   labetaloL injection 10 mg (10 mg Intravenous Given 9/13/22 2333)   ketorolac injection 9.999 mg (9.999 mg Intravenous Given 9/14/22 0104)   diazePAM injection 2 mg (2 mg Intravenous Given 9/14/22 0102)        Additional MDM:   Comments: 67-year-old male presents significantly hypertensive with complaint of spinning sensation with associated nausea and vomiting it started approximately 1 hour ago.  The patient's neuro exam was significant only for horizontal nystagmus.  Adams-Hallpike maneuver attempted but patient was not able to cooperate for it.  HINTS exam normal.  Suspect peripheral cause for his vertigo.  Will give Zofran and Antivert and reassess.  Once he is able tolerate p.o. challenge, will also give his home dose of Coreg..         ED Course as of 09/14/22 0411   Tue Sep 13, 2022   2317 Since my initial evaluation the patient has developed a generalized headache.  He describes the pain as throbbing.  Nausea and vomiting still present despite Zofran.  He does report some improvement in the dizziness.  Will obtain CT of the head give additional IV antiemetics and analgesics. [AA]   Wed Sep 14, 2022   0406 On reassessment the patient appeared to be resting comfortably, however, when asked about his symptoms he continues to report dizziness.  He does state that the spinning sensation has improved after the Valium.  His headache has also improved to a 5/10.  Despite the improvement in symptoms, the patient still does not feel comfortable being discharged home.  I have discussed the case with Anahi Felipe and the patient will be admitted to the hospitalist service for further observation  and possibly additional medication. [AA]      ED Course User Index  [AA] Eileen Pool MD                 Clinical Impression:   Final diagnoses:  [R42] Vertigo (Primary)  [R51.9] Generalized headache  [R11.2] Nausea and vomiting in adult      ED Disposition Condition    Observation Stable                Eileen Pool MD  09/14/22 0411

## 2022-09-14 NOTE — ASSESSMENT & PLAN NOTE
- Mr. Damir Faria presents with dizziness and light headedness that started after sleeping   - CT Head negative   - fall precautions   - Neuro consulted

## 2022-09-14 NOTE — ASSESSMENT & PLAN NOTE
- chronic   - no reports of lower extremity edema or shortness of breath  - last Echo 11/26/129:   Summary   Mildly decreased left ventricular systolic function. The estimated ejection fraction is 40%   Local segmental wall motion abnormalities.   Concentric left ventricular remodeling.   Grade I (mild) left ventricular diastolic dysfunction consistent with impaired relaxation.   Normal right ventricular systolic function.   Mild mitral regurgitation.   Mild tricuspid regurgitation.   The estimated PA systolic pressure is 23 mm Hg   Normal central venous pressure (3 mm Hg).   Large spherical solid left ventricular thrombus present. The thrombus is located in the apex and protrudes inot the LV. It is partially calcified.  - monitor I&Os and daily weights

## 2022-09-14 NOTE — ED NOTES
Pt arrives via ems with reports nausea, vomiting and dizziness x1 hour. Pt states it woke him from sleep. Denies pain. Will continue to monitor.

## 2022-09-14 NOTE — HPI
Mr. Damir Faria is a 67 y.o. male with PMH of CHF, ICM, CAD (s/p stenting), HTN, T2DM, GERD, Polysubstance abuse, Depression, medication non-compliance, prior VTE, who [place] [facility] [place] due to abdominal pain x 1 hour. He notes associated nausea, vomiting, dizziness (room spins with movement of head). He states that prior to going to bed on 9/13 he was not dizzy. He notes associated lght headedness and nausea. He denies head injury, illness, h/o vertigo. He was evaluated in the ED with labs showing glucose of 140 without further abnormalities. A CT Head was without acute intracranial abnormalities, but did show remote infarcts of the right thalamus and medial right temporal lobe. He was treated in the ED with meclizine and diazepam. He was placed on OBS.

## 2022-09-14 NOTE — PLAN OF CARE
CM met with the patient at the bedside.     Patient is alert and oriented with no communication barriers.     Prior to admission patient is independent. Patient denies the use of HH or DME.     Patients PCP is correct on the face sheet. Patient choice pharmacy is bedside.    Patient denies having written advance directives.      Patients family will transport the patient home at discharge.     No CM needs identified at this time.     CM team will continue to follow.        09/14/22 1641   Discharge Assessment   Assessment Type Discharge Planning Assessment   Confirmed/corrected address, phone number and insurance Yes   Confirmed Demographics Correct on Facesheet   Source of Information patient;health record   Lives With alone   Do you expect to return to your current living situation? Yes   Do you have help at home or someone to help you manage your care at home? No   Prior to hospitilization cognitive status: Alert/Oriented   Current cognitive status: Alert/Oriented   Walking or Climbing Stairs Difficulty none   Dressing/Bathing Difficulty none   Equipment Currently Used at Home none   Readmission within 30 days? No   Patient currently being followed by outpatient case management? No   Do you currently have service(s) that help you manage your care at home? No   Do you take prescription medications? Yes   Do you have prescription coverage? Yes   Do you have any problems affording any of your prescribed medications? No   Is the patient taking medications as prescribed? yes   How do you get to doctors appointments? car, drives self;family or friend will provide   Are you on dialysis? No   Do you take coumadin? No   Discharge Plan A Home with family   Discharge Plan B Home Health   DME Needed Upon Discharge  none   Discharge Plan discussed with: Patient   Discharge Barriers Identified None   Physical Activity   On average, how many days per week do you engage in moderate to strenuous exercise (like a brisk walk)? 0  days   On average, how many minutes do you engage in exercise at this level? 0 min   Financial Resource Strain   How hard is it for you to pay for the very basics like food, housing, medical care, and heating? Not hard   Housing Stability   In the last 12 months, was there a time when you were not able to pay the mortgage or rent on time? N   In the last 12 months, was there a time when you did not have a steady place to sleep or slept in a shelter (including now)? N   Transportation Needs   In the past 12 months, has lack of transportation kept you from medical appointments or from getting medications? no   In the past 12 months, has lack of transportation kept you from meetings, work, or from getting things needed for daily living? No   Food Insecurity   Within the past 12 months, you worried that your food would run out before you got the money to buy more. Never true   Within the past 12 months, the food you bought just didn't last and you didn't have money to get more. Never true   Stress   Do you feel stress - tense, restless, nervous, or anxious, or unable to sleep at night because your mind is troubled all the time - these days? Not at all   Social Connections   In a typical week, how many times do you talk on the phone with family, friends, or neighbors? More than 3   How often do you get together with friends or relatives? More than 3   How often do you attend Zoroastrian or Restoration services? Never   Do you belong to any clubs or organizations such as Zoroastrian groups, unions, fraternal or athletic groups, or school groups? Yes   How often do you attend meetings of the clubs or organizations you belong to? More than 4   Are you , , , , never , or living with a partner? Never marrie   Alcohol Use   Q1: How often do you have a drink containing alcohol? Never   Q2: How many drinks containing alcohol do you have on a typical day when you are drinking? None   Q3: How often do you  have six or more drinks on one occasion? Never

## 2022-09-14 NOTE — ASSESSMENT & PLAN NOTE
- last A1C:   Lab Results   Component Value Date    HGBA1C 5.8 (H) 08/20/2018    - A1C pending for AM   - hold oral antidiabetic meds   - Diabetic diet   - SSI with accuchecks AC/HS      Anti-hyperglycemic dose as follows-   Antihyperglycemics (From admission, onward)    Start     Stop Route Frequency Ordered    09/14/22 0614  insulin aspart U-100 pen 0-5 Units         -- SubQ Before meals & nightly PRN 09/14/22 0514        Hold Oral hypoglycemics while patient is in the hospital.

## 2022-09-14 NOTE — NURSING
Patient arrived to the unit from ED, patient transfer to bed per self, safety maintained, patient oriented to room and facility, BP elevated 179/89, CINDY Van aware, no new orders at this time.

## 2022-09-14 NOTE — PT/OT/SLP EVAL
Physical Therapy Evaluation and Treatment    Patient Name:  Damir Faria   MRN:  [id number]    Recommendations:     Discharge Recommendations:  outpatient PT (vestibular PT)   Discharge Equipment Recommendations: walker, rolling   Barriers to discharge: Decreased caregiver support    Assessment:     Damir Faria is a 67 y.o. male admitted with a medical diagnosis of Vertigo. Pmhx significant for CHF, ICM, CAD, HTN, DM-T2, polysubstance abuse, STEMI. He presents with the following impairments/functional limitations:  impaired functional mobility, gait instability, impaired balance, visual deficits, other (comment) (vertigo).    Patient evaluated by PT and goals established. Patient with overt dizziness with startle reactions d/t sensation of losing balance while sitting and standing. Trialed Jie d/t dizziness with L [place]-Hallpike, some symptoms with position changes but no nystagmus noted. PT will continue to follow and progress as tolerated. Rec for dc TBD, anticipate to home with OP PT with vestibular therapist for continued treatment.     Rehab Prognosis: Good; patient would benefit from acute skilled PT services to address these deficits and reach maximum level of function.    Recent Surgery: * No surgery found *      Plan:     During this hospitalization, patient to be seen 5 x/week to address the identified rehab impairments via gait training, therapeutic activities, therapeutic exercises, neuromuscular re-education, canalith reposition procedure and progress toward the following goals:    Plan of Care Expires:  09/28/22    Subjective     Chief Complaint: Dizziness - described by pt as room spinning and flipping upside down  Patient/Family Comments/goals: Goal to be less dizzy; Patient agreeable to evaluation.  Pain/Comfort:  Pain Rating 1: 0/10  Pain Rating Post-Intervention 1: 0/10    Patients cultural, spiritual, Anglican conflicts given the current situation: no    Living Environment:  Pt lives alone  in an apartment with no steps to enter.  Upon discharge, patient will have assistance from friends in building.  Prior level of function:  Ambulation: Indep  ADL's: Indep  IADLs: Indep  Equipment used at home: none.    Objective:     Communicated with SHEYLA Biswas and CINDY Bolanos prior to session.  Patient found supine with peripheral IV  upon PT entry to room.    General Precautions: Standard, fall   Orthopedic Precautions:N/A   Braces: N/A  Respiratory Status: Room air    Patient donned non slip socks for OOB mobility.     Exams:  Cognition:   Patient is oriented x4.  Pt follows approximately 100% of one and two step commands.    Mood: Pleasant and cooperative.   Safety Awareness: Good  Musculoskeletal:  BMI: 26.22  Posture:  Forward head, L head tilt  LE ROM/Strength:   R ROM: WFL  L ROM: WFL  R Strength: WFL  L Strength: WFL  Neuromuscular:  Sensation: Intact to light touch bilateral LEs.  Coordination/Tone/Reflexes: No impairments identified with functional mobility. No formal testing performed.   Balance:   Static sitting: Good, noted initial sway and occasional startle reactions without overt LOB   Static standing: Sway without UE support, with UE support minimal sway  Dynamic standing: Difficulty with turns d/t increased dizziness  Visual-vestibular:   Smooth Pursuit: good motion quality and elicits symptoms  Saccadic eye movements:  elicits symptoms  Head turns: elicits symptoms R>L  HINTS Exam  Head impulse test: attempted, pt with eyes closed and guarding of head turns d/t onset of dizziness  Nystagmus: slight horizontal with L eye gaze  Skew deviation: attempted, pt with eyes closed  Integument: Visible skin intact  Cardiopulmonary:  Edema: None noted    Functional Mobility:  Bed Mobility:     Supine to Sit: modified independence  Sit to Supine: modified independence  Transfers:     Sit to Stand:  supervision with no AD  Gait: x30 ft with BUE support on IV pole.   Slower gait with increased B stance time  and occasional cross over gait d/t minor LOB.   Occasional over-correction of balance with startle reaction.     Therapeutic Activities and Exercises:   [place]-Hallpike  Right [place]-Hallpike - yes vertigo, no nystagmus, less vertigo than left  Left [place]-Hallpike - yes vertigo, questionable nystagmus  Epley Maneuver ([id number])  Indication: Dizziness, Positive [place]-Hallpike Maneuver  Technique: After the procedure was explained at length to the patient and verbal consent was obtained, the patient's head was turned to the left while sitting upright. The patient was then brought to the supine position with head turned to the left and slightly extended, and remained in that position for 1 minute. The patient's head was then turned all the way to the right for 1 minute. At that point the patient was turned to right lateral decubitus position for 1 minute. The patient was then brought back to the upright seated position. The patient tolerated the procedure well, and was instructed to remain upright for the next 48 hours.  PT educated patient re:   PT plan of care/role of PT  Safety with OOB mobility  Use of RW for increased support d/t dizziness celeste with turns  Discharge disposition    Pt verbalized understanding        AM-PAC 6 CLICK MOBILITY  Total Score:21     Patient left HOB elevated with all lines intact, call button in reach, bed alarm on, and CINDY Bolanos notified.    GOALS:   Multidisciplinary Problems       Physical Therapy Goals          Problem: Physical Therapy    Goal Priority Disciplines Outcome Goal Variances Interventions   Physical Therapy Goal     PT, PT/OT Ongoing, Progressing     Description: Goals to be met by: 22    Patient will increase functional independence with mobility by performin. Perform bed mobility without onset of dizziness.   2. Gait x 150 feet with supervision with LRAD.  3. Standing activity x3 min without UE support without LOB/startle reactions d/t dizziness.                         History:     Past Medical History:   Diagnosis Date    Acute renal insufficiency 6/21/2015    Cocaine abuse     Depression     History of psychiatric care     HTN (hypertension) 1/25/2014    STEMI (ST elevation myocardial infarction) 2/12/2017    Stroke        Past Surgical History:   Procedure Laterality Date    CARDIAC SURGERY      stents    NECK SURGERY         Time Tracking:     PT Received On: 09/14/22  PT Start Time: 1531     PT Stop Time: 1556  PT Total Time (min): 25 min     Billable Minutes: Evaluation 15 and Total Time canalith repositioning maneuver 10      09/14/2022

## 2022-09-14 NOTE — H&P
[facility] Medicine  History & Physical    Patient Name: Damir Faria  MRN: [id number]  Patient Class: OP- Observation  Admission Date: 9/13/2022  Attending Physician: Daniele Szymanski MD   Primary Care Provider: [facility]         Patient information was obtained from patient, past medical records and ER records.     Subjective:     Principal Problem:Vertigo    Chief Complaint:   Chief Complaint   Patient presents with    Abdominal Pain     X1hour with associated n/v and lightheadedness        HPI: Mr. Damir Faria is a 67 y.o. male with PMH of CHF, ICM, CAD (s/p stenting), HTN, T2DM, GERD, Polysubstance abuse, Depression, medication non-compliance, prior VTE, who [place] [facility] [place]14[place]22 due to abdominal pain x 1 hour. He notes associated nausea, vomiting, dizziness (room spins with movement of head). He states that prior to going to bed on 9/13 he was not dizzy. He notes associated lght headedness and nausea. He denies head injury, illness, h/o vertigo. He was evaluated in the ED with labs showing glucose of 140 without further abnormalities. A CT Head was without acute intracranial abnormalities, but did show remote infarcts of the right thalamus and medial right temporal lobe. He was treated in the ED with meclizine and diazepam. He was placed on OBS.       Past Medical History:   Diagnosis Date    Acute renal insufficiency 6/21/2015    Cocaine abuse     Depression     History of psychiatric care     HTN (hypertension) 1/25/2014    STEMI (ST elevation myocardial infarction) 2/12/2017    Stroke        Past Surgical History:   Procedure Laterality Date    CARDIAC SURGERY      stents    NECK SURGERY         Review of patient's allergies indicates:  No Known Allergies    No current facility-administered medications on file prior to encounter.     Current Outpatient Medications on File Prior to Encounter   Medication Sig    apixaban  (ELIQUIS) 5 mg Tab Take 1 tablet (5 mg total) by mouth 2 (two) times daily.    aspirin 81 MG Chew Take 1 tablet (81 mg total) by mouth once daily.    atorvastatin (LIPITOR) 40 MG tablet Take 1 tablet (40 mg total) by mouth every evening.    carvedilol (COREG) 6.25 MG tablet Take 1 tablet (6.25 mg total) by mouth 2 (two) times daily.    dicyclomine (BENTYL) 20 mg tablet Take 1 tablet (20 mg total) by mouth 3 (three) times daily as needed (for abdominal pain).    lisinopril (PRINIVIL,ZESTRIL) 20 MG tablet Take 1 tablet (20 mg total) by mouth once daily.    mirtazapine (REMERON) 30 MG tablet Take 1 tablet (30 mg total) by mouth every evening.    spironolactone (ALDACTONE) 25 MG tablet Take 1 tablet (25 mg total) by mouth once daily.     Family History       Problem Relation (Age of Onset)    Diabetes Mother    Heart disease Mother    Hypertension Mother          Tobacco Use    Smoking status: Some Days     Packs/day: 0.25     Types: Cigarettes    Smokeless tobacco: Never   Substance and Sexual Activity    Alcohol use: Not Currently     Comment: social    Drug use: Not Currently     Types: Cocaine     Comment: this weekend    Sexual activity: Yes     Partners: Female     Birth control/protection: Condom     Review of Systems   Constitutional:  Negative for activity change, appetite change, chills, diaphoresis and fever.   HENT:  Negative for ear pain and sore throat.    Eyes:  Negative for visual disturbance.   Respiratory:  Negative for cough, shortness of breath and wheezing.    Cardiovascular:  Negative for chest pain and palpitations.   Gastrointestinal:  Positive for abdominal pain (crampy), nausea and vomiting. Negative for constipation and diarrhea.   Genitourinary:  Negative for decreased urine volume, difficulty urinating, dysuria, frequency, hematuria and urgency.   Musculoskeletal:  Negative for back pain, joint swelling, myalgias, neck pain and neck stiffness.   Skin:  Negative for rash and  wound.   Neurological:  Positive for dizziness and light-headedness. Negative for syncope, weakness, numbness and headaches.   Hematological:  Does not bruise/bleed easily.   Psychiatric/Behavioral:  Negative for agitation and confusion.    Objective:     Vital Signs (Most Recent):  Temp: 98 °F (36.7 °C) (09/14/22 0520)  Pulse: 67 (09/14/22 0600)  Resp: 20 (09/14/22 0520)  BP: (!) 179/89 (09/14/22 0549)  SpO2: 99 % (09/14/22 0520)   Vital Signs (24h Range):  Temp:  [98 °F (36.7 °C)-98.2 °F (36.8 °C)] 98 °F (36.7 °C)  Pulse:  [57-75] 67  Resp:  [12-20] 20  SpO2:  [97 %-100 %] 99 %  BP: (127-226)/() 179/89        There is no height or weight on file to calculate BMI.    Physical Exam  Constitutional:       General: He is not in acute distress.     Appearance: He is well-developed and normal weight. He is not ill-appearing, toxic-appearing or diaphoretic.   HENT:      Head: Normocephalic and atraumatic.      Right Ear: External ear normal.      Left Ear: External ear normal.      Mouth/Throat:      Mouth: Mucous membranes are dry.      Comments: Somewhat dry oral mucous membranes.   Eyes:      General: No scleral icterus.        Right eye: No discharge.         Left eye: No discharge.      Conjunctiva/sclera: Conjunctivae normal.   Neck:      Vascular: No JVD.      Trachea: No tracheal deviation.   Cardiovascular:      Rate and Rhythm: Normal rate and regular rhythm.      Heart sounds: Normal heart sounds. No murmur heard.    No gallop.   Pulmonary:      Effort: Pulmonary effort is normal. No respiratory distress.      Breath sounds: Normal breath sounds. No stridor. No wheezing or rales.   Abdominal:      General: Bowel sounds are normal. There is no distension.      Palpations: Abdomen is soft. There is no mass.      Tenderness: There is no abdominal tenderness. There is no guarding.   Musculoskeletal:         General: No deformity. Normal range of motion.      Cervical back: Normal range of motion and neck  supple.   Skin:     General: Skin is warm and dry.   Neurological:      General: No focal deficit present.      Mental Status: He is alert and oriented to person, place, and time.      Cranial Nerves: No cranial nerve deficit.      Motor: No abnormal muscle tone.      Coordination: Coordination normal.   Psychiatric:         Mood and Affect: Mood normal.         Behavior: Behavior normal.         Thought Content: Thought content normal.         Judgment: Judgment normal.           Significant Labs: All pertinent labs within the past 24 hours have been reviewed.  BMP:   Recent Labs   Lab 09/13/22  2328   *      K 4.0      CO2 24   BUN 12   CREATININE 1.3   CALCIUM 8.8     CBC:   Recent Labs   Lab 09/13/22 2328   WBC 4.99   HGB 15.0   HCT 43.2        CMP:   Recent Labs   Lab 09/13/22  2328      K 4.0      CO2 24   *   BUN 12   CREATININE 1.3   CALCIUM 8.8   ANIONGAP 8     Urine Culture: No results for input(s): LABURIN in the last 48 hours.  Urine Studies: No results for input(s): COLORU, APPEARANCEUA, PHUR, SPECGRAV, PROTEINUA, GLUCUA, KETONESU, BILIRUBINUA, OCCULTUA, NITRITE, UROBILINOGEN, LEUKOCYTESUR, RBCUA, WBCUA, BACTERIA, SQUAMEPITHEL, HYALINECASTS in the last 48 hours.    Invalid input(s): WRIGHTSUR    Significant Imaging: I have reviewed all pertinent imaging results/findings within the past 24 hours.  Imaging Results              CT Head Without Contrast (Final result)  Result time 09/14/22 00:17:42      Final result by Heidy Ortega MD (09/14/22 00:17:42)                   Impression:      No acute intracranial abnormality detected.  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    Sinus disease.      Electronically signed by: Heidy Ortega  Date:    09/14/2022  Time:    00:17               Narrative:    EXAMINATION:  CT OF THE HEAD WITHOUT    CLINICAL HISTORY:  Nausea, vomiting, and dizziness x1 hour.  Woke from  sleep.    TECHNIQUE:  5 mm unenhanced axial images were obtained from the skull base to the vertex.    COMPARISON:  06/05/2022    FINDINGS:  Mild cerebral atrophy and chronic small vessel ischemic changes are present.  There is remote lacunar infarct in the right thalamus.  Focal encephalomalacia change is again seen involving the medial aspect of the right temporal lobe with resultant ex vacuole dilatation exerted upon the right temporal horn.  There is no acute intracranial hemorrhage, territorial infarct or mass effect, or midline shift. In the visualized paranasal sinuses, there is mild bilateral frontal, bilateral ethmoid and bilateral maxillary sinuses.  There is a small fluid level with an air bubble in the right sphenoid sinus.                                       Assessment/Plan:     * Vertigo  - Mr. Damir Faria presents with dizziness and light headedness that started after sleeping   - CT Head negative   - fall precautions   - Neuro consulted     Type 2 diabetes mellitus without complication, without long-term current use of insulin  - last A1C:   Lab Results   Component Value Date    HGBA1C 5.8 (H) 08/20/2018    - A1C pending for AM   - hold oral antidiabetic meds   - Diabetic diet   - SSI with accuchecks AC/HS      Anti-hyperglycemic dose as follows-   Antihyperglycemics (From admission, onward)    Start     Stop Route Frequency Ordered    09/14/22 0614  insulin aspart U-100 pen 0-5 Units         -- SubQ Before meals & nightly PRN 09/14/22 0514        Hold Oral hypoglycemics while patient is in the hospital.    Chronic combined systolic and diastolic heart failure  - chronic   - no reports of lower extremity edema or shortness of breath  - last Echo 11/26/129:   Summary   Mildly decreased left ventricular systolic function. The estimated ejection fraction is 40%   Local segmental wall motion abnormalities.   Concentric left ventricular remodeling.   Grade I (mild) left ventricular diastolic  dysfunction consistent with impaired relaxation.   Normal right ventricular systolic function.   Mild mitral regurgitation.   Mild tricuspid regurgitation.   The estimated PA systolic pressure is 23 mm Hg   Normal central venous pressure (3 mm Hg).   Large spherical solid left ventricular thrombus present. The thrombus is located in the apex and protrudes inot the LV. It is partially calcified.  - monitor I&Os and daily weights       Coronary artery disease involving native coronary artery of native heart without angina pectoris  - continue ASA 81 mg QD, atorvastatin 40 mg QD       Ischemic cardiomyopathy  - history noted       Essential hypertension  - hypertensive at present   - home meds: carvedilol 6.25 mg BID, lisinopril 20 mg QD, spironolactone 25 mg QD   - monitor       Mixed hyperlipidemia  - continue statin         Polysubstance abuse  - utox pending       VTE Risk Mitigation (From admission, onward)         Ordered     apixaban tablet 5 mg  2 times daily         09/14/22 0622     IP VTE HIGH RISK PATIENT  Once         09/14/22 0509     Place sequential compression device  Until discontinued         09/14/22 0509                   AMBROCIO MartinezC  Department of Hospital Medicine   [facility] Med [facility])

## 2022-09-14 NOTE — ASSESSMENT & PLAN NOTE
- hypertensive at present   - home meds: carvedilol 6.25 mg BID, lisinopril 20 mg QD, spironolactone 25 mg QD   - monitor

## 2022-09-14 NOTE — NURSING
Nurses Note -- 4 Eyes      9/14/2022   7:51 AM      Skin assessed during: Admit      [x] No Pressure Injuries Present    []Prevention Measures Documented      [] Yes- Altered Skin Integrity Present or Discovered   [] LDA Added if Not in Epic (Describe Wound)   [] New Altered Skin Integrity was Present on Admit and Documented in LDA   [] Wound Image Taken    Wound Care Consulted? No    Attending Nurse:  Antoni Garza RN     Second RN/Staff Member:  SHEYLA Gay

## 2022-09-14 NOTE — PLAN OF CARE
Problem: Physical Therapy  Goal: Physical Therapy Goal  Description: Goals to be met by: 22    Patient will increase functional independence with mobility by performin. Perform bed mobility without onset of dizziness.   2. Gait x 150 feet with supervision with LRAD.  3. Standing activity x3 min without UE support without LOB/startle reactions d/t dizziness.   Outcome: Ongoing, Progressing     Patient evaluated by PT and goals established. Patient with overt dizziness with startle reactions d/t sensation of losing balance while sitting and standing. Trialed Jie d/t dizziness with L [place], some symptoms with position changes but no nystagmus noted. PT will continue to follow and progress as tolerated. Rec for dc TBD, anticipate to home with OP PT with vestibular therapist for continued treatment. Please see progress note for detailed plan of care and recommendations.

## 2022-09-14 NOTE — SUBJECTIVE & OBJECTIVE
Past Medical History:   Diagnosis Date    Acute renal insufficiency 6/21/2015    Cocaine abuse     Depression     History of psychiatric care     HTN (hypertension) 1/25/2014    STEMI (ST elevation myocardial infarction) 2/12/2017    Stroke        Past Surgical History:   Procedure Laterality Date    CARDIAC SURGERY      stents    NECK SURGERY         Review of patient's allergies indicates:  No Known Allergies    No current facility-administered medications on file prior to encounter.     Current Outpatient Medications on File Prior to Encounter   Medication Sig    apixaban (ELIQUIS) 5 mg Tab Take 1 tablet (5 mg total) by mouth 2 (two) times daily.    aspirin 81 MG Chew Take 1 tablet (81 mg total) by mouth once daily.    atorvastatin (LIPITOR) 40 MG tablet Take 1 tablet (40 mg total) by mouth every evening.    carvedilol (COREG) 6.25 MG tablet Take 1 tablet (6.25 mg total) by mouth 2 (two) times daily.    dicyclomine (BENTYL) 20 mg tablet Take 1 tablet (20 mg total) by mouth 3 (three) times daily as needed (for abdominal pain).    lisinopril (PRINIVIL,ZESTRIL) 20 MG tablet Take 1 tablet (20 mg total) by mouth once daily.    mirtazapine (REMERON) 30 MG tablet Take 1 tablet (30 mg total) by mouth every evening.    spironolactone (ALDACTONE) 25 MG tablet Take 1 tablet (25 mg total) by mouth once daily.     Family History       Problem Relation (Age of Onset)    Diabetes Mother    Heart disease Mother    Hypertension Mother          Tobacco Use    Smoking status: Some Days     Packs/day: 0.25     Types: Cigarettes    Smokeless tobacco: Never   Substance and Sexual Activity    Alcohol use: Not Currently     Comment: social    Drug use: Not Currently     Types: Cocaine     Comment: this weekend    Sexual activity: Yes     Partners: Female     Birth control/protection: Condom     Review of Systems   Constitutional:  Negative for activity change, appetite change, chills, diaphoresis and fever.   HENT:  Negative for ear  pain and sore throat.    Eyes:  Negative for visual disturbance.   Respiratory:  Negative for cough, shortness of breath and wheezing.    Cardiovascular:  Negative for chest pain and palpitations.   Gastrointestinal:  Positive for abdominal pain (crampy), nausea and vomiting. Negative for constipation and diarrhea.   Genitourinary:  Negative for decreased urine volume, difficulty urinating, dysuria, frequency, hematuria and urgency.   Musculoskeletal:  Negative for back pain, joint swelling, myalgias, neck pain and neck stiffness.   Skin:  Negative for rash and wound.   Neurological:  Positive for dizziness and light-headedness. Negative for syncope, weakness, numbness and headaches.   Hematological:  Does not bruise/bleed easily.   Psychiatric/Behavioral:  Negative for agitation and confusion.    Objective:     Vital Signs (Most Recent):  Temp: 98 °F (36.7 °C) (09/14/22 0520)  Pulse: 67 (09/14/22 0600)  Resp: 20 (09/14/22 0520)  BP: (!) 179/89 (09/14/22 0549)  SpO2: 99 % (09/14/22 0520)   Vital Signs (24h Range):  Temp:  [98 °F (36.7 °C)-98.2 °F (36.8 °C)] 98 °F (36.7 °C)  Pulse:  [57-75] 67  Resp:  [12-20] 20  SpO2:  [97 %-100 %] 99 %  BP: (127-226)/() 179/89        There is no height or weight on file to calculate BMI.    Physical Exam  Constitutional:       General: He is not in acute distress.     Appearance: He is well-developed and normal weight. He is not ill-appearing, toxic-appearing or diaphoretic.   HENT:      Head: Normocephalic and atraumatic.      Right Ear: External ear normal.      Left Ear: External ear normal.      Mouth/Throat:      Mouth: Mucous membranes are dry.      Comments: Somewhat dry oral mucous membranes.   Eyes:      General: No scleral icterus.        Right eye: No discharge.         Left eye: No discharge.      Conjunctiva/sclera: Conjunctivae normal.   Neck:      Vascular: No JVD.      Trachea: No tracheal deviation.   Cardiovascular:      Rate and Rhythm: Normal rate and  regular rhythm.      Heart sounds: Normal heart sounds. No murmur heard.    No gallop.   Pulmonary:      Effort: Pulmonary effort is normal. No respiratory distress.      Breath sounds: Normal breath sounds. No stridor. No wheezing or rales.   Abdominal:      General: Bowel sounds are normal. There is no distension.      Palpations: Abdomen is soft. There is no mass.      Tenderness: There is no abdominal tenderness. There is no guarding.   Musculoskeletal:         General: No deformity. Normal range of motion.      Cervical back: Normal range of motion and neck supple.   Skin:     General: Skin is warm and dry.   Neurological:      General: No focal deficit present.      Mental Status: He is alert and oriented to person, place, and time.      Cranial Nerves: No cranial nerve deficit.      Motor: No abnormal muscle tone.      Coordination: Coordination normal.   Psychiatric:         Mood and Affect: Mood normal.         Behavior: Behavior normal.         Thought Content: Thought content normal.         Judgment: Judgment normal.           Significant Labs: All pertinent labs within the past 24 hours have been reviewed.  BMP:   Recent Labs   Lab 09/13/22  2328   *      K 4.0      CO2 24   BUN 12   CREATININE 1.3   CALCIUM 8.8     CBC:   Recent Labs   Lab 09/13/22  2328   WBC 4.99   HGB 15.0   HCT 43.2        CMP:   Recent Labs   Lab 09/13/22  2328      K 4.0      CO2 24   *   BUN 12   CREATININE 1.3   CALCIUM 8.8   ANIONGAP 8     Urine Culture: No results for input(s): LABURIN in the last 48 hours.  Urine Studies: No results for input(s): COLORU, APPEARANCEUA, PHUR, SPECGRAV, PROTEINUA, GLUCUA, KETONESU, BILIRUBINUA, OCCULTUA, NITRITE, UROBILINOGEN, LEUKOCYTESUR, RBCUA, WBCUA, BACTERIA, SQUAMEPITHEL, HYALINECASTS in the last 48 hours.    Invalid input(s): WRIGHTSUR    Significant Imaging: I have reviewed all pertinent imaging results/findings within the past 24  hours.  Imaging Results              CT Head Without Contrast (Final result)  Result time 09/14/22 00:17:42      Final result by Heidy Ortega MD (09/14/22 00:17:42)                   Impression:      No acute intracranial abnormality detected.  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    Sinus disease.      Electronically signed by: Heidy Ortega  Date:    09/14/2022  Time:    00:17               Narrative:    EXAMINATION:  CT OF THE HEAD WITHOUT    CLINICAL HISTORY:  Nausea, vomiting, and dizziness x1 hour.  Woke from sleep.    TECHNIQUE:  5 mm unenhanced axial images were obtained from the skull base to the vertex.    COMPARISON:  06/05/2022    FINDINGS:  Mild cerebral atrophy and chronic small vessel ischemic changes are present.  There is remote lacunar infarct in the right thalamus.  Focal encephalomalacia change is again seen involving the medial aspect of the right temporal lobe with resultant ex vacuole dilatation exerted upon the right temporal horn.  There is no acute intracranial hemorrhage, territorial infarct or mass effect, or midline shift. In the visualized paranasal sinuses, there is mild bilateral frontal, bilateral ethmoid and bilateral maxillary sinuses.  There is a small fluid level with an air bubble in the right sphenoid sinus.

## 2022-09-14 NOTE — PT/OT/SLP PROGRESS
Physical Therapy  Not Seen    Patient Name:  Damir Faria   MRN:  [id number]    Patient not seen today secondary to Therapist assessment (recently administered PO meds, defer provocative testing until >1 hour to ensure digestion). Will follow-up later in PM.

## 2022-09-15 VITALS
HEIGHT: 71 IN | BODY MASS INDEX: 26.76 KG/M2 | WEIGHT: 191.13 LBS | HEART RATE: 61 BPM | SYSTOLIC BLOOD PRESSURE: 145 MMHG | TEMPERATURE: 98 F | DIASTOLIC BLOOD PRESSURE: 74 MMHG | RESPIRATION RATE: 12 BRPM | OXYGEN SATURATION: 100 %

## 2022-09-15 LAB
ANION GAP SERPL CALC-SCNC: 6 MMOL/L (ref 8–16)
BASOPHILS # BLD AUTO: 0.03 K/UL (ref 0–0.2)
BASOPHILS NFR BLD: 0.6 % (ref 0–1.9)
BUN SERPL-MCNC: 15 MG/DL (ref 8–23)
CALCIUM SERPL-MCNC: 8.3 MG/DL (ref 8.7–10.5)
CHLORIDE SERPL-SCNC: 110 MMOL/L (ref 95–110)
CO2 SERPL-SCNC: 24 MMOL/L (ref 23–29)
CREAT SERPL-MCNC: 1.2 MG/DL (ref 0.5–1.4)
DIFFERENTIAL METHOD: ABNORMAL
EOSINOPHIL # BLD AUTO: 0.2 K/UL (ref 0–0.5)
EOSINOPHIL NFR BLD: 3.9 % (ref 0–8)
ERYTHROCYTE [DISTWIDTH] IN BLOOD BY AUTOMATED COUNT: 13.1 % (ref 11.5–14.5)
EST. GFR  (NO RACE VARIABLE): >60 ML/MIN/1.73 M^2
GLUCOSE SERPL-MCNC: 85 MG/DL (ref 70–110)
HCT VFR BLD AUTO: 40.3 % (ref 40–54)
HGB BLD-MCNC: 13.8 G/DL (ref 14–18)
IMM GRANULOCYTES # BLD AUTO: 0.01 K/UL (ref 0–0.04)
IMM GRANULOCYTES NFR BLD AUTO: 0.2 % (ref 0–0.5)
LYMPHOCYTES # BLD AUTO: 2.6 K/UL (ref 1–4.8)
LYMPHOCYTES NFR BLD: 51.4 % (ref 18–48)
MAGNESIUM SERPL-MCNC: 1.8 MG/DL (ref 1.6–2.6)
MCH RBC QN AUTO: 32.1 PG (ref 27–31)
MCHC RBC AUTO-ENTMCNC: 34.2 G/DL (ref 32–36)
MCV RBC AUTO: 94 FL (ref 82–98)
MONOCYTES # BLD AUTO: 0.6 K/UL (ref 0.3–1)
MONOCYTES NFR BLD: 10.8 % (ref 4–15)
NEUTROPHILS # BLD AUTO: 1.7 K/UL (ref 1.8–7.7)
NEUTROPHILS NFR BLD: 33.1 % (ref 38–73)
NRBC BLD-RTO: 0 /100 WBC
PLATELET # BLD AUTO: 244 K/UL (ref 150–450)
PMV BLD AUTO: 9.1 FL (ref 9.2–12.9)
POCT GLUCOSE: 77 MG/DL (ref 70–110)
POCT GLUCOSE: 93 MG/DL (ref 70–110)
POTASSIUM SERPL-SCNC: 3.9 MMOL/L (ref 3.5–5.1)
RBC # BLD AUTO: 4.3 M/UL (ref 4.6–6.2)
SODIUM SERPL-SCNC: 140 MMOL/L (ref 136–145)
WBC # BLD AUTO: 5.1 K/UL (ref 3.9–12.7)

## 2022-09-15 PROCEDURE — 36415 COLL VENOUS BLD VENIPUNCTURE: CPT | Performed by: PHYSICIAN ASSISTANT

## 2022-09-15 PROCEDURE — 25000003 PHARM REV CODE 250: Performed by: PHYSICIAN ASSISTANT

## 2022-09-15 PROCEDURE — G0378 HOSPITAL OBSERVATION PER HR: HCPCS

## 2022-09-15 PROCEDURE — 83735 ASSAY OF MAGNESIUM: CPT | Performed by: PHYSICIAN ASSISTANT

## 2022-09-15 PROCEDURE — 99217 PR OBSERVATION CARE DISCHARGE: ICD-10-PCS | Mod: ,,, | Performed by: PHYSICIAN ASSISTANT

## 2022-09-15 PROCEDURE — G0426 INPT/ED TELECONSULT50: HCPCS | Mod: 95,,, | Performed by: PSYCHIATRY & NEUROLOGY

## 2022-09-15 PROCEDURE — 97535 SELF CARE MNGMENT TRAINING: CPT

## 2022-09-15 PROCEDURE — 80048 BASIC METABOLIC PNL TOTAL CA: CPT | Performed by: PHYSICIAN ASSISTANT

## 2022-09-15 PROCEDURE — G0426 PR INPT TELEHEALTH CONSULT 50M: ICD-10-PCS | Mod: 95,,, | Performed by: PSYCHIATRY & NEUROLOGY

## 2022-09-15 PROCEDURE — 99217 PR OBSERVATION CARE DISCHARGE: CPT | Mod: ,,, | Performed by: PHYSICIAN ASSISTANT

## 2022-09-15 PROCEDURE — A4216 STERILE WATER/SALINE, 10 ML: HCPCS | Performed by: PHYSICIAN ASSISTANT

## 2022-09-15 PROCEDURE — 97116 GAIT TRAINING THERAPY: CPT | Mod: CQ

## 2022-09-15 PROCEDURE — 97166 OT EVAL MOD COMPLEX 45 MIN: CPT

## 2022-09-15 PROCEDURE — 85025 COMPLETE CBC W/AUTO DIFF WBC: CPT | Performed by: PHYSICIAN ASSISTANT

## 2022-09-15 RX ORDER — MECLIZINE HYDROCHLORIDE 25 MG/1
25 TABLET ORAL 3 TIMES DAILY PRN
Qty: 30 TABLET | Refills: 0 | Status: SHIPPED | OUTPATIENT
Start: 2022-09-15 | End: 2023-07-22 | Stop reason: ALTCHOICE

## 2022-09-15 RX ADMIN — SPIRONOLACTONE 25 MG: 25 TABLET, FILM COATED ORAL at 09:09

## 2022-09-15 RX ADMIN — APIXABAN 5 MG: 2.5 TABLET, FILM COATED ORAL at 09:09

## 2022-09-15 RX ADMIN — Medication 10 ML: at 06:09

## 2022-09-15 RX ADMIN — MECLIZINE HYDROCHLORIDE 25 MG: 25 TABLET ORAL at 09:09

## 2022-09-15 RX ADMIN — ASPIRIN 81 MG CHEWABLE TABLET 81 MG: 81 TABLET CHEWABLE at 09:09

## 2022-09-15 RX ADMIN — LISINOPRIL 20 MG: 20 TABLET ORAL at 09:09

## 2022-09-15 RX ADMIN — CARVEDILOL 6.25 MG: 6.25 TABLET, FILM COATED ORAL at 09:09

## 2022-09-15 NOTE — NURSING
Reported from phlebotomy, patient jump with needle in arm when she was drawing patient labs. Patient request for another phlebotomist to come and draw his labs. Phlebotomist states, that another Phlebotomist will come and draw patient labs.

## 2022-09-15 NOTE — PLAN OF CARE
Problem: Occupational Therapy  Goal: Occupational Therapy Goal  Description: Goals to be met by: 9/29/22     Patient will increase functional independence with ADLs by performing:    UE Dressing with [place].  LE Dressing with [place].  Grooming while standing at sink with Modified [place].  Toileting from toilet with [place] for hygiene and clothing management.   Toilet transfer to toilet with Modified [place].    Outcome: Ongoing, Progressing     OT evaluation complete. Pt benefits from RW for balance and fall prevention. SBA with RW for transfers, functional ambulation and OOB ADLs. Pt declining RW but after education agreeable to use, needs reinforcement. Anticipate will clear soon but would benefit from further safety retraining for OOB ADLs and toilet transfer.    DC: Home with outpatient vestibular PT  DME: [facility]

## 2022-09-15 NOTE — PT/OT/SLP PROGRESS
"Physical Therapy Treatment    Patient Name:  Damir Faria   MRN:  [id number]    Recommendations:     Discharge Recommendations:  outpatient PT (vestibular PT)   Discharge Equipment Recommendations: walker, rolling   Barriers to discharge: Decreased caregiver support    Assessment:     Damir Faria is a 67 y.o. male admitted with a medical diagnosis of Vertigo.  He presents with the following impairments/functional limitations:  impaired balance, impaired functional mobility, impaired self care skills, visual deficits, decreased safety awareness.    Supine<>sit with mod(I)  Sit>stand with SPV and RW  Amb 90' with RW and CGA, decreased gait speed, yani and B step length, no sway or LoB  Static/dynamic stand x 15 secs with no UE support and close SBA, including marching in place x 10 steps, no sway or LoB  Pt's symptoms more stabilized, still "woozy" but no startle reactions or LoB, and good use of RW for safety with OOB  Rec OP PT (vestibular PT)    Rehab Prognosis: Good; patient would benefit from acute skilled PT services to address these deficits and reach maximum level of function.    Recent Surgery: * No surgery found *      Plan:     During this hospitalization, patient to be seen 5 x/week to address the identified rehab impairments via gait training, therapeutic activities, therapeutic exercises, neuromuscular re-education, canalith reposition procedure and progress toward the following goals:    Plan of Care Expires:  09/28/22    Subjective     Chief Complaint: still kind of woozy - this is not normal   Patient/Family Comments/goals: I'm doing better than I was  Pain/Comfort:  Pain Rating 1: 0/10  Pain Rating Post-Intervention 1: 0/10      Objective:     Communicated with nurse Biswas prior to session.  Patient found HOB elevated with peripheral IV upon PT entry to room.     General Precautions: Standard, fall   Orthopedic Precautions:N/A   Braces:    Respiratory Status: Room air     Functional " Mobility:  Bed Mobility:     Supine to Sit: modified independence  Sit to Supine: modified independence  Transfers:     Sit to Stand:  supervision with rolling walker  Gait: 90' with RW and CGA, decreased gait speed, yani and B step length, no sway or LoB      AM-PAC 6 CLICK MOBILITY  Turning over in bed (including adjusting bedclothes, sheets and blankets)?: 4  Sitting down on and standing up from a chair with arms (e.g., wheelchair, bedside commode, etc.): 4  Moving from lying on back to sitting on the side of the bed?: 4  Moving to and from a bed to a chair (including a wheelchair)?: 3  Need to walk in hospital room?: 3  Climbing 3-5 steps with a railing?: 3  Basic Mobility Total Score: 21       Therapeutic Activities and Exercises:   Static stand x 15 secs with close SBA with no UE support and no sway or LoB, including marching in place x 10 steps  Gait training as noted with RW    Patient left HOB elevated with all lines intact, call button in reach, nurse Zulay notified, and televisit  present..    GOALS:   Multidisciplinary Problems       Physical Therapy Goals          Problem: Physical Therapy    Goal Priority Disciplines Outcome Goal Variances Interventions   Physical Therapy Goal     PT, PT/OT Ongoing, Progressing     Description: Goals to be met by: 22    Patient will increase functional independence with mobility by performin. Perform bed mobility without onset of dizziness.   2. Gait x 150 feet with supervision with LRAD.  3. Standing activity x3 min without UE support without LOB/startle reactions d/t dizziness.                        Time Tracking:     PT Received On: 09/15/22  PT Start Time: 1531     PT Stop Time: 1546  PT Total Time (min): 15 min     Billable Minutes: Gait Training 15    Treatment Type: Treatment  PT/PTA: PTA     PTA Visit Number: 1     09/15/2022

## 2022-09-15 NOTE — NURSING
Dc paper work given, Iv removed. Patient informed that he will be having ENT calling to schedule a follow up and that he needs to follow up tomorrow with his primary care physician about the hospital stay. Patient has received a walker for DC and explained to him that he should use it to ambulate.  Patient will need Lyft home, supervisor notified. Will transport patient to DC

## 2022-09-15 NOTE — PLAN OF CARE
Problem: Adult Inpatient Plan of Care  Goal: Plan of Care Review  Outcome: Ongoing, Progressing  Goal: Patient-Specific Goal (Individualized)  Outcome: Ongoing, Progressing  Goal: Absence of Hospital-Acquired Illness or Injury  Outcome: Ongoing, Progressing  Goal: Optimal Comfort and Wellbeing  Outcome: Ongoing, Progressing  Goal: Readiness for Transition of Care  Outcome: Ongoing, Progressing     Problem: Diabetes Comorbidity  Goal: Blood Glucose Level Within Targeted Range  Outcome: Ongoing, Progressing     Problem: Fluid and Electrolyte Imbalance (Acute Kidney Injury/Impairment)  Goal: Fluid and Electrolyte Balance  Outcome: Ongoing, Progressing     Problem: Oral Intake Inadequate (Acute Kidney Injury/Impairment)  Goal: Optimal Nutrition Intake  Outcome: Ongoing, Progressing     Problem: Renal Function Impairment (Acute Kidney Injury/Impairment)  Goal: Effective Renal Function  Outcome: Ongoing, Progressing     Problem: Nausea and Vomiting  Goal: Fluid and Electrolyte Balance  Outcome: Ongoing, Progressing

## 2022-09-15 NOTE — CONSULTS
[facility])  Neurology  Consult Note    Patient Name: Damir Faria  MRN: [id number]  Admission Date: 9/13/2022  Hospital Length of Stay: 0 days  Code Status: Full Code   Attending Provider: Daniele Szymanski MD   Consulting Provider: Niels Cortez MD  Primary Care Physician: [facility]  Principal Problem:Vertigo    Inpatient consult to Neurology  Consult performed by: Niels Cortez MD  Consult ordered by: Anahi Felipe PA-C      Subjective:     Chief Complaint:  Dizziness     HPI: 66 y/o male with medical Hx of CHF, CAD (s/p stenting), HTN, DM, polysubstance abuse, comes to ED for abdominal pain. Associated nausea, vomiting, and room-spinning sensation. Changing position  made his symptoms worse as well as moving his head. He states that he was feeling as if he was drunk. Symptoms ameliorated with meclizine. Pt denies headaches, visual or speech disturbances, weakness or numbness of limbs. Mr Faria states that he feels 90% better.    Past Medical History:   Diagnosis Date    Acute renal insufficiency 6/21/2015    Cocaine abuse     Depression     History of psychiatric care     HTN (hypertension) 1/25/2014    STEMI (ST elevation myocardial infarction) 2/12/2017    Stroke        Past Surgical History:   Procedure Laterality Date    CARDIAC SURGERY      stents    NECK SURGERY         Review of patient's allergies indicates:  No Known Allergies    Current Neurological Medications:     No current facility-administered medications on file prior to encounter.     Current Outpatient Medications on File Prior to Encounter   Medication Sig    apixaban (ELIQUIS) 5 mg Tab Take 1 tablet (5 mg total) by mouth 2 (two) times daily.    aspirin 81 MG Chew Take 1 tablet (81 mg total) by mouth once daily.    atorvastatin (LIPITOR) 40 MG tablet Take 1 tablet (40 mg total) by mouth every evening.    carvedilol (COREG) 6.25 MG tablet Take 1 tablet (6.25 mg total) by mouth 2 (two) times  daily.    lisinopril (PRINIVIL,ZESTRIL) 20 MG tablet Take 1 tablet (20 mg total) by mouth once daily.    mirtazapine (REMERON) 30 MG tablet Take 1 tablet (30 mg total) by mouth every evening. (Patient not taking: Reported on 9/14/2022)    spironolactone (ALDACTONE) 25 MG tablet Take 1 tablet (25 mg total) by mouth once daily.      Family History       Problem Relation (Age of Onset)    Diabetes Mother    Heart disease Mother    Hypertension Mother          Tobacco Use    Smoking status: Some Days     Packs/day: 0.25     Types: Cigarettes    Smokeless tobacco: Never   Substance and Sexual Activity    Alcohol use: Not Currently     Comment: social    Drug use: Not Currently     Types: Cocaine     Comment: this weekend    Sexual activity: Yes     Partners: Female     Birth control/protection: Condom     Review of Systems   Constitutional:  Negative for fever.   HENT:  Negative for trouble swallowing.    Eyes:  Negative for photophobia.   Respiratory:  Negative for chest tightness.    Cardiovascular:  Negative for chest pain.   Gastrointestinal:  Negative for abdominal pain.   Genitourinary:  Negative for flank pain.   Musculoskeletal:  Negative for back pain.   Neurological:  Positive for dizziness.   Psychiatric/Behavioral:  Negative for confusion.      Objective:     Vital Signs (Most Recent):  Temp: 98.4 °F (36.9 °C) (09/15/22 0718)  Pulse: 62 (09/15/22 1000)  Resp: 16 (09/15/22 0718)  BP: (!) 141/81 (09/15/22 0718)  SpO2: 99 % (09/15/22 0718)   Vital Signs (24h Range):  Temp:  [97.7 °F (36.5 °C)-99.2 °F (37.3 °C)] 98.4 °F (36.9 °C)  Pulse:  [52-71] 62  Resp:  [16-20] 16  SpO2:  [96 %-100 %] 99 %  BP: (122-175)/(58-91) 141/81     Weight: 86.7 kg (191 lb 2.2 oz)  Body mass index is 26.66 kg/m².    Physical Exam  Constitutional:       General: He is not in acute distress.  Pulmonary:      Effort: No respiratory distress.   Neurological:      Mental Status: He is oriented to person, place, and time.   Psychiatric:          Speech: Speech normal.       NEUROLOGICAL EXAMINATION:     MENTAL STATUS   Oriented to person, place, and time.   Speech: speech is normal   Level of consciousness: alert    CRANIAL NERVES     CN III, IV, VI   Conjugate gaze: present    CN VII   Right facial weakness: none  Left facial weakness: none    CN XII   Tongue deviation: none    MOTOR EXAM        AROM of UE's and LE's against gravity     GAIT AND COORDINATION     Tremor   Resting tremor: absent    Significant Labs: CBC:   Recent Labs   Lab 09/13/22 2328 09/14/22 0716 09/15/22  0807   WBC 4.99 6.71 5.10   HGB 15.0 16.1 13.8*   HCT 43.2 47.5 40.3    270 244     CMP:   Recent Labs   Lab 09/13/22 2328 09/14/22 0716 09/15/22  0806   * 114* 85    138 140   K 4.0 4.0 3.9    108 110   CO2 24 22* 24   BUN 12 13 15   CREATININE 1.3 1.3 1.2   CALCIUM 8.8 9.2 8.3*   MG  --  1.8 1.8   ANIONGAP 8 8 6*       Significant Imaging: I have reviewed all pertinent imaging results/findings within the past 24 hours.    Head CT  FINDINGS:  Mild cerebral atrophy and chronic small vessel ischemic changes are present.  There is remote lacunar infarct in the right thalamus.  Focal encephalomalacia change is again seen involving the medial aspect of the right temporal lobe with resultant ex vacuole dilatation exerted upon the right temporal horn.  There is no acute intracranial hemorrhage, territorial infarct or mass effect, or midline shift. In the visualized paranasal sinuses, there is mild bilateral frontal, bilateral ethmoid and bilateral maxillary sinuses.  There is a small fluid level with an air bubble in the right sphenoid sinus.     Impression:     No acute intracranial abnormality detected.  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     Sinus disease.        Electronically signed by: Heidy Ortega  Date:                                            09/14/2022  Time:                                            00:17    Assessment and Plan:     66 y/o male consulted for dizziness    Vertigo: markedly improved according to patient. No stroke on MRI. Markedly elevated BP could had played a role. UDS positive for cocaine  ASA 81 mg daily. He is on apixaban.  Illicit drug cessation.  PT/OT.  BP control.  Dexamethasone 8 mg IV and/orcompazine 10 mg IV  x 1 may hepl for vertiginous sensation if needed.    Active Diagnoses:    Diagnosis Date Noted POA    PRINCIPAL PROBLEM:  Vertigo [R42] 09/14/2022 Yes    Chronic combined systolic and diastolic heart failure [I50.42] 06/05/2022 Yes    Coronary artery disease involving native coronary artery of native heart without angina pectoris [I25.10] 07/06/2018 Yes    Ischemic cardiomyopathy [I25.5] 07/06/2018 Yes    Polysubstance abuse [F19.10] 02/23/2017 Yes    Type 2 diabetes mellitus without complication, without long-term current use of insulin [E11.9] 09/28/2016 Yes    Mixed hyperlipidemia [E78.2] 05/25/2016 Yes    Essential hypertension [I10] 01/25/2014 Yes      Problems Resolved During this Admission:       VTE Risk Mitigation (From admission, onward)           Ordered     apixaban tablet 5 mg  2 times daily         09/14/22 0622     IP VTE HIGH RISK PATIENT  Once         09/14/22 0509     Place sequential compression device  Until discontinued         09/14/22 0509                    Thank you for your consult. I will follow-up with patient. Please contact us if you have any additional questions.    Niels Cortez MD  Neurology  [facility]

## 2022-09-15 NOTE — PLAN OF CARE
Plan of care reviewed with patient and verbalized understanding. Pt remains free from fall, injury, and skin breakdown. Positions self independently. No complaints of N/V, patient states he feels better and able to eat. Voiding spontaneously without difficulty. Continuous cardiac monitoring maintained. Incision CDI. Purposeful hourly rounding done. Safety maintained.      Problem: Adult Inpatient Plan of Care  Goal: Plan of Care Review  Outcome: Ongoing, Progressing     Problem: Diabetes Comorbidity  Goal: Blood Glucose Level Within Targeted Range  Outcome: Ongoing, Progressing     Problem: Nausea and Vomiting  Goal: Fluid and Electrolyte Balance  Outcome: Ongoing, Progressing

## 2022-09-15 NOTE — PT/OT/SLP EVAL
Occupational Therapy   Evaluation    Name: Damir Faria  MRN: [id number]  Admitting Diagnosis:  Vertigo  Recent Surgery: * No surgery found *      Recommendations:     Discharge Recommendations: outpatient PT (vestibular PT)  Discharge Equipment Recommendations:  walker, rolling  Barriers to discharge:  None    Assessment:     Damir Faria is a 67 y.o. male with a medical diagnosis of Vertigo.  He presents with impaired self care skills, impaired functional mobility, impaired balance, decreased safety awareness, visual deficits (vertigo).      Rehab Prognosis: Good; patient would benefit from acute skilled OT services to address these deficits and reach maximum level of function.       Plan:     Patient to be seen 3 x/week to address the above listed problems via self-care/home management, therapeutic activities, therapeutic exercises  Plan of Care Expires: 09/29/22  Plan of Care Reviewed with: patient    Subjective     Chief Complaint: vertigo  Patient/Family Comments/goals: to dc home     Occupational Profile:  Living Environment: alone, apartment, 0 ANY, tub/shower combo with shower chair   Previous level of function: Independent with ADLs and IADLs  Roles and Routines: community ambulator, goes shopping. Drives but uses a friend's car   Equipment Used at Home:  shower chair (owns but uncertain if he uses)  Assistance upon Discharge: pt reports the people who live in his building are able to help him if he calls them and they are available     Pain/Comfort:  Pain Rating 1: 0/10    Patients cultural, spiritual, Sabianism conflicts given the current situation: no    Objective:     Communicated with: RN prior to session.  Patient found HOB elevated with peripheral IV upon OT entry to room.    General Precautions: Standard, fall   Orthopedic Precautions:N/A   Braces: N/A  Respiratory Status: Room air    Occupational Performance:    Bed Mobility:    Supine <> sitting: SBA    Functional Mobility/Transfers:  Sit  "<> stand: SBA with RW. CGA without AD  Toilet: SBA with RW and grab bar   Functional Mobility: Pt required CGA and experienced overt LOB x1 without AD. With RW, pt with improved balance and safety and required SBA only. Pt given extensive education on RW use for prevention of falls and increasing safety and independence with functional ambulation and ADLs. Pt declining RW at first but upon further education stated "You explained that very clearly" and verbalized willingness to ambulate with RW.     Activities of Daily Living:  Grooming: SBA in standing at sink for oral care and hand/face hygiene  LBD: SBA sitting EOB to don socks     Cognitive/Visual Perceptual:  Cognitive/Psychosocial Skills:     -       Oriented to: not formally assessed, no deficits noted   -       Follows Commands/attention:Follows one-step commands  -       Communication: clear/fluent  -       Memory: No Deficits noted  -       Safety awareness/insight to disability: impaired, decreased insight into vertigo/balance deficits and increased fall risk   -       Mood/Affect/Coping skills/emotional control: Pleasant    Physical Exam:  Upper Extremity Range of Motion:     -       Right Upper Extremity: WNL  -       Left Upper Extremity: WNL  Upper Extremity Strength:    -       Right Upper Extremity: WNL  -       Left Upper Extremity: WNL  Pt denies numbness/tingling in BUEs.   Pt with vertigo and reported dizziness throughout OOB activity.     AMPAC 6 Click ADL:  AMPAC Total Score: 20    Treatment & Education:  OT role, plan of care, progression of goals, importance of continued OOB activity, ADL/functional mobility/transfer retraining, proper and safe use of RW, fall prevention, safety precautions, call don't fall    Patient left HOB elevated with all lines intact, call button in reach, bed alarm on, and RN notified    GOALS:   Multidisciplinary Problems       Occupational Therapy Goals          Problem: Occupational Therapy    Goal Priority " Disciplines Outcome Interventions   Occupational Therapy Goal     OT, PT/OT Ongoing, Progressing    Description: Goals to be met by: 9/29/22     Patient will increase functional independence with ADLs by performing:    UE Dressing with [place].  LE Dressing with [place].  Grooming while standing at sink with Modified [place].  Toileting from toilet with [place] for hygiene and clothing management.   Toilet transfer to toilet with Modified [place].                         History:     Past Medical History:   Diagnosis Date    Acute renal insufficiency 6/21/2015    Cocaine abuse     Depression     History of psychiatric care     HTN (hypertension) 1/25/2014    STEMI (ST elevation myocardial infarction) 2/12/2017    Stroke          Past Surgical History:   Procedure Laterality Date    CARDIAC SURGERY      stents    NECK SURGERY         Time Tracking:     OT Date of Treatment: 09/15/22  OT Start Time: 1026  OT Stop Time: 1040  OT Total Time (min): 14 min    Billable Minutes:Evaluation 6 minutes  Self Care/Home Management 8 minutes    9/15/2022

## 2022-09-17 NOTE — HOSPITAL COURSE
Damir Faria was admitted for vertigo. Given IVF, zofran, valium, meclizine with improvement. MRI brain without evidence of CVA. PTOT eval, outpt vestibular therapy. Stable for dc with ENT fu, return precautions discussed, no further questions

## 2022-09-17 NOTE — DISCHARGE SUMMARY
[facility] Medicine  Discharge Summary      Patient Name: Damir Faria  MRN: [id number]  Patient Class: OP- Observation  Admission Date: 9/13/2022  Hospital Length of Stay: 0 days  Discharge Date and Time: 9/15/2022  7:36 PM  Attending Physician: No att. providers found   Discharging Provider: Cici Epperson PA-C  Primary Care Provider: [facility]      HPI:   Mr. Damir Faria is a 67 y.o. male with PMH of CHF, ICM, CAD (s/p stenting), HTN, T2DM, GERD, Polysubstance abuse, Depression, medication non-compliance, prior VTE, who presented to [facility] on 9/14/22 due to abdominal pain x 1 hour. He notes associated nausea, vomiting, dizziness (room spins with movement of head). He states that prior to going to bed on 9/13 he was not dizzy. He notes associated lght headedness and nausea. He denies head injury, illness, h/o vertigo. He was evaluated in the ED with labs showing glucose of 140 without further abnormalities. A CT Head was without acute intracranial abnormalities, but did show remote infarcts of the right thalamus and medial right temporal lobe. He was treated in the ED with meclizine and diazepam. He was placed on OBS.       * No surgery found *      Hospital Course:   Damir Faria was admitted for vertigo. Given IVF, zofran, valium, meclizine with improvement. MRI brain without evidence of CVA. PTOT eval, outpt vestibular therapy. Stable for dc with ENT fu, return precautions discussed, no further questions       Goals of Care Treatment Preferences:  Code Status: Full Code      Consults:   Consults (From admission, onward)        Status Ordering Provider     Inpatient consult to Neurology  Once        Provider:  Niels Cortez MD    Completed HNADY TITUS          * Vertigo  - Mr. Damir Faria presents with dizziness and light headedness that started after sleeping   - CT Head negative   - fall precautions   - Neuro consulted : mri  "brain unremarkable  Stable for dc with vestibular therapy, ent, meclizine and zofran    Type 2 diabetes mellitus without complication, without long-term current use of insulin  - last A1C:   Lab Results   Component Value Date    HGBA1C 5.7 (H) 09/14/2022    - A1C pending for AM   - hold oral antidiabetic meds   - Diabetic diet   - SSI with accuchecks AC/HS      Anti-hyperglycemic dose as follows-   Antihyperglycemics (From admission, onward)    None        Hold Oral hypoglycemics while patient is in the hospital.    Mixed hyperlipidemia  - continue statin         Essential hypertension  - hypertensive at present   - home meds: carvedilol 6.25 mg BID, lisinopril 20 mg QD, spironolactone 25 mg QD   - monitor         Final Active Diagnoses:    Diagnosis Date Noted POA    PRINCIPAL PROBLEM:  Vertigo [R42] 09/14/2022 Yes    Chronic combined systolic and diastolic heart failure [I50.42] 06/05/2022 Yes    Coronary artery disease involving native coronary artery of native heart without angina pectoris [I25.10] 07/06/2018 Yes    Ischemic cardiomyopathy [I25.5] 07/06/2018 Yes    Polysubstance abuse [F19.10] 02/23/2017 Yes    Type 2 diabetes mellitus without complication, without long-term current use of insulin [E11.9] 09/28/2016 Yes    Mixed hyperlipidemia [E78.2] 05/25/2016 Yes    Essential hypertension [I10] 01/25/2014 Yes      Problems Resolved During this Admission:       Discharged Condition: stable    Disposition: Home or Self Care    Follow Up:   Follow-up Information     [facility].    Specialty: EMETERIO Provider  Contact information:  [facility]  [facility] [address] [phone]                       Patient Instructions:      WALKER FOR HOME USE     Order Specific Question Answer Comments   Type of Walker: Adult (5'4"-6'6")    With wheels? Yes    Height: 5' 11" (1.803 m)    Weight: 86.7 kg (191 lb 2.2 oz)    Length of need (1-99 months): 99    Does patient have medical equipment at " "home? none    Please check all that apply: Patient's condition impairs ambulation.      WHEELCHAIR FOR HOME USE     Order Specific Question Answer Comments   Hours in W/C per day: 12    Type of Wheelchair: Standard    Size(Width): 18"(STD adult)    Leg Support: STD footrests    Lap Belt: Velcro    Accessories: Front brakes    Cushion: Basic    Height: 5' 11" (1.803 m)    Weight: 86.7 kg (191 lb 2.2 oz)    Does patient have medical equipment at home? none    Length of need (1-99 months): 99    Please check all that apply: Patient mobility limitations cannot be sufficiently resolved by the use of other ambulatory therapies.    Please check all that apply: The patient requires the use of a w/c for activities of daily living within the Home.    Please check all that apply: Caregiver is capable and willing to operate wheelchair safely.      Ambulatory referral/consult to Physical/Occupational Therapy   Standing Status: Future   Referral Priority: Routine Referral Type: Physical Medicine   Referral Reason: Specialty Services Required   Number of Visits Requested: 1     Ambulatory referral/consult to ENT   Standing Status: Future   Referral Priority: Routine Referral Type: Consultation   Referral Reason: Specialty Services Required   Requested Specialty: Otolaryngology   Number of Visits Requested: 1     Notify your health care provider if you experience any of the following:  temperature >100.4     Notify your health care provider if you experience any of the following:  persistent nausea and vomiting or diarrhea     Notify your health care provider if you experience any of the following:  redness, tenderness, or signs of infection (pain, swelling, redness, odor or green/yellow discharge around incision site)     Notify your health care provider if you experience any of the following:  difficulty breathing or increased cough     Notify your health care provider if you experience any of the following:  severe persistent " headache     Notify your health care provider if you experience any of the following:  worsening rash     Notify your health care provider if you experience any of the following:  persistent dizziness, light-headedness, or visual disturbances     Notify your health care provider if you experience any of the following:  increased confusion or weakness     Activity as tolerated       Significant Diagnostic Studies: Radiology: MRI: no acute CVA    Pending Diagnostic Studies:     None         Medications:  Reconciled Home Medications:      Medication List      START taking these medications    meclizine 25 mg tablet  Commonly known as: ANTIVERT  Take 1 tablet (25 mg total) by mouth 3 (three) times daily as needed for Dizziness.        CONTINUE taking these medications    apixaban 5 mg Tab  Commonly known as: ELIQUIS  Take 1 tablet (5 mg total) by mouth 2 (two) times daily.     aspirin 81 MG Chew  Take 1 tablet (81 mg total) by mouth once daily.     atorvastatin 40 MG tablet  Commonly known as: LIPITOR  Take 1 tablet (40 mg total) by mouth every evening.     carvediloL 6.25 MG tablet  Commonly known as: COREG  Take 1 tablet (6.25 mg total) by mouth 2 (two) times daily.     lisinopriL 20 MG tablet  Commonly known as: PRINIVIL,ZESTRIL  Take 1 tablet (20 mg total) by mouth once daily.     mirtazapine 30 MG tablet  Commonly known as: REMERON  Take 1 tablet (30 mg total) by mouth every evening.     spironolactone 25 MG tablet  Commonly known as: ALDACTONE  Take 1 tablet (25 mg total) by mouth once daily.            Indwelling Lines/Drains at time of discharge:   Lines/Drains/Airways     None                 Time spent on the discharge of patient: >35 minutes       Discussed with staff  Cici Epperson PA-C  Department of Hospital Medicine  [facility]

## 2022-09-17 NOTE — ASSESSMENT & PLAN NOTE
- last A1C:   Lab Results   Component Value Date    HGBA1C 5.7 (H) 09/14/2022    - A1C pending for AM   - hold oral antidiabetic meds   - Diabetic diet   - SSI with accuchecks AC/HS      Anti-hyperglycemic dose as follows-   Antihyperglycemics (From admission, onward)    None        Hold Oral hypoglycemics while patient is in the hospital.

## 2022-10-07 DIAGNOSIS — D18.03 HEMANGIOMA OF LIVER: Primary | ICD-10-CM

## 2022-10-14 ENCOUNTER — HOSPITAL ENCOUNTER (OUTPATIENT)
Dept: RADIOLOGY | Facility: HOSPITAL | Age: 67
Discharge: HOME | End: 2022-10-14
Attending: STUDENT IN AN ORGANIZED HEALTH CARE EDUCATION/TRAINING PROGRAM
Payer: MEDICARE

## 2022-10-14 DIAGNOSIS — D18.03 HEMANGIOMA OF LIVER: ICD-10-CM

## 2022-10-14 PROCEDURE — 76700 US EXAM ABDOM COMPLETE: CPT | Mod: TC

## 2022-10-14 PROCEDURE — 76700 US ABDOMEN COMPLETE: ICD-10-PCS | Mod: 26,,, | Performed by: RADIOLOGY

## 2022-10-14 PROCEDURE — 76700 US EXAM ABDOM COMPLETE: CPT | Mod: 26,,, | Performed by: RADIOLOGY

## 2022-11-07 ENCOUNTER — TELEPHONE (OUTPATIENT)
Dept: OTOLARYNGOLOGY | Facility: CLINIC | Age: 67
End: 2022-11-07
Payer: MEDICARE

## 2022-11-07 NOTE — TELEPHONE ENCOUNTER
Called patient to reschedule appointment due to incorrect scheduling. No answer,left vm to call back and reschedule.

## 2022-11-09 ENCOUNTER — TELEPHONE (OUTPATIENT)
Dept: ADMINISTRATIVE | Facility: OTHER | Age: 67
End: 2022-11-09
Payer: MEDICARE

## 2023-03-18 ENCOUNTER — HOSPITAL ENCOUNTER (EMERGENCY)
Facility: OTHER | Age: 68
Discharge: HOME | End: 2023-03-18
Attending: EMERGENCY MEDICINE
Payer: MEDICARE

## 2023-03-18 VITALS
OXYGEN SATURATION: 100 % | HEART RATE: 68 BPM | HEIGHT: 72 IN | DIASTOLIC BLOOD PRESSURE: 85 MMHG | WEIGHT: 190 LBS | BODY MASS INDEX: 25.73 KG/M2 | RESPIRATION RATE: 19 BRPM | SYSTOLIC BLOOD PRESSURE: 139 MMHG | TEMPERATURE: 98 F

## 2023-03-18 DIAGNOSIS — R07.9 CHEST PAIN: ICD-10-CM

## 2023-03-18 LAB
ALBUMIN SERPL BCP-MCNC: 4 G/DL (ref 3.5–5.2)
ALP SERPL-CCNC: 75 U/L (ref 55–135)
ALT SERPL W/O P-5'-P-CCNC: 24 U/L (ref 10–44)
ANION GAP SERPL CALC-SCNC: 9 MMOL/L (ref 8–16)
AST SERPL-CCNC: 26 U/L (ref 10–40)
BASOPHILS # BLD AUTO: 0.04 K/UL (ref 0–0.2)
BASOPHILS NFR BLD: 0.9 % (ref 0–1.9)
BILIRUB SERPL-MCNC: 1 MG/DL (ref 0.1–1)
BNP SERPL-MCNC: 148 PG/ML (ref 0–99)
BUN SERPL-MCNC: 20 MG/DL (ref 8–23)
CALCIUM SERPL-MCNC: 8.9 MG/DL (ref 8.7–10.5)
CHLORIDE SERPL-SCNC: 111 MMOL/L (ref 95–110)
CO2 SERPL-SCNC: 19 MMOL/L (ref 23–29)
CREAT SERPL-MCNC: 1.4 MG/DL (ref 0.5–1.4)
DIFFERENTIAL METHOD: ABNORMAL
EOSINOPHIL # BLD AUTO: 0.2 K/UL (ref 0–0.5)
EOSINOPHIL NFR BLD: 4.2 % (ref 0–8)
ERYTHROCYTE [DISTWIDTH] IN BLOOD BY AUTOMATED COUNT: 13.2 % (ref 11.5–14.5)
EST. GFR  (NO RACE VARIABLE): 55 ML/MIN/1.73 M^2
GLUCOSE SERPL-MCNC: 84 MG/DL (ref 70–110)
HCT VFR BLD AUTO: 45.5 % (ref 40–54)
HCV AB SERPL QL IA: NEGATIVE
HGB BLD-MCNC: 15.5 G/DL (ref 14–18)
HIV 1+2 AB+HIV1 P24 AG SERPL QL IA: NEGATIVE
IMM GRANULOCYTES # BLD AUTO: 0.01 K/UL (ref 0–0.04)
IMM GRANULOCYTES NFR BLD AUTO: 0.2 % (ref 0–0.5)
LYMPHOCYTES # BLD AUTO: 1.8 K/UL (ref 1–4.8)
LYMPHOCYTES NFR BLD: 42.5 % (ref 18–48)
MCH RBC QN AUTO: 32.6 PG (ref 27–31)
MCHC RBC AUTO-ENTMCNC: 34.1 G/DL (ref 32–36)
MCV RBC AUTO: 96 FL (ref 82–98)
MONOCYTES # BLD AUTO: 0.6 K/UL (ref 0.3–1)
MONOCYTES NFR BLD: 13.5 % (ref 4–15)
NEUTROPHILS # BLD AUTO: 1.7 K/UL (ref 1.8–7.7)
NEUTROPHILS NFR BLD: 38.7 % (ref 38–73)
NRBC BLD-RTO: 0 /100 WBC
PLATELET # BLD AUTO: 238 K/UL (ref 150–450)
PMV BLD AUTO: 8.4 FL (ref 9.2–12.9)
POTASSIUM SERPL-SCNC: 4.5 MMOL/L (ref 3.5–5.1)
PROT SERPL-MCNC: 7.6 G/DL (ref 6–8.4)
RBC # BLD AUTO: 4.75 M/UL (ref 4.6–6.2)
SODIUM SERPL-SCNC: 139 MMOL/L (ref 136–145)
TROPONIN I SERPL DL<=0.01 NG/ML-MCNC: 0.01 NG/ML (ref 0–0.03)
TROPONIN I SERPL DL<=0.01 NG/ML-MCNC: <0.006 NG/ML (ref 0–0.03)
WBC # BLD AUTO: 4.31 K/UL (ref 3.9–12.7)

## 2023-03-18 PROCEDURE — 87389 HIV-1 AG W/HIV-1&-2 AB AG IA: CPT | Performed by: EMERGENCY MEDICINE

## 2023-03-18 PROCEDURE — 93005 ELECTROCARDIOGRAM TRACING: CPT

## 2023-03-18 PROCEDURE — 84484 ASSAY OF TROPONIN QUANT: CPT | Performed by: EMERGENCY MEDICINE

## 2023-03-18 PROCEDURE — 83880 ASSAY OF NATRIURETIC PEPTIDE: CPT | Performed by: EMERGENCY MEDICINE

## 2023-03-18 PROCEDURE — 99285 EMERGENCY DEPT VISIT HI MDM: CPT | Mod: 25

## 2023-03-18 PROCEDURE — 80053 COMPREHEN METABOLIC PANEL: CPT | Performed by: EMERGENCY MEDICINE

## 2023-03-18 PROCEDURE — 93010 EKG 12-LEAD: ICD-10-PCS | Mod: ,,, | Performed by: INTERNAL MEDICINE

## 2023-03-18 PROCEDURE — 93010 ELECTROCARDIOGRAM REPORT: CPT | Mod: ,,, | Performed by: INTERNAL MEDICINE

## 2023-03-18 PROCEDURE — 85025 COMPLETE CBC W/AUTO DIFF WBC: CPT | Performed by: EMERGENCY MEDICINE

## 2023-03-18 PROCEDURE — 86803 HEPATITIS C AB TEST: CPT | Performed by: EMERGENCY MEDICINE

## 2023-03-18 NOTE — ED TRIAGE NOTES
67 YOM presents to ED with c/o continuous left cp 7/10 non radiating that began last night. -SOB. PMH MI and stroke 2018. Stated relief with sublingual nitro PTA. A&Ox4. Denies any other complaints.     LOC: The patient is awake, alert and aware of environment with an appropriate affect, the patient is oriented x 3.  APPEARANCE: Patient resting comfortably and in no acute distress, patient is clean and well groomed, patient's clothing is properly fastened.  SKIN: The skin is warm and dry, patient has normal skin turgor and moist mucus membranes, skin intact, no breakdown or brusing noted.  MUSKULOSKELETAL: Patient moving all extremities well, no obvious swelling or deformities noted.  RESPIRATORY: Airway is open and patent, respirations are spontaneous, patient has a normal effort and rate.  CARDIAC: No peripheral edema.  ABDOMEN: Soft and no tenderness to palpation, no distention noted.     ED workup in progress. VSS. Safety measures in place; side rails up x2. Call light within pt reach. Will continue to monitor.

## 2023-03-18 NOTE — ED PROVIDER NOTES
Encounter Date: 3/18/2023       History     Chief Complaint   Patient presents with    Chest Pain     Pt brought in by NO EMS with pt c.o chest pain onset last night. Non radiating. Pt took nitro and asa at home. Pt has hx of stents.  C.o SOB.  EKG with st depression per ems.  AAO x 3 skin w.d      Damir Faria is a 67-year-old male history of hypertension, depression, substance abuse, previous MI with nonobstructive cardiac catheterization in 2018 with no intervention presenting today for chest pain.  Symptoms started about 10:00 p.m. last night.  It has been intermittent.  Located in left side of the chest and associated with shortness of breath.  No radiation.  He initially thought it was gas pain but then when it did not resolve decided to take nitro yesterday and then again this morning.  Pain was a 7/10 prior to nitro, improved to a 3/10.  No fever or chills.  No cough.  Denies recent cocaine use.  No alcohol use in the past 2 weeks    Review of patient's allergies indicates:  No Known Allergies  Past Medical History:   Diagnosis Date    Acute renal insufficiency 6/21/2015    Cocaine abuse     Depression     History of psychiatric care     HTN (hypertension) 1/25/2014    STEMI (ST elevation myocardial infarction) 2/12/2017    Stroke      Past Surgical History:   Procedure Laterality Date    CARDIAC SURGERY      stents    NECK SURGERY       Family History   Problem Relation Age of Onset    Diabetes Mother     Heart disease Mother     Hypertension Mother      Social History     Tobacco Use    Smoking status: Some Days     Packs/day: 0.25     Types: Cigarettes    Smokeless tobacco: Never   Substance Use Topics    Alcohol use: Not Currently     Comment: social    Drug use: Not Currently     Types: Cocaine     Comment: this weekend     Review of Systems    Physical Exam     Initial Vitals [03/18/23 1009]   BP Pulse Resp Temp SpO2   127/71 (!) 3 16 98 °F (36.7 °C) 95 %      MAP       --         Physical  Exam    Nursing note and vitals reviewed.  Constitutional: He appears well-developed and well-nourished. No distress.   HENT:   Mouth/Throat: Oropharynx is clear and moist.   Eyes: Conjunctivae are normal.   Neck: Neck supple.   Cardiovascular:  Normal rate, regular rhythm and intact distal pulses.           Pulmonary/Chest: Breath sounds normal. He has no wheezes. He has no rales.   Abdominal: Abdomen is soft. Bowel sounds are normal. There is no abdominal tenderness.   Musculoskeletal:         General: No edema.      Cervical back: Neck supple.     Lymphadenopathy:     He has no cervical adenopathy.   Neurological: He is alert and oriented to person, place, and time.   Skin: No rash noted.   Psychiatric: He has a normal mood and affect.       ED Course   Procedures  Labs Reviewed   CBC W/ AUTO DIFFERENTIAL - Abnormal; Notable for the following components:       Result Value    MCH 32.6 (*)     MPV 8.4 (*)     Gran # (ANC) 1.7 (*)     All other components within normal limits   COMPREHENSIVE METABOLIC PANEL - Abnormal; Notable for the following components:    Chloride 111 (*)     CO2 19 (*)     eGFR 55 (*)     All other components within normal limits   B-TYPE NATRIURETIC PEPTIDE - Abnormal; Notable for the following components:     (*)     All other components within normal limits   TROPONIN I   HIV 1 / 2 ANTIBODY    Narrative:     Release to patient->Immediate   HEPATITIS C ANTIBODY    Narrative:     Release to patient->Immediate   TROPONIN I     EKG Readings: (Independently Interpreted)   EKG:  Sinus rhythm at 60, left axis, new T-wave inversion in lateral leads.  When compared to June 2022     Imaging Results              X-Ray Chest AP Portable (Final result)  Result time 03/18/23 10:45:02      Final result by Eliot Magana MD (03/18/23 10:45:02)                   Impression:      As above      Electronically signed by: Eliot Magana  Date:    03/18/2023  Time:    10:45               Narrative:     EXAMINATION:  XR CHEST AP PORTABLE    CLINICAL HISTORY:  Chest Pain;    TECHNIQUE:  Single frontal view of the chest was performed.    COMPARISON:  June 5, 2022    FINDINGS:  Positioning for present exam essentially apical lordotic.    Upper normal heart size.  Normal pulmonary vasculature.  Stable isolated focus of right cardiophrenic atelectatic or fibrotic change.  Suggested intervally developed focus of left lower lobe-retrocardiac plate like opacity that could be on the basis of atelectasis, infiltrate felt less likely but not totally excluded.  No other focal right or left lung infiltrates.  No right or left pleural effusions or pneumothorax.  Partially visualized anterior cervical fusion hardware as before.                                    X-Rays:   Independently Interpreted Readings:   Other Readings:  Chest x-ray with atelectatic changes, no consolidation or effusion  Medications - No data to display  Medical Decision Making:   History:   Old Medical Records: I decided to obtain old medical records.  Initial Assessment:   Emergent evaluation a 67-year-old male presenting today for chest pain since last night.  Vital signs stable  Took aspirin and nitro prior to arrival  Differential Diagnosis:   ACS, pericarditis, myocarditis, musculoskeletal pain, costochondritis, GERD  Independently Interpreted Test(s):   I have ordered and independently interpreted X-rays - see prior notes.  I have ordered and independently interpreted EKG Reading(s) - see prior notes  Clinical Tests:   Lab Tests: Reviewed and Ordered  Radiological Study: Ordered and Reviewed  Medical Tests: Ordered and Reviewed  ED Management:  - labs  - EKG  - CXR             ED Course as of 03/18/23 1352   Sat Mar 18, 2023   1125 Troponin I: <0.006 [GM]   1127 WBC: 4.31 [GM]   1127 Hemoglobin: 15.5 [GM]   1127 BNP(!): 148 [GM]   1127 BUN: 20 [GM]   1127 Creatinine: 1.4 [GM]   1325 Repeat trop negative.       [GM]   1332 Patient has had no further  episodes of chest pain while emergency department.  He has been resting comfortably while in the emergency department.  Chest x-ray with no acute findings.  Repeat EKG remains sinus rhythm at 70, no significant changes.    Patient was given a diet.  Recommend follow-up with     [GM]      ED Course User Index  [GM] Juju Gómez MD                 Clinical Impression:   Final diagnoses:  [R07.9] Chest pain        ED Disposition Condition    Discharge Stable          ED Prescriptions    None       Follow-up Information    None          Juju Gómez MD  03/18/23 [id number]

## 2023-07-22 ENCOUNTER — HOSPITAL ENCOUNTER (OUTPATIENT)
Facility: HOSPITAL | Age: 68
Discharge: HOME | End: 2023-07-24
Attending: EMERGENCY MEDICINE | Admitting: EMERGENCY MEDICINE
Payer: MEDICARE

## 2023-07-22 DIAGNOSIS — R07.2 PRECORDIAL PAIN: ICD-10-CM

## 2023-07-22 DIAGNOSIS — R07.9 CHEST PAIN: ICD-10-CM

## 2023-07-22 DIAGNOSIS — I25.5 ISCHEMIC CARDIOMYOPATHY: ICD-10-CM

## 2023-07-22 DIAGNOSIS — F33.41 RECURRENT MAJOR DEPRESSIVE DISORDER, IN PARTIAL REMISSION: Chronic | ICD-10-CM

## 2023-07-22 DIAGNOSIS — I50.42 CHRONIC COMBINED SYSTOLIC AND DIASTOLIC HEART FAILURE: ICD-10-CM

## 2023-07-22 DIAGNOSIS — R07.2 PRECORDIAL CHEST PAIN: Primary | ICD-10-CM

## 2023-07-22 DIAGNOSIS — F17.210 TOBACCO DEPENDENCE DUE TO CIGARETTES: Chronic | ICD-10-CM

## 2023-07-22 DIAGNOSIS — F14.20 COCAINE USE DISORDER, SEVERE, DEPENDENCE: Chronic | ICD-10-CM

## 2023-07-22 PROBLEM — M62.82 NON-TRAUMATIC RHABDOMYOLYSIS: Status: RESOLVED | Noted: 2022-06-05 | Resolved: 2023-07-22

## 2023-07-22 PROBLEM — I11.9 HYPERTENSIVE HEART DISEASE WITHOUT HEART FAILURE: Status: RESOLVED | Noted: 2017-02-13 | Resolved: 2023-07-22

## 2023-07-22 PROBLEM — R45.851 DEPRESSION WITH SUICIDAL IDEATION: Status: RESOLVED | Noted: 2020-01-09 | Resolved: 2023-07-22

## 2023-07-22 PROBLEM — N17.9 AKI (ACUTE KIDNEY INJURY): Status: RESOLVED | Noted: 2017-02-14 | Resolved: 2023-07-22

## 2023-07-22 PROBLEM — F32.A DEPRESSION WITH SUICIDAL IDEATION: Status: RESOLVED | Noted: 2020-01-09 | Resolved: 2023-07-22

## 2023-07-22 PROBLEM — R79.89 ELEVATED SERUM CREATININE: Status: RESOLVED | Noted: 2020-01-09 | Resolved: 2023-07-22

## 2023-07-22 PROBLEM — I20.9 ISCHEMIC CHEST PAIN: Status: RESOLVED | Noted: 2017-02-11 | Resolved: 2023-07-22

## 2023-07-22 PROBLEM — I82.90: Chronic | Status: ACTIVE | Noted: 2022-06-05

## 2023-07-22 PROBLEM — R45.851 SUICIDAL IDEATION: Status: RESOLVED | Noted: 2019-07-07 | Resolved: 2023-07-22

## 2023-07-22 PROBLEM — Z79.01 CURRENT USE OF LONG TERM ANTICOAGULATION: Chronic | Status: ACTIVE | Noted: 2017-06-21

## 2023-07-22 PROBLEM — G56.30 RADIAL NEUROPATHY: Status: ACTIVE | Noted: 2023-07-22

## 2023-07-22 PROBLEM — R82.4 URINE KETONES: Status: RESOLVED | Noted: 2020-01-09 | Resolved: 2023-07-22

## 2023-07-22 PROBLEM — I23.6 LV (LEFT VENTRICULAR) MURAL THROMBUS FOLLOWING MI: Chronic | Status: ACTIVE | Noted: 2017-06-21

## 2023-07-22 LAB
ALBUMIN SERPL BCP-MCNC: 3.4 G/DL (ref 3.5–5.2)
ALP SERPL-CCNC: 57 U/L (ref 55–135)
ALT SERPL W/O P-5'-P-CCNC: 26 U/L (ref 10–44)
AMPHET+METHAMPHET UR QL: NEGATIVE
ANION GAP SERPL CALC-SCNC: 6 MMOL/L (ref 8–16)
AST SERPL-CCNC: 26 U/L (ref 10–40)
BARBITURATES UR QL SCN>200 NG/ML: NEGATIVE
BASOPHILS # BLD AUTO: 0.04 K/UL (ref 0–0.2)
BASOPHILS NFR BLD: 0.8 % (ref 0–1.9)
BENZODIAZ UR QL SCN>200 NG/ML: NEGATIVE
BILIRUB SERPL-MCNC: 0.5 MG/DL (ref 0.1–1)
BNP SERPL-MCNC: 108 PG/ML (ref 0–99)
BUN SERPL-MCNC: 23 MG/DL (ref 8–23)
BZE UR QL SCN: ABNORMAL
CALCIUM SERPL-MCNC: 8.4 MG/DL (ref 8.7–10.5)
CANNABINOIDS UR QL SCN: NEGATIVE
CHLORIDE SERPL-SCNC: 112 MMOL/L (ref 95–110)
CO2 SERPL-SCNC: 22 MMOL/L (ref 23–29)
CREAT SERPL-MCNC: 1.2 MG/DL (ref 0.5–1.4)
CREAT UR-MCNC: 98 MG/DL (ref 23–375)
DIFFERENTIAL METHOD: ABNORMAL
EOSINOPHIL # BLD AUTO: 0.3 K/UL (ref 0–0.5)
EOSINOPHIL NFR BLD: 5.5 % (ref 0–8)
ERYTHROCYTE [DISTWIDTH] IN BLOOD BY AUTOMATED COUNT: 13.3 % (ref 11.5–14.5)
EST. GFR  (NO RACE VARIABLE): >60 ML/MIN/1.73 M^2
ETHANOL UR-MCNC: <10 MG/DL
GLUCOSE SERPL-MCNC: 83 MG/DL (ref 70–110)
HCT VFR BLD AUTO: 38.3 % (ref 40–54)
HGB BLD-MCNC: 12.6 G/DL (ref 14–18)
IMM GRANULOCYTES # BLD AUTO: 0.02 K/UL (ref 0–0.04)
IMM GRANULOCYTES NFR BLD AUTO: 0.4 % (ref 0–0.5)
LYMPHOCYTES # BLD AUTO: 2.2 K/UL (ref 1–4.8)
LYMPHOCYTES NFR BLD: 42.5 % (ref 18–48)
MCH RBC QN AUTO: 31.7 PG (ref 27–31)
MCHC RBC AUTO-ENTMCNC: 32.9 G/DL (ref 32–36)
MCV RBC AUTO: 97 FL (ref 82–98)
METHADONE UR QL SCN>300 NG/ML: NEGATIVE
MONOCYTES # BLD AUTO: 0.7 K/UL (ref 0.3–1)
MONOCYTES NFR BLD: 13.5 % (ref 4–15)
NEUTROPHILS # BLD AUTO: 2 K/UL (ref 1.8–7.7)
NEUTROPHILS NFR BLD: 37.3 % (ref 38–73)
NRBC BLD-RTO: 0 /100 WBC
OPIATES UR QL SCN: NEGATIVE
PCP UR QL SCN>25 NG/ML: NEGATIVE
PLATELET # BLD AUTO: 246 K/UL (ref 150–450)
PMV BLD AUTO: 8.9 FL (ref 9.2–12.9)
POC CARDIAC TROPONIN I: 0 NG/ML (ref 0–0.08)
POC CARDIAC TROPONIN I: 0.01 NG/ML (ref 0–0.08)
POCT GLUCOSE: 45 MG/DL (ref 70–110)
POTASSIUM SERPL-SCNC: 4.5 MMOL/L (ref 3.5–5.1)
PROT SERPL-MCNC: 6.4 G/DL (ref 6–8.4)
RBC # BLD AUTO: 3.97 M/UL (ref 4.6–6.2)
SAMPLE: NORMAL
SAMPLE: NORMAL
SODIUM SERPL-SCNC: 140 MMOL/L (ref 136–145)
TOXICOLOGY INFORMATION: ABNORMAL
TROPONIN I SERPL DL<=0.01 NG/ML-MCNC: 0.01 NG/ML (ref 0–0.03)
TROPONIN I SERPL DL<=0.01 NG/ML-MCNC: 0.01 NG/ML (ref 0–0.03)
WBC # BLD AUTO: 5.25 K/UL (ref 3.9–12.7)

## 2023-07-22 PROCEDURE — 85025 COMPLETE CBC W/AUTO DIFF WBC: CPT | Performed by: EMERGENCY MEDICINE

## 2023-07-22 PROCEDURE — 25000242 PHARM REV CODE 250 ALT 637 W/ HCPCS: Performed by: HOSPITALIST

## 2023-07-22 PROCEDURE — 94761 N-INVAS EAR/PLS OXIMETRY MLT: CPT

## 2023-07-22 PROCEDURE — 93005 ELECTROCARDIOGRAM TRACING: CPT

## 2023-07-22 PROCEDURE — 83880 ASSAY OF NATRIURETIC PEPTIDE: CPT | Performed by: EMERGENCY MEDICINE

## 2023-07-22 PROCEDURE — 80053 COMPREHEN METABOLIC PANEL: CPT | Performed by: EMERGENCY MEDICINE

## 2023-07-22 PROCEDURE — 80307 DRUG TEST PRSMV CHEM ANLYZR: CPT | Performed by: HOSPITALIST

## 2023-07-22 PROCEDURE — 99285 EMERGENCY DEPT VISIT HI MDM: CPT | Mod: 25

## 2023-07-22 PROCEDURE — 25000242 PHARM REV CODE 250 ALT 637 W/ HCPCS: Performed by: EMERGENCY MEDICINE

## 2023-07-22 PROCEDURE — 84484 ASSAY OF TROPONIN QUANT: CPT | Mod: 91 | Performed by: EMERGENCY MEDICINE

## 2023-07-22 PROCEDURE — 99223 1ST HOSP IP/OBS HIGH 75: CPT | Mod: ,,, | Performed by: HOSPITALIST

## 2023-07-22 PROCEDURE — 93010 ELECTROCARDIOGRAM REPORT: CPT | Mod: ,,, | Performed by: INTERNAL MEDICINE

## 2023-07-22 PROCEDURE — G0378 HOSPITAL OBSERVATION PER HR: HCPCS

## 2023-07-22 PROCEDURE — 82962 GLUCOSE BLOOD TEST: CPT | Mod: 91

## 2023-07-22 PROCEDURE — 99223 PR INITIAL HOSPITAL CARE,LEVL III: ICD-10-PCS | Mod: ,,, | Performed by: HOSPITALIST

## 2023-07-22 PROCEDURE — 25000003 PHARM REV CODE 250: Performed by: HOSPITALIST

## 2023-07-22 PROCEDURE — 93010 EKG 12-LEAD: ICD-10-PCS | Mod: ,,, | Performed by: INTERNAL MEDICINE

## 2023-07-22 RX ORDER — DICLOFENAC SODIUM 10 MG/G
4 GEL TOPICAL 2 TIMES DAILY
COMMUNITY
Start: 2023-06-19

## 2023-07-22 RX ORDER — NALOXONE HCL 0.4 MG/ML
0.02 VIAL (ML) INJECTION
Status: DISCONTINUED | OUTPATIENT
Start: 2023-07-22 | End: 2023-07-24 | Stop reason: HOSPADM

## 2023-07-22 RX ORDER — POLYETHYLENE GLYCOL 3350 17 G/17G
17 POWDER, FOR SOLUTION ORAL 2 TIMES DAILY PRN
Status: DISCONTINUED | OUTPATIENT
Start: 2023-07-22 | End: 2023-07-24 | Stop reason: HOSPADM

## 2023-07-22 RX ORDER — IBUPROFEN 200 MG
16 TABLET ORAL
Status: DISCONTINUED | OUTPATIENT
Start: 2023-07-22 | End: 2023-07-24 | Stop reason: HOSPADM

## 2023-07-22 RX ORDER — ISOSORBIDE MONONITRATE 30 MG/1
30 TABLET, EXTENDED RELEASE ORAL DAILY
COMMUNITY
Start: 2023-07-01

## 2023-07-22 RX ORDER — ALLOPURINOL 100 MG/1
100 TABLET ORAL DAILY
COMMUNITY
Start: 2023-06-16 | End: 2023-07-22

## 2023-07-22 RX ORDER — MIRTAZAPINE 15 MG/1
15 TABLET, FILM COATED ORAL NIGHTLY
Status: DISCONTINUED | OUTPATIENT
Start: 2023-07-22 | End: 2023-07-24 | Stop reason: HOSPADM

## 2023-07-22 RX ORDER — CARVEDILOL 6.25 MG/1
6.25 TABLET ORAL 2 TIMES DAILY
Status: DISCONTINUED | OUTPATIENT
Start: 2023-07-22 | End: 2023-07-24 | Stop reason: HOSPADM

## 2023-07-22 RX ORDER — ONDANSETRON 2 MG/ML
4 INJECTION INTRAMUSCULAR; INTRAVENOUS EVERY 8 HOURS PRN
Status: DISCONTINUED | OUTPATIENT
Start: 2023-07-22 | End: 2023-07-24 | Stop reason: HOSPADM

## 2023-07-22 RX ORDER — IBUPROFEN 200 MG
1 TABLET ORAL DAILY
Status: DISCONTINUED | OUTPATIENT
Start: 2023-07-23 | End: 2023-07-24 | Stop reason: HOSPADM

## 2023-07-22 RX ORDER — CLOPIDOGREL BISULFATE 75 MG/1
75 TABLET ORAL DAILY
Status: DISCONTINUED | OUTPATIENT
Start: 2023-07-23 | End: 2023-07-24 | Stop reason: HOSPADM

## 2023-07-22 RX ORDER — SPIRONOLACTONE 25 MG/1
25 TABLET ORAL DAILY
Status: DISCONTINUED | OUTPATIENT
Start: 2023-07-23 | End: 2023-07-24 | Stop reason: HOSPADM

## 2023-07-22 RX ORDER — ACETAMINOPHEN 325 MG/1
650 TABLET ORAL EVERY 4 HOURS PRN
Status: DISCONTINUED | OUTPATIENT
Start: 2023-07-22 | End: 2023-07-24 | Stop reason: HOSPADM

## 2023-07-22 RX ORDER — OMEPRAZOLE 20 MG/1
20 CAPSULE, DELAYED RELEASE ORAL DAILY
COMMUNITY
Start: 2023-07-01 | End: 2023-07-22 | Stop reason: CLARIF

## 2023-07-22 RX ORDER — CLOPIDOGREL BISULFATE 75 MG/1
75 TABLET ORAL DAILY
Status: ON HOLD | COMMUNITY
Start: 2023-07-10 | End: 2023-07-24 | Stop reason: SDUPTHER

## 2023-07-22 RX ORDER — PROCHLORPERAZINE EDISYLATE 5 MG/ML
5 INJECTION INTRAMUSCULAR; INTRAVENOUS EVERY 6 HOURS PRN
Status: DISCONTINUED | OUTPATIENT
Start: 2023-07-22 | End: 2023-07-24 | Stop reason: HOSPADM

## 2023-07-22 RX ORDER — ATORVASTATIN CALCIUM 40 MG/1
40 TABLET, FILM COATED ORAL NIGHTLY
Status: DISCONTINUED | OUTPATIENT
Start: 2023-07-22 | End: 2023-07-24 | Stop reason: HOSPADM

## 2023-07-22 RX ORDER — NITROGLYCERIN 0.4 MG/1
0.4 TABLET SUBLINGUAL
Status: COMPLETED | OUTPATIENT
Start: 2023-07-22 | End: 2023-07-22

## 2023-07-22 RX ORDER — NITROGLYCERIN 0.4 MG/1
0.4 TABLET SUBLINGUAL EVERY 5 MIN PRN
Status: DISCONTINUED | OUTPATIENT
Start: 2023-07-22 | End: 2023-07-24 | Stop reason: HOSPADM

## 2023-07-22 RX ORDER — TRAZODONE HYDROCHLORIDE 50 MG/1
1 TABLET ORAL NIGHTLY
COMMUNITY
Start: 2023-04-11 | End: 2023-07-22 | Stop reason: CLARIF

## 2023-07-22 RX ORDER — LISINOPRIL 20 MG/1
20 TABLET ORAL DAILY
Status: DISCONTINUED | OUTPATIENT
Start: 2023-07-23 | End: 2023-07-24 | Stop reason: HOSPADM

## 2023-07-22 RX ORDER — MIRTAZAPINE 15 MG/1
15 TABLET, FILM COATED ORAL NIGHTLY
COMMUNITY
Start: 2023-06-24

## 2023-07-22 RX ORDER — NITROGLYCERIN 0.4 MG/1
1 TABLET SUBLINGUAL EVERY 5 MIN PRN
Status: ON HOLD | COMMUNITY
Start: 2023-06-22 | End: 2023-07-24 | Stop reason: SDUPTHER

## 2023-07-22 RX ORDER — EZETIMIBE 10 MG/1
10 TABLET ORAL DAILY
Status: DISCONTINUED | OUTPATIENT
Start: 2023-07-23 | End: 2023-07-24 | Stop reason: HOSPADM

## 2023-07-22 RX ORDER — ISOSORBIDE MONONITRATE 30 MG/1
30 TABLET, EXTENDED RELEASE ORAL DAILY
Status: DISCONTINUED | OUTPATIENT
Start: 2023-07-23 | End: 2023-07-24 | Stop reason: HOSPADM

## 2023-07-22 RX ORDER — MORPHINE SULFATE 4 MG/ML
4 INJECTION, SOLUTION INTRAMUSCULAR; INTRAVENOUS EVERY 4 HOURS PRN
Status: DISCONTINUED | OUTPATIENT
Start: 2023-07-22 | End: 2023-07-24 | Stop reason: HOSPADM

## 2023-07-22 RX ORDER — EZETIMIBE 10 MG/1
1 TABLET ORAL DAILY
COMMUNITY
Start: 2023-07-10

## 2023-07-22 RX ORDER — GLUCAGON 1 MG
1 KIT INJECTION
Status: DISCONTINUED | OUTPATIENT
Start: 2023-07-22 | End: 2023-07-24 | Stop reason: HOSPADM

## 2023-07-22 RX ORDER — SODIUM CHLORIDE 0.9 % (FLUSH) 0.9 %
10 SYRINGE (ML) INJECTION EVERY 6 HOURS PRN
Status: DISCONTINUED | OUTPATIENT
Start: 2023-07-22 | End: 2023-07-24 | Stop reason: HOSPADM

## 2023-07-22 RX ORDER — INSULIN ASPART 100 [IU]/ML
0-5 INJECTION, SOLUTION INTRAVENOUS; SUBCUTANEOUS
Status: DISCONTINUED | OUTPATIENT
Start: 2023-07-22 | End: 2023-07-24 | Stop reason: HOSPADM

## 2023-07-22 RX ORDER — TALC
6 POWDER (GRAM) TOPICAL NIGHTLY PRN
Status: DISCONTINUED | OUTPATIENT
Start: 2023-07-22 | End: 2023-07-24 | Stop reason: HOSPADM

## 2023-07-22 RX ORDER — IBUPROFEN 200 MG
24 TABLET ORAL
Status: DISCONTINUED | OUTPATIENT
Start: 2023-07-22 | End: 2023-07-24 | Stop reason: HOSPADM

## 2023-07-22 RX ADMIN — ATORVASTATIN CALCIUM 40 MG: 40 TABLET, FILM COATED ORAL at 08:07

## 2023-07-22 RX ADMIN — MIRTAZAPINE 15 MG: 15 TABLET, FILM COATED ORAL at 08:07

## 2023-07-22 RX ADMIN — CARVEDILOL 6.25 MG: 6.25 TABLET, FILM COATED ORAL at 08:07

## 2023-07-22 RX ADMIN — NITROGLYCERIN 0.4 MG: 0.4 TABLET, ORALLY DISINTEGRATING SUBLINGUAL at 05:07

## 2023-07-22 RX ADMIN — APIXABAN 5 MG: 5 TABLET, FILM COATED ORAL at 08:07

## 2023-07-22 RX ADMIN — NITROGLYCERIN 0.4 MG: 0.4 TABLET, ORALLY DISINTEGRATING SUBLINGUAL at 08:07

## 2023-07-22 RX ADMIN — NITROGLYCERIN 0.4 MG: 0.4 TABLET, ORALLY DISINTEGRATING SUBLINGUAL at 03:07

## 2023-07-22 NOTE — ED PROVIDER NOTES
Encounter Date: 7/22/2023       History     Chief Complaint   Patient presents with    Chest Pain     Starting yesterday. Advised to come to ED yesterday after clinic. Pain worse today. 3/10 from 8/10 after 2 sprays nitro. Given      68-year-old male with history of coronary artery disease, STEMI, recent stent placement, cocaine use presents with chest discomfort.  He states that over the last few days he has been having more chest discomfort.  Seems to come and go.  His lasting longer.  It is a heaviness.  He has it currently.  He was given nitroglycerin and the pain went from an 8 to a 3.  Denies any shortness of breath.  He states that he was in [place] doing rehab for gambling.  While he was there he had chest pain and was sent to the emergency department where they were concerned and took him to the ER where they did an angiogram and placed a stent.  He states that he was sent home on Plavix and he has been taking that.  Last dose was this morning.  He is also on Eliquis.  He feels comfortable now but just has slight chest pain.    Review of patient's allergies indicates:  No Known Allergies  Past Medical History:   Diagnosis Date    Acute renal insufficiency 6/21/2015    Cocaine abuse     Depression     History of psychiatric care     HTN (hypertension) 1/25/2014    STEMI (ST elevation myocardial infarction) 2/12/2017    Stroke      Past Surgical History:   Procedure Laterality Date    CARDIAC SURGERY      stents    NECK SURGERY       Family History   Problem Relation Age of Onset    Diabetes Mother     Heart disease Mother     Hypertension Mother      Social History     Tobacco Use    Smoking status: Some Days     Packs/day: 0.25     Types: Cigarettes    Smokeless tobacco: Never   Substance Use Topics    Alcohol use: Not Currently     Comment: social    Drug use: Not Currently     Types: Cocaine     Comment: this weekend     Review of Systems    Physical Exam     Initial Vitals [07/22/23 1416]   BP  Pulse Resp Temp SpO2   124/71 72 18 98.6 °F (37 °C) 99 %      MAP       --         Physical Exam    Constitutional: He appears well-developed and well-nourished. No distress.   HENT:   Head: Normocephalic and atraumatic.   Mouth/Throat: Oropharynx is clear and moist.   Eyes: Conjunctivae and EOM are normal. Pupils are equal, round, and reactive to light. Right eye exhibits no discharge. Left eye exhibits no discharge. No scleral icterus.   Neck: Neck supple. No JVD present.   Normal range of motion.  Cardiovascular:  Normal rate, regular rhythm, normal heart sounds and intact distal pulses.     Exam reveals no friction rub.       No murmur heard.  Pulmonary/Chest: Breath sounds normal. No stridor. No respiratory distress. He has no wheezes. He has no rhonchi. He has no rales.   Abdominal: Abdomen is soft. Bowel sounds are normal. He exhibits no distension and no mass. There is no abdominal tenderness. There is no rebound and no guarding.   Musculoskeletal:         General: No tenderness or edema. Normal range of motion.      Cervical back: Normal range of motion and neck supple.     Lymphadenopathy:     He has no cervical adenopathy.   Neurological: He is alert. He has normal strength. No cranial nerve deficit or sensory deficit. GCS score is 15. GCS eye subscore is 4. GCS verbal subscore is 5. GCS motor subscore is 6.   Skin: Skin is warm. Capillary refill takes less than 2 seconds. No rash noted.   Psychiatric: He has a normal mood and affect.       ED Course   Procedures  Labs Reviewed   CBC W/ AUTO DIFFERENTIAL - Abnormal; Notable for the following components:       Result Value    RBC 3.97 (*)     Hemoglobin 12.6 (*)     Hematocrit 38.3 (*)     MCH 31.7 (*)     MPV 8.9 (*)     Gran % 37.3 (*)     All other components within normal limits   COMPREHENSIVE METABOLIC PANEL - Abnormal; Notable for the following components:    Chloride 112 (*)     CO2 22 (*)     Calcium 8.4 (*)     Albumin 3.4 (*)     Anion Gap 6 (*)      All other components within normal limits   B-TYPE NATRIURETIC PEPTIDE - Abnormal; Notable for the following components:     (*)     All other components within normal limits   TROPONIN I   TROPONIN ISTAT   TROPONIN I   POCT TROPONIN   POCT TROPONIN     EKG Readings: (Independently Interpreted)   Normal sinus rhythm at 64.  Q-waves throughout the precordium V1 through V4 which are present on prior EKG.  T-wave inversions in the anterior leads which have also been present on prior.     Imaging Results              X-Ray Chest 1 View (Final result)  Result time 07/22/23 17:33:43      Final result by Quinn Whitlock MD (07/22/23 17:33:43)                   Impression:      1. No acute cardiopulmonary process, shallow inspiratory effort.      Electronically signed by: Quinn Whitlock MD  Date:    07/22/2023  Time:    17:33               Narrative:    EXAMINATION:  XR CHEST 1 VIEW    CLINICAL HISTORY:  Chest pain, unspecified    TECHNIQUE:  Single frontal view of the chest was performed.    COMPARISON:  03/18/2023    FINDINGS:  The cardiomediastinal silhouette is not enlarged, stable.  There is elevation of the right hemidiaphragm..  There is no pleural effusion.  The trachea is midline.  The lungs are symmetrically expanded bilaterally with minimally coarse central hilar interstitial attenuation.  No large focal consolidation seen.  There is no pneumothorax.  The osseous structures are remarkable for degenerative changes.  Surgical change noted of the cervical spine..                                    X-Rays:   Independently Interpreted Readings:   Chest X-Ray: Normal heart size.  No infiltrates.  No acute abnormalities.   Medications   nitroGLYCERIN SL tablet 0.4 mg (0.4 mg Sublingual Given 7/22/23 1535)   nitroGLYCERIN SL tablet 0.4 mg (0.4 mg Sublingual Given 7/22/23 1717)     Medical Decision Making:   Initial Assessment:   68-year-old male with history of coronary disease presents with chest pain.   He has mild active chest pain.  He is well anticoagulated.  Will do cardiac workup.  Will give nitroglycerin here.  ED Management:  Patient asymptomatic now.  Initial troponin negative.  Will admit to hospital medicine in stable condition.  Additional MDM:   Heart Score:    History:          Moderately suspicious.  ECG:             Nonspecific repolarisation disturbance  Age:               >65 years  Risk factors: >= 3 risk factors or history of atherosclerotic disease  Troponin:       Less than or equal to normal limit  Final Score: 6                       Clinical Impression:   Final diagnoses:  [R07.9] Chest pain               Chato Pollock MD  07/22/23 0326

## 2023-07-22 NOTE — ED TRIAGE NOTES
Damir Faria, a 68 y.o. male presents to the ED w/ complaint of with chest pain. Onset of pain began yesterday during the day. The pt was advised by the PCP to seek treatment in the ED but the pt went to work today instead and chest pain became more intense. Pt describes the pain as sharp and intense and radiated to his left arm. Pt admits to drug use 5 days prior to arrival. Pt denies nausea and vomiting.       Triage note:  Chief Complaint   Patient presents with    Chest Pain     Starting yesterday. Advised to come to ED yesterday after clinic. Pain worse today. 3/10 from 8/10 after 2 sprays nitro. Given      Review of patient's allergies indicates:  No Known Allergies  Past Medical History:   Diagnosis Date    Acute renal insufficiency 6/21/2015    Cocaine abuse     Depression     History of psychiatric care     HTN (hypertension) 1/25/2014    STEMI (ST elevation myocardial infarction) 2/12/2017    Stroke

## 2023-07-22 NOTE — PROVIDER PROGRESS NOTES - EMERGENCY DEPT.
Encounter Date: 7/22/2023    ED Physician Progress Notes          ECG, independently reviewed and interpreted by me, reveals sinus rhythm with ST and T-wave changes in the anterior precordium that appear biphasic a bit in V2 and most predominantly has T-wave inversions through the remainder of the precordium but these appear similar to the prior EKG from March 18, 2023.  Does not meet STEMI criteria.

## 2023-07-23 PROBLEM — I51.3 LEFT VENTRICULAR THROMBUS: Status: ACTIVE | Noted: 2017-06-21

## 2023-07-23 LAB
ALBUMIN SERPL BCP-MCNC: 3.2 G/DL (ref 3.5–5.2)
ALP SERPL-CCNC: 55 U/L (ref 55–135)
ALT SERPL W/O P-5'-P-CCNC: 26 U/L (ref 10–44)
ANION GAP SERPL CALC-SCNC: 10 MMOL/L (ref 8–16)
ASCENDING AORTA: 3.21 CM
AST SERPL-CCNC: 21 U/L (ref 10–40)
AV INDEX (PROSTH): 1.07
AV MEAN GRADIENT: 1 MMHG
AV PEAK GRADIENT: 3 MMHG
AV VALVE AREA: 3.83 CM2
AV VELOCITY RATIO: 1.02
BILIRUB SERPL-MCNC: 0.4 MG/DL (ref 0.1–1)
BSA FOR ECHO PROCEDURE: 2.09 M2
BUN SERPL-MCNC: 21 MG/DL (ref 8–23)
CALCIUM SERPL-MCNC: 8.6 MG/DL (ref 8.7–10.5)
CHLORIDE SERPL-SCNC: 110 MMOL/L (ref 95–110)
CO2 SERPL-SCNC: 23 MMOL/L (ref 23–29)
CREAT SERPL-MCNC: 1.3 MG/DL (ref 0.5–1.4)
CV ECHO LV RWT: 0.4 CM
DOP CALC AO PEAK VEL: 0.87 M/S
DOP CALC AO VTI: 18.03 CM
DOP CALC LVOT AREA: 3.6 CM2
DOP CALC LVOT DIAMETER: 2.14 CM
DOP CALC LVOT PEAK VEL: 0.89 M/S
DOP CALC LVOT STROKE VOLUME: 69.06 CM3
DOP CALCLVOT PEAK VEL VTI: 19.21 CM
E WAVE DECELERATION TIME: 255.34 MSEC
E/A RATIO: 0.87
E/E' RATIO: 9.23 M/S
ECHO LV POSTERIOR WALL: 0.95 CM (ref 0.6–1.1)
EJECTION FRACTION: 40 %
EST. GFR  (NO RACE VARIABLE): 59.5 ML/MIN/1.73 M^2
ESTIMATED AVG GLUCOSE: 120 MG/DL (ref 68–131)
FRACTIONAL SHORTENING: 31 % (ref 28–44)
GLUCOSE SERPL-MCNC: 101 MG/DL (ref 70–110)
HBA1C MFR BLD: 5.8 % (ref 4–5.6)
INTERVENTRICULAR SEPTUM: 1 CM (ref 0.6–1.1)
IVRT: 134.16 MSEC
LA MAJOR: 5.4 CM
LA MINOR: 5.38 CM
LA WIDTH: 3.46 CM
LEFT ATRIUM SIZE: 3.07 CM
LEFT ATRIUM VOLUME INDEX MOD: 23 ML/M2
LEFT ATRIUM VOLUME INDEX: 23.4 ML/M2
LEFT ATRIUM VOLUME MOD: 47.86 CM3
LEFT ATRIUM VOLUME: 48.67 CM3
LEFT INTERNAL DIMENSION IN SYSTOLE: 3.28 CM (ref 2.1–4)
LEFT VENTRICLE DIASTOLIC VOLUME INDEX: 50.3 ML/M2
LEFT VENTRICLE DIASTOLIC VOLUME: 104.62 ML
LEFT VENTRICLE MASS INDEX: 77 G/M2
LEFT VENTRICLE SYSTOLIC VOLUME INDEX: 21 ML/M2
LEFT VENTRICLE SYSTOLIC VOLUME: 43.58 ML
LEFT VENTRICULAR INTERNAL DIMENSION IN DIASTOLE: 4.74 CM (ref 3.5–6)
LEFT VENTRICULAR MASS: 161.11 G
LV LATERAL E/E' RATIO: 8.57 M/S
LV SEPTAL E/E' RATIO: 10 M/S
MAGNESIUM SERPL-MCNC: 1.8 MG/DL (ref 1.6–2.6)
MV PEAK A VEL: 0.69 M/S
MV PEAK E VEL: 0.6 M/S
PHOSPHATE SERPL-MCNC: 3.3 MG/DL (ref 2.7–4.5)
PISA TR MAX VEL: 2.39 M/S
POCT GLUCOSE: 114 MG/DL (ref 70–110)
POTASSIUM SERPL-SCNC: 4.3 MMOL/L (ref 3.5–5.1)
PROT SERPL-MCNC: 5.9 G/DL (ref 6–8.4)
RA MAJOR: 4.53 CM
RA PRESSURE: 3 MMHG
RA WIDTH: 4.09 CM
RIGHT VENTRICULAR END-DIASTOLIC DIMENSION: 3.5 CM
SINUS: 3.2 CM
SODIUM SERPL-SCNC: 143 MMOL/L (ref 136–145)
STJ: 2.64 CM
TDI LATERAL: 0.07 M/S
TDI SEPTAL: 0.06 M/S
TDI: 0.07 M/S
TR MAX PG: 23 MMHG
TRICUSPID ANNULAR PLANE SYSTOLIC EXCURSION: 2.32 CM
TROPONIN I SERPL DL<=0.01 NG/ML-MCNC: 0.01 NG/ML (ref 0–0.03)
TV REST PULMONARY ARTERY PRESSURE: 26 MMHG

## 2023-07-23 PROCEDURE — 84100 ASSAY OF PHOSPHORUS: CPT | Performed by: HOSPITALIST

## 2023-07-23 PROCEDURE — 84484 ASSAY OF TROPONIN QUANT: CPT | Performed by: HOSPITALIST

## 2023-07-23 PROCEDURE — 99233 PR SUBSEQUENT HOSPITAL CARE,LEVL III: ICD-10-PCS | Mod: ,,, | Performed by: PHYSICIAN ASSISTANT

## 2023-07-23 PROCEDURE — G0378 HOSPITAL OBSERVATION PER HR: HCPCS

## 2023-07-23 PROCEDURE — 94761 N-INVAS EAR/PLS OXIMETRY MLT: CPT

## 2023-07-23 PROCEDURE — 25000003 PHARM REV CODE 250: Performed by: HOSPITALIST

## 2023-07-23 PROCEDURE — 25500020 PHARM REV CODE 255: Performed by: INTERNAL MEDICINE

## 2023-07-23 PROCEDURE — 99499 NO LOS: ICD-10-PCS | Mod: ,,, | Performed by: INTERNAL MEDICINE

## 2023-07-23 PROCEDURE — 82962 GLUCOSE BLOOD TEST: CPT

## 2023-07-23 PROCEDURE — 99213 PR OFFICE/OUTPT VISIT, EST, LEVL III, 20-29 MIN: ICD-10-PCS | Mod: 25,GC,, | Performed by: INTERNAL MEDICINE

## 2023-07-23 PROCEDURE — 99213 OFFICE O/P EST LOW 20 MIN: CPT | Mod: 25,GC,, | Performed by: INTERNAL MEDICINE

## 2023-07-23 PROCEDURE — 83036 HEMOGLOBIN GLYCOSYLATED A1C: CPT | Performed by: HOSPITALIST

## 2023-07-23 PROCEDURE — 80053 COMPREHEN METABOLIC PANEL: CPT | Performed by: HOSPITALIST

## 2023-07-23 PROCEDURE — 99499 UNLISTED E&M SERVICE: CPT | Mod: ,,, | Performed by: INTERNAL MEDICINE

## 2023-07-23 PROCEDURE — 99233 SBSQ HOSP IP/OBS HIGH 50: CPT | Mod: ,,, | Performed by: PHYSICIAN ASSISTANT

## 2023-07-23 PROCEDURE — 83735 ASSAY OF MAGNESIUM: CPT | Performed by: HOSPITALIST

## 2023-07-23 RX ADMIN — APIXABAN 5 MG: 5 TABLET, FILM COATED ORAL at 10:07

## 2023-07-23 RX ADMIN — APIXABAN 5 MG: 5 TABLET, FILM COATED ORAL at 08:07

## 2023-07-23 RX ADMIN — ATORVASTATIN CALCIUM 40 MG: 40 TABLET, FILM COATED ORAL at 10:07

## 2023-07-23 RX ADMIN — SPIRONOLACTONE 25 MG: 25 TABLET, FILM COATED ORAL at 08:07

## 2023-07-23 RX ADMIN — HUMAN ALBUMIN MICROSPHERES AND PERFLUTREN 0.11 MG: 10; .22 INJECTION, SOLUTION INTRAVENOUS at 07:07

## 2023-07-23 RX ADMIN — CARVEDILOL 6.25 MG: 6.25 TABLET, FILM COATED ORAL at 10:07

## 2023-07-23 RX ADMIN — ISOSORBIDE MONONITRATE 30 MG: 30 TABLET, EXTENDED RELEASE ORAL at 08:07

## 2023-07-23 RX ADMIN — EZETIMIBE 10 MG: 10 TABLET ORAL at 08:07

## 2023-07-23 RX ADMIN — CARVEDILOL 6.25 MG: 6.25 TABLET, FILM COATED ORAL at 08:07

## 2023-07-23 RX ADMIN — LISINOPRIL 20 MG: 20 TABLET ORAL at 08:07

## 2023-07-23 RX ADMIN — MIRTAZAPINE 15 MG: 15 TABLET, FILM COATED ORAL at 10:07

## 2023-07-23 RX ADMIN — CLOPIDOGREL BISULFATE 75 MG: 75 TABLET ORAL at 08:07

## 2023-07-23 NOTE — PLAN OF CARE
[facility] - Emergency Dept  Initial Discharge Assessment       Primary Care Provider: SIERRA WILLARD MD    Admission Diagnosis: Chest pain [R07.9]    Admission Date: 7/22/2023  Expected Discharge Date:     Transition of Care Barriers: (P) None    Payor: HU[employer]NA MANAGED MEDICARE / Plan: HU[employer]NA MEDICARE HMO / Product Type: Capitation /     Extended Emergency Contact Information  Primary Emergency Contact: Hilda Faria   [facility]  Home Phone: [phone]  Mobile Phone: [phone]  Relation: Sister  Preferred language: English    Discharge Plan A: (P) Home         [facility]  [address] [place] [address]  Phone: [phone] Fax: [phone]    [id number] - [place], [place] - [address] AT [facility] [address]  [address]  [place] [address]  Phone: [phone] Fax: [phone]    [id number] - [place], [place] - [address] AT [facility] [address]  [address]  [place] [address]  Phone: [phone] Fax: [phone]      Initial Assessment (most recent)       Adult Discharge Assessment - 07/23/23 0354          Discharge Assessment    Assessment Type Discharge Planning Assessment (P)      Confirmed/corrected address, phone number and insurance Yes (P)      Confirmed Demographics Correct on Facesheet (P)      Source of Information patient (P)      When was your last doctors appointment? 07/21/23 (P)      Does patient/caregiver understand observation status Yes (P)      Communicated DANELLE with patient/caregiver Yes (P)      Reason For Admission Abnormal Labs (P)      People in Home alone (P)      Facility Arrived From: Home (P)      Do you expect to return to your current living situation? Yes (P)      Do you have help at home or someone to help you manage your care at home? No (P)      Prior to hospitilization cognitive status: Alert/Oriented (P)      Current cognitive status:  Alert/Oriented (P)      Home Accessibility wheelchair accessible (P)      Home Layout Able to live on 1st floor (P)      Equipment Currently Used at Home none (P)      Readmission within 30 days? Yes (P)      Patient currently being followed by outpatient case management? No (P)      Do you currently have service(s) that help you manage your care at home? No (P)      Do you take prescription medications? Yes (P)      Do you have prescription coverage? Yes (P)      Coverage [employer] managed Medicare (P)      Do you have any problems affording any of your prescribed medications? No (P)      Is the patient taking medications as prescribed? yes (P)      Who is going to help you get home at discharge? Self (P)      How do you get to doctors appointments? health plan transportation (P)      Are you on dialysis? No (P)      Do you take coumadin? No (P)      Discharge Plan A Home (P)      DME Needed Upon Discharge  none (P)      Discharge Plan discussed with: Patient (P)      Transition of Care Barriers None (P)         Physical Activity    On average, how many days per week do you engage in moderate to strenuous exercise (like a brisk walk)? 0 days (P)      On average, how many minutes do you engage in exercise at this level? 0 min (P)         Financial Resource Strain    How hard is it for you to pay for the very basics like food, housing, medical care, and heating? Not hard at all (P)         Housing Stability    In the last 12 months, was there a time when you were not able to pay the mortgage or rent on time? No (P)      In the last 12 months, how many places have you lived? 1 (P)      In the last 12 months, was there a time when you did not have a steady place to sleep or slept in a shelter (including now)? No (P)         Transportation Needs    In the past 12 months, has lack of transportation kept you from medical appointments or from getting medications? No (P)      In the past 12 months, has lack of transportation  kept you from meetings, work, or from getting things needed for daily living? No (P)         Food Insecurity    Within the past 12 months, you worried that your food would run out before you got the money to buy more. Never true (P)      Within the past 12 months, the food you bought just didn't last and you didn't have money to get more. Never true (P)         Stress    Do you feel stress - tense, restless, nervous, or anxious, or unable to sleep at night because your mind is troubled all the time - these days? Rather much (P)         Social Connections    In a typical week, how many times do you talk on the phone with family, friends, or neighbors? More than three times a week (P)      How often do you get together with friends or relatives? Never (P)      How often do you attend Gnosticism or Buddhism services? Never (P)      Do you belong to any clubs or organizations such as Gnosticism groups, unions, fraternal or athletic groups, or school groups? No (P)      How often do you attend meetings of the clubs or organizations you belong to? Never (P)      Are you , , , , never , or living with a partner?  (P)         Alcohol Use    Q1: How often do you have a drink containing alcohol? Monthly or less (P)      Q2: How many drinks containing alcohol do you have on a typical day when you are drinking? 5 or 6 (P)      Q3: How often do you have six or more drinks on one occasion? Less than monthly (P)

## 2023-07-23 NOTE — ASSESSMENT & PLAN NOTE
No acute symptoms of decompensated CHF  - last ECHO in our system was 2018  - Repeat ECHO in AM   - continue goal directed heart failure therapy - Carvedilol, Lisionpril, Spironolactone.   - low sodium diet.

## 2023-07-23 NOTE — ASSESSMENT & PLAN NOTE
Patient has persistent depression which is moderate and is currently uncontrolled. Will Continue anti-depressant medications. We will not consult psychiatry at this time. Patient does not display psychosis at this time. Continue to monitor closely and adjust plan of care as needed.    Continue mirtazapine nightly  Refer to outpatient substance abuse treatment

## 2023-07-23 NOTE — HOSPITAL COURSE
Pt placed in observation for chest pain. Hx suspicious for ACS. Pt afebrile and HDS. Trop wnl x2. UDS presumptively positive for cocaine. CXR showed no acute cardiopulmonary process. Echo shows EF 40%, segmental LV wall motion abnormalities, LV thrombus, Grade 1 LV diastolic dysfunction. Cardiology consulted for high risk chest pain, found CP is likely 2/2 cocaine use. Patient advised to avoid use. Addiction psych consulted and found the patient does not currently meet the criteria for psychiatric hospitalization and can be safely and effectively managed in a less restrictive level of care. Continue psychotropic regimen as prescribed. Patient expresses interest in attending residential rehab in [place] upon discharge. Will provide resources for other outpatient addiction programs for patient to utilize upon discharge. Pt follow up with OP cardiology; given recent PCI, patient needs adherence to medication to prevent in stent restenosis. Instructed to continue triple therapy with ASA, Plavix and Eliquis for 1 month since stent placement, end date 8/10. Then continue with Plavix and Eliquis only. Pt medically ready for discharge home. Pt educated on hospital course and plan, verbally agrees and understands. All questions answered.

## 2023-07-23 NOTE — PLAN OF CARE
07/23/23 0358   Post-Acute Status   Post-Acute Authorization Other   Coverage [employer] Managed Medicare   Other Status No Post-Acute Service Needs   Discharge Delays None known at this time   Discharge Plan   Discharge Plan A Home     Patient stated that he lives alone and is independent.    Patient stated that he can safely return to his home on discharge.    Patient denies any post acute needs at this time.     Ju Orellana, AMILCAR, MSW-LMSW  Medical Social Worker/  ER Department
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
## 2023-07-23 NOTE — ASSESSMENT & PLAN NOTE
-strong ischemic heart disease history with recent stent placement, he reports adherence to   -EKG has chronic signs of ischemic heart disease - inferior/anterolateral Q-waves and Lateral T-wave inversion, no St segment changes noted.   -Continue to trend troponin to rule out ACS  -would consult cardiology in AM  -obtain ECHO cardiogram to eval wall-motion abnormalities  -Etiology of his pain may also be related to vasospasm from recent cocaine use this week.  - give 3rd nitroglycerin and if pain not resolved will give adjunctive IV dose of calcium channel blocker for chest pain

## 2023-07-23 NOTE — ASSESSMENT & PLAN NOTE
 TTE with A small layered left ventricular thrombus is present. The thrombus is located in the apex    Continue Eliquis 5 mg BID   OP follow up with cardiology
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
## 2023-07-23 NOTE — ASSESSMENT & PLAN NOTE
No acute symptoms of decompensated CHF  -last ECHO in our system was 2018  -Repeat ECHO in AM   -continue goal directed heart failure therapy - Carvedilol, Lisionpril, Spironolactone.   -low sodium diet.

## 2023-07-23 NOTE — ASSESSMENT & PLAN NOTE
68-year-old male with CAD status post NAM PCI July 10th, cocaine abuse, hypertension, hyperlipidemia presented with chest pain.  Cardiology consulted for chest pain workup.    Onset of chest pain is likely related to recent cocaine use.   Trop negative x 2   EKG without acute changes.   TTE 7/23    The left ventricle is normal in size with mildly decreased systolic function. The estimated ejection fraction is 40%.   There are segmental left ventricular wall motion abnormalities.   A small layered left ventricular thrombus is present. The thrombus is located in the apex.   Normal right ventricular size with normal right ventricular systolic function.   Grade I left ventricular diastolic dysfunction.   The estimated PA systolic pressure is 26 mmHg.   Normal central venous pressure (3 mmHg).    -- This is likely 2/2 cocaine use. Patient advised to avoid use.   -- Addiction psych consult appreciated   -- Follow up with OP cardiology   -- Continue triple therapy with ASA, Plavix and Eliquis for 1 month since stent placement, end date 8/10. Then continue with Plavix and Eliquis only.

## 2023-07-23 NOTE — ASSESSMENT & PLAN NOTE
Patient's FSGs are controlled on current medication regimen.  Last A1c reviewed-   Lab Results   Component Value Date    HGBA1C 5.7 (H) 09/14/2022     Most recent fingerstick glucose reviewed- No results for input(s): POCTGLUCOSE in the last 24 hours.  Current correctional scale  Low  Maintain anti-hyperglycemic dose as follows-   Antihyperglycemics (From admission, onward)    Start     Stop Route Frequency Ordered    07/22/23 2123  insulin aspart U-100 pen 0-5 Units         -- SubQ Before meals & nightly PRN 07/22/23 2024        Hold Oral hypoglycemics while patient is in the hospital.

## 2023-07-23 NOTE — HPI
68-year-old male with CAD status post NAM PCI July 10th, cocaine abuse, hypertension, hyperlipidemia presented with chest pain.  Cardiology consulted for chest pain workup.  7/10 patient developed substernal chest pain was admitted to [facility] in [place] and underwent left heart catheterization status post stent placement.  History obtained from patient, no catheterization report obtained.  Was discharged with new Plavix, already taking Aspirin and Apixaban.  Patient admitted to using cocaine 4 days ago, he developed chest pain a day later on Friday.  Pain described as tightness and 4/10 in severity radiating to jaw.  Pain improved after nitro.  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  PCP advised patient to present to the emergency room.  On presentation, patient complained of chest pain, that resolved with NTG x 1 on 7/22. Trop negative x2, EKG without acute changes.       Today he denies angina, KERN, shortness of breath, palpitations, presyncope, syncope, leg swelling, PND, or orthopnea.       TTE 7/23/23  The left ventricle is normal in size with mildly decreased systolic function. The estimated ejection fraction is 40%.  There are segmental left ventricular wall motion abnormalities.  A small layered left ventricular thrombus is present. The thrombus is located in the apex.  Normal right ventricular size with normal right ventricular systolic function.  Grade I left ventricular diastolic dysfunction.  The estimated PA systolic pressure is 26 mmHg.  Normal central venous pressure (3 mmHg).

## 2023-07-23 NOTE — ASSESSMENT & PLAN NOTE
- patient is on DAPT + Apixaban at this time  - continue only Plavix and Apixaban at this time  - continue statin, Coreg, Lisinopril. Zetia    Patient still has Right groin stitch from post-cath bleeding in last 2 weeks - remove prior to discharge. Reports it is a figure8 stitch placed

## 2023-07-23 NOTE — SUBJECTIVE & OBJECTIVE
Past Medical History:   Diagnosis Date    Acute renal insufficiency 6/21/2015    Cocaine abuse     Depression     History of psychiatric care     HTN (hypertension) 1/25/2014    STEMI (ST elevation myocardial infarction) 2/12/2017    Stroke     Tobacco dependence due to cigarettes 5/25/2016       Past Surgical History:   Procedure Laterality Date    CARDIAC SURGERY      stents    NECK SURGERY         Review of patient's allergies indicates:  No Known Allergies    No current facility-administered medications on file prior to encounter.     Current Outpatient Medications on File Prior to Encounter   Medication Sig    apixaban (ELIQUIS) 5 mg Tab Take 1 tablet (5 mg total) by mouth 2 (two) times daily.    aspirin 81 MG Chew Take 1 tablet (81 mg total) by mouth once daily.    atorvastatin (LIPITOR) 40 MG tablet Take 1 tablet (40 mg total) by mouth every evening.    clopidogreL (PLAVIX) 75 mg tablet Take 75 mg by mouth once daily.    ezetimibe (ZETIA) 10 mg tablet Take 1 tablet by mouth once daily.    isosorbide mononitrate (IMDUR) 30 MG 24 hr tablet Take 30 mg by mouth once daily.    lisinopril (PRINIVIL,ZESTRIL) 20 MG tablet Take 1 tablet (20 mg total) by mouth once daily.    mirtazapine (REMERON) 15 MG tablet Take 15 mg by mouth every evening.    nitroGLYCERIN (NITROSTAT) 0.4 MG SL tablet Place 1 tablet under the tongue every 5 (five) minutes as needed for Chest pain.    spironolactone (ALDACTONE) 25 MG tablet Take 1 tablet (25 mg total) by mouth once daily.    carvedilol (COREG) 6.25 MG tablet Take 1 tablet (6.25 mg total) by mouth 2 (two) times daily.    diclofenac sodium (VOLTAREN) 1 % Gel Apply 4 g topically 2 (two) times a day. Joint pains     Family History       Problem Relation (Age of Onset)    Diabetes Mother    Heart disease Mother    Hypertension Mother          Tobacco Use    Smoking status: Some Days     Packs/day: 0.25     Types: Cigarettes    Smokeless tobacco: Never   Substance and Sexual Activity     Alcohol use: Not Currently     Comment: social    Drug use: Not Currently     Types: Cocaine     Comment: this weekend    Sexual activity: Yes     Partners: Female     Birth control/protection: Condom     Review of Systems   Constitutional: Negative for chills, decreased appetite, diaphoresis, fever, malaise/fatigue and weight gain.   HENT:  Negative for congestion and ear discharge.    Eyes:  Negative for blurred vision.   Cardiovascular:  Negative for chest pain, dyspnea on exertion, irregular heartbeat, leg swelling, orthopnea, palpitations and paroxysmal nocturnal dyspnea.   Respiratory:  Negative for cough, shortness of breath, snoring and sputum production.    Skin:  Negative for color change, dry skin and rash.   Musculoskeletal:  Negative for back pain, falls, joint pain and neck pain.   Gastrointestinal:  Negative for bloating, abdominal pain, constipation and diarrhea.   Genitourinary:  Negative for dysuria, flank pain, hematuria and hesitancy.   Neurological:  Negative for focal weakness, headaches, numbness and seizures.   Psychiatric/Behavioral:  Negative for altered mental status, depression and hallucinations.    Objective:     Vital Signs (Most Recent):  Temp: 98.8 °F (37.1 °C) (07/23/23 1333)  Pulse: 68 (07/23/23 1333)  Resp: 18 (07/23/23 1333)  BP: 111/61 (07/23/23 1333)  SpO2: 99 % (07/23/23 1333) Vital Signs (24h Range):  Temp:  [97.6 °F (36.4 °C)-98.8 °F (37.1 °C)] 98.8 °F (37.1 °C)  Pulse:  [60-81] 68  Resp:  [16-20] 18  SpO2:  [97 %-100 %] 99 %  BP: (111-176)/(58-77) 111/61     Weight: 86.2 kg (190 lb)  Body mass index is 25.77 kg/m².    SpO2: 99 %       No intake or output data in the 24 hours ending 07/23/23 1407    Lines/Drains/Airways       Peripheral Intravenous Line  Duration                  Peripheral IV - Single Lumen 07/22/23 1417 18 G Left Antecubital <1 day                     Physical Exam  Constitutional:       General: He is not in acute distress.     Appearance: He is not  ill-appearing.   HENT:      Nose: Nose normal.      Mouth/Throat:      Mouth: Mucous membranes are moist.   Eyes:      Pupils: Pupils are equal, round, and reactive to light.   Neck:      Comments: No JVD appreciated   Cardiovascular:      Rate and Rhythm: Normal rate and regular rhythm.      Pulses: Normal pulses.      Heart sounds: No murmur heard.  Pulmonary:      Effort: Pulmonary effort is normal. No respiratory distress.      Breath sounds: No wheezing or rales.   Abdominal:      General: Abdomen is flat. There is no distension.      Palpations: Abdomen is soft.      Tenderness: There is no right CVA tenderness or left CVA tenderness.   Musculoskeletal:         General: No swelling.      Cervical back: Normal range of motion and neck supple. No tenderness.      Right lower leg: No edema.      Left lower leg: No edema.   Skin:     General: Skin is warm.      Coloration: Skin is not pale.      Findings: No rash.   Neurological:      General: No focal deficit present.      Mental Status: He is alert and oriented to person, place, and time.      Motor: No weakness.        Significant Labs: ABG: No results for input(s): PH, PCO2, HCO3, POCSATURATED, BE in the last 48 hours., Blood Culture: No results for input(s): LABBLOO in the last 48 hours., BMP:   Recent Labs   Lab 07/22/23  1721 07/23/23  0352   GLU 83 101    143   K 4.5 4.3   * 110   CO2 22* 23   BUN 23 21   CREATININE 1.2 1.3   CALCIUM 8.4* 8.6*   MG  --  1.8   , CMP   Recent Labs   Lab 07/22/23  1721 07/23/23  0352    143   K 4.5 4.3   * 110   CO2 22* 23   GLU 83 101   BUN 23 21   CREATININE 1.2 1.3   CALCIUM 8.4* 8.6*   PROT 6.4 5.9*   ALBUMIN 3.4* 3.2*   BILITOT 0.5 0.4   ALKPHOS 57 55   AST 26 21   ALT 26 26   ANIONGAP 6* 10   , CBC   Recent Labs   Lab 07/22/23  1721   WBC 5.25   HGB 12.6*   HCT 38.3*      , INR No results for input(s): INR, PROTIME in the last 48 hours., Lipid Panel No results for input(s): CHOL, HDL,  LDLCALC, TRIG, CHOLHDL in the last 48 hours.,   Pathology Results  (Last 10 years)      None        , Troponin   Recent Labs   Lab 07/22/23  1721 07/22/23  1947 07/23/23  0122   TROPONINI 0.012 0.011 0.011   , and All pertinent lab results from the last 24 hours have been reviewed.

## 2023-07-23 NOTE — ASSESSMENT & PLAN NOTE
-patient is on DAPT + Apixaban at this time  - continue only Plavix and Apixaban at this time  -continue statin, Coreg, Lisinopril. Zetia    Patient still has Right groin stitch from post-cath bleeding in last 2 weeks - remove prior to discharge. Reports it is a figure8 stitch placed

## 2023-07-23 NOTE — CONSULTS
[facility] - Emergency Dept  Cardiology  Consult Note    Patient Name: Damir Faria  MRN: [id number]  Admission Date: 7/22/2023  Hospital Length of Stay: 0 days  Code Status: Full Code   Attending Provider: Nilesh Espinoza MD   Consulting Provider: Cleo Jordan MD  Primary Care Physician: SIERRA WILLARD MD  Principal Problem:Precordial pain    Patient information was obtained from patient and ER records.     Inpatient consult to Cardiology  Consult performed by: Cleo Jordan MD  Consult ordered by: Gayathri Hargrove PA-C        Subjective:     Chief Complaint:  Chest pain      HPI:   68-year-old male with CAD status post NAM PCI July 10th, cocaine abuse, hypertension, hyperlipidemia presented with chest pain.  Cardiology consulted for chest pain workup.  7/10 patient developed substernal chest pain was admitted to [facility] in [place] and underwent left heart catheterization status post stent placement.  History obtained from patient, no catheterization report obtained.  Was discharged with new Plavix, already taking Aspirin and Apixaban.  Patient admitted to using cocaine 4 days ago, he developed chest pain a day later on Friday.  Pain described as tightness and 4/10 in severity radiating to jaw.  Pain improved after nitro.  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  PCP advised patient to present to the emergency room.  On presentation, patient complained of chest pain, that resolved with NTG x 1 on 7/22. Trop negative x2, EKG without acute changes.       Today he denies angina, KERN, shortness of breath, palpitations, presyncope, syncope, leg swelling, PND, or orthopnea.       TTE 7/23/23   The left ventricle is normal in size with mildly decreased systolic function. The estimated ejection fraction is 40%.   There are segmental left ventricular wall motion abnormalities.   A small layered left  ventricular thrombus is present. The thrombus is located in the apex.   Normal right ventricular size with normal right ventricular systolic function.   Grade I left ventricular diastolic dysfunction.   The estimated PA systolic pressure is 26 mmHg.   Normal central venous pressure (3 mmHg).      Past Medical History:   Diagnosis Date    Acute renal insufficiency 6/21/2015    Cocaine abuse     Depression     History of psychiatric care     HTN (hypertension) 1/25/2014    STEMI (ST elevation myocardial infarction) 2/12/2017    Stroke     Tobacco dependence due to cigarettes 5/25/2016       Past Surgical History:   Procedure Laterality Date    CARDIAC SURGERY      stents    NECK SURGERY         Review of patient's allergies indicates:  No Known Allergies    No current facility-administered medications on file prior to encounter.     Current Outpatient Medications on File Prior to Encounter   Medication Sig    apixaban (ELIQUIS) 5 mg Tab Take 1 tablet (5 mg total) by mouth 2 (two) times daily.    aspirin 81 MG Chew Take 1 tablet (81 mg total) by mouth once daily.    atorvastatin (LIPITOR) 40 MG tablet Take 1 tablet (40 mg total) by mouth every evening.    clopidogreL (PLAVIX) 75 mg tablet Take 75 mg by mouth once daily.    ezetimibe (ZETIA) 10 mg tablet Take 1 tablet by mouth once daily.    isosorbide mononitrate (IMDUR) 30 MG 24 hr tablet Take 30 mg by mouth once daily.    lisinopril (PRINIVIL,ZESTRIL) 20 MG tablet Take 1 tablet (20 mg total) by mouth once daily.    mirtazapine (REMERON) 15 MG tablet Take 15 mg by mouth every evening.    nitroGLYCERIN (NITROSTAT) 0.4 MG SL tablet Place 1 tablet under the tongue every 5 (five) minutes as needed for Chest pain.    spironolactone (ALDACTONE) 25 MG tablet Take 1 tablet (25 mg total) by mouth once daily.    carvedilol (COREG) 6.25 MG tablet Take 1 tablet (6.25 mg total) by mouth 2 (two) times daily.    diclofenac sodium (VOLTAREN) 1 % Gel Apply 4  g topically 2 (two) times a day. Joint pains     Family History       Problem Relation (Age of Onset)    Diabetes Mother    Heart disease Mother    Hypertension Mother          Tobacco Use    Smoking status: Some Days     Packs/day: 0.25     Types: Cigarettes    Smokeless tobacco: Never   Substance and Sexual Activity    Alcohol use: Not Currently     Comment: social    Drug use: Not Currently     Types: Cocaine     Comment: this weekend    Sexual activity: Yes     Partners: Female     Birth control/protection: Condom     Review of Systems   Constitutional: Negative for chills, decreased appetite, diaphoresis, fever, malaise/fatigue and weight gain.   HENT:  Negative for congestion and ear discharge.    Eyes:  Negative for blurred vision.   Cardiovascular:  Negative for chest pain, dyspnea on exertion, irregular heartbeat, leg swelling, orthopnea, palpitations and paroxysmal nocturnal dyspnea.   Respiratory:  Negative for cough, shortness of breath, snoring and sputum production.    Skin:  Negative for color change, dry skin and rash.   Musculoskeletal:  Negative for back pain, falls, joint pain and neck pain.   Gastrointestinal:  Negative for bloating, abdominal pain, constipation and diarrhea.   Genitourinary:  Negative for dysuria, flank pain, hematuria and hesitancy.   Neurological:  Negative for focal weakness, headaches, numbness and seizures.   Psychiatric/Behavioral:  Negative for altered mental status, depression and hallucinations.    Objective:     Vital Signs (Most Recent):  Temp: 98.8 °F (37.1 °C) (07/23/23 1333)  Pulse: 68 (07/23/23 1333)  Resp: 18 (07/23/23 1333)  BP: 111/61 (07/23/23 1333)  SpO2: 99 % (07/23/23 1333) Vital Signs (24h Range):  Temp:  [97.6 °F (36.4 °C)-98.8 °F (37.1 °C)] 98.8 °F (37.1 °C)  Pulse:  [60-81] 68  Resp:  [16-20] 18  SpO2:  [97 %-100 %] 99 %  BP: (111-176)/(58-77) 111/61     Weight: 86.2 kg (190 lb)  Body mass index is 25.77 kg/m².    SpO2: 99 %       No intake or  output data in the 24 hours ending 07/23/23 1407    Lines/Drains/Airways       Peripheral Intravenous Line  Duration                  Peripheral IV - Single Lumen 07/22/23 1417 18 G Left Antecubital <1 day                     Physical Exam  Constitutional:       General: He is not in acute distress.     Appearance: He is not ill-appearing.   HENT:      Nose: Nose normal.      Mouth/Throat:      Mouth: Mucous membranes are moist.   Eyes:      Pupils: Pupils are equal, round, and reactive to light.   Neck:      Comments: No JVD appreciated   Cardiovascular:      Rate and Rhythm: Normal rate and regular rhythm.      Pulses: Normal pulses.      Heart sounds: No murmur heard.  Pulmonary:      Effort: Pulmonary effort is normal. No respiratory distress.      Breath sounds: No wheezing or rales.   Abdominal:      General: Abdomen is flat. There is no distension.      Palpations: Abdomen is soft.      Tenderness: There is no right CVA tenderness or left CVA tenderness.   Musculoskeletal:         General: No swelling.      Cervical back: Normal range of motion and neck supple. No tenderness.      Right lower leg: No edema.      Left lower leg: No edema.   Skin:     General: Skin is warm.      Coloration: Skin is not pale.      Findings: No rash.   Neurological:      General: No focal deficit present.      Mental Status: He is alert and oriented to person, place, and time.      Motor: No weakness.        Significant Labs: ABG: No results for input(s): PH, PCO2, HCO3, POCSATURATED, BE in the last 48 hours., Blood Culture: No results for input(s): LABBLOO in the last 48 hours., BMP:   Recent Labs   Lab 07/22/23  1721 07/23/23  0352   GLU 83 101    143   K 4.5 4.3   * 110   CO2 22* 23   BUN 23 21   CREATININE 1.2 1.3   CALCIUM 8.4* 8.6*   MG  --  1.8   , CMP   Recent Labs   Lab 07/22/23  1721 07/23/23  0352    143   K 4.5 4.3   * 110   CO2 22* 23   GLU 83 101   BUN 23 21   CREATININE 1.2 1.3   CALCIUM  8.4* 8.6*   PROT 6.4 5.9*   ALBUMIN 3.4* 3.2*   BILITOT 0.5 0.4   ALKPHOS 57 55   AST 26 21   ALT 26 26   ANIONGAP 6* 10   , CBC   Recent Labs   Lab 07/22/23  1721   WBC 5.25   HGB 12.6*   HCT 38.3*      , INR No results for input(s): INR, PROTIME in the last 48 hours., Lipid Panel No results for input(s): CHOL, HDL, LDLCALC, TRIG, CHOLHDL in the last 48 hours.,   Pathology Results  (Last 10 years)      None        , Troponin   Recent Labs   Lab 07/22/23  1721 07/22/23  1947 07/23/23  0122   TROPONINI 0.012 0.011 0.011   , and All pertinent lab results from the last 24 hours have been reviewed.       Assessment and Plan:     Chest pain  68-year-old male with CAD status post NAM PCI July 10th, cocaine abuse, hypertension, hyperlipidemia presented with chest pain.  Cardiology consulted for chest pain workup.    Onset of chest pain is likely related to recent cocaine use.   Trop negative x 2   EKG without acute changes.   TTE 7/23    The left ventricle is normal in size with mildly decreased systolic function. The estimated ejection fraction is 40%.   There are segmental left ventricular wall motion abnormalities.   A small layered left ventricular thrombus is present. The thrombus is located in the apex.   Normal right ventricular size with normal right ventricular systolic function.   Grade I left ventricular diastolic dysfunction.   The estimated PA systolic pressure is 26 mmHg.   Normal central venous pressure (3 mmHg).    -- This is likely 2/2 cocaine use. Patient advised to avoid use.   -- Addiction psych consult appreciated   -- Follow up with OP cardiology; given recent PCI, patient needs adherence to medication to prevent in stent restenosis.   - If recurrent chest pain not resolved with NTG, please contact us.   - EKG prn   -- Continue triple therapy with ASA, Plavix and Eliquis for 1 month since stent placement, end date 8/10. Then continue with Plavix and Eliquis only.         Left ventricular  thrombus   TTE with A small layered left ventricular thrombus is present. The thrombus is located in the apex    Continue Eliquis 5 mg BID   OP follow up with cardiology     Current use of long term anticoagulation       Cocaine abuse  Please consult addiction psych         VTE Risk Mitigation (From admission, onward)         Ordered     apixaban tablet 5 mg  2 times daily         07/22/23 1945     Reason for No Pharmacological VTE Prophylaxis  Once        Question:  Reasons:  Answer:  Already adequately anticoagulated on oral Anticoagulants    07/22/23 1946     IP VTE HIGH RISK PATIENT  Once         07/22/23 1945     Place sequential compression device  Until discontinued         07/22/23 1945                Thank you for your consult. I will sign off. Please contact us if you have any additional questions.    Cleo Jordan MD  Cardiology   [facility] - Emergency Dept

## 2023-07-23 NOTE — SUBJECTIVE & OBJECTIVE
Interval History: NAEON. Pt seen and evaluated at bedside this am. Pt is doing well this am. Reports he had some chest pain today that resolved w/o intervention. Echo ordered. Cardiology consulted.     Review of Systems   Constitutional:  Negative for activity change, chills, diaphoresis, fatigue and fever.   HENT:  Negative for congestion, facial swelling, rhinorrhea and sore throat.    Eyes:  Negative for photophobia, itching and visual disturbance.   Respiratory:  Negative for cough, chest tightness, shortness of breath and wheezing.    Cardiovascular:  Positive for chest pain. Negative for palpitations and leg swelling.   Gastrointestinal:  Negative for abdominal distention, abdominal pain, blood in stool, constipation, diarrhea, nausea and vomiting.   Genitourinary:  Negative for difficulty urinating, dysuria, frequency, hematuria and urgency.   Musculoskeletal:  Negative for arthralgias, back pain and neck stiffness.   Neurological:  Negative for dizziness, tremors, seizures, syncope, weakness, light-headedness, numbness and headaches.   Psychiatric/Behavioral:  Negative for agitation, confusion and hallucinations.    Objective:     Vital Signs (Most Recent):  Temp: 98.6 °F (37 °C) (07/23/23 0825)  Pulse: 60 (07/23/23 0825)  Resp: 16 (07/23/23 0825)  BP: (!) 114/59 (07/23/23 0825)  SpO2: 100 % (07/23/23 0825) Vital Signs (24h Range):  Temp:  [97.6 °F (36.4 °C)-98.8 °F (37.1 °C)] 98.6 °F (37 °C)  Pulse:  [60-81] 60  Resp:  [16-20] 16  SpO2:  [97 %-100 %] 100 %  BP: (114-176)/(58-77) 114/59     Weight: 86.2 kg (190 lb)  Body mass index is 25.77 kg/m².  No intake or output data in the 24 hours ending 07/23/23 1246      Physical Exam  Vitals and nursing note reviewed.   Constitutional:       General: He is not in acute distress.     Appearance: He is well-developed. He is not diaphoretic.   HENT:      Head: Normocephalic and atraumatic.      Right Ear: External ear normal.      Left Ear: External ear normal.       Nose: Nose normal. No congestion.      Mouth/Throat:      Pharynx: Oropharynx is clear.   Eyes:      General: No scleral icterus.     Extraocular Movements: Extraocular movements intact.   Cardiovascular:      Rate and Rhythm: Normal rate and regular rhythm.      Pulses: Normal pulses.      Heart sounds: Normal heart sounds. No murmur heard.  Pulmonary:      Effort: Pulmonary effort is normal. No respiratory distress.      Breath sounds: Normal breath sounds. No wheezing or rales.   Abdominal:      General: Bowel sounds are normal. There is no distension.      Palpations: Abdomen is soft.      Tenderness: There is no abdominal tenderness. There is no guarding or rebound.   Musculoskeletal:      Cervical back: Normal range of motion.      Right lower leg: No edema.      Left lower leg: No edema.   Skin:     General: Skin is warm and dry.      Capillary Refill: Capillary refill takes less than 2 seconds.   Neurological:      General: No focal deficit present.      Mental Status: He is alert and oriented to person, place, and time. Mental status is at baseline.   Psychiatric:         Mood and Affect: Mood normal.         Behavior: Behavior normal.         Thought Content: Thought content normal.           Significant Labs: All pertinent labs within the past 24 hours have been reviewed.    Significant Imaging: I have reviewed all pertinent imaging results/findings within the past 24 hours.

## 2023-07-23 NOTE — SUBJECTIVE & OBJECTIVE
Past Medical History:   Diagnosis Date    Acute renal insufficiency 6/21/2015    Cocaine abuse     Depression     History of psychiatric care     HTN (hypertension) 1/25/2014    STEMI (ST elevation myocardial infarction) 2/12/2017    Stroke     Tobacco dependence due to cigarettes 5/25/2016       Past Surgical History:   Procedure Laterality Date    CARDIAC SURGERY      stents    NECK SURGERY         Review of patient's allergies indicates:  No Known Allergies    No current facility-administered medications on file prior to encounter.     Current Outpatient Medications on File Prior to Encounter   Medication Sig    apixaban (ELIQUIS) 5 mg Tab Take 1 tablet (5 mg total) by mouth 2 (two) times daily.    aspirin 81 MG Chew Take 1 tablet (81 mg total) by mouth once daily.    atorvastatin (LIPITOR) 40 MG tablet Take 1 tablet (40 mg total) by mouth every evening.    clopidogreL (PLAVIX) 75 mg tablet Take 75 mg by mouth once daily.    ezetimibe (ZETIA) 10 mg tablet Take 1 tablet by mouth once daily.    isosorbide mononitrate (IMDUR) 30 MG 24 hr tablet Take 30 mg by mouth once daily.    lisinopril (PRINIVIL,ZESTRIL) 20 MG tablet Take 1 tablet (20 mg total) by mouth once daily.    mirtazapine (REMERON) 15 MG tablet Take 15 mg by mouth every evening.    nitroGLYCERIN (NITROSTAT) 0.4 MG SL tablet Place 1 tablet under the tongue every 5 (five) minutes as needed for Chest pain.    spironolactone (ALDACTONE) 25 MG tablet Take 1 tablet (25 mg total) by mouth once daily.    carvedilol (COREG) 6.25 MG tablet Take 1 tablet (6.25 mg total) by mouth 2 (two) times daily.    diclofenac sodium (VOLTAREN) 1 % Gel Apply 4 g topically 2 (two) times a day. Joint pains    [DISCONTINUED] allopurinoL (ZYLOPRIM) 100 MG tablet Take 100 mg by mouth once daily.    [DISCONTINUED] meclizine (ANTIVERT) 25 mg tablet Take 1 tablet (25 mg total) by mouth 3 (three) times daily as needed for Dizziness.    [DISCONTINUED] mirtazapine (REMERON) 30 MG tablet  Take 1 tablet (30 mg total) by mouth every evening. (Patient not taking: Reported on 9/14/2022)    [DISCONTINUED] omeprazole (PRILOSEC) 20 MG capsule Take 20 mg by mouth once daily.    [DISCONTINUED] traZODone (DESYREL) 50 MG tablet Take 1 tablet by mouth nightly.     Family History       Problem Relation (Age of Onset)    Diabetes Mother    Heart disease Mother    Hypertension Mother          Tobacco Use    Smoking status: Some Days     Packs/day: 0.25     Types: Cigarettes    Smokeless tobacco: Never   Substance and Sexual Activity    Alcohol use: Not Currently     Comment: social    Drug use: Not Currently     Types: Cocaine     Comment: this weekend    Sexual activity: Yes     Partners: Female     Birth control/protection: Condom     Review of Systems   Constitutional:  Negative for activity change and appetite change.   HENT:  Negative for sore throat and trouble swallowing.    Cardiovascular:  Negative for leg swelling.   Gastrointestinal:  Negative for abdominal pain, nausea and vomiting.   Genitourinary: Negative.    Musculoskeletal:         Performs all ADL without assistance   Neurological: Negative.    Psychiatric/Behavioral: Negative.  Negative for self-injury and suicidal ideas.    Objective:     Vital Signs (Most Recent):  Temp: 98.6 °F (37 °C) (07/22/23 1416)  Pulse: 64 (07/22/23 1830)  Resp: 20 (07/22/23 1830)  BP: (!) 152/72 (07/22/23 1830)  SpO2: 100 % (07/22/23 1830) Vital Signs (24h Range):  Temp:  [98.6 °F (37 °C)] 98.6 °F (37 °C)  Pulse:  [60-72] 64  Resp:  [18-20] 20  SpO2:  [99 %-100 %] 100 %  BP: (124-152)/(64-73) 152/72     Weight: 86.2 kg (190 lb)  Body mass index is 25.77 kg/m².     Physical Exam  Vitals and nursing note reviewed.   Constitutional:       General: He is not in acute distress.  HENT:      Head: Normocephalic and atraumatic.      Mouth/Throat:      Pharynx: Oropharynx is clear. No oropharyngeal exudate.   Eyes:      General: No scleral icterus.     Extraocular Movements:  Extraocular movements intact.   Cardiovascular:      Rate and Rhythm: Normal rate and regular rhythm.      Pulses: Normal pulses.   Pulmonary:      Effort: Pulmonary effort is normal.   Abdominal:      General: Bowel sounds are normal.      Palpations: Abdomen is soft.   Genitourinary:     Comments: Patient has single stitch in the Right groin -placed post cardiac-cath site bleeding.   Musculoskeletal:      Cervical back: Neck supple.   Skin:     General: Skin is warm and dry.   Neurological:      General: No focal deficit present.      Mental Status: He is alert.   Psychiatric:         Mood and Affect: Mood normal.         Behavior: Behavior normal.              Significant Labs: All pertinent labs within the past 24 hours have been reviewed.  CBC:   Recent Labs   Lab 07/22/23  1721   WBC 5.25   HGB 12.6*   HCT 38.3*        CMP:   Recent Labs   Lab 07/22/23  1721      K 4.5   *   CO2 22*   GLU 83   BUN 23   CREATININE 1.2   CALCIUM 8.4*   PROT 6.4   ALBUMIN 3.4*   BILITOT 0.5   ALKPHOS 57   AST 26   ALT 26   ANIONGAP 6*     Cardiac Markers:   Recent Labs   Lab 07/22/23  1721   *     Coagulation: No results for input(s): PT, INR, APTT in the last 48 hours.  Magnesium: No results for input(s): MG in the last 48 hours.  Troponin:   Recent Labs   Lab 07/22/23  1721   TROPONINI 0.012     Urine Studies: No results for input(s): COLORU, APPEARANCEUA, PHUR, SPECGRAV, PROTEINUA, GLUCUA, KETONESU, BILIRUBINUA, OCCULTUA, NITRITE, UROBILINOGEN, LEUKOCYTESUR, RBCUA, WBCUA, BACTERIA, SQUAMEPITHEL, HYALINECASTS in the last 48 hours.    Invalid input(s): WRIGHTSUR    Significant Imaging: I have reviewed all pertinent imaging results/findings within the past 24 hours.  XR CHEST 1 VIEW     CLINICAL HISTORY:  Chest pain, unspecified     TECHNIQUE:  Single frontal view of the chest was performed.     COMPARISON:  03/18/2023     FINDINGS:  The cardiomediastinal silhouette is not enlarged, stable.  There is  elevation of the right hemidiaphragm..  There is no pleural effusion.  The trachea is midline.  The lungs are symmetrically expanded bilaterally with minimally coarse central hilar interstitial attenuation.  No large focal consolidation seen.  There is no pneumothorax.  The osseous structures are remarkable for degenerative changes.  Surgical change noted of the cervical spine..     Impression:     1. No acute cardiopulmonary process, shallow inspiratory effort.        Electronically signed by: Quinn Whitlock MD  Date:                                            07/22/2023  Time:                                           17:33
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
## 2023-07-23 NOTE — CARE UPDATE
During daytime - discuss with Case Management to obtain records from [facility] in [place] regarding patients recent admission/cardiac procedures

## 2023-07-23 NOTE — ASSESSMENT & PLAN NOTE
- strong ischemic heart disease history with recent stent placement, he reports adherence to   - EKG has chronic signs of ischemic heart disease - inferior/anterolateral Q-waves and Lateral T-wave inversion, no St segment changes noted.   - Continue to trend troponin to rule out ACS  - would consult cardiology in AM  - obtain ECHO cardiogram to eval wall-motion abnormalities  - Etiology of his pain may also be related to vasospasm from recent cocaine use this week.  - give 3rd nitroglycerin and if pain not resolved will give adjunctive IV dose of calcium channel blocker for chest pain

## 2023-07-23 NOTE — ASSESSMENT & PLAN NOTE
Patient's FSGs are controlled on current medication regimen.  Last A1c reviewed-   Lab Results   Component Value Date    HGBA1C 5.8 (H) 07/23/2023     Most recent fingerstick glucose reviewed-   Recent Labs   Lab 07/22/23 2038   POCTGLUCOSE 45*     Current correctional scale  Low  Maintain anti-hyperglycemic dose as follows-   Antihyperglycemics (From admission, onward)    Start     Stop Route Frequency Ordered    07/22/23 2123  insulin aspart U-100 pen 0-5 Units         -- SubQ Before meals & nightly PRN 07/22/23 2024        Hold Oral hypoglycemics while patient is in the hospital.

## 2023-07-24 VITALS
HEART RATE: 88 BPM | SYSTOLIC BLOOD PRESSURE: 117 MMHG | BODY MASS INDEX: 26.76 KG/M2 | HEIGHT: 72 IN | TEMPERATURE: 98 F | WEIGHT: 197.56 LBS | RESPIRATION RATE: 18 BRPM | OXYGEN SATURATION: 97 % | DIASTOLIC BLOOD PRESSURE: 61 MMHG

## 2023-07-24 LAB
ALBUMIN SERPL BCP-MCNC: 3.5 G/DL (ref 3.5–5.2)
ALP SERPL-CCNC: 64 U/L (ref 55–135)
ALT SERPL W/O P-5'-P-CCNC: 20 U/L (ref 10–44)
ANION GAP SERPL CALC-SCNC: 10 MMOL/L (ref 8–16)
AST SERPL-CCNC: 18 U/L (ref 10–40)
BILIRUB SERPL-MCNC: 0.5 MG/DL (ref 0.1–1)
BUN SERPL-MCNC: 17 MG/DL (ref 8–23)
CALCIUM SERPL-MCNC: 9.1 MG/DL (ref 8.7–10.5)
CHLORIDE SERPL-SCNC: 106 MMOL/L (ref 95–110)
CO2 SERPL-SCNC: 21 MMOL/L (ref 23–29)
CREAT SERPL-MCNC: 1.1 MG/DL (ref 0.5–1.4)
EST. GFR  (NO RACE VARIABLE): >60 ML/MIN/1.73 M^2
GLUCOSE SERPL-MCNC: 107 MG/DL (ref 70–110)
MAGNESIUM SERPL-MCNC: 1.7 MG/DL (ref 1.6–2.6)
PHOSPHATE SERPL-MCNC: 3.6 MG/DL (ref 2.7–4.5)
POCT GLUCOSE: 118 MG/DL (ref 70–110)
POCT GLUCOSE: 56 MG/DL (ref 70–110)
POCT GLUCOSE: 78 MG/DL (ref 70–110)
POCT GLUCOSE: 91 MG/DL (ref 70–110)
POTASSIUM SERPL-SCNC: 3.9 MMOL/L (ref 3.5–5.1)
PROT SERPL-MCNC: 6.5 G/DL (ref 6–8.4)
SODIUM SERPL-SCNC: 137 MMOL/L (ref 136–145)

## 2023-07-24 PROCEDURE — 36415 COLL VENOUS BLD VENIPUNCTURE: CPT | Performed by: HOSPITALIST

## 2023-07-24 PROCEDURE — 99239 PR HOSPITAL DISCHARGE DAY,>30 MIN: ICD-10-PCS | Mod: ,,, | Performed by: PHYSICIAN ASSISTANT

## 2023-07-24 PROCEDURE — 99499 UNLISTED E&M SERVICE: CPT | Mod: ,,, | Performed by: INTERNAL MEDICINE

## 2023-07-24 PROCEDURE — 25000003 PHARM REV CODE 250: Performed by: HOSPITALIST

## 2023-07-24 PROCEDURE — S4991 NICOTINE PATCH NONLEGEND: HCPCS | Performed by: HOSPITALIST

## 2023-07-24 PROCEDURE — 99222 PR INITIAL HOSPITAL CARE,LEVL II: ICD-10-PCS | Mod: ,,, | Performed by: PSYCHIATRY & NEUROLOGY

## 2023-07-24 PROCEDURE — 84100 ASSAY OF PHOSPHORUS: CPT | Performed by: HOSPITALIST

## 2023-07-24 PROCEDURE — G0378 HOSPITAL OBSERVATION PER HR: HCPCS

## 2023-07-24 PROCEDURE — 83735 ASSAY OF MAGNESIUM: CPT | Performed by: HOSPITALIST

## 2023-07-24 PROCEDURE — 80053 COMPREHEN METABOLIC PANEL: CPT | Performed by: HOSPITALIST

## 2023-07-24 PROCEDURE — 99499 NO LOS: ICD-10-PCS | Mod: ,,, | Performed by: INTERNAL MEDICINE

## 2023-07-24 PROCEDURE — 99222 1ST HOSP IP/OBS MODERATE 55: CPT | Mod: ,,, | Performed by: PSYCHIATRY & NEUROLOGY

## 2023-07-24 PROCEDURE — 99239 HOSP IP/OBS DSCHRG MGMT >30: CPT | Mod: ,,, | Performed by: PHYSICIAN ASSISTANT

## 2023-07-24 RX ORDER — SPIRONOLACTONE 25 MG/1
25 TABLET ORAL DAILY
Qty: 90 TABLET | Refills: 3 | Status: SHIPPED | OUTPATIENT
Start: 2023-07-24 | End: 2024-07-23

## 2023-07-24 RX ORDER — ATORVASTATIN CALCIUM 40 MG/1
40 TABLET, FILM COATED ORAL NIGHTLY
Qty: 90 TABLET | Refills: 3 | Status: SHIPPED | OUTPATIENT
Start: 2023-07-24 | End: 2024-07-23

## 2023-07-24 RX ORDER — NAPROXEN SODIUM 220 MG/1
81 TABLET, FILM COATED ORAL DAILY
Qty: 17 TABLET | Refills: 0 | Status: SHIPPED | OUTPATIENT
Start: 2023-07-24 | End: 2023-08-10

## 2023-07-24 RX ORDER — CLOPIDOGREL BISULFATE 75 MG/1
75 TABLET ORAL DAILY
Qty: 90 TABLET | Refills: 3 | Status: SHIPPED | OUTPATIENT
Start: 2023-07-24 | End: 2024-07-23

## 2023-07-24 RX ORDER — CARVEDILOL 6.25 MG/1
6.25 TABLET ORAL 2 TIMES DAILY
Qty: 180 TABLET | Refills: 3 | Status: SHIPPED | OUTPATIENT
Start: 2023-07-24 | End: 2024-07-23

## 2023-07-24 RX ORDER — LISINOPRIL 20 MG/1
20 TABLET ORAL DAILY
Qty: 90 TABLET | Refills: 3 | Status: SHIPPED | OUTPATIENT
Start: 2023-07-24 | End: 2024-07-23

## 2023-07-24 RX ORDER — NITROGLYCERIN 0.4 MG/1
0.4 TABLET SUBLINGUAL EVERY 5 MIN PRN
Qty: 25 TABLET | Refills: 0 | Status: SHIPPED | OUTPATIENT
Start: 2023-07-24

## 2023-07-24 RX ADMIN — APIXABAN 5 MG: 5 TABLET, FILM COATED ORAL at 09:07

## 2023-07-24 RX ADMIN — LISINOPRIL 20 MG: 20 TABLET ORAL at 09:07

## 2023-07-24 RX ADMIN — CARVEDILOL 6.25 MG: 6.25 TABLET, FILM COATED ORAL at 09:07

## 2023-07-24 RX ADMIN — NICOTINE 1 PATCH: 14 PATCH, EXTENDED RELEASE TRANSDERMAL at 09:07

## 2023-07-24 RX ADMIN — EZETIMIBE 10 MG: 10 TABLET ORAL at 09:07

## 2023-07-24 RX ADMIN — CLOPIDOGREL BISULFATE 75 MG: 75 TABLET ORAL at 09:07

## 2023-07-24 RX ADMIN — SPIRONOLACTONE 25 MG: 25 TABLET, FILM COATED ORAL at 09:07

## 2023-07-24 RX ADMIN — ISOSORBIDE MONONITRATE 30 MG: 30 TABLET, EXTENDED RELEASE ORAL at 09:07

## 2023-07-24 NOTE — PLAN OF CARE
[facility] was denied by the [facility] to schedule the hospital follow up, they requested that the patient call to schedule the appointment

## 2023-07-24 NOTE — PLAN OF CARE
Problem: Adult Inpatient Plan of Care  Goal: Plan of Care Review  Outcome: Met  Goal: Patient-Specific Goal (Individualized)  Outcome: Met  Goal: Absence of Hospital-Acquired Illness or Injury  Outcome: Met  Goal: Optimal Comfort and Wellbeing  Outcome: Met  Goal: Readiness for Transition of Care  Outcome: Met     Problem: Infection  Goal: Absence of Infection Signs and Symptoms  Outcome: Met     Problem: Diabetes Comorbidity  Goal: Blood Glucose Level Within Targeted Range  Outcome: Met

## 2023-07-24 NOTE — MEDICAL/APP STUDENT
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    PCDSCOMETHA Negative 07/22/2023    OPIATESCREEN Negative 07/22/2023    COCAINEMETAB Presumptive Positive (A) 07/22/2023    AMPHETAMINES Negative 07/22/2023    MARIJUANATHC Negative 07/22/2023    PCDSOPHENCYN Negative 07/22/2023    ALCOHOLMEDIC <10 06/05/2022       Results for orders placed or performed during the hospital encounter of 07/22/23   EKG 12-lead    Collection Time: 07/22/23  2:22 PM    Narrative    Test Reason : R07.9,    Vent. Rate : 064 BPM     Atrial Rate : 064 BPM     P-R Int : 162 ms          QRS Dur : 078 ms      QT Int : 402 ms       P-R-T Axes : 082 003 216 degrees     QTc Int : 414 ms    Normal sinus rhythm  Inferior infarct (cited on or before 11-FEB-2017)  Anteroseptal infarct (cited on or before 11-FEB-2017)  T wave abnormality, consider lateral ischemia  Abnormal ECG  When compared with ECG of 18-MAR-2023 13:33,  The axis Shifted right  Confirmed by ISABEL MITCHELL, HOMEYAR ([id number]) on 7/23/2023 10:13:54 AM    Referred By: AAAREFERR   SELF           Confirmed By:FLORYADONALD HALL MD       Results for orders placed or performed during the  hospital encounter of 09/13/22   MRI Brain Without Contrast    Narrative    EXAMINATION:  MRI BRAIN WITHOUT CONTRAST    CLINICAL HISTORY:  eval for posterior CVA;.    TECHNIQUE:  Multiplanar multisequence MR imaging of the brain was performed without contrast.    COMPARISON:  None    FINDINGS:  Ventricles and sulci are normal in size for age without evidence of hydrocephalus. No acute extra-axial fluid collections.    No acute hemorrhage, edema or acute infarct.  Confluent T2 and FLAIR signal hyperintensity in the periventricular white matter.  Small focus of T2 and FLAIR signal abnormality left dayna.    Volume loss along the medial aspect of the right temporal lobe with compensatory dilatation of the temporal horn right lateral ventricle.    Remote lacunar type infarct right thalamus.  Probable remote lacunar type infarct left cerebellar hemisphere.    Normal vascular flow voids are preserved.    Corpus callosum is intact.  Craniocervical junction is intact.    Mastoid air cells are clear.  Visualized paranasal sinuses show patchy mucosal membrane thickening.      Impression    No acute intracranial abnormality identified.    Chronic ischemic changes as above.  Moderate chronic small vessel ischemic change.      Electronically signed by: Leticia Wolff  Date:    09/15/2022  Time:    14:43   CT Head Without Contrast    Narrative    EXAMINATION:  CT OF THE HEAD WITHOUT    CLINICAL HISTORY:  Nausea, vomiting, and dizziness x1 hour.  Woke from sleep.    TECHNIQUE:  5 mm unenhanced axial images were obtained from the skull base to the vertex.    COMPARISON:  06/05/2022    FINDINGS:  Mild cerebral atrophy and chronic small vessel ischemic changes are present.  There is remote lacunar infarct in the right thalamus.  Focal encephalomalacia change is again seen involving the medial aspect of the right temporal lobe with resultant ex vacuole dilatation exerted upon the right temporal horn.  There is no acute intracranial  hemorrhage, territorial infarct or mass effect, or midline shift. In the visualized paranasal sinuses, there is mild bilateral frontal, bilateral ethmoid and bilateral maxillary sinuses.  There is a small fluid level with an air bubble in the right sphenoid sinus.      Impression    No acute intracranial abnormality detected.  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    Sinus disease.      Electronically signed by: Heidy Ortega  Date:    09/14/2022  Time:    00:17   Results for orders placed or performed during the hospital encounter of 05/25/16   MRI Brain W WO Contrast    Narrative    Findings: Patient was administered 10 cc of gadovist.    The the diffusion sequence is normal without evidence of acute ischemia or infarct.  GRE images demonstrate no blood products.  There is evolution of change and microvascular small vessel ischemic change of a classic distribution.  Cerebellum and posterior fossa structures are unremarkable.  Major flow voids are present where expected.  Skull and skull base show nothing unusual.  Pituitary and craniocervical junction are unremarkable.  Postcontrast images demonstrate no significant abnormal enhancement.  The cavernous sinuses are symmetric.    Impression     No acute process seen and no abnormal enhancement seen.    Microvascular small vessel ischemic change.    MRA: Vertebrobasilar system, ICAs, ACAs, MCAs, and PCAs are patent and symmetric.  No aneurysm, stenosis, or vascular malformation is seen.    Impression: Normal MRA neck vessels.  ______________________________________     Electronically signed by: INDIA BENAVIDES  Date:     05/25/16  Time:    17:02    MRA Brain without contrast    Narrative    Findings: Patient was administered 10 cc of gadovist.    The the diffusion sequence is normal without evidence of acute ischemia or infarct.  GRE images demonstrate no blood products.  There is evolution of change and microvascular  small vessel ischemic change of a classic distribution.  Cerebellum and posterior fossa structures are unremarkable.  Major flow voids are present where expected.  Skull and skull base show nothing unusual.  Pituitary and craniocervical junction are unremarkable.  Postcontrast images demonstrate no significant abnormal enhancement.  The cavernous sinuses are symmetric.    Impression     No acute process seen and no abnormal enhancement seen.    Microvascular small vessel ischemic change.    MRA: Vertebrobasilar system, ICAs, ACAs, MCAs, and PCAs are patent and symmetric.  No aneurysm, stenosis, or vascular malformation is seen.    Impression: Normal MRA neck vessels.  ______________________________________     Electronically signed by: INDIA BENAVIDES  Date:     05/25/16  Time:    17:02        CONSULTATION:     A diagnostic psychiatric evaluation was performed and responsiveness to treatment was assessed.  {XX-ResponseTX:47602}    Consults    {XX-Courtesy:51334}

## 2023-07-24 NOTE — DISCHARGE INSTRUCTIONS
REFERRAL RECOMMENDATIONS FOR SUBSTANCE ABUSE & MENTAL HEALTH      IN CASE OF SUICIDAL THINKING, call the National Suicide Hotline Number: 988    988 Suicide & Crisis Lifeline: 985 , 9-398-865-VLBW (9225)  https://988Luxtech.Cardiovascular Systems       SUBSTANCE ABUSE:     [facility] (formerly known as the ABU)  [x] [phone], Option 2  [x] [address] [facility], [place] [address]  [x] https://www.ochsner.org/services/[email or web address]-recovery-program  [x] [facility] delivers comprehensive and collaborative treatment for alcohol and substance use disorders.  Excellent program for working professionals or anyone else seeking recovery.  [x] Requires insurance approval prior to starting program, call number above for more information.  [x] Intensive Outpatient Rehabilitation Program - M-F 9am-3pm - daily groups with psychologists and social workers, sessions with MDs 3x per week   [x] Ambulatory detox and dual diagnosis available      SUBOXONE:     NOTE: some Suboxone clinics require their clients to participate in a structured program (such as an IOP) in order to be prescribed Suboxone.  Some clinics have a long waiting list.  Most of these clinics do not accept walk-in clients, so call first to to learn what must be done to get started on Suboxone.    [facility] - [phone] (can do Sublocade)  [address] [facility], [place] [address]    [address]  [phone]    [facility] - [phone] (can do Sublocade)  [address] [place] [address]    [facility]  [address] [place]  [phone]     [facility] (very short waiting list, may accept some walk-in's but call first if possible)  [address], [place] [address]  [phone]; [phone]    [facility]   [address] [place]    [phone]    [facility] (can usually be seen within a week but is cash only  for appointment)  [address] [place], [place]    [facility] ([facility])  [address] [facility], [address] [place] [address]  [phone]    [facility] ([facility])  [address], [place]  [phone]    [facility] ([facility])  [address] [facility] [address]  [phone]    METHADONE:     [facility] (the only methadone clinic in the [facility], has two locations)  [x] [address] [address], [phone]  [x] [facility] [address] [place] [address], [phone]    12 STEP PROGRAMS (and similar):     Alcoholics Anonymous (local)  [x] [phone]  [x] www.[place].org for schedules for in-person and online meetings  [x] There are AA meetings throughout the day all over [facility]  [x] AA costs nothing to attend; they pass a basket for donations but this is not required    Narcotics Anonymous  [x] [phone]  [x] www.noana.org  [x] There are NA meetings throughout the day all over [facility]  [x] NA costs nothing to attend; they pass a basket for donations but this is not required    Alcoholics Anonymous Online Intergroup (national)  [x] www.aa-intergroup.org  [x] Good resource for large, nation-wide meetings  [x] Can also attend smaller, local meetings in other cities  [x] Countless meetings all day and all night  [x] AA costs nothing to attend; they pass a basket for donations but this is not required    [facility] [employer] - 24/7 zoom meetings for women and coed - sign on anytime, anywhere!  https://CuriouslysoberTRAFFIQ/86-9-imqtfssq/    Online Intergroup of AA - 121 Open AA [place] Meeting - 24/7 zoom meetings  https://aa-intergroup.org/meetings/    LOOKING FOR AN ALTERNATIVE TO 12 STEP PROGRAMS - check out:  [facility] Recovery: https://www.smartrecovery.org/about-us  [facility]: https://recoverydharma.org      DETOX UNITS (USUALLY 5-7 DAYS):     [facility]: [address], [facility]  [phone], call first to ensure bed availability    [facility]: [address] [place]   [phone], Option 1, call first to ensure bed availability    [facility]: [address], [facility]  [phone] (intake by appointment only)    [facility]: [address], [facility]  [phone]      INTENSIVE OUTPATIENT PROGRAMS:     [facility] (formerly known as the ABU)  [x] [phone], Option 2  [x] [address], [facility] 4th Floor, [facility] [address]  [x] https://www.ochsner.org/services/[facility]covery-[facility]  [x] [facility] delivers comprehensive and collaborative treatment for alcohol and substance use disorders.  Excellent program for working professionals or anyone else seeking recovery.  [x] Requires insurance approval prior to starting program, call number above for more information.  [x] Intensive Outpatient Rehabilitation Program - M-F 9am-3pm - daily groups with psychologists and social workers, sessions with MDs 3x per week   [x] Ambulatory detox and dual diagnosis available    [facility] Intensive Outpatient Program  [x] [phone]  [x] [facility] (the clinic not on [facility])  [x] Call number above for more info and to check insurance requirements    [facility]  [address] [place] [address] [phone]    [facility]:  [address] [facility], [address]?, [place], [place] [address]?, [phone]  [address] [facility]?, [place], [place] [address]?, [phone]    RESIDENTIAL REHABS (USUALLY 28 DAYS):     [facility]: [address], [phone]    [facility]: [address], [facility]  [phone] (intake by appointment only)    [facility] (men only) [address] [place], [phone]    [facility] (Female only) [address] [place], [phone]    [facility]: [address] [facility], men's program [phone], women's program [phone]    [facility]: [address] [place],  [phone]    [facility]: [address], [place], [phone]    [facility]: Men only, [phone], [address] [place] [facility]: [address] [place], [place], [phone]    [facility]: [facility] [place], [place],  [phone]  New Location: [address] [place] [address] [address], [place] [address], [phone]    [facility]:   ?[address], [place] [address]?[phone]    [place]: [address] [place] [address], [phone]    [place]: [place], [phone]     [facility]: [place], [place], [phone]    [facility]: [place], [place], [phone]    [facility]: [place], [place], [phone]    [place]: [place], [phone]    [facility]: [address] [place] [address], [phone]    [facility] ADDICTION CLINICS:     [facility]: [address] [facility], [address] [place], [phone] -or- [address] [place] [address]    [facility] [place]: [address] [place]  [address]     [facility]: Clinics [phone]; Crisis [phone]    [facility]: [address] [place] [address]    [facility]: [address] [place] [address]    [facility]: [address] [place] [address],    [facility] Behavior[facility] Health Center: 2nd Floor [address] [place] [address]    [facility]: [address] [place] [address] , [place] [address]    [facility], [address] [place] [address]    [facility]: [address] [place] [address], [facility] [phone]  (serves youth 16-23 years old)    [facility]: [facility]/[place]/[facility] [phone]    [facility]: [address] [place] [address] [phone]    [facility]:  [address] [facility] [phone]    [facility]: [address] [facility], [address], [phone]     [facility]: [address] [facility].[place], [phone], [phone]    RESOURCES IN OTHER [facility]:     [facility]: [address] [place], [phone], [phone]    [facility]: [address] [place], [address], [phone]    [facility], [address] [place] [address], [phone]    [facility]: [address] [facility] [address] [facility] [address], [phone]    [facility], [address] [facility], [phone] ([facility])    [facility], [address] [facility], [phone] ([facility])    [facility] Health, [address] [facility], [address], [place], [phone] ([place], [place], and [facility])    [facility], [address] [facility], [phone] ([facility])    [facility] Hotline [phone], [phone]    [facility] Center, [address] [facility], [address], [facility] Behavior[facility] Health Center, [address] [facility] Behavior[facility] Health Center, [address] [facility] [address]., [facility], [address], [facility], [address], [place] [phone]    [facility], [address] [facility] [phone]    [facility], [address] [facility], [phone]    [facility]. for Addiction Recovery, [address] [facility], [phone]    MENTAL HEALTH:     [facility] of Psychiat[facility] - Outpatient  Clinic  [phone]    [facility] - General Psychiatry Intensive Outpatient Program  [facility] (formerly known as the BMU)  [phone], option 3    [facility] - Dual Diagnosis Intensive Outpatient Program  [facility] (formerly known as the ABU)  [phone], option 2      COMMUNITY MENTAL HEALTH CENTERS:     [facility]  ([facility])  Serves [facility].  Serves uninsured patients & those with Medicaid.  Main location: [address] [place] [id number] [phone]  Walk-in's available during regular business hours.  24/7 Crisis Line: [phone]    [facility]  ([facility], [facility])  Serves [facility].  Serves uninsured patients, those with Medicaid and some private plans.  Walk-in's available during regular business hours.  Primary care services available as well.  [facility]: [address] [facility] [place] [address];  [phone]  [place]: [address] [place] [address];  [phone]  24/7 Crisis Line: [phone]    [facility]  Serves uninsured patients & those with Medicaid, call for more info.  Primary care, pediatrics, HIV treatment, and dentistry services available as well.  Three locations.  [phone]    [facility] [place]Corporate Office  Serves patients with Medicaid, Medicare, and private insurance  [address]  [place],?[place] [address]  [phone]    [facility]  Serves uninsured on a sliding scale, as well as Medicaid, Medicare, and private plans.  Eight locations around the [facility] area.  [phone]    [facility]  Serves uninsured patients & those with Medicaid, private insurances.  Primary care available  as well.  [phone]  [address] [place] [address]    [facility] Outpatient Psychiatry  Serves veterans who were honorably discharged.  [address] [place] [address]  [phone]  24/7 Veterans Crisis Line: 1-756.561.7737 (Press 1)    If you have private insurance and need to find a specialist, please contact your insurance network to request a list of providers covered by your benefits.      MENTAL HEALTH/ADDICTIVE DISORDERS:     AA ([phone]), NA ([phone])   National Suicide Prevention Lifeline- Call 1-391.872.6347 Available 24 hours everyday  [facility] [phone]; Crisis Line 656-6388 - Call for options A-F:  Intensive Outpatient Treatment/ Day programs   [facility], please contact   [facility], please contact [phone] or [phone] to speak with an admissions counselor.  [facility] (Methadone Maintenance)   [address] [place] [address], [phone]  [address] [place] [address] [phone]  [facility], [address] [place] [address], [phone]  [place] [facility] [phone]  [facility] [place] please contact [phone]  [facility], [address] [facility], 4th floor [place] [address] Phone: [phone]   [place] Office: [address] [address], [phone]  [place] Office: [address] [facility], [address], [place] [address], [phone]  [place] Office: [address] [place] [address] [phone]    Outpatient Substance Abuse Treatment   [facility] (Methadone Maintenance)   [address] [place] [address], [phone]  [address] [place] [address] [phone]  [facility], [address] [place] [address], [phone]  [facility]  [place] Office: [address] [address], [phone]  [place] Office: [address]  [facility], [address], [place] [address], [phone]  [place] Office: [address] [place] [address] [phone]  [place] [facility], [address] [place] [address] [phone]   [facility], [address] [facility], [address], [phone]  [facility], [address] [place], [place] [phone]    Residential Substance Abuse Treatment   [facility] [address] [facility], [phone]  [facility], [address] [facility], [phone]  [facility] (men only) [address] [place] [address], [phone]  [facility] (women only) [address] [place] [address] [phone]  [facility], [address] [place] [address] [phone]  [facility] (women only), intakes at [address] [facility], [phone]  [facility] (7-day program, $100, [address], [phone], [phone], [phone])  [facility] [place] (Men only, [phone]), [address], [place] (Vets*/Non-Vets)  [facility] (Men only, $400/month program fee) [address] [facility] [place], [phone]  [facility] (Women over age 39 only), [address] [facility], [phone]    Out of Area:    [place], [address] Critical [address], [place], [place] [phone])  [facility] (men only), [address] [facility] [place] [address], [phone]  [facility], [address] [place], [place] [phone])  [place], [address] [place] [address] Dr. [place], [place] ([phone])  [facility], [address] [place] [address], [phone]  [facility] (Women only, has to have mental illness, can be homeless or substance abuser), [phone]        IN CASE OF SUICIDAL THINKING, call the National Suicide Hotline Number: 988    988 Suicide & Crisis Lifeline: 988 , 1-7088-153-057-TALK (4024)  Provides 24/7, free and confidential support  for people in distress, prevention and crisis resources for you or your loved ones, and best practices for professionals.    Call, text or chat.  https://SocialSafe.org

## 2023-07-24 NOTE — PLAN OF CARE
[facility]  Discharge Final Note    Primary Care Provider: SIERRA WILLARD MD    Expected Discharge Date: 7/24/2023    Final Discharge Note (most recent)       Final Note - 07/24/23 1742          Final Note    Assessment Type Final Discharge Note (P)      Anticipated Discharge Disposition Home or Self Care (P)      Hospital Resources/Appts/Education Provided Community resources provided;Provided patient/caregiver with written discharge plan information;Provided education on problems/symptoms using teachback (P)         Post-Acute Status    Coverage [employer] (P)      Other Status No Post-Acute Service Needs (P)                      Important Message from Medicare             Contact Info       Psychiatry    Psychiatry   [phone]       Next Steps: Follow up    Instructions: Please call to schedule your appointment asap.          Pt. discharged to home with Self-Care. SW assisting with arranging transportation home via [employer].     Elia Pierce LMSW

## 2023-07-24 NOTE — CARE UPDATE
07/24/2023      Damir Faria  [address]  [facility] [id number]          [facility] Medicine Dept.  [facility] [address] [place] [address] [phone] after hours  [phone] fax Damir Faria has been hospitalized at the [facility] since 7/22/2023.  Please excuse the patient from duties.  Patient may return on 7/25/23.  No restrictions.     Please contact me if you have any questions.                  __________________________  Gayathri Hargrove PA-C  07/24/2023

## 2023-07-24 NOTE — CONSULTS
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       Results for orders placed or performed during the hospital encounter of 09/13/22   MRI Brain Without Contrast    Narrative    EXAMINATION:  MRI BRAIN WITHOUT CONTRAST    CLINICAL HISTORY:  eval for posterior CVA;.    TECHNIQUE:  Multiplanar multisequence MR imaging of the brain was performed without contrast.    COMPARISON:  None    FINDINGS:  Ventricles and sulci are normal in size for age without evidence of hydrocephalus. No acute extra-axial fluid collections.    No acute hemorrhage, edema or acute infarct.  Confluent T2 and FLAIR signal hyperintensity in the periventricular white matter.  Small focus of T2 and FLAIR signal abnormality left dayna.    Volume loss along the medial aspect of the right temporal lobe with compensatory dilatation of the temporal horn right lateral ventricle.    Remote lacunar type infarct right thalamus.  Probable remote lacunar type infarct left cerebellar hemisphere.    Normal vascular flow voids are preserved.    Corpus callosum is intact.  Craniocervical junction is intact.    Mastoid air cells are clear.  Visualized paranasal sinuses show patchy  mucosal membrane thickening.      Impression    No acute intracranial abnormality identified.    Chronic ischemic changes as above.  Moderate chronic small vessel ischemic change.      Electronically signed by: Leticia Wolff  Date:    09/15/2022  Time:    14:43   CT Head Without Contrast    Narrative    EXAMINATION:  CT OF THE HEAD WITHOUT    CLINICAL HISTORY:  Nausea, vomiting, and dizziness x1 hour.  Woke from sleep.    TECHNIQUE:  5 mm unenhanced axial images were obtained from the skull base to the vertex.    COMPARISON:  06/05/2022    FINDINGS:  Mild cerebral atrophy and chronic small vessel ischemic changes are present.  There is remote lacunar infarct in the right thalamus.  Focal encephalomalacia change is again seen involving the medial aspect of the right temporal lobe with resultant ex vacuole dilatation exerted upon the right temporal horn.  There is no acute intracranial hemorrhage, territorial infarct or mass effect, or midline shift. In the visualized paranasal sinuses, there is mild bilateral frontal, bilateral ethmoid and bilateral maxillary sinuses.  There is a small fluid level with an air bubble in the right sphenoid sinus.      Impression    No acute intracranial abnormality detected.  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    Sinus disease.      Electronically signed by: Heidy Ortega  Date:    09/14/2022  Time:    00:17   Results for orders placed or performed during the hospital encounter of 05/25/16   MRI Brain W WO Contrast    Narrative    Findings: Patient was administered 10 cc of gadovist.    The the diffusion sequence is normal without evidence of acute ischemia or infarct.  GRE images demonstrate no blood products.  There is evolution of change and microvascular small vessel ischemic change of a classic distribution.  Cerebellum and posterior fossa structures are unremarkable.  Major flow voids are present where expected.  Skull and  skull base show nothing unusual.  Pituitary and craniocervical junction are unremarkable.  Postcontrast images demonstrate no significant abnormal enhancement.  The cavernous sinuses are symmetric.    Impression     No acute process seen and no abnormal enhancement seen.    Microvascular small vessel ischemic change.    MRA: Vertebrobasilar system, ICAs, ACAs, MCAs, and PCAs are patent and symmetric.  No aneurysm, stenosis, or vascular malformation is seen.    Impression: Normal MRA neck vessels.  ______________________________________     Electronically signed by: INDIA BENAVIDES  Date:     05/25/16  Time:    17:02    MRA Brain without contrast    Narrative    Findings: Patient was administered 10 cc of gadovist.    The the diffusion sequence is normal without evidence of acute ischemia or infarct.  GRE images demonstrate no blood products.  There is evolution of change and microvascular small vessel ischemic change of a classic distribution.  Cerebellum and posterior fossa structures are unremarkable.  Major flow voids are present where expected.  Skull and skull base show nothing unusual.  Pituitary and craniocervical junction are unremarkable.  Postcontrast images demonstrate no significant abnormal enhancement.  The cavernous sinuses are symmetric.    Impression     No acute process seen and no abnormal enhancement seen.    Microvascular small vessel ischemic change.    MRA: Vertebrobasilar system, ICAs, ACAs, MCAs, and PCAs are patent and symmetric.  No aneurysm, stenosis, or vascular malformation is seen.    Impression: Normal MRA neck vessels.  ______________________________________     Electronically signed by: INDIA BENAVIDES  Date:     05/25/16  Time:    17:02        CONSULTATION:     A diagnostic psychiatric evaluation was performed and responsiveness to treatment was assessed.  The patient demonstrates adequate ability/capacity to respond to treatment.    Inpatient consult to Psychiatry  Consult performed  by: Pascual Sethi MD  Consult ordered by: Gayathri Hargrove PA-C        - The case was discussed and care was coordinated with facility staff, including clinical impression, assessment, and treatment recommendations.
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
## 2024-02-27 ENCOUNTER — TELEPHONE (OUTPATIENT)
Dept: ADMINISTRATIVE | Facility: HOSPITAL | Age: 69
End: 2024-02-27
Payer: MEDICARE

## 2024-02-28 ENCOUNTER — TELEPHONE (OUTPATIENT)
Dept: ADMINISTRATIVE | Facility: HOSPITAL | Age: 69
End: 2024-02-28
Payer: MEDICARE

## 2024-03-01 ENCOUNTER — TELEPHONE (OUTPATIENT)
Dept: ENDOSCOPY | Facility: HOSPITAL | Age: 69
End: 2024-03-01
Payer: MEDICARE

## 2024-03-01 NOTE — TELEPHONE ENCOUNTER
----- Message from Dannielle Feliz sent at 2/27/2024  1:36 PM CST -----    ----- Message -----  From: Cici Starks  Sent: 2/27/2024   1:16 PM CST  To: [facility]    Hello,    I have pt being referred for K63.5 Colon polyps  D68.69 Secondary hypercoagulable state.  I have scanned the referral /records in to media mgr within Epic. Please review and contact for scheduling,thanks!           Cici Norris

## 2024-03-01 NOTE — TELEPHONE ENCOUNTER
Contacted the patient to schedule an endoscopy procedure. The patient did not answer the call and unable to leave a voice message requesting a call back.

## 2024-04-03 ENCOUNTER — TELEPHONE (OUTPATIENT)
Dept: PODIATRY | Facility: CLINIC | Age: 69
End: 2024-04-03
Payer: MEDICARE

## 2024-04-30 ENCOUNTER — TELEPHONE (OUTPATIENT)
Dept: ADMINISTRATIVE | Facility: HOSPITAL | Age: 69
End: 2024-04-30
Payer: MEDICARE

## 2024-05-01 ENCOUNTER — TELEPHONE (OUTPATIENT)
Dept: PODIATRY | Facility: CLINIC | Age: 69
End: 2024-05-01
Payer: MEDICARE

## 2024-05-01 ENCOUNTER — TELEPHONE (OUTPATIENT)
Dept: ADMINISTRATIVE | Facility: HOSPITAL | Age: 69
End: 2024-05-01
Payer: MEDICARE

## 2024-05-01 NOTE — TELEPHONE ENCOUNTER
Attempted to call pt to r/s no show appt 5/1. Number is not valid.    Called Sister and left vm to return call at [phone] in regards to r/s 5/1 appt.

## 2024-06-14 ENCOUNTER — HOSPITAL ENCOUNTER (INPATIENT)
Facility: HOSPITAL | Age: 69
LOS: 2 days | Discharge: HOME | DRG: 683 | End: 2024-06-17
Attending: EMERGENCY MEDICINE | Admitting: STUDENT IN AN ORGANIZED HEALTH CARE EDUCATION/TRAINING PROGRAM
Payer: MEDICARE

## 2024-06-14 DIAGNOSIS — N17.9 AKI (ACUTE KIDNEY INJURY): Primary | ICD-10-CM

## 2024-06-14 DIAGNOSIS — R45.851 SUICIDAL IDEATION: ICD-10-CM

## 2024-06-14 DIAGNOSIS — R07.9 CHEST PAIN: ICD-10-CM

## 2024-06-14 LAB
ALBUMIN SERPL BCP-MCNC: 4.5 G/DL (ref 3.5–5.2)
ALP SERPL-CCNC: 73 U/L (ref 55–135)
ALT SERPL W/O P-5'-P-CCNC: 24 U/L (ref 10–44)
AMPHET+METHAMPHET UR QL: NEGATIVE
ANION GAP SERPL CALC-SCNC: 16 MMOL/L (ref 8–16)
APAP SERPL-MCNC: <3 UG/ML (ref 10–20)
AST SERPL-CCNC: 25 U/L (ref 10–40)
BACTERIA #/AREA URNS AUTO: ABNORMAL /HPF
BARBITURATES UR QL SCN>200 NG/ML: NEGATIVE
BASOPHILS # BLD AUTO: 0.05 K/UL (ref 0–0.2)
BASOPHILS NFR BLD: 0.7 % (ref 0–1.9)
BENZODIAZ UR QL SCN>200 NG/ML: NEGATIVE
BILIRUB SERPL-MCNC: 1.4 MG/DL (ref 0.1–1)
BILIRUB UR QL STRIP: NEGATIVE
BUN SERPL-MCNC: 33 MG/DL (ref 8–23)
BZE UR QL SCN: ABNORMAL
CALCIUM SERPL-MCNC: 10.3 MG/DL (ref 8.7–10.5)
CANNABINOIDS UR QL SCN: NEGATIVE
CHLORIDE SERPL-SCNC: 108 MMOL/L (ref 95–110)
CLARITY UR REFRACT.AUTO: ABNORMAL
CO2 SERPL-SCNC: 17 MMOL/L (ref 23–29)
COLOR UR AUTO: YELLOW
CREAT SERPL-MCNC: 3.7 MG/DL (ref 0.5–1.4)
CREAT UR-MCNC: >450 MG/DL (ref 23–375)
DIFFERENTIAL METHOD BLD: ABNORMAL
EOSINOPHIL # BLD AUTO: 0.1 K/UL (ref 0–0.5)
EOSINOPHIL NFR BLD: 1.3 % (ref 0–8)
ERYTHROCYTE [DISTWIDTH] IN BLOOD BY AUTOMATED COUNT: 13.2 % (ref 11.5–14.5)
EST. GFR  (NO RACE VARIABLE): 16.8 ML/MIN/1.73 M^2
ETHANOL SERPL-MCNC: <10 MG/DL
GLUCOSE SERPL-MCNC: 92 MG/DL (ref 70–110)
GLUCOSE UR QL STRIP: NEGATIVE
HCT VFR BLD AUTO: 45.5 % (ref 40–54)
HGB BLD-MCNC: 15.2 G/DL (ref 14–18)
HGB UR QL STRIP: NEGATIVE
HYALINE CASTS UR QL AUTO: 32 /LPF
IMM GRANULOCYTES # BLD AUTO: 0.03 K/UL (ref 0–0.04)
IMM GRANULOCYTES NFR BLD AUTO: 0.4 % (ref 0–0.5)
KETONES UR QL STRIP: ABNORMAL
LEUKOCYTE ESTERASE UR QL STRIP: NEGATIVE
LYMPHOCYTES # BLD AUTO: 2.2 K/UL (ref 1–4.8)
LYMPHOCYTES NFR BLD: 32 % (ref 18–48)
MCH RBC QN AUTO: 32.3 PG (ref 27–31)
MCHC RBC AUTO-ENTMCNC: 33.4 G/DL (ref 32–36)
MCV RBC AUTO: 97 FL (ref 82–98)
METHADONE UR QL SCN>300 NG/ML: NEGATIVE
MICROSCOPIC COMMENT: ABNORMAL
MONOCYTES # BLD AUTO: 0.8 K/UL (ref 0.3–1)
MONOCYTES NFR BLD: 11.4 % (ref 4–15)
NEUTROPHILS # BLD AUTO: 3.7 K/UL (ref 1.8–7.7)
NEUTROPHILS NFR BLD: 54.2 % (ref 38–73)
NITRITE UR QL STRIP: NEGATIVE
NRBC BLD-RTO: 0 /100 WBC
OPIATES UR QL SCN: NEGATIVE
PCP UR QL SCN>25 NG/ML: NEGATIVE
PH UR STRIP: 6 [PH] (ref 5–8)
PLATELET # BLD AUTO: 256 K/UL (ref 150–450)
PMV BLD AUTO: 9 FL (ref 9.2–12.9)
POTASSIUM SERPL-SCNC: 4.1 MMOL/L (ref 3.5–5.1)
PROT SERPL-MCNC: 8.2 G/DL (ref 6–8.4)
PROT UR QL STRIP: ABNORMAL
RBC # BLD AUTO: 4.71 M/UL (ref 4.6–6.2)
RBC #/AREA URNS AUTO: 18 /HPF (ref 0–4)
SODIUM SERPL-SCNC: 141 MMOL/L (ref 136–145)
SP GR UR STRIP: 1.02 (ref 1–1.03)
TOXICOLOGY INFORMATION: ABNORMAL
TSH SERPL DL<=0.005 MIU/L-ACNC: 0.59 UIU/ML (ref 0.4–4)
URN SPEC COLLECT METH UR: ABNORMAL
WBC # BLD AUTO: 6.78 K/UL (ref 3.9–12.7)
WBC #/AREA URNS AUTO: 5 /HPF (ref 0–5)

## 2024-06-14 PROCEDURE — 81001 URINALYSIS AUTO W/SCOPE: CPT | Mod: XB

## 2024-06-14 PROCEDURE — 82077 ASSAY SPEC XCP UR&BREATH IA: CPT

## 2024-06-14 PROCEDURE — 25000003 PHARM REV CODE 250: Performed by: STUDENT IN AN ORGANIZED HEALTH CARE EDUCATION/TRAINING PROGRAM

## 2024-06-14 PROCEDURE — 80143 DRUG ASSAY ACETAMINOPHEN: CPT

## 2024-06-14 PROCEDURE — 90792 PSYCH DIAG EVAL W/MED SRVCS: CPT | Mod: GC,,, | Performed by: PSYCHIATRY & NEUROLOGY

## 2024-06-14 PROCEDURE — 99285 EMERGENCY DEPT VISIT HI MDM: CPT | Mod: 25

## 2024-06-14 PROCEDURE — 85025 COMPLETE CBC W/AUTO DIFF WBC: CPT

## 2024-06-14 PROCEDURE — G0378 HOSPITAL OBSERVATION PER HR: HCPCS

## 2024-06-14 PROCEDURE — 25000003 PHARM REV CODE 250

## 2024-06-14 PROCEDURE — 84443 ASSAY THYROID STIM HORMONE: CPT

## 2024-06-14 PROCEDURE — 80053 COMPREHEN METABOLIC PANEL: CPT

## 2024-06-14 PROCEDURE — 80307 DRUG TEST PRSMV CHEM ANLYZR: CPT

## 2024-06-14 PROCEDURE — 96360 HYDRATION IV INFUSION INIT: CPT

## 2024-06-14 RX ORDER — ISOSORBIDE MONONITRATE 30 MG/1
30 TABLET, EXTENDED RELEASE ORAL DAILY
Status: DISCONTINUED | OUTPATIENT
Start: 2024-06-14 | End: 2024-06-17 | Stop reason: HOSPADM

## 2024-06-14 RX ORDER — IBUPROFEN 200 MG
16 TABLET ORAL
Status: DISCONTINUED | OUTPATIENT
Start: 2024-06-14 | End: 2024-06-17 | Stop reason: HOSPADM

## 2024-06-14 RX ORDER — NALOXONE HCL 0.4 MG/ML
0.02 VIAL (ML) INJECTION
Status: DISCONTINUED | OUTPATIENT
Start: 2024-06-14 | End: 2024-06-17 | Stop reason: HOSPADM

## 2024-06-14 RX ORDER — ONDANSETRON HYDROCHLORIDE 2 MG/ML
4 INJECTION, SOLUTION INTRAVENOUS EVERY 8 HOURS PRN
Status: DISCONTINUED | OUTPATIENT
Start: 2024-06-14 | End: 2024-06-17 | Stop reason: HOSPADM

## 2024-06-14 RX ORDER — ATORVASTATIN CALCIUM 40 MG/1
40 TABLET, FILM COATED ORAL NIGHTLY
Status: DISCONTINUED | OUTPATIENT
Start: 2024-06-14 | End: 2024-06-17 | Stop reason: HOSPADM

## 2024-06-14 RX ORDER — SODIUM CHLORIDE 0.9 % (FLUSH) 0.9 %
10 SYRINGE (ML) INJECTION EVERY 12 HOURS PRN
Status: DISCONTINUED | OUTPATIENT
Start: 2024-06-14 | End: 2024-06-17 | Stop reason: HOSPADM

## 2024-06-14 RX ORDER — IBUPROFEN 200 MG
24 TABLET ORAL
Status: DISCONTINUED | OUTPATIENT
Start: 2024-06-14 | End: 2024-06-17 | Stop reason: HOSPADM

## 2024-06-14 RX ORDER — NAPROXEN SODIUM 220 MG/1
81 TABLET, FILM COATED ORAL DAILY
Status: DISCONTINUED | OUTPATIENT
Start: 2024-06-14 | End: 2024-06-17 | Stop reason: HOSPADM

## 2024-06-14 RX ORDER — MIRTAZAPINE 15 MG/1
15 TABLET, FILM COATED ORAL NIGHTLY
Status: DISCONTINUED | OUTPATIENT
Start: 2024-06-14 | End: 2024-06-15

## 2024-06-14 RX ORDER — CARVEDILOL 6.25 MG/1
6.25 TABLET ORAL 2 TIMES DAILY
Status: DISCONTINUED | OUTPATIENT
Start: 2024-06-14 | End: 2024-06-17 | Stop reason: HOSPADM

## 2024-06-14 RX ORDER — ACETAMINOPHEN 325 MG/1
650 TABLET ORAL EVERY 4 HOURS PRN
Status: DISCONTINUED | OUTPATIENT
Start: 2024-06-14 | End: 2024-06-17 | Stop reason: HOSPADM

## 2024-06-14 RX ORDER — CLOPIDOGREL BISULFATE 75 MG/1
75 TABLET ORAL DAILY
Status: DISCONTINUED | OUTPATIENT
Start: 2024-06-14 | End: 2024-06-17 | Stop reason: HOSPADM

## 2024-06-14 RX ORDER — GLUCAGON 1 MG
1 KIT INJECTION
Status: DISCONTINUED | OUTPATIENT
Start: 2024-06-14 | End: 2024-06-17 | Stop reason: HOSPADM

## 2024-06-14 RX ADMIN — APIXABAN 5 MG: 5 TABLET, FILM COATED ORAL at 08:06

## 2024-06-14 RX ADMIN — ATORVASTATIN CALCIUM 40 MG: 40 TABLET, FILM COATED ORAL at 08:06

## 2024-06-14 RX ADMIN — CLOPIDOGREL BISULFATE 75 MG: 75 TABLET ORAL at 02:06

## 2024-06-14 RX ADMIN — ISOSORBIDE MONONITRATE 30 MG: 30 TABLET, EXTENDED RELEASE ORAL at 02:06

## 2024-06-14 RX ADMIN — MIRTAZAPINE 15 MG: 15 TABLET, FILM COATED ORAL at 08:06

## 2024-06-14 RX ADMIN — SODIUM CHLORIDE 1000 ML: 9 INJECTION, SOLUTION INTRAVENOUS at 06:06

## 2024-06-14 RX ADMIN — ASPIRIN 81 MG CHEWABLE TABLET 81 MG: 81 TABLET CHEWABLE at 02:06
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
## 2024-06-14 NOTE — ED NOTES
Personal Belongings: Large Black & Red Duffle Bag                                       8 pair socks                                       1 pair gloves                                       1 pair grey sneakers                                        1 pair blk crocs                                       13 pair pants                                       12 shirts                                       2 jackets                                       1 belt                                       1 brown/camo hat                                       1 large yellow envelope(documents)                                       Multiple miscellaneous items   Medication:Clopidogrel                       Eliquis                       Trazodone                        Lisinopril                       Carvedilol                       Spironolact                       Atorvastin  Security Envelope #[id number]: $1.03                                                   Keys                                                   Lighter                                                   Brown Wallet (12 cards 1 ID)                                                  2 Facial Héctor                                                  2 watches                                                  1 phone                                                  2 phone chargers

## 2024-06-14 NOTE — ED NOTES
Attempted to give report twice to nurse, it would ring then hang up. Will try again in a couple minutes.
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
## 2024-06-14 NOTE — ED NOTES
Assumed pt care, he is lying on the stretcher resting NAD noted. Pt dressed in paper scrubs, all belongings secured outside f the room. Pt has IVF's infusing. Pt room scanned for hazards, EDT outside of room DVC maintained. Pt informed of his pending admit, he verbalizes understanding.

## 2024-06-14 NOTE — ED NOTES
"The patient is attired in [facility] provided scrubs w/ fair grooming & hygiene. His affect is blunted, mood is "depressed." He states that he presented to the ED because of SI. Onset of SI today after losing 2 friends. He did not state a formulated plan for SI. He denies HI, A/VH. He states that he "self medicate" w/ cocaine. His last use of cocaine was on "today." He is maintained on DV for safety, sitter present.  "

## 2024-06-14 NOTE — SUBJECTIVE & OBJECTIVE
Past Medical History:   Diagnosis Date    Acute renal insufficiency 6/21/2015    Cocaine abuse     Depression     History of psychiatric care     HTN (hypertension) 1/25/2014    STEMI (ST elevation myocardial infarction) 2/12/2017    Stroke     Tobacco dependence due to cigarettes 5/25/2016       Past Surgical History:   Procedure Laterality Date    CARDIAC SURGERY      stents    NECK SURGERY         Review of patient's allergies indicates:  No Known Allergies    No current facility-administered medications on file prior to encounter.     Current Outpatient Medications on File Prior to Encounter   Medication Sig    apixaban (ELIQUIS) 5 mg Tab Take 1 tablet (5 mg total) by mouth 2 (two) times daily.    aspirin 81 MG Chew Take 1 tablet (81 mg total) by mouth once daily. for 17 days    atorvastatin (LIPITOR) 40 MG tablet Take 1 tablet (40 mg total) by mouth every evening.    carvediloL (COREG) 6.25 MG tablet Take 1 tablet (6.25 mg total) by mouth 2 (two) times daily.    clopidogreL (PLAVIX) 75 mg tablet Take 1 tablet (75 mg total) by mouth once daily.    diclofenac sodium (VOLTAREN) 1 % Gel Apply 4 g topically 2 (two) times a day. Joint pains    ezetimibe (ZETIA) 10 mg tablet Take 1 tablet by mouth once daily.    isosorbide mononitrate (IMDUR) 30 MG 24 hr tablet Take 30 mg by mouth once daily.    lisinopriL (PRINIVIL,ZESTRIL) 20 MG tablet Take 1 tablet (20 mg total) by mouth once daily.    mirtazapine (REMERON) 15 MG tablet Take 15 mg by mouth every evening.    nitroGLYCERIN (NITROSTAT) 0.4 MG SL tablet Place 1 tablet (0.4 mg total) under the tongue every 5 (five) minutes as needed for Chest pain.    spironolactone (ALDACTONE) 25 MG tablet Take 1 tablet (25 mg total) by mouth once daily.     Family History       Problem Relation (Age of Onset)    Diabetes Mother    Heart disease Mother    Hypertension Mother          Tobacco Use    Smoking status: Some Days     Current packs/day: 0.25     Types: Cigarettes    Smokeless  tobacco: Never   Substance and Sexual Activity    Alcohol use: Not Currently     Comment: social    Drug use: Not Currently     Types: Cocaine     Comment: this weekend    Sexual activity: Yes     Partners: Female     Birth control/protection: Condom     Review of Systems   Constitutional:  Negative for chills and fever.   HENT:  Negative for congestion and sore throat.    Respiratory:  Negative for cough and shortness of breath.    Cardiovascular:  Negative for chest pain and leg swelling.   Gastrointestinal:  Negative for abdominal pain, nausea and vomiting.   Genitourinary:  Negative for decreased urine volume, dysuria, frequency and urgency.   Musculoskeletal:  Negative for arthralgias and myalgias.   Skin:  Negative for color change and pallor.   Neurological:  Negative for dizziness, light-headedness and headaches.   Psychiatric/Behavioral:  Positive for dysphoric mood and suicidal ideas. Negative for agitation and confusion.      Objective:     Vital Signs (Most Recent):  Temp: 98.1 °F (36.7 °C) (06/14/24 0959)  Pulse: 72 (06/14/24 0959)  Resp: 16 (06/14/24 0959)  BP: 126/65 (06/14/24 0959)  SpO2: 97 % (06/14/24 0959) Vital Signs (24h Range):  Temp:  [97.9 °F (36.6 °C)-98.2 °F (36.8 °C)] 98.1 °F (36.7 °C)  Pulse:  [72-92] 72  Resp:  [16-18] 16  SpO2:  [97 %-98 %] 97 %  BP: (120-126)/(61-81) 126/65     Weight: 81.6 kg (180 lb)  Body mass index is 24.41 kg/m².     Physical Exam  Constitutional:       General: He is not in acute distress.     Appearance: Normal appearance. He is ill-appearing. He is not toxic-appearing or diaphoretic.   HENT:      Mouth/Throat:      Mouth: Mucous membranes are dry.   Cardiovascular:      Rate and Rhythm: Normal rate and regular rhythm.      Heart sounds: No murmur heard.     No gallop.   Pulmonary:      Effort: Pulmonary effort is normal. No respiratory distress.      Breath sounds: No wheezing or rales.   Abdominal:      General: Abdomen is flat. There is no distension.       Palpations: Abdomen is soft.      Tenderness: There is no abdominal tenderness. There is no guarding or rebound.   Musculoskeletal:      Cervical back: Normal range of motion and neck supple.      Right lower leg: No edema.      Left lower leg: No edema.   Skin:     General: Skin is warm and dry.   Neurological:      Mental Status: He is alert and oriented to person, place, and time.                Significant Labs: All pertinent labs within the past 24 hours have been reviewed.    Significant Imaging: I have reviewed all pertinent imaging results/findings within the past 24 hours.

## 2024-06-14 NOTE — ED NOTES
Provided 2 additional servings of Apple Juice. Patient completed 1/2 of a turkey sandwich & 3 servings of Apple Juice. DVC maintained for safety, sitter present.
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
## 2024-06-14 NOTE — NURSING
Pt arrived to unit with Sitter and . Pt belongings handed to nurse and placed in designated area with Pt labels. Sitter in the room with pt with direct view.

## 2024-06-14 NOTE — DISCHARGE INSTRUCTIONS
REFERRAL RECOMMENDATIONS FOR SUBSTANCE ABUSE & MENTAL HEALTH      IN CASE OF SUICIDAL THINKING, call the National Suicide Hotline Number: 988    988 Suicide & Crisis Lifeline: 984 , 0-935-444-QAAW (5105)  https://988Tribe Studios.Cartesian       SUBSTANCE ABUSE:     [facility] (formerly known as the ABU)  [x] [phone], Option 2  [x] [address] [facility] [facility], [place] [address]  [x] https://www.ochsner.[email or web address]/services/[email or web address]-recovery-program  [x] [facility] delivers comprehensive and collaborative treatment for alcohol and substance use disorders.  Excellent program for working professionals or anyone else seeking recovery.  [x] Requires insurance approval prior to starting program, call number above for more information.  [x] Intensive Outpatient Rehabilitation Program - M-F 9am-3pm - daily groups with psychologists and social workers, sessions with MDs 3x per week   [x] Ambulatory detox and dual diagnosis available      SUBOXONE:     NOTE: some Suboxone clinics require their clients to participate in a structured program (such as an IOP) in order to be prescribed Suboxone.  Some clinics have a long waiting list.  Most of these clinics do not accept walk-in clients, so call first to to learn what must be done to get started on Suboxone.    [facility] - [phone] (can do Sublocade)  [address] [facility], [place] [address]    [address]  [phone]    [facility] - [phone] (can do Sublocade)  [address] [place] [address]    [facility]  [address] [place]  [phone]     [facility] (very short waiting list, may accept some walk-in's but call first if possible)  [address], [place] [address]  [phone]; [phone]    [facility]   [address] [place]    [phone]    [facility] (can usually be seen within a week but is cash only  for appointment)  [address] [place], [place]    [facility] ([facility])  [address] [facility], [address] [place] [address]  [phone]    [facility] (Community Hospital)  [address], [place]  [phone]    [facility] ([facility])  [address] [facility] [address]  [phone]    METHADONE:     [facility] (the only methadone clinic in the [facility], has two locations)  [x] [address] [address], [phone]  [x] [facility] [address] [place] [address], [phone]    12 STEP PROGRAMS (and similar):     Alcoholics Anonymous (local)  [x] [phone]  [x] www.[place].org for schedules for in-person and online meetings  [x] There are AA meetings throughout the day all over [facility]  [x] AA costs nothing to attend; they pass a basket for donations but this is not required    Narcotics Anonymous  [x] [phone]  [x] www.noana.org  [x] There are NA meetings throughout the day all over [facility]  [x] NA costs nothing to attend; they pass a basket for donations but this is not required    Alcoholics Anonymous Online Intergroup (national)  [x] www.aa-intergroup.org  [x] Good resource for large, nation-wide meetings  [x] Can also attend smaller, local meetings in other cities  [x] Countless meetings all day and all night  [x] AA costs nothing to attend; they pass a basket for donations but this is not required    Flying Sober - 24/7 zoom meetings for women and coed - sign on anytime, anywhere!  https://PSafesoberPropel Fuels/41-7-nxioidss/    Online Intergroup of AA - 121 Open AA [place] Meeting - 24/7 zoom meetings  https://aa-intergroup.org/meetings/    LOOKING FOR AN ALTERNATIVE TO 12 STEP PROGRAMS - check out:  SMART Recovery: https://www.smartrecovery.org/about-us  [facility]: https://recoverydharma.org      DETOX UNITS (USUALLY 5-7 DAYS):     [facility]: [address], [facility]  [phone], call first to ensure bed availability    [facility]: [address] [place]   [phone], Option 1, call first to ensure bed availability    [facility]: [address], [facility]  [phone] (intake by appointment only)    [facility]: [address], [facility]  [phone]      INTENSIVE OUTPATIENT PROGRAMS:     [facility] (formerly known as the ABU)  [x] [phone], Option 2  [x] [address], [address] 4th Floor, [facility] [address]  [x] https://www.ochsner.org/services/[facility]-recovery-program  [x] The [facility] delivers comprehensive and collaborative treatment for alcohol and substance use disorders.  Excellent program for working professionals or anyone else seeking recovery.  [x] Requires insurance approval prior to starting program, call number above for more information.  [x] Intensive Outpatient Rehabilitation Program - M-F 9am-3pm - daily groups with psychologists and social workers, sessions with MDs 3x per week   [x] Ambulatory detox and dual diagnosis available    [facility] Intensive Outpatient Program  [x] [phone]  [x] [facility] (the clinic not [facility])  [x] Call number above for more info and to check insurance requirements    [facility]  [address] [place] [address] [phone]    [facility]:  [address] [facility]?, [place], [place] [address]?, [phone]  [address] [facility]?, [place], [place] [address]?, [phone]    RESIDENTIAL REHABS (USUALLY 28 DAYS):     [facility]: [address], [phone]    [facility]: [address], [facility]  [phone] (intake by appointment only)    [facility] (men only) [address] [place], [phone]    [facility] (Female only) [address] [place], [phone]    [facility]: [address] [facility], men's program [phone], women's program [phone]    [facility]: [address] [place],  [phone]    [facility]: [address], [place], [phone]    [facility]: Men only, [phone], [address] [place] [facility]: [address] [place], [place], [phone]    [facility]: [facility] [place], [place],  [phone]  New Location: [address] [place] [address] [address], [place] [address], [phone]    [facility]:   ?[address], [place] [address]?[phone]    [place]: [address] [place] [address], [phone]    [place]: [place], [phone]     [facility]: [place], [place], [phone]    [facility]: [place], [place], [phone]    [facility]: [place], [place], [phone]    [place]: [place], [phone]    [facility]: [address] [place] [address], [phone]    [facility] ADDICTION CLINICS:     [facility]: [address] [facility], [address] [place], [phone] -or- [address] [place] [address]    [facility] [place]: [address] [place]  [address]     [facility]: Clinics [phone]; Crisis [phone]    [facility]: [address] [place] [address]    [facility]: [address] [facility] [place] [address]    [facility]: [address] [address],    [facility]: 2nd Floor [address] [place] [address]    [facility]: [address] [place] [address] , [place] [address]    [facility], [address] [place] [address]    [facility]: [address] [place] [address], [facility] [phone]  (serves youth 16-23 years old)    [facility]: [facility]/St. Klein/[place]/[facility] [phone]    [facility]: [address] [place] [address] [phone]    [facility]:  [address] [facility] [phone]    [facility]: [address] [facility], [address], [phone]     [facility]: [address] [facility].[place], [phone], [phone]    RESOURCES IN OTHER [facility]:     [facility]: [address] [place], [phone], [phone]    [facility]: [address] [facility], [address], [phone]    [facility], [address] [place] [address], [phone]    [facility]: [address] [facility], [phone]    [facility], [address] [facility], [phone] ([facility])    [facility], [address] [facility], [phone] ([facility])    [facility], [address] [facility], [address], [place], [phone] ([place], [place], and [facility])    [facility], [address] [facility], [phone] ([facility])    [facility] Hotline [phone], [phone]    [facility], [address] [facility], [facility], [address] [facility], [facility], [address] [facility], [address] [facility] [address] [facility], [address], [facility], [address], [place] [phone]    [facility], [address] [facility] [phone]    [facility], [address] [facility], [phone]    [facility] for Addiction Recovery, [address] [facility], [phone]    MENTAL HEALTH:     [facility]  Department of Psychiatry - Outpatient  Clinic  [phone]    [facility] of Psychiatry - General Psychiatry Intensive Outpatient Program  [facility] (formerly known as the BMU)  [phone], option 3    [facility] of Psychiatry - Dual Diagnosis Intensive Outpatient Program  [facility] (formerly known as the ABU)  [phone], option 2      COMMUNITY MENTAL HEALTH CENTERS:     [facility]  ([facility])  Serves [facility].  Serves uninsured patients & those with Medicaid.  Main location: [address] [place] [id number] [phone]  Walk-in's available during regular business hours.  24/7 Crisis Line: [phone]    [facility]  (aka [facility])  Serves [facility].  Serves uninsured patients, those with Medicaid and some private plans.  Walk-in's available during regular business hours.  Primary care services available as well.  [facility]: [address] [facility] [place] [address];  [phone]  [place]: [address] [place] [address];  [phone]  24/7 Crisis Line: [phone]    [facility]  Serves uninsured patients & those with Medicaid, call for more info.  Primary care, pediatrics, HIV treatment, and dentistry services available as well.  Three locations.  [phone]    [facility] [place]Corporate Office  Serves patients with Medicaid, Medicare, and private insurance  [address]  [place],?[place] [address]  [phone]    [facility]  Serves uninsured on a sliding scale, as well as Medicaid, Medicare, and private plans.  Eight locations around the [facility] area.  [phone]    [facility]  Serves uninsured patients & those with Medicaid, private insurances.  Primary care available  as well.  [phone]  [address] [place] [address]    [facility]  Serves veterans who were honorably discharged.  [address] [place] [address]  [phone]  24/7 Veterans Crisis Line: [phone] (Press 1)    If you have private insurance and need to find a specialist, please contact your insurance network to request a list of providers covered by your benefits.      MENTAL HEALTH/ADDICTIVE DISORDERS:     AA ([phone]), NA (584-1842)   National Suicide Prevention Lifeline- Call 1-292.714.1880 Available 24 hours everyday  [facility] [phone]; Crisis Line 714-0369 - Call for options A-F:  Intensive Outpatient Treatment/ Day programs   [facility], please contact   [facility], please contact [phone] or [phone] to speak with an admissions counselor.  [facility] (Methadone Maintenance)   [address] [place] [address], [phone]  [address] [place] [address] [phone]  [facility], [address] [place] [address], [phone]  [facility] [phone]  [facility] [place] please contact [phone]  [facility], [address] [facility], 4th floor [place] [address] Phone: [phone]   [place] Office: [address] [address], [phone]  [place] Office: [address] [facility], [address], [place] [address], [phone]  [place] Office: [address] [place] [address] [phone]    Outpatient Substance Abuse Treatment   [facility] (Methadone Maintenance)   [address] [place] [address], [phone]  [address] [place] [address] [phone]  [facility], [address] [place] [address], [phone]  [facility]  [place] Office: [address] [address], [phone]  [place] Office: [address]  [facility], [address], [place] [address], [phone]  [place] Office: [address] [place] [address] [phone]  [facility], [address] [place] [address] [phone]   [facility], [address] [facility], [address], [phone]  [facility], [address] [place], [place] [phone]    Residential Substance Abuse Treatment   [facility] [address] [facility], [phone]  [facility], [address] [facility], [phone]  [facility] (men only) [address] [place] [address], [phone]  [facility] (women only) [address] [place] [address] [phone]  [facility], [address] [place] [address] [phone]  [facility] (women only), intakes at [address] [facility], [phone]  [facility] (7-day program, $100, [address], [phone], [phone], [phone])  [facility] (Men only, [phone]), [address], [place] (Vets*/Non-Vets)  [facility] (Men only, $400/month program fee) [address] [facility] [place], [phone]  [facility] (Women over age 39 only), [address] [facility], [phone]    Out of Area:    [place], [address] [facility] [address], [place], [place] ([phone])  [facility] (men only), [address] [place] [address], [phone]  [facility], [address] [place], [place] ([phone])  [place], [address] [place] [address] Dr. Poole, [place] ([phone])  [facility], [address] [place] [address], [phone]  [facility] (Women only, has to have mental illness, can be homeless or substance abuser), [phone]        IN CASE OF SUICIDAL THINKING, call the National Suicide Hotline Number: 988    988 Suicide & Crisis Lifeline: 988 , 6-1102-127-138-TALK (3303)  Provides 24/7, free and confidential support  for people in distress, prevention and crisis resources for you or your loved ones, and best practices for professionals.    Call, text or chat.  https://Quantros.org

## 2024-06-14 NOTE — ASSESSMENT & PLAN NOTE
Patient with acute kidney injury/acute renal failure likely due to pre-renal azotemia due to dehydration CARTER is currently worsening. Baseline creatinine  normal  - Labs reviewed- Renal function/electrolytes with Estimated Creatinine Clearance: 20.4 mL/min (A) (based on SCr of 3.7 mg/dL (H)). according to latest data. Monitor urine output and serial BMP and adjust therapy as needed. Avoid nephrotoxins and renally dose meds for GFR listed above.

## 2024-06-14 NOTE — CARE UPDATE
I have reviewed the chart of Damir Faria who is hospitalized for the following:    Active Hospital Problems    Diagnosis    *CARTER (acute kidney injury)    Cocaine use disorder, severe, dependence    Suicidal ideation        Matt Rivero PA-C  Unit Based [facility]OCE

## 2024-06-14 NOTE — H&P
[facility] - Emergency Dept  Hospital Medicine  History & Physical    Patient Name: Damir Faria  MRN: [id number]  Patient Class: OP- Observation  Admission Date: 2024  Attending Physician: Vitaliy Thomas*   Primary Care Provider: Tata Ziegler MD         Patient information was obtained from patient, past medical records, and ER records.     Subjective:     Principal Problem:CARTER (acute kidney injury)    Chief Complaint:   Chief Complaint   Patient presents with    Psychiatric Evaluation     Pt states he has been feeling suicidal all day and it has gotten progressively worse. Pt denies a plan at this time and denies HI. Pt denies any other complaints.         HPI: Damir Faria is a 69 y.o. male with past medical history of CAD, HTN, HLD, cocaine abuse, MDD, who presents with SI. Patient states that he has been feeling more depressed due to thinking about two of his friends that . He has been thinking that he would walk in front of a car, which he was tried in the past but the car avoided him. He has recent ED visits in march and may also for suicidal thoughts. He denies shortness of breath, chest pain, abdominal pain, nausea / vomiting, fever. He reports tolerating PO ok. He reports regular cocaine use to help him cope with his feelings, the last of which was yesterday. He denies alcohol consumption. In the ED, patient was afebrile and hemodynamically stable. Labs with Cr 3.7, BUN 33, CO2 17, UDS positive for cocaine. Patient was admitted to hospital medicine for further evaluation and treatment.     Past Medical History:   Diagnosis Date    Acute renal insufficiency 2015    Cocaine abuse     Depression     History of psychiatric care     HTN (hypertension) 2014    STEMI (ST elevation myocardial infarction) 2017    Stroke     Tobacco dependence due to cigarettes 2016       Past Surgical History:   Procedure Laterality Date    CARDIAC SURGERY      stents    NECK  SURGERY         Review of patient's allergies indicates:  No Known Allergies    No current facility-administered medications on file prior to encounter.     Current Outpatient Medications on File Prior to Encounter   Medication Sig    apixaban (ELIQUIS) 5 mg Tab Take 1 tablet (5 mg total) by mouth 2 (two) times daily.    aspirin 81 MG Chew Take 1 tablet (81 mg total) by mouth once daily. for 17 days    atorvastatin (LIPITOR) 40 MG tablet Take 1 tablet (40 mg total) by mouth every evening.    carvediloL (COREG) 6.25 MG tablet Take 1 tablet (6.25 mg total) by mouth 2 (two) times daily.    clopidogreL (PLAVIX) 75 mg tablet Take 1 tablet (75 mg total) by mouth once daily.    diclofenac sodium (VOLTAREN) 1 % Gel Apply 4 g topically 2 (two) times a day. Joint pains    ezetimibe (ZETIA) 10 mg tablet Take 1 tablet by mouth once daily.    isosorbide mononitrate (IMDUR) 30 MG 24 hr tablet Take 30 mg by mouth once daily.    lisinopriL (PRINIVIL,ZESTRIL) 20 MG tablet Take 1 tablet (20 mg total) by mouth once daily.    mirtazapine (REMERON) 15 MG tablet Take 15 mg by mouth every evening.    nitroGLYCERIN (NITROSTAT) 0.4 MG SL tablet Place 1 tablet (0.4 mg total) under the tongue every 5 (five) minutes as needed for Chest pain.    spironolactone (ALDACTONE) 25 MG tablet Take 1 tablet (25 mg total) by mouth once daily.     Family History       Problem Relation (Age of Onset)    Diabetes Mother    Heart disease Mother    Hypertension Mother          Tobacco Use    Smoking status: Some Days     Current packs/day: 0.25     Types: Cigarettes    Smokeless tobacco: Never   Substance and Sexual Activity    Alcohol use: Not Currently     Comment: social    Drug use: Not Currently     Types: Cocaine     Comment: this weekend    Sexual activity: Yes     Partners: Female     Birth control/protection: Condom     Review of Systems   Constitutional:  Negative for chills and fever.   HENT:  Negative for congestion and sore throat.     Respiratory:  Negative for cough and shortness of breath.    Cardiovascular:  Negative for chest pain and leg swelling.   Gastrointestinal:  Negative for abdominal pain, nausea and vomiting.   Genitourinary:  Negative for decreased urine volume, dysuria, frequency and urgency.   Musculoskeletal:  Negative for arthralgias and myalgias.   Skin:  Negative for color change and pallor.   Neurological:  Negative for dizziness, light-headedness and headaches.   Psychiatric/Behavioral:  Positive for dysphoric mood and suicidal ideas. Negative for agitation and confusion.      Objective:     Vital Signs (Most Recent):  Temp: 98.1 °F (36.7 °C) (06/14/24 0959)  Pulse: 72 (06/14/24 0959)  Resp: 16 (06/14/24 0959)  BP: 126/65 (06/14/24 0959)  SpO2: 97 % (06/14/24 0959) Vital Signs (24h Range):  Temp:  [97.9 °F (36.6 °C)-98.2 °F (36.8 °C)] 98.1 °F (36.7 °C)  Pulse:  [72-92] 72  Resp:  [16-18] 16  SpO2:  [97 %-98 %] 97 %  BP: (120-126)/(61-81) 126/65     Weight: 81.6 kg (180 lb)  Body mass index is 24.41 kg/m².     Physical Exam  Constitutional:       General: He is not in acute distress.     Appearance: Normal appearance. He is ill-appearing. He is not toxic-appearing or diaphoretic.   HENT:      Mouth/Throat:      Mouth: Mucous membranes are dry.   Cardiovascular:      Rate and Rhythm: Normal rate and regular rhythm.      Heart sounds: No murmur heard.     No gallop.   Pulmonary:      Effort: Pulmonary effort is normal. No respiratory distress.      Breath sounds: No wheezing or rales.   Abdominal:      General: Abdomen is flat. There is no distension.      Palpations: Abdomen is soft.      Tenderness: There is no abdominal tenderness. There is no guarding or rebound.   Musculoskeletal:      Cervical back: Normal range of motion and neck supple.      Right lower leg: No edema.      Left lower leg: No edema.   Skin:     General: Skin is warm and dry.   Neurological:      Mental Status: He is alert and oriented to person, place,  and time.                Significant Labs: All pertinent labs within the past 24 hours have been reviewed.    Significant Imaging: I have reviewed all pertinent imaging results/findings within the past 24 hours.  Assessment/Plan:     * CARTER (acute kidney injury)  Patient with acute kidney injury/acute renal failure likely due to pre-renal azotemia due to dehydration CARTER is currently worsening. Baseline creatinine  normal  - Labs reviewed- Renal function/electrolytes with Estimated Creatinine Clearance: 20.4 mL/min (A) (based on SCr of 3.7 mg/dL (H)). according to latest data. Monitor urine output and serial BMP and adjust therapy as needed. Avoid nephrotoxins and renally dose meds for GFR listed above.    History of venous thromboembolism (VTE)  Continue eliquis      Suicidal ideation  Reports dysphoria and SI with plan  Psych consulted  PEC in place  Continue remeron for now      Coronary artery disease involving native coronary artery of native heart without angina pectoris  Continue statin, DAPT    Mixed hyperlipidemia  Continue statin      Essential hypertension  Chronic, controlled. Latest blood pressure and vitals reviewed-     Temp:  [97.9 °F (36.6 °C)-98.2 °F (36.8 °C)]   Pulse:  [72-92]   Resp:  [16-18]   BP: (120-126)/(61-81)   SpO2:  [97 %-98 %] .   Home meds for hypertension were reviewed and noted below.   Hypertension Medications               carvediloL (COREG) 6.25 MG tablet Take 1 tablet (6.25 mg total) by mouth 2 (two) times daily.    isosorbide mononitrate (IMDUR) 30 MG 24 hr tablet Take 30 mg by mouth once daily.    lisinopriL (PRINIVIL,ZESTRIL) 20 MG tablet Take 1 tablet (20 mg total) by mouth once daily.    nitroGLYCERIN (NITROSTAT) 0.4 MG SL tablet Place 1 tablet (0.4 mg total) under the tongue every 5 (five) minutes as needed for Chest pain.    spironolactone (ALDACTONE) 25 MG tablet Take 1 tablet (25 mg total) by mouth once daily.            While in the hospital, will manage blood pressure  as follows; Adjust home antihypertensive regimen as follows- hold lisinopril and spironolactone for puja    Will utilize p.r.n. blood pressure medication only if patient's blood pressure greater than 180/110 and he develops symptoms such as worsening chest pain or shortness of breath.      VTE Risk Mitigation (From admission, onward)           Ordered     apixaban tablet 5 mg  2 times daily         06/14/24 1340     IP VTE HIGH RISK PATIENT  Once         06/14/24 0904     Place sequential compression device  Until discontinued         06/14/24 0904                       On 06/14/2024, patient should be placed in hospital observation services under my care.             Vitaliy Thomas MD  Department of Hospital Medicine  [facility] - Emergency Dept

## 2024-06-14 NOTE — ASSESSMENT & PLAN NOTE
Chronic, controlled. Latest blood pressure and vitals reviewed-     Temp:  [97.9 °F (36.6 °C)-98.2 °F (36.8 °C)]   Pulse:  [72-92]   Resp:  [16-18]   BP: (120-126)/(61-81)   SpO2:  [97 %-98 %] .   Home meds for hypertension were reviewed and noted below.   Hypertension Medications               carvediloL (COREG) 6.25 MG tablet Take 1 tablet (6.25 mg total) by mouth 2 (two) times daily.    isosorbide mononitrate (IMDUR) 30 MG 24 hr tablet Take 30 mg by mouth once daily.    lisinopriL (PRINIVIL,ZESTRIL) 20 MG tablet Take 1 tablet (20 mg total) by mouth once daily.    nitroGLYCERIN (NITROSTAT) 0.4 MG SL tablet Place 1 tablet (0.4 mg total) under the tongue every 5 (five) minutes as needed for Chest pain.    spironolactone (ALDACTONE) 25 MG tablet Take 1 tablet (25 mg total) by mouth once daily.            While in the hospital, will manage blood pressure as follows; Adjust home antihypertensive regimen as follows- hold lisinopril and spironolactone for puja    Will utilize p.r.n. blood pressure medication only if patient's blood pressure greater than 180/110 and he develops symptoms such as worsening chest pain or shortness of breath.

## 2024-06-15 LAB
ALBUMIN SERPL BCP-MCNC: 3.4 G/DL (ref 3.5–5.2)
ALP SERPL-CCNC: 55 U/L (ref 55–135)
ALT SERPL W/O P-5'-P-CCNC: 17 U/L (ref 10–44)
ANION GAP SERPL CALC-SCNC: 10 MMOL/L (ref 8–16)
AST SERPL-CCNC: 20 U/L (ref 10–40)
BASOPHILS # BLD AUTO: 0.04 K/UL (ref 0–0.2)
BASOPHILS NFR BLD: 0.8 % (ref 0–1.9)
BILIRUB SERPL-MCNC: 0.4 MG/DL (ref 0.1–1)
BUN SERPL-MCNC: 27 MG/DL (ref 8–23)
CALCIUM SERPL-MCNC: 8.8 MG/DL (ref 8.7–10.5)
CHLORIDE SERPL-SCNC: 110 MMOL/L (ref 95–110)
CO2 SERPL-SCNC: 23 MMOL/L (ref 23–29)
CREAT SERPL-MCNC: 1.7 MG/DL (ref 0.5–1.4)
DIFFERENTIAL METHOD BLD: ABNORMAL
EOSINOPHIL # BLD AUTO: 0.3 K/UL (ref 0–0.5)
EOSINOPHIL NFR BLD: 5.5 % (ref 0–8)
ERYTHROCYTE [DISTWIDTH] IN BLOOD BY AUTOMATED COUNT: 13.1 % (ref 11.5–14.5)
EST. GFR  (NO RACE VARIABLE): 42.8 ML/MIN/1.73 M^2
GLUCOSE SERPL-MCNC: 90 MG/DL (ref 70–110)
HCT VFR BLD AUTO: 41 % (ref 40–54)
HGB BLD-MCNC: 13.5 G/DL (ref 14–18)
IMM GRANULOCYTES # BLD AUTO: 0.01 K/UL (ref 0–0.04)
IMM GRANULOCYTES NFR BLD AUTO: 0.2 % (ref 0–0.5)
LYMPHOCYTES # BLD AUTO: 2.4 K/UL (ref 1–4.8)
LYMPHOCYTES NFR BLD: 48.6 % (ref 18–48)
MCH RBC QN AUTO: 32.3 PG (ref 27–31)
MCHC RBC AUTO-ENTMCNC: 32.9 G/DL (ref 32–36)
MCV RBC AUTO: 98 FL (ref 82–98)
MONOCYTES # BLD AUTO: 0.6 K/UL (ref 0.3–1)
MONOCYTES NFR BLD: 12.5 % (ref 4–15)
NEUTROPHILS # BLD AUTO: 1.6 K/UL (ref 1.8–7.7)
NEUTROPHILS NFR BLD: 32.4 % (ref 38–73)
NRBC BLD-RTO: 0 /100 WBC
PLATELET # BLD AUTO: 241 K/UL (ref 150–450)
PMV BLD AUTO: 9.1 FL (ref 9.2–12.9)
POTASSIUM SERPL-SCNC: 3.9 MMOL/L (ref 3.5–5.1)
PROT SERPL-MCNC: 6.3 G/DL (ref 6–8.4)
RBC # BLD AUTO: 4.18 M/UL (ref 4.6–6.2)
SODIUM SERPL-SCNC: 143 MMOL/L (ref 136–145)
WBC # BLD AUTO: 4.88 K/UL (ref 3.9–12.7)

## 2024-06-15 PROCEDURE — 80053 COMPREHEN METABOLIC PANEL: CPT | Performed by: STUDENT IN AN ORGANIZED HEALTH CARE EDUCATION/TRAINING PROGRAM

## 2024-06-15 PROCEDURE — 36415 COLL VENOUS BLD VENIPUNCTURE: CPT | Performed by: STUDENT IN AN ORGANIZED HEALTH CARE EDUCATION/TRAINING PROGRAM

## 2024-06-15 PROCEDURE — 85025 COMPLETE CBC W/AUTO DIFF WBC: CPT | Performed by: STUDENT IN AN ORGANIZED HEALTH CARE EDUCATION/TRAINING PROGRAM

## 2024-06-15 PROCEDURE — 11000001 HC ACUTE MED/SURG PRIVATE ROOM

## 2024-06-15 PROCEDURE — 99232 SBSQ HOSP IP/OBS MODERATE 35: CPT | Mod: GC,,, | Performed by: STUDENT IN AN ORGANIZED HEALTH CARE EDUCATION/TRAINING PROGRAM

## 2024-06-15 PROCEDURE — 25000003 PHARM REV CODE 250: Performed by: STUDENT IN AN ORGANIZED HEALTH CARE EDUCATION/TRAINING PROGRAM

## 2024-06-15 RX ORDER — TRAZODONE HYDROCHLORIDE 50 MG/1
50 TABLET ORAL NIGHTLY
Status: DISCONTINUED | OUTPATIENT
Start: 2024-06-15 | End: 2024-06-17 | Stop reason: HOSPADM

## 2024-06-15 RX ADMIN — ASPIRIN 81 MG CHEWABLE TABLET 81 MG: 81 TABLET CHEWABLE at 08:06

## 2024-06-15 RX ADMIN — ATORVASTATIN CALCIUM 40 MG: 40 TABLET, FILM COATED ORAL at 08:06

## 2024-06-15 RX ADMIN — APIXABAN 5 MG: 5 TABLET, FILM COATED ORAL at 08:06

## 2024-06-15 RX ADMIN — CLOPIDOGREL BISULFATE 75 MG: 75 TABLET ORAL at 08:06

## 2024-06-15 RX ADMIN — ISOSORBIDE MONONITRATE 30 MG: 30 TABLET, EXTENDED RELEASE ORAL at 08:06

## 2024-06-15 RX ADMIN — CARVEDILOL 6.25 MG: 6.25 TABLET, FILM COATED ORAL at 08:06

## 2024-06-15 RX ADMIN — TRAZODONE HYDROCHLORIDE 50 MG: 50 TABLET ORAL at 08:06

## 2024-06-15 NOTE — PROGRESS NOTES
"CONSULTATION LIAISON PSYCHIATRY PROGRESS NOTE    Patient Name: Damir Faria  MRN: [id number]  Patient Class: IP- Inpatient  Admission Date: 6/14/2024  Attending Physician: Vitaliy Thomas*      SUBJECTIVE:   Damir Faria is a 69 y.o. male with a past psychiatric history of MDD and cocaine use disorder and pertinent past medical history of CAD, STEMI, stroke who presented to the ED for SI and was admitted to the hospital for CARTER (acute kidney injury).     Psychiatry consulted for SI.    Today, patient resting in bed prior to interview.  Reports mood as fine.  Reports sleeping well last night, and ate lunch this afternoon.  Denies acute complaints or concerns.  Reports looking forward to rehab.  Denies thoughts of self-harm or wanting to die       OBJECTIVE:    Mental Status Exam:  General Appearance: appears stated age, well developed and nourished, adequately groomed and appropriately dressed, in no acute distress  Behavior: cooperative, friendly, pleasant, under good behavioral control  Involuntary Movements and Motor Activity: no abnormal involuntary movements noted  Gait and Station: unable to assess - patient lying down or seated  Speech and Language: normal rate, rhythm, volume, tone, and pitch  Mood: "fine"  Affect: blunted  Thought Process and Associations: linear, goal-directed  Thought Content and Perceptions:: no auditory or visual hallucinations, no paranoid ideation, no ideas of reference, no evidence of delusions or psychosis  Sensorium and Orientation: grossly intact  Recent and Remote Memory: intact  Attention and Concentration: intact  Fund of Knowledge: grossly intact, vocabulary appropriate  Insight: intact  Judgment: intact    CAM ICU positive? no      ASSESSMENT & RECOMMENDATIONS      Cocaine use disorder  Substance-induced depressive disorder  R/o major depressive disorder        PSYCH MEDICATIONS  Scheduled:  Continue home trazodone 50 mg PO qhs.     RISK ASSESSMENT  NEEDS PEC " because pt is in imminent danger of hurting self or others and is gravely disabled. NEEDS 1:1 sitter.     FOLLOW-UP  Will follow up while in-house.     DISPOSITION - once medically cleared:   Allow for continued washout.  Recommend pt attend residential rehab program when medically cleared  Outpt psych and substance use resources will be provided in discharge instructions.    Please contact ON CALL psychiatry service (24/7) for any acute issues that may arise.    Dr. Pascual Sethi   Psychiatry  [facility]  6/15/2024 6:26 PM        --------------------------------------------------------------------------------------------------------------------------------------------------------------------------------------------------------------------------------------    CONTINUED OBJECTIVE clinical data & findings reviewed and noted for above decision making    Current Medications:   Scheduled Meds:    apixaban  5 mg Oral BID    aspirin  81 mg Oral Daily    atorvastatin  40 mg Oral QHS    carvediloL  6.25 mg Oral BID    clopidogreL  75 mg Oral Daily    isosorbide mononitrate  30 mg Oral Daily    traZODone  50 mg Oral QHS     PRN Meds:   Current Facility-Administered Medications:     acetaminophen, 650 mg, Oral, Q4H PRN    dextrose 10%, 12.5 g, Intravenous, PRN    dextrose 10%, 25 g, Intravenous, PRN    glucagon (human recombinant), 1 mg, Intramuscular, PRN    glucose, 16 g, Oral, PRN    glucose, 24 g, Oral, PRN    naloxone, 0.02 mg, Intravenous, PRN    ondansetron, 4 mg, Intravenous, Q8H PRN    sodium chloride 0.9%, 10 mL, Intravenous, Q12H PRN    Allergies:   Review of patient's allergies indicates:  No Known Allergies    Vitals  Vitals:    06/15/24 1608   BP: 126/71   Pulse: 71   Resp: 16   Temp: 98.7 °F (37.1 °C)       Labs/Imaging/Studies:  Recent Results (from the past 24 hour(s))   Comprehensive Metabolic Panel (CMP)    Collection Time: 06/15/24  6:05 AM   Result Value Ref Range    Sodium 143 136 -  145 mmol/L    Potassium 3.9 3.5 - 5.1 mmol/L    Chloride 110 95 - 110 mmol/L    CO2 23 23 - 29 mmol/L    Glucose 90 70 - 110 mg/dL    BUN 27 (H) 8 - 23 mg/dL    Creatinine 1.7 (H) 0.5 - 1.4 mg/dL    Calcium 8.8 8.7 - 10.5 mg/dL    Total Protein 6.3 6.0 - 8.4 g/dL    Albumin 3.4 (L) 3.5 - 5.2 g/dL    Total Bilirubin 0.4 0.1 - 1.0 mg/dL    Alkaline Phosphatase 55 55 - 135 U/L    AST 20 10 - 40 U/L    ALT 17 10 - 44 U/L    eGFR 42.8 (A) >60 mL/min/1.73 m^2    Anion Gap 10 8 - 16 mmol/L   CBC Auto Differential    Collection Time: 06/15/24  6:05 AM   Result Value Ref Range    WBC 4.88 3.90 - 12.70 K/uL    RBC 4.18 (L) 4.60 - 6.20 M/uL    Hemoglobin 13.5 (L) 14.0 - 18.0 g/dL    Hematocrit 41.0 40.0 - 54.0 %    MCV 98 82 - 98 fL    MCH 32.3 (H) 27.0 - 31.0 pg    MCHC 32.9 32.0 - 36.0 g/dL    RDW 13.1 11.5 - 14.5 %    Platelets 241 150 - 450 K/uL    MPV 9.1 (L) 9.2 - 12.9 fL    Immature Granulocytes 0.2 0.0 - 0.5 %    Gran # (ANC) 1.6 (L) 1.8 - 7.7 K/uL    Immature Grans (Abs) 0.01 0.00 - 0.04 K/uL    Lymph # 2.4 1.0 - 4.8 K/uL    Mono # 0.6 0.3 - 1.0 K/uL    Eos # 0.3 0.0 - 0.5 K/uL    Baso # 0.04 0.00 - 0.20 K/uL    nRBC 0 0 /100 WBC    Gran % 32.4 (L) 38.0 - 73.0 %    Lymph % 48.6 (H) 18.0 - 48.0 %    Mono % 12.5 4.0 - 15.0 %    Eosinophil % 5.5 0.0 - 8.0 %    Basophil % 0.8 0.0 - 1.9 %    Differential Method Automated      Imaging Results    None

## 2024-06-15 NOTE — ASSESSMENT & PLAN NOTE
Patient with acute kidney injury/acute renal failure likely due to pre-renal azotemia due to dehydration CARTER is currently worsening. Baseline creatinine  normal  - Labs reviewed- Renal function/electrolytes with Estimated Creatinine Clearance: 44.4 mL/min (A) (based on SCr of 1.7 mg/dL (H)). according to latest data. Monitor urine output and serial BMP and adjust therapy as needed. Avoid nephrotoxins and renally dose meds for GFR listed above.  Improving

## 2024-06-15 NOTE — PLAN OF CARE
Problem: Adult Inpatient Plan of Care  Goal: Plan of Care Review  Outcome: Progressing  Goal: Patient-Specific Goal (Individualized)  Outcome: Progressing  Goal: Absence of Hospital-Acquired Illness or Injury  Outcome: Progressing  Goal: Optimal Comfort and Wellbeing  Outcome: Progressing  Goal: Readiness for Transition of Care  Outcome: Progressing     Problem: Suicide Risk  Goal: Absence of Self-Harm  Outcome: Progressing     Problem: Diabetes Comorbidity  Goal: Blood Glucose Level Within Targeted Range  Outcome: Progressing     Problem: Acute Kidney Injury/Impairment  Goal: Fluid and Electrolyte Balance  Outcome: Progressing  Goal: Improved Oral Intake  Outcome: Progressing  Goal: Effective Renal Function  Outcome: Progressing

## 2024-06-15 NOTE — PROGRESS NOTES
[facility] Medicine  Progress Note    Patient Name: Damir Faria  MRN: [id number]  Patient Class: IP- Inpatient   Admission Date: 2024  Length of Stay: 0 days  Attending Physician: Vitaliy Thomas*  Primary Care Provider: Tata Ziegler MD        Subjective:     Principal Problem:CARTER (acute kidney injury)        HPI:  Damir Faria is a 69 y.o. male with past medical history of CAD, HTN, HLD, cocaine abuse, MDD, who presents with SI. Patient states that he has been feeling more depressed due to thinking about two of his friends that . He has been thinking that he would walk in front of a car, which he was tried in the past but the car avoided him. He has recent ED visits in march and may also for suicidal thoughts. He denies shortness of breath, chest pain, abdominal pain, nausea / vomiting, fever. He reports tolerating PO ok. He reports regular cocaine use to help him cope with his feelings, the last of which was yesterday. He denies alcohol consumption. In the ED, patient was afebrile and hemodynamically stable. Labs with Cr 3.7, BUN 33, CO2 17, UDS positive for cocaine. Patient was admitted to hospital medicine for further evaluation and treatment.     Overview/Hospital Course:  No notes on file    Interval History: No acute events. Pt reports ongoing dysphoria. Afebrile. Cr appears improving    Review of Systems  Objective:     Vital Signs (Most Recent):  Temp: 98.4 °F (36.9 °C) (06/15/24 1127)  Pulse: 67 (06/15/24 1127)  Resp: 17 (06/15/24 1127)  BP: (!) 114/58 (06/15/24 1127)  SpO2: 98 % (06/15/24 1127) Vital Signs (24h Range):  Temp:  [98.1 °F (36.7 °C)-98.5 °F (36.9 °C)] 98.4 °F (36.9 °C)  Pulse:  [62-83] 67  Resp:  [16-18] 17  SpO2:  [94 %-100 %] 98 %  BP: (103-138)/(50-82) 114/58     Weight: 81.6 kg (180 lb)  Body mass index is 24.41 kg/m².  No intake or output data in the 24 hours ending 06/15/24 1445      Physical Exam  Constitutional:       General: He  is not in acute distress.     Appearance: Normal appearance. He is ill-appearing. He is not toxic-appearing or diaphoretic.   Cardiovascular:      Rate and Rhythm: Normal rate and regular rhythm.      Heart sounds: No murmur heard.     No gallop.   Pulmonary:      Effort: Pulmonary effort is normal. No respiratory distress.      Breath sounds: No wheezing or rales.   Abdominal:      General: Abdomen is flat. There is no distension.      Palpations: Abdomen is soft.      Tenderness: There is no abdominal tenderness. There is no guarding or rebound.   Musculoskeletal:      Cervical back: Normal range of motion and neck supple.      Right lower leg: No edema.      Left lower leg: No edema.   Skin:     General: Skin is warm and dry.   Neurological:      Mental Status: He is alert and oriented to person, place, and time.             Significant Labs: All pertinent labs within the past 24 hours have been reviewed.    Significant Imaging: I have reviewed all pertinent imaging results/findings within the past 24 hours.    Assessment/Plan:      * CARTER (acute kidney injury)  Patient with acute kidney injury/acute renal failure likely due to pre-renal azotemia due to dehydration CARTER is currently worsening. Baseline creatinine  normal  - Labs reviewed- Renal function/electrolytes with Estimated Creatinine Clearance: 44.4 mL/min (A) (based on SCr of 1.7 mg/dL (H)). according to latest data. Monitor urine output and serial BMP and adjust therapy as needed. Avoid nephrotoxins and renally dose meds for GFR listed above.  Improving    History of venous thromboembolism (VTE)  Continue eliquis      Suicidal ideation  Reports dysphoria and SI with plan  Psych consulted  PEC in place      Coronary artery disease involving native coronary artery of native heart without angina pectoris  Continue statin, DAPT    Mixed hyperlipidemia  Continue statin      Essential hypertension  Chronic, controlled. Latest blood pressure and vitals reviewed-      Temp:  [97.9 °F (36.6 °C)-98.2 °F (36.8 °C)]   Pulse:  [72-92]   Resp:  [16-18]   BP: (120-126)/(61-81)   SpO2:  [97 %-98 %] .   Home meds for hypertension were reviewed and noted below.   Hypertension Medications               carvediloL (COREG) 6.25 MG tablet Take 1 tablet (6.25 mg total) by mouth 2 (two) times daily.    isosorbide mononitrate (IMDUR) 30 MG 24 hr tablet Take 30 mg by mouth once daily.    lisinopriL (PRINIVIL,ZESTRIL) 20 MG tablet Take 1 tablet (20 mg total) by mouth once daily.    nitroGLYCERIN (NITROSTAT) 0.4 MG SL tablet Place 1 tablet (0.4 mg total) under the tongue every 5 (five) minutes as needed for Chest pain.    spironolactone (ALDACTONE) 25 MG tablet Take 1 tablet (25 mg total) by mouth once daily.            While in the hospital, will manage blood pressure as follows; Adjust home antihypertensive regimen as follows- hold lisinopril and spironolactone for puja    Will utilize p.r.n. blood pressure medication only if patient's blood pressure greater than 180/110 and he develops symptoms such as worsening chest pain or shortness of breath.      VTE Risk Mitigation (From admission, onward)           Ordered     apixaban tablet 5 mg  2 times daily         06/14/24 1340     IP VTE HIGH RISK PATIENT  Once         06/14/24 0904     Place sequential compression device  Until discontinued         06/14/24 0904                    Discharge Planning   DANELLE:      Code Status: Full Code   Is the patient medically ready for discharge?:     Reason for patient still in hospital (select all that apply): Patient trending condition and Consult recommendations           Vitaliy Thomas MD  Department of Hospital Medicine   [facility]

## 2024-06-15 NOTE — SUBJECTIVE & OBJECTIVE
Interval History: No acute events. Pt reports ongoing dysphoria. Afebrile. Cr appears improving    Review of Systems  Objective:     Vital Signs (Most Recent):  Temp: 98.4 °F (36.9 °C) (06/15/24 1127)  Pulse: 67 (06/15/24 1127)  Resp: 17 (06/15/24 1127)  BP: (!) 114/58 (06/15/24 1127)  SpO2: 98 % (06/15/24 1127) Vital Signs (24h Range):  Temp:  [98.1 °F (36.7 °C)-98.5 °F (36.9 °C)] 98.4 °F (36.9 °C)  Pulse:  [62-83] 67  Resp:  [16-18] 17  SpO2:  [94 %-100 %] 98 %  BP: (103-138)/(50-82) 114/58     Weight: 81.6 kg (180 lb)  Body mass index is 24.41 kg/m².  No intake or output data in the 24 hours ending 06/15/24 1445      Physical Exam  Constitutional:       General: He is not in acute distress.     Appearance: Normal appearance. He is ill-appearing. He is not toxic-appearing or diaphoretic.   Cardiovascular:      Rate and Rhythm: Normal rate and regular rhythm.      Heart sounds: No murmur heard.     No gallop.   Pulmonary:      Effort: Pulmonary effort is normal. No respiratory distress.      Breath sounds: No wheezing or rales.   Abdominal:      General: Abdomen is flat. There is no distension.      Palpations: Abdomen is soft.      Tenderness: There is no abdominal tenderness. There is no guarding or rebound.   Musculoskeletal:      Cervical back: Normal range of motion and neck supple.      Right lower leg: No edema.      Left lower leg: No edema.   Skin:     General: Skin is warm and dry.   Neurological:      Mental Status: He is alert and oriented to person, place, and time.             Significant Labs: All pertinent labs within the past 24 hours have been reviewed.    Significant Imaging: I have reviewed all pertinent imaging results/findings within the past 24 hours.

## 2024-06-16 LAB
ALBUMIN SERPL BCP-MCNC: 3.3 G/DL (ref 3.5–5.2)
ALP SERPL-CCNC: 55 U/L (ref 55–135)
ALT SERPL W/O P-5'-P-CCNC: 17 U/L (ref 10–44)
ANION GAP SERPL CALC-SCNC: 8 MMOL/L (ref 8–16)
AST SERPL-CCNC: 18 U/L (ref 10–40)
BILIRUB SERPL-MCNC: 0.6 MG/DL (ref 0.1–1)
BUN SERPL-MCNC: 16 MG/DL (ref 8–23)
CALCIUM SERPL-MCNC: 8.4 MG/DL (ref 8.7–10.5)
CHLORIDE SERPL-SCNC: 110 MMOL/L (ref 95–110)
CO2 SERPL-SCNC: 22 MMOL/L (ref 23–29)
CREAT SERPL-MCNC: 1.4 MG/DL (ref 0.5–1.4)
EST. GFR  (NO RACE VARIABLE): 54.1 ML/MIN/1.73 M^2
GLUCOSE SERPL-MCNC: 125 MG/DL (ref 70–110)
POTASSIUM SERPL-SCNC: 4.2 MMOL/L (ref 3.5–5.1)
PROT SERPL-MCNC: 6.2 G/DL (ref 6–8.4)
SODIUM SERPL-SCNC: 140 MMOL/L (ref 136–145)

## 2024-06-16 PROCEDURE — 99232 SBSQ HOSP IP/OBS MODERATE 35: CPT | Mod: GC,,, | Performed by: STUDENT IN AN ORGANIZED HEALTH CARE EDUCATION/TRAINING PROGRAM

## 2024-06-16 PROCEDURE — 25000003 PHARM REV CODE 250: Performed by: STUDENT IN AN ORGANIZED HEALTH CARE EDUCATION/TRAINING PROGRAM

## 2024-06-16 PROCEDURE — 11000001 HC ACUTE MED/SURG PRIVATE ROOM

## 2024-06-16 PROCEDURE — 36415 COLL VENOUS BLD VENIPUNCTURE: CPT | Performed by: STUDENT IN AN ORGANIZED HEALTH CARE EDUCATION/TRAINING PROGRAM

## 2024-06-16 PROCEDURE — 80053 COMPREHEN METABOLIC PANEL: CPT | Performed by: STUDENT IN AN ORGANIZED HEALTH CARE EDUCATION/TRAINING PROGRAM

## 2024-06-16 RX ADMIN — CLOPIDOGREL BISULFATE 75 MG: 75 TABLET ORAL at 09:06

## 2024-06-16 RX ADMIN — CARVEDILOL 6.25 MG: 6.25 TABLET, FILM COATED ORAL at 09:06

## 2024-06-16 RX ADMIN — ISOSORBIDE MONONITRATE 30 MG: 30 TABLET, EXTENDED RELEASE ORAL at 09:06

## 2024-06-16 RX ADMIN — ACETAMINOPHEN 650 MG: 325 TABLET ORAL at 09:06

## 2024-06-16 RX ADMIN — ATORVASTATIN CALCIUM 40 MG: 40 TABLET, FILM COATED ORAL at 09:06

## 2024-06-16 RX ADMIN — TRAZODONE HYDROCHLORIDE 50 MG: 50 TABLET ORAL at 09:06

## 2024-06-16 RX ADMIN — APIXABAN 5 MG: 5 TABLET, FILM COATED ORAL at 09:06

## 2024-06-16 RX ADMIN — ASPIRIN 81 MG CHEWABLE TABLET 81 MG: 81 TABLET CHEWABLE at 09:06

## 2024-06-16 NOTE — PROGRESS NOTES
[facility] Medicine  Progress Note    Patient Name: Damir Faria  MRN: [id number]  Patient Class: IP- Inpatient   Admission Date: 2024  Length of Stay: 1 days  Attending Physician: Vitaliy Thomas*  Primary Care Provider: Tata Ziegler MD        Subjective:     Principal Problem:CARTER (acute kidney injury)        HPI:  Damir Faria is a 69 y.o. male with past medical history of CAD, HTN, HLD, cocaine abuse, MDD, who presents with SI. Patient states that he has been feeling more depressed due to thinking about two of his friends that . He has been thinking that he would walk in front of a car, which he was tried in the past but the car avoided him. He has recent ED visits in march and may also for suicidal thoughts. He denies shortness of breath, chest pain, abdominal pain, nausea / vomiting, fever. He reports tolerating PO ok. He reports regular cocaine use to help him cope with his feelings, the last of which was yesterday. He denies alcohol consumption. In the ED, patient was afebrile and hemodynamically stable. Labs with Cr 3.7, BUN 33, CO2 17, UDS positive for cocaine. Patient was admitted to hospital medicine for further evaluation and treatment.     Overview/Hospital Course:  No notes on file    Interval History: No acute events. Cr continues to improve  Denies shortness of breath    Review of Systems  Objective:     Vital Signs (Most Recent):  Temp: 98.4 °F (36.9 °C) (24 1156)  Pulse: 69 (24 1156)  Resp: 18 (24 1156)  BP: 123/77 (24 1156)  SpO2: 99 % (24 1156) Vital Signs (24h Range):  Temp:  [97.5 °F (36.4 °C)-98.7 °F (37.1 °C)] 98.4 °F (36.9 °C)  Pulse:  [65-76] 69  Resp:  [16-20] 18  SpO2:  [96 %-99 %] 99 %  BP: (121-127)/(66-80) 123/77     Weight: 81.6 kg (180 lb)  Body mass index is 24.41 kg/m².  No intake or output data in the 24 hours ending 24 9172      Physical Exam  Constitutional:       General: He is not in  acute distress.     Appearance: Normal appearance. He is ill-appearing. He is not toxic-appearing or diaphoretic.   Cardiovascular:      Rate and Rhythm: Normal rate and regular rhythm.      Heart sounds: No murmur heard.     No gallop.   Pulmonary:      Effort: Pulmonary effort is normal. No respiratory distress.      Breath sounds: No wheezing or rales.   Abdominal:      General: Abdomen is flat. There is no distension.      Palpations: Abdomen is soft.      Tenderness: There is no abdominal tenderness. There is no guarding or rebound.   Musculoskeletal:      Cervical back: Normal range of motion and neck supple.      Right lower leg: No edema.      Left lower leg: No edema.   Skin:     General: Skin is warm and dry.   Neurological:      Mental Status: He is alert and oriented to person, place, and time.             Significant Labs: All pertinent labs within the past 24 hours have been reviewed.    Significant Imaging: I have reviewed all pertinent imaging results/findings within the past 24 hours.    Assessment/Plan:      * CARTER (acute kidney injury)  Patient with acute kidney injury/acute renal failure likely due to pre-renal azotemia due to dehydration CARTER is currently worsening. Baseline creatinine  normal  - Labs reviewed- Renal function/electrolytes with Estimated Creatinine Clearance: 44.4 mL/min (A) (based on SCr of 1.7 mg/dL (H)). according to latest data. Monitor urine output and serial BMP and adjust therapy as needed. Avoid nephrotoxins and renally dose meds for GFR listed above.  Improving    History of venous thromboembolism (VTE)  Continue eliquis      Suicidal ideation  Reports dysphoria and SI with plan  Psych consulted  PEC in place      Coronary artery disease involving native coronary artery of native heart without angina pectoris  Continue statin, DAPT    Mixed hyperlipidemia  Continue statin      Essential hypertension  Chronic, controlled. Latest blood pressure and vitals reviewed-     Temp:   [97.9 °F (36.6 °C)-98.2 °F (36.8 °C)]   Pulse:  [72-92]   Resp:  [16-18]   BP: (120-126)/(61-81)   SpO2:  [97 %-98 %] .   Home meds for hypertension were reviewed and noted below.   Hypertension Medications               carvediloL (COREG) 6.25 MG tablet Take 1 tablet (6.25 mg total) by mouth 2 (two) times daily.    isosorbide mononitrate (IMDUR) 30 MG 24 hr tablet Take 30 mg by mouth once daily.    lisinopriL (PRINIVIL,ZESTRIL) 20 MG tablet Take 1 tablet (20 mg total) by mouth once daily.    nitroGLYCERIN (NITROSTAT) 0.4 MG SL tablet Place 1 tablet (0.4 mg total) under the tongue every 5 (five) minutes as needed for Chest pain.    spironolactone (ALDACTONE) 25 MG tablet Take 1 tablet (25 mg total) by mouth once daily.            While in the hospital, will manage blood pressure as follows; Adjust home antihypertensive regimen as follows- hold lisinopril and spironolactone for puja    Will utilize p.r.n. blood pressure medication only if patient's blood pressure greater than 180/110 and he develops symptoms such as worsening chest pain or shortness of breath.      VTE Risk Mitigation (From admission, onward)           Ordered     apixaban tablet 5 mg  2 times daily         06/14/24 1340     IP VTE HIGH RISK PATIENT  Once         06/14/24 0904     Place sequential compression device  Until discontinued         06/14/24 0904                    Discharge Planning   DANELLE:      Code Status: Full Code   Is the patient medically ready for discharge?:     Reason for patient still in hospital (select all that apply): Patient trending condition           Vitaliy Thomas MD  Department of Hospital Medicine   [facility] Surg

## 2024-06-16 NOTE — PROGRESS NOTES
"CONSULTATION LIAISON PSYCHIATRY PROGRESS NOTE    Patient Name: Damir Faria  MRN: [id number]  Patient Class: IP- Inpatient  Admission Date: 6/14/2024  Attending Physician: Vitaliy Thomas*      SUBJECTIVE:   Damir Faria is a 69 y.o. male with a past psychiatric history of MDD and cocaine use disorder and pertinent past medical history of CAD, STEMI, stroke who presented to the ED for SI and was admitted to the hospital for CARTER (acute kidney injury).     Psychiatry consulted for SI.    Today, patient resting in bed prior to interview.  Reports mood as good.  Reports sleeping well last night, and eating well..  Denies acute complaints or concerns.  Reports looking forward to rehab but is trying to get in touch with his girlfriend beforehand. He also endorsed he would not like to go to [facility].  Denies thoughts of self-harm or wanting to die. Denies SI/HI/AVH.       OBJECTIVE:    Mental Status Exam:  General Appearance: appears stated age, well developed and nourished, adequately groomed and appropriately dressed, in no acute distress  Behavior: cooperative, friendly, pleasant, under good behavioral control  Involuntary Movements and Motor Activity: no abnormal involuntary movements noted  Gait and Station: unable to assess - patient lying down or seated  Speech and Language: normal rate, rhythm, volume, tone, and pitch  Mood: "good"  Affect: constricted  Thought Process and Associations: linear, goal-directed  Thought Content and Perceptions:: no auditory or visual hallucinations, no paranoid ideation, no ideas of reference, no evidence of delusions or psychosis  Sensorium and Orientation: grossly intact  Recent and Remote Memory: intact  Attention and Concentration: intact  Fund of Knowledge: grossly intact, vocabulary appropriate  Insight: intact  Judgment: intact    CAM ICU positive? no      ASSESSMENT & RECOMMENDATIONS      Cocaine use disorder  Substance-induced depressive disorder  R/o " major depressive disorder        PSYCH MEDICATIONS  Scheduled:  Continue home trazodone 50 mg PO qhs.     RISK ASSESSMENT  Rescind PEC because pt is no longer  in imminent danger of hurting self or others and is gravely disabled.      FOLLOW-UP  Addiction psychiatry will sign off at this time      DISPOSITION - once medically cleared:   Recommend pt attend residential rehab program when medically cleared; patient requesting not to go to [facility] house if possible  Outpt psych and substance use resources will be provided in discharge instructions.    Please contact ON CALL psychiatry service (24/7) for any acute issues that may arise.    Dr. Suzanne Garcia MD PGY-II  CL Psychiatry  [facility]  6/16/2024 6:26 PM        --------------------------------------------------------------------------------------------------------------------------------------------------------------------------------------------------------------------------------------    CONTINUED OBJECTIVE clinical data & findings reviewed and noted for above decision making    Current Medications:   Scheduled Meds:    apixaban  5 mg Oral BID    aspirin  81 mg Oral Daily    atorvastatin  40 mg Oral QHS    carvediloL  6.25 mg Oral BID    clopidogreL  75 mg Oral Daily    isosorbide mononitrate  30 mg Oral Daily    traZODone  50 mg Oral QHS     PRN Meds:   Current Facility-Administered Medications:     acetaminophen, 650 mg, Oral, Q4H PRN    dextrose 10%, 12.5 g, Intravenous, PRN    dextrose 10%, 25 g, Intravenous, PRN    glucagon (human recombinant), 1 mg, Intramuscular, PRN    glucose, 16 g, Oral, PRN    glucose, 24 g, Oral, PRN    naloxone, 0.02 mg, Intravenous, PRN    ondansetron, 4 mg, Intravenous, Q8H PRN    sodium chloride 0.9%, 10 mL, Intravenous, Q12H PRN    Allergies:   Review of patient's allergies indicates:  No Known Allergies    Vitals  Vitals:    06/16/24 0803   BP: 127/80   Pulse: 65   Resp: 18   Temp: 98.4 °F (36.9 °C)        Labs/Imaging/Studies:  No results found for this or any previous visit (from the past 24 hour(s)).    Imaging Results    None

## 2024-06-16 NOTE — PLAN OF CARE
Patient understand the importance of getting continued mental care in inpatient facility after cleared medically.  Problem: Adult Inpatient Plan of Care  Goal: Plan of Care Review  Outcome: Progressing  Goal: Patient-Specific Goal (Individualized)  Outcome: Progressing  Goal: Absence of Hospital-Acquired Illness or Injury  Outcome: Progressing  Goal: Optimal Comfort and Wellbeing  Outcome: Progressing  Goal: Readiness for Transition of Care  Outcome: Progressing     Problem: Suicide Risk  Goal: Absence of Self-Harm  Outcome: Progressing     Problem: Diabetes Comorbidity  Goal: Blood Glucose Level Within Targeted Range  Outcome: Progressing     Problem: Acute Kidney Injury/Impairment  Goal: Fluid and Electrolyte Balance  Outcome: Progressing  Goal: Improved Oral Intake  Outcome: Progressing  Goal: Effective Renal Function  Outcome: Progressing

## 2024-06-16 NOTE — SUBJECTIVE & OBJECTIVE
Interval History: No acute events. Cr continues to improve  Denies shortness of breath    Review of Systems  Objective:     Vital Signs (Most Recent):  Temp: 98.4 °F (36.9 °C) (06/16/24 1156)  Pulse: 69 (06/16/24 1156)  Resp: 18 (06/16/24 1156)  BP: 123/77 (06/16/24 1156)  SpO2: 99 % (06/16/24 1156) Vital Signs (24h Range):  Temp:  [97.5 °F (36.4 °C)-98.7 °F (37.1 °C)] 98.4 °F (36.9 °C)  Pulse:  [65-76] 69  Resp:  [16-20] 18  SpO2:  [96 %-99 %] 99 %  BP: (121-127)/(66-80) 123/77     Weight: 81.6 kg (180 lb)  Body mass index is 24.41 kg/m².  No intake or output data in the 24 hours ending 06/16/24 1404      Physical Exam  Constitutional:       General: He is not in acute distress.     Appearance: Normal appearance. He is ill-appearing. He is not toxic-appearing or diaphoretic.   Cardiovascular:      Rate and Rhythm: Normal rate and regular rhythm.      Heart sounds: No murmur heard.     No gallop.   Pulmonary:      Effort: Pulmonary effort is normal. No respiratory distress.      Breath sounds: No wheezing or rales.   Abdominal:      General: Abdomen is flat. There is no distension.      Palpations: Abdomen is soft.      Tenderness: There is no abdominal tenderness. There is no guarding or rebound.   Musculoskeletal:      Cervical back: Normal range of motion and neck supple.      Right lower leg: No edema.      Left lower leg: No edema.   Skin:     General: Skin is warm and dry.   Neurological:      Mental Status: He is alert and oriented to person, place, and time.             Significant Labs: All pertinent labs within the past 24 hours have been reviewed.    Significant Imaging: I have reviewed all pertinent imaging results/findings within the past 24 hours.

## 2024-06-17 VITALS
HEIGHT: 72 IN | RESPIRATION RATE: 16 BRPM | HEART RATE: 75 BPM | WEIGHT: 180 LBS | SYSTOLIC BLOOD PRESSURE: 133 MMHG | DIASTOLIC BLOOD PRESSURE: 75 MMHG | OXYGEN SATURATION: 99 % | TEMPERATURE: 98 F | BODY MASS INDEX: 24.38 KG/M2

## 2024-06-17 PROCEDURE — 25000003 PHARM REV CODE 250: Performed by: STUDENT IN AN ORGANIZED HEALTH CARE EDUCATION/TRAINING PROGRAM

## 2024-06-17 RX ORDER — TRAZODONE HYDROCHLORIDE 50 MG/1
50 TABLET ORAL NIGHTLY
Status: ON HOLD | COMMUNITY

## 2024-06-17 RX ADMIN — ASPIRIN 81 MG CHEWABLE TABLET 81 MG: 81 TABLET CHEWABLE at 09:06

## 2024-06-17 RX ADMIN — CARVEDILOL 6.25 MG: 6.25 TABLET, FILM COATED ORAL at 09:06

## 2024-06-17 RX ADMIN — ISOSORBIDE MONONITRATE 30 MG: 30 TABLET, EXTENDED RELEASE ORAL at 09:06

## 2024-06-17 RX ADMIN — CLOPIDOGREL BISULFATE 75 MG: 75 TABLET ORAL at 09:06

## 2024-06-17 RX ADMIN — APIXABAN 5 MG: 5 TABLET, FILM COATED ORAL at 09:06

## 2024-06-17 NOTE — PLAN OF CARE
APPOINTMENT:    Patient Appointment(s) scheduled with SIERRA WILLARD Wednesday Jun 19, 2024 hospital follow up at 2:50 pm

## 2024-06-17 NOTE — PLAN OF CARE
[facility] - Med Surg  Discharge Final Note    Primary Care Provider: Tata Ziegler MD    Expected Discharge Date: 6/17/2024      Patient discharged home with no needs. Follow up appointment scheduled and placed in AVS. Discharge Plan A and Plan B have been determined by review of patient's clinical status, future medical and therapeutic needs, and coverage/benefits for post-acute care in coordination with multidisciplinary team members.  Final Discharge Note (most recent)       Final Note - 06/17/24 1546          Final Note    Assessment Type Final Discharge Note (P)      Anticipated Discharge Disposition Home or Self Care (P)      Hospital Resources/Appts/Education Provided Appointments scheduled and added to AVS (P)         Post-Acute Status    Coverage [employer] (P)      Discharge Delays None known at this time (P)                      Important Message from Medicare  Important Message from Medicare regarding Discharge Appeal Rights: Given to patient/caregiver, Explained to patient/caregiver, Signed/date by patient/caregiver     Date IMM was signed: 06/17/24  Time IMM was signed: 1417    Contact Info       Tata Ziegler MD   Specialty: Internal Medicine   Relationship: PCP - General    [place] [id number]   Phone: [phone]       Next Steps: Go on 6/19/2024    Instructions: hospital follow up at 2:50 pm            RACHEL Alfaro  Case Management  [phone]

## 2024-06-17 NOTE — NURSING
Pt. C/o chest wall pain that is reproducible with movement.  Pt. Reports he was told it was muscle. Denies N/V, pain radiation.  Tylenol administered.

## 2024-06-17 NOTE — PLAN OF CARE
Care provided to patient as per POC and as charted, Safety sitter remains at bedside.  No further c/o chest wall pain.  NO other events overnight.   Problem: Adult Inpatient Plan of Care  Goal: Plan of Care Review  Outcome: Progressing  Goal: Patient-Specific Goal (Individualized)  Outcome: Progressing  Goal: Absence of Hospital-Acquired Illness or Injury  Outcome: Progressing  Goal: Optimal Comfort and Wellbeing  Outcome: Progressing  Goal: Readiness for Transition of Care  Outcome: Progressing     Problem: Suicide Risk  Goal: Absence of Self-Harm  Outcome: Progressing     Problem: Diabetes Comorbidity  Goal: Blood Glucose Level Within Targeted Range  Outcome: Progressing     Problem: Acute Kidney Injury/Impairment  Goal: Fluid and Electrolyte Balance  Outcome: Progressing  Goal: Improved Oral Intake  Outcome: Progressing  Goal: Effective Renal Function  Outcome: Progressing

## 2024-06-17 NOTE — PLAN OF CARE
[facility] - Med Surg  Initial Discharge Assessment       Primary Care Provider: Tata Ziegler MD    Admission Diagnosis: Suicidal ideation [R45.851]  CARTER (acute kidney injury) [N17.9]  Chest pain [R07.9]    Admission Date: 6/14/2024  Expected Discharge Date: 6/17/2024    Transition of Care Barriers: (P) None    Payor: [employer] MANAGED MEDICARE / Plan: [employer] MEDICARE HMO / Product Type: Capitation /     Extended Emergency Contact Information  Primary Emergency Contact: Hilda Faria   [facility]  Home Phone: [phone]  Mobile Phone: [phone]  Relation: Sister  Preferred language: English  Secondary Emergency Contact: Elidia Normana  Home Phone: [phone]  Relation: Significant other  Preferred language: English   needed? No    Discharge Plan A: (P) Home with family  Discharge Plan B: (P) Home      [facility]  [address] [address]  Phone: [phone] Fax: [phone]    [id number] - [place], [place] - [address] AT [facility] [address]  [address]  [place] [address]  Phone: [phone] Fax: [phone]    [id number] [place], [place] - [address]  [address]  [place] [address]  Phone: [phone] Fax: [phone]    CM met with patient to discuss discharge planning. Patient lives with Autumn Norman (significant other) [phone]  in a single story home with no steps to enter. Prior to hospital admission patient was independent with ADLs and did not require medical equipment. CM assisted patient with finding an inpatient substance abuse facility and faxed clinical information over to [facility] in [place] ([phone]). [facility] was unable to admit patient today but will follow up with patient once beds are available. Discharge Plan A and Plan B have been determined by review of patient's clinical status, future  medical and therapeutic needs, and coverage/benefits for post-acute care in coordination with multidisciplinary team members.  Initial Assessment (most recent)       Adult Discharge Assessment - 06/17/24 1537          Discharge Assessment    Assessment Type Discharge Planning Assessment     Confirmed/corrected address, phone number and insurance Yes     Confirmed Demographics Correct on Facesheet     Source of Information patient     Does patient/caregiver understand observation status Yes     Communicated DANELLE with patient/caregiver Yes     Reason For Admission CARTER     People in Home significant other     Facility Arrived From: home     Do you expect to return to your current living situation? Yes     Do you have help at home or someone to help you manage your care at home? Yes     Who are your caregiver(s) and their phone number(s)? Autumn oNrman (significant other) [phone]     Prior to hospitilization cognitive status: Alert/Oriented     Current cognitive status: Alert/Oriented     Dressing/Bathing Difficulty no     Home Accessibility wheelchair accessible     Home Layout Able to live on 1st floor     Equipment Currently Used at Home none     Readmission within 30 days? No     Patient currently being followed by outpatient case management? No (P)      Do you currently have service(s) that help you manage your care at home? No (P)      Do you take prescription medications? Yes (P)      Do you have prescription coverage? Yes (P)      Coverage [employer] (P)      Do you have any problems affording any of your prescribed medications? No (P)      Is the patient taking medications as prescribed? yes (P)      Who is going to help you get home at discharge? Autumn Norman (significant other) [phone] (P)      How do you get to doctors appointments? public transportation (P)      Are you on dialysis? No (P)      Do you take coumadin? No (P)      Discharge Plan A Home with family (P)      Discharge Plan B  Home (P)      DME Needed Upon Discharge  none (P)      Discharge Plan discussed with: Patient (P)      Transition of Care Barriers None (P)         Physical Activity    On average, how many days per week do you engage in moderate to strenuous exercise (like a brisk walk)? 0 days (P)      On average, how many minutes do you engage in exercise at this level? 0 min (P)         Financial Resource Strain    How hard is it for you to pay for the very basics like food, housing, medical care, and heating? Not hard at all (P)         Housing Stability    In the last 12 months, was there a time when you were not able to pay the mortgage or rent on time? No (P)      At any time in the past 12 months, were you homeless or living in a shelter (including now)? No (P)         Transportation Needs    Has the lack of transportation kept you from medical appointments, meetings, work or from getting things needed for daily living? No (P)         Food Insecurity    Within the past 12 months, you worried that your food would run out before you got the money to buy more. Never true (P)      Within the past 12 months, the food you bought just didn't last and you didn't have money to get more. Never true (P)         Stress    Do you feel stress - tense, restless, nervous, or anxious, or unable to sleep at night because your mind is troubled all the time - these days? Rather much (P)         Social Isolation    How often do you feel lonely or isolated from those around you?  Rarely (P)         Alcohol Use    Q1: How often do you have a drink containing alcohol? Monthly or less (P)      Q2: How many drinks containing alcohol do you have on a typical day when you are drinking? 5 or 6 (P)      Q3: How often do you have six or more drinks on one occasion? Less than monthly (P)         Health Literacy    How often do you need to have someone help you when you read instructions, pamphlets, or other written material from your doctor or pharmacy?  Sometimes (P)         OTHER    Name(s) of People in Home Autumn Norman (significant other) [phone] (P)                         Latisha Ceja RNCM  Case Management  [phone]

## 2024-06-20 ENCOUNTER — HOSPITAL ENCOUNTER (EMERGENCY)
Facility: HOSPITAL | Age: 69
Discharge: PSYCHIATRIC OR SUBSTANCE USE FACILITY | End: 2024-06-20
Attending: EMERGENCY MEDICINE
Payer: MEDICARE

## 2024-06-20 VITALS
DIASTOLIC BLOOD PRESSURE: 76 MMHG | TEMPERATURE: 98 F | OXYGEN SATURATION: 97 % | RESPIRATION RATE: 16 BRPM | BODY MASS INDEX: 24.41 KG/M2 | SYSTOLIC BLOOD PRESSURE: 118 MMHG | HEART RATE: 80 BPM | WEIGHT: 180 LBS

## 2024-06-20 DIAGNOSIS — F32.A DEPRESSION, UNSPECIFIED DEPRESSION TYPE: Primary | ICD-10-CM

## 2024-06-20 LAB
ALBUMIN SERPL BCP-MCNC: 3.6 G/DL (ref 3.5–5.2)
ALP SERPL-CCNC: 66 U/L (ref 55–135)
ALT SERPL W/O P-5'-P-CCNC: 36 U/L (ref 10–44)
AMPHET+METHAMPHET UR QL: NEGATIVE
ANION GAP SERPL CALC-SCNC: 8 MMOL/L (ref 8–16)
APAP SERPL-MCNC: <3 UG/ML (ref 10–20)
AST SERPL-CCNC: 35 U/L (ref 10–40)
BARBITURATES UR QL SCN>200 NG/ML: NEGATIVE
BASOPHILS # BLD AUTO: 0.05 K/UL (ref 0–0.2)
BASOPHILS NFR BLD: 1.1 % (ref 0–1.9)
BENZODIAZ UR QL SCN>200 NG/ML: NEGATIVE
BILIRUB SERPL-MCNC: 0.4 MG/DL (ref 0.1–1)
BILIRUB UR QL STRIP: NEGATIVE
BUN SERPL-MCNC: 18 MG/DL (ref 8–23)
BZE UR QL SCN: ABNORMAL
CALCIUM SERPL-MCNC: 9.1 MG/DL (ref 8.7–10.5)
CANNABINOIDS UR QL SCN: NEGATIVE
CHLORIDE SERPL-SCNC: 109 MMOL/L (ref 95–110)
CLARITY UR REFRACT.AUTO: CLEAR
CO2 SERPL-SCNC: 23 MMOL/L (ref 23–29)
COLOR UR AUTO: YELLOW
CREAT SERPL-MCNC: 1.4 MG/DL (ref 0.5–1.4)
CREAT UR-MCNC: 286 MG/DL (ref 23–375)
DIFFERENTIAL METHOD BLD: ABNORMAL
EOSINOPHIL # BLD AUTO: 0.2 K/UL (ref 0–0.5)
EOSINOPHIL NFR BLD: 4.8 % (ref 0–8)
ERYTHROCYTE [DISTWIDTH] IN BLOOD BY AUTOMATED COUNT: 12.9 % (ref 11.5–14.5)
EST. GFR  (NO RACE VARIABLE): 54.1 ML/MIN/1.73 M^2
ETHANOL SERPL-MCNC: <10 MG/DL
GLUCOSE SERPL-MCNC: 92 MG/DL (ref 70–110)
GLUCOSE UR QL STRIP: NEGATIVE
HCT VFR BLD AUTO: 43.4 % (ref 40–54)
HCV AB SERPL QL IA: NORMAL
HGB BLD-MCNC: 15 G/DL (ref 14–18)
HGB UR QL STRIP: NEGATIVE
HIV 1+2 AB+HIV1 P24 AG SERPL QL IA: NORMAL
IMM GRANULOCYTES # BLD AUTO: 0.03 K/UL (ref 0–0.04)
IMM GRANULOCYTES NFR BLD AUTO: 0.7 % (ref 0–0.5)
KETONES UR QL STRIP: NEGATIVE
LEUKOCYTE ESTERASE UR QL STRIP: NEGATIVE
LYMPHOCYTES # BLD AUTO: 1.9 K/UL (ref 1–4.8)
LYMPHOCYTES NFR BLD: 40.1 % (ref 18–48)
MCH RBC QN AUTO: 33 PG (ref 27–31)
MCHC RBC AUTO-ENTMCNC: 34.6 G/DL (ref 32–36)
MCV RBC AUTO: 95 FL (ref 82–98)
METHADONE UR QL SCN>300 NG/ML: NEGATIVE
MONOCYTES # BLD AUTO: 0.7 K/UL (ref 0.3–1)
MONOCYTES NFR BLD: 15.2 % (ref 4–15)
NEUTROPHILS # BLD AUTO: 1.8 K/UL (ref 1.8–7.7)
NEUTROPHILS NFR BLD: 38.1 % (ref 38–73)
NITRITE UR QL STRIP: NEGATIVE
NRBC BLD-RTO: 0 /100 WBC
OPIATES UR QL SCN: NEGATIVE
PCP UR QL SCN>25 NG/ML: NEGATIVE
PH UR STRIP: 6 [PH] (ref 5–8)
PLATELET # BLD AUTO: 223 K/UL (ref 150–450)
PMV BLD AUTO: 8.6 FL (ref 9.2–12.9)
POTASSIUM SERPL-SCNC: 4.4 MMOL/L (ref 3.5–5.1)
PROT SERPL-MCNC: 7.2 G/DL (ref 6–8.4)
PROT UR QL STRIP: ABNORMAL
RBC # BLD AUTO: 4.55 M/UL (ref 4.6–6.2)
SODIUM SERPL-SCNC: 140 MMOL/L (ref 136–145)
SP GR UR STRIP: 1.02 (ref 1–1.03)
T4 FREE SERPL-MCNC: 0.92 NG/DL (ref 0.71–1.51)
TOXICOLOGY INFORMATION: ABNORMAL
TSH SERPL DL<=0.005 MIU/L-ACNC: 0.06 UIU/ML (ref 0.4–4)
URN SPEC COLLECT METH UR: ABNORMAL
WBC # BLD AUTO: 4.61 K/UL (ref 3.9–12.7)

## 2024-06-20 PROCEDURE — 84439 ASSAY OF FREE THYROXINE: CPT

## 2024-06-20 PROCEDURE — 85025 COMPLETE CBC W/AUTO DIFF WBC: CPT

## 2024-06-20 PROCEDURE — 87389 HIV-1 AG W/HIV-1&-2 AB AG IA: CPT | Performed by: PHYSICIAN ASSISTANT

## 2024-06-20 PROCEDURE — 81003 URINALYSIS AUTO W/O SCOPE: CPT | Mod: 59

## 2024-06-20 PROCEDURE — 84443 ASSAY THYROID STIM HORMONE: CPT

## 2024-06-20 PROCEDURE — 80307 DRUG TEST PRSMV CHEM ANLYZR: CPT

## 2024-06-20 PROCEDURE — 86803 HEPATITIS C AB TEST: CPT | Performed by: PHYSICIAN ASSISTANT

## 2024-06-20 PROCEDURE — 82077 ASSAY SPEC XCP UR&BREATH IA: CPT

## 2024-06-20 PROCEDURE — 99285 EMERGENCY DEPT VISIT HI MDM: CPT

## 2024-06-20 PROCEDURE — 80053 COMPREHEN METABOLIC PANEL: CPT

## 2024-06-20 PROCEDURE — 80143 DRUG ASSAY ACETAMINOPHEN: CPT

## 2024-06-20 NOTE — PROVIDER PROGRESS NOTES - EMERGENCY DEPT.
Encounter Date: 6/20/2024    ED Physician Progress Notes        Patient is a 69 y.o. male with a PMH of cocaine abuse, depression, CVA, acute renal insufficiency, STEMI presents to [facility] for evaluation of suicidal ideation.  Patient reported a fight with his significant other in which he felt like harming her.  Due to this, he was concerned and called EMS.  Labs ordered and psychiatry consulted.    Patient received at sign-out pending psychiatry recommendations.  They recommended continuing PEC.  I went to talk with the patient.  He confirms that he has no longer feeling like harming a significant other, but does endorse that he was at that time.  He also reported a moment where he felt like hurting himself.  Patient denies hallucinations.  Does endorse cocaine use and hopes to seek rehab, last use 4-5 days ago.  PEC form completed.  Patient medically cleared for transport.
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
## 2024-06-20 NOTE — DISCHARGE INSTRUCTIONS
Thank you for coming to [facility].  It was a pleasure taking care of you today.  Please follow-up as soon as possible with your primary care physician in psychiatry within 3 days.  Please report to the rehab facility as you indicated you would like to receive help with attaining and maintaining sobriety.  Please return to the nearest emergency department if you develop any new or worsening symptoms.

## 2024-06-20 NOTE — ED NOTES
Assumed care of patient. Pt. Roomed in 27. Pt placed in paper scrubs and resting in stretcher comfortably. No signs of distress noted. Sitter remains at bedside in direct visual contact, charting per protocol every 15 minutes. No equipment or belongings are in the patients room. Pt aware of plan of care. Will continue to monitor.  Bed placed in low locked position, side rails up x2, call bell is within reach. Warm blanket and pillow provided to patient. Will continue to monitor.
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
## 2024-06-20 NOTE — ED NOTES
Pt escorted off of the unit on a stretcher by ED and security staff. Pt's PEC, CEC, transfer form, and all belongings [facility]. Pt remained calm and cooperative for the transfer.

## 2024-06-20 NOTE — ED NOTES
LOC/ APPEARANCE: The patient is AAOx4. Pt is speaking appropriately, no slurred speech. Pt reports pain level of 0. Pt updated on POC. Bed low and locked with 1:1 sitter at bedside.   SKIN: Skin is warm dry and intact, and color is consistent with ethnicity.   RESPIRATORY: Airway is open and patent. Respirations-spontaneous, unlabored, regular rate, equal bilaterally on inspiration and expiration. No accessory muscle use noted.   CARDIAC: Patient has regular heart rate.  No peripheral edema noted, and patient has no c/o chest pain. Peripheral pulses present equal and strong throughout.  ABDOMEN: Soft and non-tender to palpation with no distention noted. Pt has no complaints of abnormal bowel movements, denies blood in stool. Pt reports normal appetite.   NEUROLOGIC: Eyes open spontaneously and facial expression symmetrical. Pt behavior appropriate to situation, and pt follows commands. Pt reports sensation present in all extremities when touched with a finger, denies any numbness or tingling. PERRLA  MUSCULOSKELETAL: Spontaneous movement noted to all extremities. Hand  equal and leg strength strong +5 bilaterally.   : No complaints of frequency, burning, urgency or blood in the urine. No complaints of incontinence.
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
## 2024-06-20 NOTE — ED NOTES
Received report and assumed care of pt at this time.     Pt remains in paper scrubs, resting comfortably in stretcher. NAD. Pt aware of plan of care. Pt belongings are removed from room, labeled, and locked away. No unnecessary equipment, cords, or wires left in room. Sitter at bedside recording Q 15 minute checks. Sitter belongings are out of room.

## 2024-06-24 PROBLEM — F33.2 SEVERE EPISODE OF RECURRENT MAJOR DEPRESSIVE DISORDER, WITHOUT PSYCHOTIC FEATURES: Status: ACTIVE | Noted: 2024-06-24

## 2024-07-11 ENCOUNTER — HOSPITAL ENCOUNTER (EMERGENCY)
Facility: HOSPITAL | Age: 69
Discharge: HOME | End: 2024-07-11
Attending: EMERGENCY MEDICINE
Payer: MEDICARE

## 2024-07-11 VITALS
WEIGHT: 182 LBS | SYSTOLIC BLOOD PRESSURE: 144 MMHG | HEIGHT: 71 IN | HEART RATE: 80 BPM | RESPIRATION RATE: 16 BRPM | BODY MASS INDEX: 25.48 KG/M2 | TEMPERATURE: 98 F | OXYGEN SATURATION: 98 % | DIASTOLIC BLOOD PRESSURE: 68 MMHG

## 2024-07-11 DIAGNOSIS — R53.1 GENERALIZED WEAKNESS: ICD-10-CM

## 2024-07-11 DIAGNOSIS — N17.9 AKI (ACUTE KIDNEY INJURY): Primary | ICD-10-CM

## 2024-07-11 DIAGNOSIS — R53.1 WEAKNESS: ICD-10-CM

## 2024-07-11 DIAGNOSIS — F43.21 GRIEF REACTION: ICD-10-CM

## 2024-07-11 LAB
ALBUMIN SERPL BCP-MCNC: 4.2 G/DL (ref 3.5–5.2)
ALP SERPL-CCNC: 74 U/L (ref 55–135)
ALT SERPL W/O P-5'-P-CCNC: 24 U/L (ref 10–44)
ANION GAP SERPL CALC-SCNC: 6 MMOL/L (ref 8–16)
AST SERPL-CCNC: 35 U/L (ref 10–40)
BASOPHILS # BLD AUTO: 0.05 K/UL (ref 0–0.2)
BASOPHILS NFR BLD: 0.9 % (ref 0–1.9)
BILIRUB SERPL-MCNC: 1 MG/DL (ref 0.1–1)
BILIRUB UR QL STRIP: NEGATIVE
BUN SERPL-MCNC: 28 MG/DL (ref 8–23)
CALCIUM SERPL-MCNC: 9.9 MG/DL (ref 8.7–10.5)
CHLORIDE SERPL-SCNC: 109 MMOL/L (ref 95–110)
CLARITY UR REFRACT.AUTO: CLEAR
CO2 SERPL-SCNC: 25 MMOL/L (ref 23–29)
COLOR UR AUTO: YELLOW
CREAT SERPL-MCNC: 1.7 MG/DL (ref 0.5–1.4)
DIFFERENTIAL METHOD BLD: ABNORMAL
EOSINOPHIL # BLD AUTO: 0.2 K/UL (ref 0–0.5)
EOSINOPHIL NFR BLD: 4.4 % (ref 0–8)
ERYTHROCYTE [DISTWIDTH] IN BLOOD BY AUTOMATED COUNT: 13.4 % (ref 11.5–14.5)
EST. GFR  (NO RACE VARIABLE): 42.8 ML/MIN/1.73 M^2
GLUCOSE SERPL-MCNC: 87 MG/DL (ref 70–110)
GLUCOSE UR QL STRIP: ABNORMAL
HCT VFR BLD AUTO: 46.2 % (ref 40–54)
HGB BLD-MCNC: 15.4 G/DL (ref 14–18)
HGB UR QL STRIP: ABNORMAL
IMM GRANULOCYTES # BLD AUTO: 0.02 K/UL (ref 0–0.04)
IMM GRANULOCYTES NFR BLD AUTO: 0.4 % (ref 0–0.5)
KETONES UR QL STRIP: NEGATIVE
LEUKOCYTE ESTERASE UR QL STRIP: NEGATIVE
LYMPHOCYTES # BLD AUTO: 1.9 K/UL (ref 1–4.8)
LYMPHOCYTES NFR BLD: 35.1 % (ref 18–48)
MAGNESIUM SERPL-MCNC: 2.2 MG/DL (ref 1.6–2.6)
MCH RBC QN AUTO: 32.4 PG (ref 27–31)
MCHC RBC AUTO-ENTMCNC: 33.3 G/DL (ref 32–36)
MCV RBC AUTO: 97 FL (ref 82–98)
MONOCYTES # BLD AUTO: 0.7 K/UL (ref 0.3–1)
MONOCYTES NFR BLD: 12.7 % (ref 4–15)
NEUTROPHILS # BLD AUTO: 2.5 K/UL (ref 1.8–7.7)
NEUTROPHILS NFR BLD: 46.5 % (ref 38–73)
NITRITE UR QL STRIP: NEGATIVE
NRBC BLD-RTO: 0 /100 WBC
OHS QRS DURATION: 72 MS
OHS QTC CALCULATION: 428 MS
PH UR STRIP: 6 [PH] (ref 5–8)
PLATELET # BLD AUTO: 262 K/UL (ref 150–450)
PMV BLD AUTO: 9.2 FL (ref 9.2–12.9)
POTASSIUM SERPL-SCNC: 4.7 MMOL/L (ref 3.5–5.1)
PROT SERPL-MCNC: 8 G/DL (ref 6–8.4)
PROT UR QL STRIP: ABNORMAL
RBC # BLD AUTO: 4.76 M/UL (ref 4.6–6.2)
SODIUM SERPL-SCNC: 140 MMOL/L (ref 136–145)
SP GR UR STRIP: 1.03 (ref 1–1.03)
TROPONIN I SERPL DL<=0.01 NG/ML-MCNC: 0.01 NG/ML (ref 0–0.03)
URN SPEC COLLECT METH UR: ABNORMAL
WBC # BLD AUTO: 5.27 K/UL (ref 3.9–12.7)

## 2024-07-11 PROCEDURE — 99285 EMERGENCY DEPT VISIT HI MDM: CPT | Mod: 25

## 2024-07-11 PROCEDURE — 85025 COMPLETE CBC W/AUTO DIFF WBC: CPT

## 2024-07-11 PROCEDURE — 80053 COMPREHEN METABOLIC PANEL: CPT

## 2024-07-11 PROCEDURE — 25000003 PHARM REV CODE 250

## 2024-07-11 PROCEDURE — 93010 ELECTROCARDIOGRAM REPORT: CPT | Mod: ,,, | Performed by: INTERNAL MEDICINE

## 2024-07-11 PROCEDURE — 84484 ASSAY OF TROPONIN QUANT: CPT

## 2024-07-11 PROCEDURE — 81003 URINALYSIS AUTO W/O SCOPE: CPT

## 2024-07-11 PROCEDURE — 93005 ELECTROCARDIOGRAM TRACING: CPT

## 2024-07-11 PROCEDURE — 83735 ASSAY OF MAGNESIUM: CPT

## 2024-07-11 RX ADMIN — SODIUM CHLORIDE 500 ML: 9 INJECTION, SOLUTION INTRAVENOUS at 04:07

## 2024-07-11 NOTE — PROVIDER PROGRESS NOTES - EMERGENCY DEPT.
Encounter Date: 7/11/2024    ED Physician Progress Notes        Physician Note:   Case was signed out to me by Melissa Child PA-C at shift change.     Briefly, patient is a 69-year-old male presenting with generalized weakness and fatigue.  Felt like he was going to pass out without true syncopal episode.  Recent death of family member 3 days ago.    Lab work essentially unremarkable.  Normal white count of 5.2.  Hemoglobin stable.  Cardiac workup essentially unremarkable.  Chemistries reveal mild CARTER.  Patient given IV fluids.  EF of 40%  At time of sign-out patient pending chest x-ray and UA    X-ray of chest unremarkable.  No evidence of acute pathology.  No infectious process.  Urinalysis does not show evidence of infection or blood.  On reassessment patient is resting comfortably.  Patient does admit to cocaine use about 2 or 3 days ago.  Suspect some of his symptoms are secondary to grief.  Advised to stay hydrated at home.  Advised to refrain from substance abuse.  He is otherwise stable for discharge and close follow-up at this time.

## 2024-07-11 NOTE — ED PROVIDER NOTES
Encounter Date: 2024       History     Chief Complaint   Patient presents with    Weakness     Generalized weakness, feeling like he is going to pass out     69-year-old male with medical history of polysubstance, hypertension, MI and CVA presents emergency department due to generalized weakness and feeling lightheaded today.  Patient reports that he was getting on a [employer] when he experienced symptoms therefore got off the bus in presented to the ED. he reports grief as his wife  a few days ago due to cancer.  Patient reports he was not been feeling sick.  He was not experienced recent fevers cough abdominal pain or changes in bowel movement.  No urinary symptoms.  No chest pain.      Review of patient's allergies indicates:  No Known Allergies  Past Medical History:   Diagnosis Date    Acute renal insufficiency 2015    Cocaine abuse     Depression     History of psychiatric care     HTN (hypertension) 2014    STEMI (ST elevation myocardial infarction) 2017    Stroke     Tobacco dependence due to cigarettes 2016     Past Surgical History:   Procedure Laterality Date    CARDIAC SURGERY      stents    NECK SURGERY       Family History   Problem Relation Name Age of Onset    Diabetes Mother      Heart disease Mother      Hypertension Mother       Social History     Tobacco Use    Smoking status: Some Days     Current packs/day: 0.25     Types: Cigarettes    Smokeless tobacco: Never   Substance Use Topics    Alcohol use: Not Currently     Comment: social    Drug use: Not Currently     Types: Cocaine     Comment: this weekend     Review of Systems  See HPI  Physical Exam     Initial Vitals [24 1427]   BP Pulse Resp Temp SpO2   (!) 157/69 86 16 98.8 °F (37.1 °C) 100 %      MAP       --         Physical Exam    Vitals reviewed.  Constitutional: He appears well-developed.   HENT:   Head: Normocephalic and atraumatic.   Eyes: Conjunctivae and EOM are normal.   Neck:   Normal range of  motion.  Cardiovascular:  Normal rate.           Pulmonary/Chest: No respiratory distress.   Abdominal: Abdomen is soft. He exhibits no distension. There is no abdominal tenderness. There is no rebound.   Musculoskeletal:         General: Normal range of motion.      Cervical back: Normal range of motion.     Neurological: He is alert and oriented to person, place, and time. He has normal strength. No cranial nerve deficit.   Patient was ambulatory without difficulty no abnormal gait   Skin: Skin is warm and dry.   Psychiatric: He has a normal mood and affect. Thought content normal.         ED Course   Procedures  Labs Reviewed   URINALYSIS, REFLEX TO URINE CULTURE   CBC W/ AUTO DIFFERENTIAL   COMPREHENSIVE METABOLIC PANEL   MAGNESIUM   TROPONIN I          Imaging Results    None          Medications - No data to display  Medical Decision Making  69-year-old male presents emergency department with complaints of generalized weakness periods differentials includes infection., electrolyte derangement, CARTER could be secondary due to decreased p.o. intake. , grief response.  Normal neuro exam.  Do not suspect this has cardiac unless significant elevation of troponin.  Patient without psychiatric features during my physical exam.  Patient signed out to oncangel WHITE.    Amount and/or Complexity of Data Reviewed  Labs: ordered.  Radiology: ordered.  ECG/medicine tests: ordered.     Details: Normal sinus 72 beats per minute                                      Clinical Impression:  Final diagnoses:  [R53.1] Generalized weakness  [R53.1] Weakness                 Melissa Child PA-C  07/11/24 [id number]

## 2024-07-11 NOTE — DISCHARGE INSTRUCTIONS
Please stay hydrated at home.  Patient you are eating and drinking well home.  Follow up with the primary doctor couple days.  Return to the ER with concerning or worsening symptoms

## 2024-07-11 NOTE — ED TRIAGE NOTES
Damir Faria, a 69 y.o. male presents to the ED w/ complaint of weakness and feeling of passing out, started this morning    Triage note:  Chief Complaint   Patient presents with    Weakness     Generalized weakness, feeling like he is going to pass out     Review of patient's allergies indicates:  No Known Allergies  Past Medical History:   Diagnosis Date    Acute renal insufficiency 6/21/2015    Cocaine abuse     Depression     History of psychiatric care     HTN (hypertension) 1/25/2014    STEMI (ST elevation myocardial infarction) 2/12/2017    Stroke     Tobacco dependence due to cigarettes 5/25/2016         APPEARANCE: awake and alert in NAD. PAIN  0/10  SKIN: warm, dry and intact. No breakdown or bruising.  MUSCULOSKELETAL: Patient moving all extremities spontaneously, no obvious swelling or deformities noted. Ambulates independently.  RESPIRATORY: endorses shortness of breath.Respirations unlabored.   CARDIAC: Denies CP, 2+ distal pulses; no peripheral edema  ABDOMEN: S/ND/NT, endorses nausea  : voids spontaneously, denies difficulty  Neurologic: AAO x 4; follows commands equal strength in all extremities; denies numbness/tingling. endorses dizziness

## 2024-07-13 ENCOUNTER — HOSPITAL ENCOUNTER (EMERGENCY)
Facility: HOSPITAL | Age: 69
Discharge: PSYCHIATRIC OR SUBSTANCE USE FACILITY | End: 2024-07-13
Attending: STUDENT IN AN ORGANIZED HEALTH CARE EDUCATION/TRAINING PROGRAM
Payer: MEDICARE

## 2024-07-13 VITALS
HEART RATE: 90 BPM | HEIGHT: 71 IN | DIASTOLIC BLOOD PRESSURE: 87 MMHG | WEIGHT: 182 LBS | OXYGEN SATURATION: 99 % | SYSTOLIC BLOOD PRESSURE: 131 MMHG | TEMPERATURE: 98 F | RESPIRATION RATE: 20 BRPM | BODY MASS INDEX: 25.48 KG/M2

## 2024-07-13 DIAGNOSIS — F14.10 COCAINE ABUSE: ICD-10-CM

## 2024-07-13 DIAGNOSIS — R45.851 DEPRESSION WITH SUICIDAL IDEATION: Primary | ICD-10-CM

## 2024-07-13 DIAGNOSIS — F32.A DEPRESSION WITH SUICIDAL IDEATION: Primary | ICD-10-CM

## 2024-07-13 DIAGNOSIS — I10 PRIMARY HYPERTENSION: ICD-10-CM

## 2024-07-13 DIAGNOSIS — Z79.01 ANTICOAGULANT LONG-TERM USE: ICD-10-CM

## 2024-07-13 DIAGNOSIS — Z86.79 HISTORY OF CAD (CORONARY ARTERY DISEASE): ICD-10-CM

## 2024-07-13 DIAGNOSIS — F43.20 ABNORMAL GRIEF REACTION: ICD-10-CM

## 2024-07-13 LAB
ALBUMIN SERPL BCP-MCNC: 3.7 G/DL (ref 3.5–5.2)
ALP SERPL-CCNC: 62 U/L (ref 55–135)
ALT SERPL W/O P-5'-P-CCNC: 22 U/L (ref 10–44)
AMPHET+METHAMPHET UR QL: NEGATIVE
ANION GAP SERPL CALC-SCNC: 10 MMOL/L (ref 8–16)
APAP SERPL-MCNC: <3 UG/ML (ref 10–20)
AST SERPL-CCNC: 26 U/L (ref 10–40)
BACTERIA #/AREA URNS AUTO: NORMAL /HPF
BARBITURATES UR QL SCN>200 NG/ML: NEGATIVE
BASOPHILS # BLD AUTO: 0.05 K/UL (ref 0–0.2)
BASOPHILS NFR BLD: 1 % (ref 0–1.9)
BENZODIAZ UR QL SCN>200 NG/ML: NEGATIVE
BILIRUB SERPL-MCNC: 0.5 MG/DL (ref 0.1–1)
BILIRUB UR QL STRIP: NEGATIVE
BUN SERPL-MCNC: 18 MG/DL (ref 8–23)
BZE UR QL SCN: ABNORMAL
CALCIUM SERPL-MCNC: 9 MG/DL (ref 8.7–10.5)
CANNABINOIDS UR QL SCN: NEGATIVE
CHLORIDE SERPL-SCNC: 110 MMOL/L (ref 95–110)
CLARITY UR REFRACT.AUTO: CLEAR
CO2 SERPL-SCNC: 21 MMOL/L (ref 23–29)
COLOR UR AUTO: YELLOW
CREAT SERPL-MCNC: 1.4 MG/DL (ref 0.5–1.4)
CREAT UR-MCNC: 205 MG/DL (ref 23–375)
DIFFERENTIAL METHOD BLD: ABNORMAL
EOSINOPHIL # BLD AUTO: 0.3 K/UL (ref 0–0.5)
EOSINOPHIL NFR BLD: 5.6 % (ref 0–8)
ERYTHROCYTE [DISTWIDTH] IN BLOOD BY AUTOMATED COUNT: 13.2 % (ref 11.5–14.5)
EST. GFR  (NO RACE VARIABLE): 54.1 ML/MIN/1.73 M^2
ETHANOL SERPL-MCNC: <10 MG/DL
GLUCOSE SERPL-MCNC: 80 MG/DL (ref 70–110)
GLUCOSE UR QL STRIP: ABNORMAL
HCT VFR BLD AUTO: 42.9 % (ref 40–54)
HGB BLD-MCNC: 13.9 G/DL (ref 14–18)
HGB UR QL STRIP: NEGATIVE
HYALINE CASTS UR QL AUTO: 1 /LPF
IMM GRANULOCYTES # BLD AUTO: 0.03 K/UL (ref 0–0.04)
IMM GRANULOCYTES NFR BLD AUTO: 0.6 % (ref 0–0.5)
KETONES UR QL STRIP: NEGATIVE
LEUKOCYTE ESTERASE UR QL STRIP: NEGATIVE
LYMPHOCYTES # BLD AUTO: 1.7 K/UL (ref 1–4.8)
LYMPHOCYTES NFR BLD: 34.8 % (ref 18–48)
MCH RBC QN AUTO: 31.6 PG (ref 27–31)
MCHC RBC AUTO-ENTMCNC: 32.4 G/DL (ref 32–36)
MCV RBC AUTO: 98 FL (ref 82–98)
METHADONE UR QL SCN>300 NG/ML: NEGATIVE
MICROSCOPIC COMMENT: NORMAL
MONOCYTES # BLD AUTO: 0.6 K/UL (ref 0.3–1)
MONOCYTES NFR BLD: 11.6 % (ref 4–15)
NEUTROPHILS # BLD AUTO: 2.3 K/UL (ref 1.8–7.7)
NEUTROPHILS NFR BLD: 46.4 % (ref 38–73)
NITRITE UR QL STRIP: NEGATIVE
NRBC BLD-RTO: 0 /100 WBC
OPIATES UR QL SCN: NEGATIVE
PCP UR QL SCN>25 NG/ML: NEGATIVE
PH UR STRIP: 6 [PH] (ref 5–8)
PLATELET # BLD AUTO: 235 K/UL (ref 150–450)
PMV BLD AUTO: 9 FL (ref 9.2–12.9)
POTASSIUM SERPL-SCNC: 4.3 MMOL/L (ref 3.5–5.1)
PROT SERPL-MCNC: 7 G/DL (ref 6–8.4)
PROT UR QL STRIP: NEGATIVE
RBC # BLD AUTO: 4.4 M/UL (ref 4.6–6.2)
RBC #/AREA URNS AUTO: 1 /HPF (ref 0–4)
SODIUM SERPL-SCNC: 141 MMOL/L (ref 136–145)
SP GR UR STRIP: 1.03 (ref 1–1.03)
SQUAMOUS #/AREA URNS AUTO: 0 /HPF
TOXICOLOGY INFORMATION: ABNORMAL
TSH SERPL DL<=0.005 MIU/L-ACNC: 0.51 UIU/ML (ref 0.4–4)
URN SPEC COLLECT METH UR: ABNORMAL
WBC # BLD AUTO: 5 K/UL (ref 3.9–12.7)
WBC #/AREA URNS AUTO: 1 /HPF (ref 0–5)
YEAST UR QL AUTO: NORMAL

## 2024-07-13 PROCEDURE — 25000003 PHARM REV CODE 250: Performed by: PHYSICIAN ASSISTANT

## 2024-07-13 PROCEDURE — 82077 ASSAY SPEC XCP UR&BREATH IA: CPT | Performed by: PHYSICIAN ASSISTANT

## 2024-07-13 PROCEDURE — 99285 EMERGENCY DEPT VISIT HI MDM: CPT

## 2024-07-13 PROCEDURE — 80307 DRUG TEST PRSMV CHEM ANLYZR: CPT | Performed by: PHYSICIAN ASSISTANT

## 2024-07-13 PROCEDURE — 85025 COMPLETE CBC W/AUTO DIFF WBC: CPT | Performed by: PHYSICIAN ASSISTANT

## 2024-07-13 PROCEDURE — 80143 DRUG ASSAY ACETAMINOPHEN: CPT | Performed by: PHYSICIAN ASSISTANT

## 2024-07-13 PROCEDURE — 84443 ASSAY THYROID STIM HORMONE: CPT | Performed by: PHYSICIAN ASSISTANT

## 2024-07-13 PROCEDURE — 80053 COMPREHEN METABOLIC PANEL: CPT | Performed by: PHYSICIAN ASSISTANT

## 2024-07-13 PROCEDURE — 81001 URINALYSIS AUTO W/SCOPE: CPT | Mod: XB | Performed by: PHYSICIAN ASSISTANT

## 2024-07-13 RX ORDER — CARVEDILOL 3.12 MG/1
6.25 TABLET ORAL
Status: COMPLETED | OUTPATIENT
Start: 2024-07-13 | End: 2024-07-13

## 2024-07-13 RX ORDER — CLOPIDOGREL BISULFATE 75 MG/1
75 TABLET ORAL DAILY
Status: DISCONTINUED | OUTPATIENT
Start: 2024-07-13 | End: 2024-07-13 | Stop reason: HOSPADM

## 2024-07-13 RX ORDER — LISINOPRIL 20 MG/1
20 TABLET ORAL
Status: COMPLETED | OUTPATIENT
Start: 2024-07-13 | End: 2024-07-13

## 2024-07-13 RX ADMIN — APIXABAN 5 MG: 5 TABLET, FILM COATED ORAL at 01:07

## 2024-07-13 RX ADMIN — CARVEDILOL 6.25 MG: 3.12 TABLET, FILM COATED ORAL at 01:07

## 2024-07-13 RX ADMIN — LISINOPRIL 20 MG: 20 TABLET ORAL at 01:07

## 2024-07-13 RX ADMIN — CLOPIDOGREL BISULFATE 75 MG: 75 TABLET ORAL at 01:07

## 2024-07-13 NOTE — ED NOTES
Informed patient of need for UA sample, patient ambulated to restroom with tech. Now speaking on phone with girlfriend Autumn. Anticipating transfer to psych facility.

## 2024-07-13 NOTE — ED NOTES
Patient comes into the emergency department by POV with complaints of SI. Patient states that his ex-wife  last week, has been feeling extremely depressed and having SI, currently no plan but came because he wanted help. Patient also doesn't want any family to know of his admission, family also unaware of his whereabouts.

## 2024-07-13 NOTE — ED NOTES
Patient belongings:  **Patient opted out of placing belongings in safe**  -home meds counted and bagged in security personal belongings bag  -clothes, shoes, hat  -duffle bag with more clothes  -dentures  -cellphone  -wallet with debit cards  -lighter  Belongings placed in security closet
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
## 2024-07-20 ENCOUNTER — HOSPITAL ENCOUNTER (EMERGENCY)
Facility: HOSPITAL | Age: 69
Discharge: PSYCHIATRIC OR SUBSTANCE USE FACILITY | End: 2024-07-20
Attending: EMERGENCY MEDICINE
Payer: MEDICARE

## 2024-07-20 VITALS
BODY MASS INDEX: 25.36 KG/M2 | DIASTOLIC BLOOD PRESSURE: 77 MMHG | OXYGEN SATURATION: 97 % | TEMPERATURE: 98 F | SYSTOLIC BLOOD PRESSURE: 139 MMHG | RESPIRATION RATE: 18 BRPM | HEART RATE: 77 BPM | WEIGHT: 181.81 LBS

## 2024-07-20 DIAGNOSIS — R45.851 SUICIDAL IDEATION: Primary | ICD-10-CM

## 2024-07-20 DIAGNOSIS — R45.850 HOMICIDAL IDEATION: ICD-10-CM

## 2024-07-20 LAB
ALBUMIN SERPL BCP-MCNC: 4.3 G/DL (ref 3.5–5.2)
ALP SERPL-CCNC: 78 U/L (ref 55–135)
ALT SERPL W/O P-5'-P-CCNC: 56 U/L (ref 10–44)
AMPHET+METHAMPHET UR QL: NEGATIVE
ANION GAP SERPL CALC-SCNC: 10 MMOL/L (ref 8–16)
APAP SERPL-MCNC: <3 UG/ML (ref 10–20)
AST SERPL-CCNC: 42 U/L (ref 10–40)
BARBITURATES UR QL SCN>200 NG/ML: NEGATIVE
BASOPHILS # BLD AUTO: 0.03 K/UL (ref 0–0.2)
BASOPHILS NFR BLD: 0.8 % (ref 0–1.9)
BENZODIAZ UR QL SCN>200 NG/ML: NEGATIVE
BILIRUB SERPL-MCNC: 0.9 MG/DL (ref 0.1–1)
BILIRUB UR QL STRIP: NEGATIVE
BUN SERPL-MCNC: 17 MG/DL (ref 8–23)
BUN SERPL-MCNC: 20 MG/DL (ref 6–30)
BZE UR QL SCN: ABNORMAL
CALCIUM SERPL-MCNC: 9.7 MG/DL (ref 8.7–10.5)
CANNABINOIDS UR QL SCN: NEGATIVE
CHLORIDE SERPL-SCNC: 102 MMOL/L (ref 95–110)
CHLORIDE SERPL-SCNC: 106 MMOL/L (ref 95–110)
CLARITY UR REFRACT.AUTO: CLEAR
CO2 SERPL-SCNC: 23 MMOL/L (ref 23–29)
COLOR UR AUTO: YELLOW
CREAT SERPL-MCNC: 1.6 MG/DL (ref 0.5–1.4)
CREAT SERPL-MCNC: 1.6 MG/DL (ref 0.5–1.4)
CREAT UR-MCNC: 213 MG/DL (ref 23–375)
DIFFERENTIAL METHOD BLD: ABNORMAL
EOSINOPHIL # BLD AUTO: 0.2 K/UL (ref 0–0.5)
EOSINOPHIL NFR BLD: 3.8 % (ref 0–8)
ERYTHROCYTE [DISTWIDTH] IN BLOOD BY AUTOMATED COUNT: 12.9 % (ref 11.5–14.5)
EST. GFR  (NO RACE VARIABLE): 46.1 ML/MIN/1.73 M^2
ETHANOL SERPL-MCNC: <10 MG/DL
GLUCOSE SERPL-MCNC: 131 MG/DL (ref 70–110)
GLUCOSE SERPL-MCNC: 91 MG/DL (ref 70–110)
GLUCOSE UR QL STRIP: NEGATIVE
HCT VFR BLD AUTO: 49.4 % (ref 40–54)
HCT VFR BLD CALC: 45 %PCV (ref 36–54)
HGB BLD-MCNC: 16.4 G/DL (ref 14–18)
HGB UR QL STRIP: NEGATIVE
IMM GRANULOCYTES # BLD AUTO: 0.02 K/UL (ref 0–0.04)
IMM GRANULOCYTES NFR BLD AUTO: 0.5 % (ref 0–0.5)
KETONES UR QL STRIP: NEGATIVE
LEUKOCYTE ESTERASE UR QL STRIP: NEGATIVE
LYMPHOCYTES # BLD AUTO: 1.8 K/UL (ref 1–4.8)
LYMPHOCYTES NFR BLD: 46.1 % (ref 18–48)
MCH RBC QN AUTO: 31.8 PG (ref 27–31)
MCHC RBC AUTO-ENTMCNC: 33.2 G/DL (ref 32–36)
MCV RBC AUTO: 96 FL (ref 82–98)
METHADONE UR QL SCN>300 NG/ML: NEGATIVE
MONOCYTES # BLD AUTO: 0.5 K/UL (ref 0.3–1)
MONOCYTES NFR BLD: 12.7 % (ref 4–15)
NEUTROPHILS # BLD AUTO: 1.4 K/UL (ref 1.8–7.7)
NEUTROPHILS NFR BLD: 36.1 % (ref 38–73)
NITRITE UR QL STRIP: NEGATIVE
NRBC BLD-RTO: 0 /100 WBC
OPIATES UR QL SCN: NEGATIVE
PCP UR QL SCN>25 NG/ML: NEGATIVE
PH UR STRIP: 7 [PH] (ref 5–8)
PLATELET # BLD AUTO: 278 K/UL (ref 150–450)
PMV BLD AUTO: 8.7 FL (ref 9.2–12.9)
POC IONIZED CALCIUM: 1.18 MMOL/L (ref 1.06–1.42)
POC TCO2 (MEASURED): 28 MMOL/L (ref 23–29)
POTASSIUM BLD-SCNC: 4.4 MMOL/L (ref 3.5–5.1)
POTASSIUM SERPL-SCNC: 5.2 MMOL/L (ref 3.5–5.1)
PROT SERPL-MCNC: 8.3 G/DL (ref 6–8.4)
PROT UR QL STRIP: ABNORMAL
RBC # BLD AUTO: 5.16 M/UL (ref 4.6–6.2)
SAMPLE: ABNORMAL
SODIUM BLD-SCNC: 139 MMOL/L (ref 136–145)
SODIUM SERPL-SCNC: 139 MMOL/L (ref 136–145)
SP GR UR STRIP: 1.02 (ref 1–1.03)
TOXICOLOGY INFORMATION: ABNORMAL
TSH SERPL DL<=0.005 MIU/L-ACNC: 0.85 UIU/ML (ref 0.4–4)
URN SPEC COLLECT METH UR: ABNORMAL
WBC # BLD AUTO: 3.93 K/UL (ref 3.9–12.7)

## 2024-07-20 PROCEDURE — 82077 ASSAY SPEC XCP UR&BREATH IA: CPT | Performed by: PHYSICIAN ASSISTANT

## 2024-07-20 PROCEDURE — 80047 BASIC METABLC PNL IONIZED CA: CPT

## 2024-07-20 PROCEDURE — 84443 ASSAY THYROID STIM HORMONE: CPT | Performed by: PHYSICIAN ASSISTANT

## 2024-07-20 PROCEDURE — 81003 URINALYSIS AUTO W/O SCOPE: CPT | Mod: 59 | Performed by: PHYSICIAN ASSISTANT

## 2024-07-20 PROCEDURE — 80143 DRUG ASSAY ACETAMINOPHEN: CPT | Performed by: PHYSICIAN ASSISTANT

## 2024-07-20 PROCEDURE — 99285 EMERGENCY DEPT VISIT HI MDM: CPT

## 2024-07-20 PROCEDURE — 80307 DRUG TEST PRSMV CHEM ANLYZR: CPT | Performed by: PHYSICIAN ASSISTANT

## 2024-07-20 PROCEDURE — 85025 COMPLETE CBC W/AUTO DIFF WBC: CPT | Performed by: PHYSICIAN ASSISTANT

## 2024-07-20 PROCEDURE — 25000003 PHARM REV CODE 250: Performed by: PHYSICIAN ASSISTANT

## 2024-07-20 PROCEDURE — 80053 COMPREHEN METABOLIC PANEL: CPT | Performed by: PHYSICIAN ASSISTANT

## 2024-07-20 PROCEDURE — 82308 ASSAY OF CALCITONIN: CPT

## 2024-07-20 RX ORDER — ISOSORBIDE MONONITRATE 30 MG/1
30 TABLET, EXTENDED RELEASE ORAL DAILY
Status: DISCONTINUED | OUTPATIENT
Start: 2024-07-21 | End: 2024-07-21 | Stop reason: HOSPADM

## 2024-07-20 RX ORDER — SPIRONOLACTONE 25 MG/1
25 TABLET ORAL
Status: DISCONTINUED | OUTPATIENT
Start: 2024-07-20 | End: 2024-07-21 | Stop reason: HOSPADM

## 2024-07-20 RX ORDER — ATORVASTATIN CALCIUM 40 MG/1
40 TABLET, FILM COATED ORAL NIGHTLY
Status: DISCONTINUED | OUTPATIENT
Start: 2024-07-20 | End: 2024-07-21 | Stop reason: HOSPADM

## 2024-07-20 RX ORDER — TALC
6 POWDER (GRAM) TOPICAL NIGHTLY PRN
Status: DISCONTINUED | OUTPATIENT
Start: 2024-07-20 | End: 2024-07-21 | Stop reason: HOSPADM

## 2024-07-20 RX ORDER — CARVEDILOL 3.12 MG/1
6.25 TABLET ORAL 2 TIMES DAILY
Status: DISCONTINUED | OUTPATIENT
Start: 2024-07-20 | End: 2024-07-21 | Stop reason: HOSPADM

## 2024-07-20 RX ORDER — NAPROXEN SODIUM 220 MG/1
81 TABLET, FILM COATED ORAL DAILY
Status: DISCONTINUED | OUTPATIENT
Start: 2024-07-21 | End: 2024-07-21 | Stop reason: HOSPADM

## 2024-07-20 RX ADMIN — SODIUM ZIRCONIUM CYCLOSILICATE 10 G: 10 POWDER, FOR SUSPENSION ORAL at 01:07

## 2024-07-20 NOTE — ED TRIAGE NOTES
Damir Faria, a 69 y.o. male presents to the ED w/ complaint of suicidal ideation,  And depressive episode since his best friend and ex-wide passed away in the last week. Hx: SI, MI with stents.  Triage note:  Chief Complaint   Patient presents with    Suicidal     Called EMS for SI, depression starting today. Denies HI, denies plans. Wants psych placement     Review of patient's allergies indicates:  No Known Allergies  Past Medical History:   Diagnosis Date    Acute renal insufficiency 6/21/2015    Cocaine abuse     Depression     History of psychiatric care     HTN (hypertension) 1/25/2014    STEMI (ST elevation myocardial infarction) 2/12/2017    Stroke     Tobacco dependence due to cigarettes 5/25/2016     Patient identifiers verified and correct for   LOC: The patient is awake, alert and aware of environment with an appropriate affect, the patient is oriented x 3 and speaking appropriately.   APPEARANCE: Patient appears comfortable and in no acute distress, patient is clean and well groomed.  PSYCH: Pt cooperative. Pt voiced that he is having SI and depression since his best friend and ex-wife passed away last week. States has a hard time dealing with it.  SKIN: The skin is warm and dry, color consistent with ethnicity, patient has normal skin turgor and moist mucus membranes. Dsg on back, states PCP lanced an abscess 3 days ago.  MUSCULOSKELETAL: Patient moving all extremities spontaneously, no swelling noted.  RESPIRATORY: Airway is open and patent, respirations are spontaneous, patient has a normal effort and rate, no accessory muscle use noted.  CARDIAC: VSS.  GASTRO: Soft and non tender to palpation, no distention noted, normoactive bowel sounds present in all four quadrants. Pt states bowel movements have been regular.  : Pt denies any pain or frequency with urination.  NEURO: Pt opens eyes spontaneously, behavior appropriate to situation, follows commands, facial expression symmetrical.

## 2024-07-20 NOTE — ED NOTES
I-STAT Chem-8+ Results:   Value Reference Range   Sodium 139 136-145 mmol/L   Potassium  4.4 3.5-5.1 mmol/L   Chloride 102  mmol/L   Ionized Calcium 1.18 1.06-1.42 mmol/L   CO2 (measured) 28 23-29 mmol/L   Glucose 131  mg/dL   BUN 20 6-30 mg/dL   Creatinine 1.6 0.5-1.4 mg/dL   Hematocrit 45 36-54%

## 2024-07-20 NOTE — ED PROVIDER NOTES
Encounter Date: 2024       History     Chief Complaint   Patient presents with    Suicidal     Called EMS for SI, depression starting today. Denies HI, denies plans. Wants psych placement     69-year-old male with past medical history of bipolar 1, hypertension, CVA, cocaine abuse presents to the emergency department for suicidal and homicidal ideations.  States he buried his wife week ago also had a close friend  this week.  Reports suicidal ideation this morning with no plan.  Also reports extreme guilt states does not want to get into it and was guarded why he was guilty also states he had homicidal ideation but refused to give any details.  Denies any specific plans for his SI.  States he did have a previous attempt few years ago where he stepped in front of a car.  States he has been trying to get into a group home which is also causing him stress.  Reports occasional cocaine use states he has not used in a few weeks.  Denies any on a visual hallucinations.  Currently not taking any psychiatric medications.    The history is provided by the patient.     Review of patient's allergies indicates:  No Known Allergies  Past Medical History:   Diagnosis Date    Acute renal insufficiency 2015    Cocaine abuse     Depression     History of psychiatric care     HTN (hypertension) 2014    STEMI (ST elevation myocardial infarction) 2017    Stroke     Tobacco dependence due to cigarettes 2016     Past Surgical History:   Procedure Laterality Date    CARDIAC SURGERY      stents    NECK SURGERY       Family History   Problem Relation Name Age of Onset    Diabetes Mother      Heart disease Mother      Hypertension Mother       Social History     Tobacco Use    Smoking status: Some Days     Current packs/day: 0.25     Types: Cigarettes    Smokeless tobacco: Never   Substance Use Topics    Alcohol use: Not Currently     Comment: social    Drug use: Yes     Types: Cocaine     Comment: this weekend      Review of Systems   Constitutional:  Negative for chills and fever.   HENT:  Negative for sore throat.    Respiratory:  Negative for cough and shortness of breath.    Cardiovascular:  Negative for chest pain.   Gastrointestinal:  Negative for abdominal pain.   Genitourinary:  Negative for difficulty urinating and dysuria.   Musculoskeletal: Negative.    Skin: Negative.    Neurological:  Negative for weakness.   Psychiatric/Behavioral:  Positive for suicidal ideas. Negative for hallucinations.        Physical Exam     Initial Vitals [07/20/24 1123]   BP Pulse Resp Temp SpO2   138/74 78 18 98.3 °F (36.8 °C) 98 %      MAP       --         Physical Exam    Nursing note and vitals reviewed.  Constitutional: He appears well-developed and well-nourished.   HENT:   Head: Normocephalic and atraumatic.   Eyes: Conjunctivae are normal. Pupils are equal, round, and reactive to light.   Neck: Neck supple.   Normal range of motion.  Cardiovascular:  Normal rate.           Pulmonary/Chest: Breath sounds normal.   Abdominal: Abdomen is soft. There is no abdominal tenderness.   Musculoskeletal:         General: Normal range of motion.      Cervical back: Normal range of motion and neck supple.     Neurological: He is alert and oriented to person, place, and time. GCS score is 15. GCS eye subscore is 4. GCS verbal subscore is 5. GCS motor subscore is 6.   Skin: Skin is warm and dry. Capillary refill takes less than 2 seconds.   Psychiatric: His speech is normal and behavior is normal. His affect is inappropriate (Pleasant, laughing). He is not actively hallucinating. Cognition and memory are normal. He expresses homicidal and suicidal ideation. He expresses no suicidal plans. He is attentive.         ED Course   Procedures  Labs Reviewed   CBC W/ AUTO DIFFERENTIAL - Abnormal       Result Value    WBC 3.93      RBC 5.16      Hemoglobin 16.4      Hematocrit 49.4      MCV 96      MCH 31.8 (*)     MCHC 33.2      RDW 12.9       Platelets 278      MPV 8.7 (*)     Immature Granulocytes 0.5      Gran # (ANC) 1.4 (*)     Immature Grans (Abs) 0.02      Lymph # 1.8      Mono # 0.5      Eos # 0.2      Baso # 0.03      nRBC 0      Gran % 36.1 (*)     Lymph % 46.1      Mono % 12.7      Eosinophil % 3.8      Basophil % 0.8      Differential Method Automated     COMPREHENSIVE METABOLIC PANEL - Abnormal    Sodium 139      Potassium 5.2 (*)     Chloride 106      CO2 23      Glucose 91      BUN 17      Creatinine 1.6 (*)     Calcium 9.7      Total Protein 8.3      Albumin 4.3      Total Bilirubin 0.9      Alkaline Phosphatase 78      AST 42 (*)     ALT 56 (*)     eGFR 46.1 (*)     Anion Gap 10     URINALYSIS, REFLEX TO URINE CULTURE - Abnormal    Specimen UA Urine, Clean Catch      Color, UA Yellow      Appearance, UA Clear      pH, UA 7.0      Specific Gravity, UA 1.020      Protein, UA Trace (*)     Glucose, UA Negative      Ketones, UA Negative      Bilirubin (UA) Negative      Occult Blood UA Negative      Nitrite, UA Negative      Leukocytes, UA Negative      Narrative:     Specimen Source->Urine   DRUG SCREEN PANEL, URINE EMERGENCY - Abnormal    Benzodiazepines Negative      Methadone metabolites Negative      Cocaine (Metab.) Presumptive Positive (*)     Opiate Scrn, Ur Negative      Barbiturate Screen, Ur Negative      Amphetamine Screen, Ur Negative      THC Negative      Phencyclidine Negative      Creatinine, Urine 213.0      Toxicology Information SEE COMMENT      Narrative:     Specimen Source->Urine   ACETAMINOPHEN LEVEL - Abnormal    Acetaminophen (Tylenol), Serum <3.0 (*)    TSH    TSH 0.855     ALCOHOL,MEDICAL (ETHANOL)    Alcohol, Serum <10     ISTAT CHEM8          Imaging Results    None          Medications   sodium zirconium cyclosilicate packet 10 g (10 g Oral Given 7/20/24 1347)     Medical Decision Making  69-year-old male presents ED for SI and HI since his wife and friend recently passed.  Inappropriate affect on exam he does  endorse SI since this morning with no plan.  History of a previous attempt a few years ago.  Also endorsing HI although he is guarded and would not give any further information regarding this.  Will pec for danger to self and others.  Denies any physical complaints currently in his vital signs are reassuring.  Reviewed his labs.  Creatinine 1.6 with baseline appears to be 1.4.  He was tolerating p.o. without difficulty.  Also has a slight potassium elevation of 5.2.  He was given a dose of Lokelma in the ED. from an ED standpoint he is medically cleared for psychiatric placement this time for SI and HI.    Amount and/or Complexity of Data Reviewed  Labs: ordered.    Risk  Prescription drug management.                  Medically cleared for psychiatry placement: 7/20/2024  4:30 PM                   Clinical Impression:  Final diagnoses:  [R45.851] Suicidal ideation (Primary)  [R45.850] Homicidal ideation          ED Disposition Condition    Transfer to Psych Facility Stable          ED Prescriptions    None       Follow-up Information    None          Flori Gaxiola PA-C  07/20/24 1630       Flori Gaxiola PA-C  07/20/24 1704

## 2024-07-20 NOTE — ED NOTES
Pt remains in paper scrubs, resting comfortably in stretcher. NAD. Pt aware of plan of care. PEC complete and in patient's chart. Pt belongings are removed from room, labeled, and locked away. No unnecessary equipment, cords, or wires left in room. Sitter at bedside recording Q 15 minute checks. Sitter belongings are out of room.

## 2024-07-24 ENCOUNTER — TELEPHONE (OUTPATIENT)
Dept: SURGERY | Facility: CLINIC | Age: 69
End: 2024-07-24
Payer: MEDICARE

## 2024-08-03 LAB
ALBUMIN SERPL BCP-MCNC: 5.1 G/DL (ref 3.5–5.2)
ALP SERPL-CCNC: 85 U/L (ref 55–135)
ALT SERPL W/O P-5'-P-CCNC: 26 U/L (ref 10–44)
ANION GAP SERPL CALC-SCNC: 15 MMOL/L (ref 8–16)
APAP SERPL-MCNC: <3 UG/ML (ref 10–20)
AST SERPL-CCNC: 37 U/L (ref 10–40)
BASOPHILS # BLD AUTO: 0.03 K/UL (ref 0–0.2)
BASOPHILS NFR BLD: 0.5 % (ref 0–1.9)
BILIRUB SERPL-MCNC: 1.8 MG/DL (ref 0.1–1)
BUN SERPL-MCNC: 48 MG/DL (ref 8–23)
CALCIUM SERPL-MCNC: 10.6 MG/DL (ref 8.7–10.5)
CHLORIDE SERPL-SCNC: 104 MMOL/L (ref 95–110)
CO2 SERPL-SCNC: 20 MMOL/L (ref 23–29)
CREAT SERPL-MCNC: 3.8 MG/DL (ref 0.5–1.4)
DIFFERENTIAL METHOD BLD: ABNORMAL
EOSINOPHIL # BLD AUTO: 0.2 K/UL (ref 0–0.5)
EOSINOPHIL NFR BLD: 3.1 % (ref 0–8)
ERYTHROCYTE [DISTWIDTH] IN BLOOD BY AUTOMATED COUNT: 13.4 % (ref 11.5–14.5)
EST. GFR  (NO RACE VARIABLE): 16.3 ML/MIN/1.73 M^2
ETHANOL SERPL-MCNC: <10 MG/DL
GLUCOSE SERPL-MCNC: 110 MG/DL (ref 70–110)
HCT VFR BLD AUTO: 47.8 % (ref 40–54)
HGB BLD-MCNC: 15.9 G/DL (ref 14–18)
IMM GRANULOCYTES # BLD AUTO: 0.02 K/UL (ref 0–0.04)
IMM GRANULOCYTES NFR BLD AUTO: 0.4 % (ref 0–0.5)
LYMPHOCYTES # BLD AUTO: 2.1 K/UL (ref 1–4.8)
LYMPHOCYTES NFR BLD: 37 % (ref 18–48)
MCH RBC QN AUTO: 31.2 PG (ref 27–31)
MCHC RBC AUTO-ENTMCNC: 33.3 G/DL (ref 32–36)
MCV RBC AUTO: 94 FL (ref 82–98)
MONOCYTES # BLD AUTO: 0.7 K/UL (ref 0.3–1)
MONOCYTES NFR BLD: 12.8 % (ref 4–15)
NEUTROPHILS # BLD AUTO: 2.6 K/UL (ref 1.8–7.7)
NEUTROPHILS NFR BLD: 46.2 % (ref 38–73)
NRBC BLD-RTO: 0 /100 WBC
PLATELET # BLD AUTO: 276 K/UL (ref 150–450)
PMV BLD AUTO: 8.6 FL (ref 9.2–12.9)
POTASSIUM SERPL-SCNC: 3.8 MMOL/L (ref 3.5–5.1)
PROT SERPL-MCNC: 9.6 G/DL (ref 6–8.4)
RBC # BLD AUTO: 5.1 M/UL (ref 4.6–6.2)
SODIUM SERPL-SCNC: 139 MMOL/L (ref 136–145)
TSH SERPL DL<=0.005 MIU/L-ACNC: 1.57 UIU/ML (ref 0.4–4)
WBC # BLD AUTO: 5.54 K/UL (ref 3.9–12.7)

## 2024-08-03 PROCEDURE — 51798 US URINE CAPACITY MEASURE: CPT

## 2024-08-03 PROCEDURE — 82077 ASSAY SPEC XCP UR&BREATH IA: CPT | Performed by: EMERGENCY MEDICINE

## 2024-08-03 PROCEDURE — 85025 COMPLETE CBC W/AUTO DIFF WBC: CPT | Performed by: EMERGENCY MEDICINE

## 2024-08-03 PROCEDURE — 84443 ASSAY THYROID STIM HORMONE: CPT | Performed by: EMERGENCY MEDICINE

## 2024-08-03 PROCEDURE — 82550 ASSAY OF CK (CPK): CPT | Performed by: EMERGENCY MEDICINE

## 2024-08-03 PROCEDURE — 80053 COMPREHEN METABOLIC PANEL: CPT | Performed by: EMERGENCY MEDICINE

## 2024-08-03 PROCEDURE — 80143 DRUG ASSAY ACETAMINOPHEN: CPT | Performed by: EMERGENCY MEDICINE

## 2024-08-03 PROCEDURE — 99285 EMERGENCY DEPT VISIT HI MDM: CPT

## 2024-08-04 ENCOUNTER — HOSPITAL ENCOUNTER (INPATIENT)
Facility: HOSPITAL | Age: 69
LOS: 1 days | Discharge: HOME | DRG: 683 | End: 2024-08-05
Attending: EMERGENCY MEDICINE | Admitting: HOSPITALIST
Payer: MEDICARE

## 2024-08-04 DIAGNOSIS — R45.851 SUICIDAL IDEATION: Primary | ICD-10-CM

## 2024-08-04 DIAGNOSIS — F14.20 COCAINE USE DISORDER, SEVERE, DEPENDENCE: Chronic | ICD-10-CM

## 2024-08-04 DIAGNOSIS — N17.9 AKI (ACUTE KIDNEY INJURY): ICD-10-CM

## 2024-08-04 DIAGNOSIS — R07.9 CHEST PAIN: ICD-10-CM

## 2024-08-04 DIAGNOSIS — F17.210 TOBACCO DEPENDENCE DUE TO CIGARETTES: Chronic | ICD-10-CM

## 2024-08-04 DIAGNOSIS — M62.82 NON-TRAUMATIC RHABDOMYOLYSIS: ICD-10-CM

## 2024-08-04 LAB
ALBUMIN SERPL BCP-MCNC: 4 G/DL (ref 3.5–5.2)
ALP SERPL-CCNC: 67 U/L (ref 55–135)
ALT SERPL W/O P-5'-P-CCNC: 21 U/L (ref 10–44)
AMPHET+METHAMPHET UR QL: NEGATIVE
ANION GAP SERPL CALC-SCNC: 11 MMOL/L (ref 8–16)
AST SERPL-CCNC: 30 U/L (ref 10–40)
BACTERIA #/AREA URNS AUTO: ABNORMAL /HPF
BARBITURATES UR QL SCN>200 NG/ML: NEGATIVE
BASOPHILS # BLD AUTO: 0.02 K/UL (ref 0–0.2)
BASOPHILS NFR BLD: 0.3 % (ref 0–1.9)
BENZODIAZ UR QL SCN>200 NG/ML: NEGATIVE
BILIRUB SERPL-MCNC: 1.2 MG/DL (ref 0.1–1)
BILIRUB UR QL STRIP: NEGATIVE
BUN SERPL-MCNC: 53 MG/DL (ref 8–23)
BZE UR QL SCN: ABNORMAL
CALCIUM SERPL-MCNC: 9.6 MG/DL (ref 8.7–10.5)
CANNABINOIDS UR QL SCN: NEGATIVE
CHLORIDE SERPL-SCNC: 109 MMOL/L (ref 95–110)
CK SERPL-CCNC: 1121 U/L (ref 20–200)
CLARITY UR REFRACT.AUTO: ABNORMAL
CO2 SERPL-SCNC: 20 MMOL/L (ref 23–29)
COLOR UR AUTO: YELLOW
CREAT SERPL-MCNC: 3.3 MG/DL (ref 0.5–1.4)
CREAT UR-MCNC: 409 MG/DL (ref 23–375)
CREAT UR-MCNC: 409 MG/DL (ref 23–375)
DIFFERENTIAL METHOD BLD: ABNORMAL
EOSINOPHIL # BLD AUTO: 0.3 K/UL (ref 0–0.5)
EOSINOPHIL NFR BLD: 5.1 % (ref 0–8)
ERYTHROCYTE [DISTWIDTH] IN BLOOD BY AUTOMATED COUNT: 13.5 % (ref 11.5–14.5)
EST. GFR  (NO RACE VARIABLE): 19.3 ML/MIN/1.73 M^2
GLUCOSE SERPL-MCNC: 102 MG/DL (ref 70–110)
GLUCOSE UR QL STRIP: NEGATIVE
HCT VFR BLD AUTO: 43.3 % (ref 40–54)
HGB BLD-MCNC: 14.5 G/DL (ref 14–18)
HGB UR QL STRIP: ABNORMAL
HYALINE CASTS UR QL AUTO: 32 /LPF
IMM GRANULOCYTES # BLD AUTO: 0.02 K/UL (ref 0–0.04)
IMM GRANULOCYTES NFR BLD AUTO: 0.3 % (ref 0–0.5)
KETONES UR QL STRIP: ABNORMAL
LEUKOCYTE ESTERASE UR QL STRIP: NEGATIVE
LYMPHOCYTES # BLD AUTO: 1.8 K/UL (ref 1–4.8)
LYMPHOCYTES NFR BLD: 30.6 % (ref 18–48)
MCH RBC QN AUTO: 31.9 PG (ref 27–31)
MCHC RBC AUTO-ENTMCNC: 33.5 G/DL (ref 32–36)
MCV RBC AUTO: 95 FL (ref 82–98)
METHADONE UR QL SCN>300 NG/ML: NEGATIVE
MICROSCOPIC COMMENT: ABNORMAL
MONOCYTES # BLD AUTO: 1.1 K/UL (ref 0.3–1)
MONOCYTES NFR BLD: 18.5 % (ref 4–15)
NEUTROPHILS # BLD AUTO: 2.6 K/UL (ref 1.8–7.7)
NEUTROPHILS NFR BLD: 45.2 % (ref 38–73)
NITRITE UR QL STRIP: NEGATIVE
NRBC BLD-RTO: 0 /100 WBC
OPIATES UR QL SCN: NEGATIVE
PCP UR QL SCN>25 NG/ML: NEGATIVE
PH UR STRIP: 6 [PH] (ref 5–8)
PLATELET # BLD AUTO: 235 K/UL (ref 150–450)
PMV BLD AUTO: 9 FL (ref 9.2–12.9)
POTASSIUM SERPL-SCNC: 4 MMOL/L (ref 3.5–5.1)
PROT SERPL-MCNC: 7.6 G/DL (ref 6–8.4)
PROT UR QL STRIP: ABNORMAL
RBC # BLD AUTO: 4.54 M/UL (ref 4.6–6.2)
RBC #/AREA URNS AUTO: 3 /HPF (ref 0–4)
SODIUM SERPL-SCNC: 140 MMOL/L (ref 136–145)
SODIUM UR-SCNC: 63 MMOL/L (ref 20–250)
SP GR UR STRIP: 1.02 (ref 1–1.03)
SQUAMOUS #/AREA URNS AUTO: 0 /HPF
TOXICOLOGY INFORMATION: ABNORMAL
UNSPECIFIED CRY UR QL COMP ASSIST: 1
URN SPEC COLLECT METH UR: ABNORMAL
WBC # BLD AUTO: 5.72 K/UL (ref 3.9–12.7)
WBC #/AREA URNS AUTO: 5 /HPF (ref 0–5)

## 2024-08-04 PROCEDURE — G0378 HOSPITAL OBSERVATION PER HR: HCPCS

## 2024-08-04 PROCEDURE — 25000003 PHARM REV CODE 250

## 2024-08-04 PROCEDURE — 80307 DRUG TEST PRSMV CHEM ANLYZR: CPT | Performed by: EMERGENCY MEDICINE

## 2024-08-04 PROCEDURE — 81001 URINALYSIS AUTO W/SCOPE: CPT | Performed by: EMERGENCY MEDICINE

## 2024-08-04 PROCEDURE — 63600175 PHARM REV CODE 636 W HCPCS: Performed by: HOSPITALIST

## 2024-08-04 PROCEDURE — 25000003 PHARM REV CODE 250: Performed by: EMERGENCY MEDICINE

## 2024-08-04 PROCEDURE — 84300 ASSAY OF URINE SODIUM: CPT

## 2024-08-04 PROCEDURE — 99215 OFFICE O/P EST HI 40 MIN: CPT | Mod: ,,, | Performed by: PSYCHIATRY & NEUROLOGY

## 2024-08-04 PROCEDURE — 96361 HYDRATE IV INFUSION ADD-ON: CPT

## 2024-08-04 PROCEDURE — 80053 COMPREHEN METABOLIC PANEL: CPT

## 2024-08-04 PROCEDURE — 94761 N-INVAS EAR/PLS OXIMETRY MLT: CPT

## 2024-08-04 PROCEDURE — 85025 COMPLETE CBC W/AUTO DIFF WBC: CPT

## 2024-08-04 PROCEDURE — 96360 HYDRATION IV INFUSION INIT: CPT

## 2024-08-04 RX ORDER — EZETIMIBE 10 MG/1
10 TABLET ORAL DAILY
Status: DISCONTINUED | OUTPATIENT
Start: 2024-08-04 | End: 2024-08-05 | Stop reason: HOSPADM

## 2024-08-04 RX ORDER — SODIUM CHLORIDE 0.9 % (FLUSH) 0.9 %
5 SYRINGE (ML) INJECTION
Status: DISCONTINUED | OUTPATIENT
Start: 2024-08-04 | End: 2024-08-05 | Stop reason: HOSPADM

## 2024-08-04 RX ORDER — ACETAMINOPHEN 500 MG
1000 TABLET ORAL EVERY 8 HOURS PRN
Status: DISCONTINUED | OUTPATIENT
Start: 2024-08-04 | End: 2024-08-05 | Stop reason: HOSPADM

## 2024-08-04 RX ORDER — ATORVASTATIN CALCIUM 40 MG/1
40 TABLET, FILM COATED ORAL NIGHTLY
Status: DISCONTINUED | OUTPATIENT
Start: 2024-08-04 | End: 2024-08-05 | Stop reason: HOSPADM

## 2024-08-04 RX ORDER — NALOXONE HCL 0.4 MG/ML
0.02 VIAL (ML) INJECTION
Status: DISCONTINUED | OUTPATIENT
Start: 2024-08-04 | End: 2024-08-05 | Stop reason: HOSPADM

## 2024-08-04 RX ORDER — ACETAMINOPHEN 325 MG/1
650 TABLET ORAL EVERY 4 HOURS PRN
Status: DISCONTINUED | OUTPATIENT
Start: 2024-08-04 | End: 2024-08-05 | Stop reason: HOSPADM

## 2024-08-04 RX ORDER — IPRATROPIUM BROMIDE AND ALBUTEROL SULFATE 2.5; .5 MG/3ML; MG/3ML
3 SOLUTION RESPIRATORY (INHALATION) EVERY 4 HOURS PRN
Status: DISCONTINUED | OUTPATIENT
Start: 2024-08-04 | End: 2024-08-05 | Stop reason: HOSPADM

## 2024-08-04 RX ORDER — NAPROXEN SODIUM 220 MG/1
81 TABLET, FILM COATED ORAL DAILY
Status: DISCONTINUED | OUTPATIENT
Start: 2024-08-04 | End: 2024-08-05 | Stop reason: HOSPADM

## 2024-08-04 RX ORDER — SODIUM CHLORIDE, SODIUM LACTATE, POTASSIUM CHLORIDE, CALCIUM CHLORIDE 600; 310; 30; 20 MG/100ML; MG/100ML; MG/100ML; MG/100ML
INJECTION, SOLUTION INTRAVENOUS CONTINUOUS
Status: DISCONTINUED | OUTPATIENT
Start: 2024-08-04 | End: 2024-08-05 | Stop reason: HOSPADM

## 2024-08-04 RX ORDER — TALC
6 POWDER (GRAM) TOPICAL NIGHTLY PRN
Status: DISCONTINUED | OUTPATIENT
Start: 2024-08-04 | End: 2024-08-05 | Stop reason: HOSPADM

## 2024-08-04 RX ORDER — CLOPIDOGREL BISULFATE 75 MG/1
75 TABLET ORAL DAILY
Status: DISCONTINUED | OUTPATIENT
Start: 2024-08-04 | End: 2024-08-05 | Stop reason: HOSPADM

## 2024-08-04 RX ORDER — SODIUM CHLORIDE 9 MG/ML
INJECTION, SOLUTION INTRAVENOUS CONTINUOUS
Status: ACTIVE | OUTPATIENT
Start: 2024-08-04 | End: 2024-08-04

## 2024-08-04 RX ORDER — CARVEDILOL 6.25 MG/1
6.25 TABLET ORAL 2 TIMES DAILY
Status: DISCONTINUED | OUTPATIENT
Start: 2024-08-04 | End: 2024-08-05 | Stop reason: HOSPADM

## 2024-08-04 RX ORDER — POLYETHYLENE GLYCOL 3350 17 G/17G
17 POWDER, FOR SOLUTION ORAL 2 TIMES DAILY PRN
Status: DISCONTINUED | OUTPATIENT
Start: 2024-08-04 | End: 2024-08-05 | Stop reason: HOSPADM

## 2024-08-04 RX ORDER — IBUPROFEN 200 MG
16 TABLET ORAL
Status: DISCONTINUED | OUTPATIENT
Start: 2024-08-04 | End: 2024-08-05 | Stop reason: HOSPADM

## 2024-08-04 RX ORDER — ONDANSETRON 8 MG/1
8 TABLET, ORALLY DISINTEGRATING ORAL EVERY 8 HOURS PRN
Status: DISCONTINUED | OUTPATIENT
Start: 2024-08-04 | End: 2024-08-05 | Stop reason: HOSPADM

## 2024-08-04 RX ORDER — HYDROXYZINE PAMOATE 25 MG/1
25 CAPSULE ORAL EVERY 8 HOURS PRN
Status: DISCONTINUED | OUTPATIENT
Start: 2024-08-04 | End: 2024-08-05 | Stop reason: HOSPADM

## 2024-08-04 RX ORDER — GLUCAGON 1 MG
1 KIT INJECTION
Status: DISCONTINUED | OUTPATIENT
Start: 2024-08-04 | End: 2024-08-05 | Stop reason: HOSPADM

## 2024-08-04 RX ORDER — IBUPROFEN 200 MG
24 TABLET ORAL
Status: DISCONTINUED | OUTPATIENT
Start: 2024-08-04 | End: 2024-08-05 | Stop reason: HOSPADM

## 2024-08-04 RX ORDER — ISOSORBIDE MONONITRATE 30 MG/1
30 TABLET, EXTENDED RELEASE ORAL DAILY
Status: DISCONTINUED | OUTPATIENT
Start: 2024-08-04 | End: 2024-08-05 | Stop reason: HOSPADM

## 2024-08-04 RX ADMIN — SODIUM CHLORIDE, SODIUM LACTATE, POTASSIUM CHLORIDE, AND CALCIUM CHLORIDE: 600; 310; 30; 20 INJECTION, SOLUTION INTRAVENOUS at 09:08

## 2024-08-04 RX ADMIN — ATORVASTATIN CALCIUM 40 MG: 40 TABLET, FILM COATED ORAL at 09:08

## 2024-08-04 RX ADMIN — CARVEDILOL 6.25 MG: 6.25 TABLET, FILM COATED ORAL at 10:08

## 2024-08-04 RX ADMIN — SODIUM CHLORIDE: 9 INJECTION, SOLUTION INTRAVENOUS at 07:08

## 2024-08-04 RX ADMIN — APIXABAN 5 MG: 5 TABLET, FILM COATED ORAL at 09:08

## 2024-08-04 RX ADMIN — SODIUM CHLORIDE 1000 ML: 9 INJECTION, SOLUTION INTRAVENOUS at 02:08

## 2024-08-04 RX ADMIN — CARVEDILOL 6.25 MG: 6.25 TABLET, FILM COATED ORAL at 09:08

## 2024-08-04 RX ADMIN — ISOSORBIDE MONONITRATE 30 MG: 30 TABLET, EXTENDED RELEASE ORAL at 10:08

## 2024-08-04 RX ADMIN — SODIUM CHLORIDE 500 ML: 9 INJECTION, SOLUTION INTRAVENOUS at 05:08

## 2024-08-04 RX ADMIN — APIXABAN 5 MG: 5 TABLET, FILM COATED ORAL at 10:08

## 2024-08-04 RX ADMIN — CLOPIDOGREL BISULFATE 75 MG: 75 TABLET ORAL at 10:08

## 2024-08-04 RX ADMIN — EZETIMIBE 10 MG: 10 TABLET ORAL at 10:08

## 2024-08-04 RX ADMIN — SODIUM CHLORIDE, SODIUM LACTATE, POTASSIUM CHLORIDE, AND CALCIUM CHLORIDE: 600; 310; 30; 20 INJECTION, SOLUTION INTRAVENOUS at 12:08

## 2024-08-05 VITALS
SYSTOLIC BLOOD PRESSURE: 130 MMHG | HEIGHT: 71 IN | DIASTOLIC BLOOD PRESSURE: 80 MMHG | BODY MASS INDEX: 25.34 KG/M2 | WEIGHT: 181 LBS | TEMPERATURE: 98 F | RESPIRATION RATE: 16 BRPM | HEART RATE: 65 BPM | OXYGEN SATURATION: 97 %

## 2024-08-05 LAB
ALBUMIN SERPL BCP-MCNC: 3.2 G/DL (ref 3.5–5.2)
ALP SERPL-CCNC: 52 U/L (ref 55–135)
ALT SERPL W/O P-5'-P-CCNC: 18 U/L (ref 10–44)
ANION GAP SERPL CALC-SCNC: 7 MMOL/L (ref 8–16)
AST SERPL-CCNC: 27 U/L (ref 10–40)
BASOPHILS # BLD AUTO: 0.01 K/UL (ref 0–0.2)
BASOPHILS NFR BLD: 0.2 % (ref 0–1.9)
BILIRUB SERPL-MCNC: 0.8 MG/DL (ref 0.1–1)
BUN SERPL-MCNC: 30 MG/DL (ref 8–23)
CALCIUM SERPL-MCNC: 8.9 MG/DL (ref 8.7–10.5)
CHLORIDE SERPL-SCNC: 112 MMOL/L (ref 95–110)
CO2 SERPL-SCNC: 22 MMOL/L (ref 23–29)
CREAT SERPL-MCNC: 1.6 MG/DL (ref 0.5–1.4)
DIFFERENTIAL METHOD BLD: ABNORMAL
EOSINOPHIL # BLD AUTO: 0.2 K/UL (ref 0–0.5)
EOSINOPHIL NFR BLD: 4.8 % (ref 0–8)
ERYTHROCYTE [DISTWIDTH] IN BLOOD BY AUTOMATED COUNT: 13.3 % (ref 11.5–14.5)
EST. GFR  (NO RACE VARIABLE): 46.1 ML/MIN/1.73 M^2
GLUCOSE SERPL-MCNC: 75 MG/DL (ref 70–110)
HCT VFR BLD AUTO: 37.7 % (ref 40–54)
HGB BLD-MCNC: 12.4 G/DL (ref 14–18)
IMM GRANULOCYTES # BLD AUTO: 0.02 K/UL (ref 0–0.04)
IMM GRANULOCYTES NFR BLD AUTO: 0.5 % (ref 0–0.5)
LYMPHOCYTES # BLD AUTO: 1.9 K/UL (ref 1–4.8)
LYMPHOCYTES NFR BLD: 44.7 % (ref 18–48)
MCH RBC QN AUTO: 31.1 PG (ref 27–31)
MCHC RBC AUTO-ENTMCNC: 32.9 G/DL (ref 32–36)
MCV RBC AUTO: 95 FL (ref 82–98)
MONOCYTES # BLD AUTO: 0.8 K/UL (ref 0.3–1)
MONOCYTES NFR BLD: 18.3 % (ref 4–15)
NEUTROPHILS # BLD AUTO: 1.3 K/UL (ref 1.8–7.7)
NEUTROPHILS NFR BLD: 31.5 % (ref 38–73)
NRBC BLD-RTO: 0 /100 WBC
PLATELET # BLD AUTO: 206 K/UL (ref 150–450)
PMV BLD AUTO: 8.9 FL (ref 9.2–12.9)
POTASSIUM SERPL-SCNC: 4.5 MMOL/L (ref 3.5–5.1)
PROT SERPL-MCNC: 6 G/DL (ref 6–8.4)
RBC # BLD AUTO: 3.99 M/UL (ref 4.6–6.2)
SODIUM SERPL-SCNC: 141 MMOL/L (ref 136–145)
WBC # BLD AUTO: 4.21 K/UL (ref 3.9–12.7)

## 2024-08-05 PROCEDURE — 25000003 PHARM REV CODE 250

## 2024-08-05 PROCEDURE — 11000001 HC ACUTE MED/SURG PRIVATE ROOM

## 2024-08-05 PROCEDURE — 85025 COMPLETE CBC W/AUTO DIFF WBC: CPT

## 2024-08-05 PROCEDURE — 63600175 PHARM REV CODE 636 W HCPCS: Performed by: HOSPITALIST

## 2024-08-05 PROCEDURE — 96361 HYDRATE IV INFUSION ADD-ON: CPT

## 2024-08-05 PROCEDURE — 36415 COLL VENOUS BLD VENIPUNCTURE: CPT

## 2024-08-05 PROCEDURE — 80053 COMPREHEN METABOLIC PANEL: CPT

## 2024-08-05 RX ORDER — IBUPROFEN 200 MG
1 TABLET ORAL DAILY
Qty: 7 PATCH | Refills: 0 | Status: SHIPPED | OUTPATIENT
Start: 2024-08-05

## 2024-08-05 RX ADMIN — CLOPIDOGREL BISULFATE 75 MG: 75 TABLET ORAL at 09:08

## 2024-08-05 RX ADMIN — ISOSORBIDE MONONITRATE 30 MG: 30 TABLET, EXTENDED RELEASE ORAL at 09:08

## 2024-08-05 RX ADMIN — APIXABAN 5 MG: 5 TABLET, FILM COATED ORAL at 09:08

## 2024-08-05 RX ADMIN — EZETIMIBE 10 MG: 10 TABLET ORAL at 09:08

## 2024-08-05 RX ADMIN — CARVEDILOL 6.25 MG: 6.25 TABLET, FILM COATED ORAL at 09:08

## 2024-08-05 RX ADMIN — SODIUM CHLORIDE, SODIUM LACTATE, POTASSIUM CHLORIDE, AND CALCIUM CHLORIDE: 600; 310; 30; 20 INJECTION, SOLUTION INTRAVENOUS at 08:08

## 2024-08-07 ENCOUNTER — HOSPITAL ENCOUNTER (EMERGENCY)
Facility: HOSPITAL | Age: 69
Discharge: HOME | End: 2024-08-07
Attending: STUDENT IN AN ORGANIZED HEALTH CARE EDUCATION/TRAINING PROGRAM
Payer: MEDICARE

## 2024-08-07 ENCOUNTER — PATIENT OUTREACH (OUTPATIENT)
Dept: ADMINISTRATIVE | Facility: CLINIC | Age: 69
End: 2024-08-07
Payer: MEDICARE

## 2024-08-07 VITALS
RESPIRATION RATE: 18 BRPM | TEMPERATURE: 98 F | HEART RATE: 67 BPM | WEIGHT: 181 LBS | BODY MASS INDEX: 25.24 KG/M2 | SYSTOLIC BLOOD PRESSURE: 148 MMHG | OXYGEN SATURATION: 99 % | DIASTOLIC BLOOD PRESSURE: 81 MMHG

## 2024-08-07 DIAGNOSIS — R45.851 SUICIDAL IDEATION: Primary | ICD-10-CM

## 2024-08-07 DIAGNOSIS — R45.850 HOMICIDAL IDEATION: ICD-10-CM

## 2024-08-07 LAB
ALBUMIN SERPL BCP-MCNC: 3.9 G/DL (ref 3.5–5.2)
ALP SERPL-CCNC: 69 U/L (ref 55–135)
ALT SERPL W/O P-5'-P-CCNC: 19 U/L (ref 10–44)
ANION GAP SERPL CALC-SCNC: 8 MMOL/L (ref 8–16)
APAP SERPL-MCNC: <3 UG/ML (ref 10–20)
AST SERPL-CCNC: 27 U/L (ref 10–40)
BASOPHILS # BLD AUTO: 0.04 K/UL (ref 0–0.2)
BASOPHILS NFR BLD: 0.9 % (ref 0–1.9)
BILIRUB SERPL-MCNC: 1.1 MG/DL (ref 0.1–1)
BUN SERPL-MCNC: 19 MG/DL (ref 8–23)
CALCIUM SERPL-MCNC: 9.5 MG/DL (ref 8.7–10.5)
CHLORIDE SERPL-SCNC: 111 MMOL/L (ref 95–110)
CK SERPL-CCNC: 371 U/L (ref 20–200)
CO2 SERPL-SCNC: 19 MMOL/L (ref 23–29)
CREAT SERPL-MCNC: 1.5 MG/DL (ref 0.5–1.4)
DIFFERENTIAL METHOD BLD: ABNORMAL
EOSINOPHIL # BLD AUTO: 0.2 K/UL (ref 0–0.5)
EOSINOPHIL NFR BLD: 3.7 % (ref 0–8)
ERYTHROCYTE [DISTWIDTH] IN BLOOD BY AUTOMATED COUNT: 13.2 % (ref 11.5–14.5)
EST. GFR  (NO RACE VARIABLE): 49.8 ML/MIN/1.73 M^2
ETHANOL SERPL-MCNC: <10 MG/DL
GLUCOSE SERPL-MCNC: 82 MG/DL (ref 70–110)
HCT VFR BLD AUTO: 43.7 % (ref 40–54)
HGB BLD-MCNC: 14.5 G/DL (ref 14–18)
IMM GRANULOCYTES # BLD AUTO: 0.01 K/UL (ref 0–0.04)
IMM GRANULOCYTES NFR BLD AUTO: 0.2 % (ref 0–0.5)
LYMPHOCYTES # BLD AUTO: 1.9 K/UL (ref 1–4.8)
LYMPHOCYTES NFR BLD: 41.2 % (ref 18–48)
MCH RBC QN AUTO: 32 PG (ref 27–31)
MCHC RBC AUTO-ENTMCNC: 33.2 G/DL (ref 32–36)
MCV RBC AUTO: 97 FL (ref 82–98)
MONOCYTES # BLD AUTO: 0.9 K/UL (ref 0.3–1)
MONOCYTES NFR BLD: 18.9 % (ref 4–15)
NEUTROPHILS # BLD AUTO: 1.6 K/UL (ref 1.8–7.7)
NEUTROPHILS NFR BLD: 35.1 % (ref 38–73)
NRBC BLD-RTO: 0 /100 WBC
PLATELET # BLD AUTO: 223 K/UL (ref 150–450)
PMV BLD AUTO: 8.8 FL (ref 9.2–12.9)
POTASSIUM SERPL-SCNC: 4.4 MMOL/L (ref 3.5–5.1)
PROT SERPL-MCNC: 7.5 G/DL (ref 6–8.4)
RBC # BLD AUTO: 4.53 M/UL (ref 4.6–6.2)
SALICYLATES SERPL-MCNC: <5 MG/DL (ref 15–30)
SODIUM SERPL-SCNC: 138 MMOL/L (ref 136–145)
TSH SERPL DL<=0.005 MIU/L-ACNC: 0.55 UIU/ML (ref 0.4–4)
WBC # BLD AUTO: 4.56 K/UL (ref 3.9–12.7)

## 2024-08-07 PROCEDURE — 80179 DRUG ASSAY SALICYLATE: CPT | Performed by: STUDENT IN AN ORGANIZED HEALTH CARE EDUCATION/TRAINING PROGRAM

## 2024-08-07 PROCEDURE — 82077 ASSAY SPEC XCP UR&BREATH IA: CPT | Performed by: STUDENT IN AN ORGANIZED HEALTH CARE EDUCATION/TRAINING PROGRAM

## 2024-08-07 PROCEDURE — 80143 DRUG ASSAY ACETAMINOPHEN: CPT | Performed by: STUDENT IN AN ORGANIZED HEALTH CARE EDUCATION/TRAINING PROGRAM

## 2024-08-07 PROCEDURE — 85025 COMPLETE CBC W/AUTO DIFF WBC: CPT | Performed by: STUDENT IN AN ORGANIZED HEALTH CARE EDUCATION/TRAINING PROGRAM

## 2024-08-07 PROCEDURE — 82550 ASSAY OF CK (CPK): CPT | Performed by: STUDENT IN AN ORGANIZED HEALTH CARE EDUCATION/TRAINING PROGRAM

## 2024-08-07 PROCEDURE — 80053 COMPREHEN METABOLIC PANEL: CPT | Performed by: STUDENT IN AN ORGANIZED HEALTH CARE EDUCATION/TRAINING PROGRAM

## 2024-08-07 PROCEDURE — 84443 ASSAY THYROID STIM HORMONE: CPT | Performed by: STUDENT IN AN ORGANIZED HEALTH CARE EDUCATION/TRAINING PROGRAM

## 2024-08-07 PROCEDURE — 99285 EMERGENCY DEPT VISIT HI MDM: CPT

## 2024-08-07 NOTE — ED NOTES
"C/C: 69 y.o. male arrived to the ED via POV for c/o psychiatric evaluation. Patient presents from home following ideations of wanting to harm both himself and another individual. Patient states this is not his first time having these thoughts; however, feels that he may be "too scared" put them into action. Patient reports being seen and evaluated by psychiatry in the past; however, refuses to be placed on any pharmacologics for help. Denies pain, chest pain, SOB/    APPEARANCE: awake, alert, and appears to be in NAD. Pain 0/10. Changed into navy blue paper scrubs.  SKIN: warm, dry and intact. No visible breakdown or bruising noted to extremities. Mucous membranes dry, acyanotic   MUSCULOSKELETAL: Patient moving all extremities spontaneously, no obvious swelling or deformities noted. Ambulates independently.  RESPIRATORY: Airway open and patent. Denies shortness of breath. Respirations even, unlabored, equal bilaterally on inspiration and expiration.   CARDIAC: Regular HR. Denies CP, 2+ DP pulses bilaterally; no peripheral edema  ABDOMEN: S/ND/NT, Denies N/V/D.  : Pt voids spontaneously, denies difficulty   NEUROLOGIC: AAO x 4; speaking and following commands appropriately. Equal strength in all extremities; denies numbness/tingling.       Environment safe. All cords and equipment removed from bedside. Alesha SO, within direct visualization of patient to promote patient safety. Care ongoing.   "

## 2024-08-07 NOTE — ED NOTES
This nurse and PCT at bedside. [facility] with two personnel at bedside for transport to receiving facility, [facility]. Patient belongings retrieved from patient belongings closet/[facility] ED safe and taken with patient to receiving facility. Patient calm and cooperative upon discharge from ED.

## 2024-08-07 NOTE — ED PROVIDER NOTES
Source of History  patient and EMR    Chief Complaint    Suicidal and Homicidal (Since being d/c'd yesterday. +plan w/ a gun. Denies auditory/ visual hallucinations/ delusions. )      History of Present Illness    Damir Faria is a 69 y.o. male presenting with concerns for feeling suicidal, and homicidal against 1 particular person with the plan was a gun.  He has had the sensation previously and was recently admitted for CARTER related to dehydration and was also under a pec order for SI and HI.  Notes that he was discharged 2 days ago and went home to take care of something .  He returns today because of the feelings that he was having a as far as the suicidality and homicidality.  He did not go to inpatient psychiatry last time because his sensations had resolved after being seen by Psychiatry.  He was hungry, but denies any other physical symptoms.  He is asking for a meal tray.    Review of Systems    As per HPI and below:  Constitutional symptoms:  No fevers  Respiratory symptoms:  No shortness of breath  Cardiovascular symptoms:  No chest pain   Gastrointestinal symptoms:  No nausea or vomiting  Musculoskeletal symptoms:   No joint pains or aches    Neurologic symptoms:  No headache, no weakness  Endocrine symptoms:  None except as in HPI     Past History    As per HPI and below:  Past Medical History:   Diagnosis Date    Acute renal insufficiency 6/21/2015    Cocaine abuse     Depression     History of psychiatric care     HTN (hypertension) 1/25/2014    STEMI (ST elevation myocardial infarction) 2/12/2017    Stroke     Tobacco dependence due to cigarettes 5/25/2016       Past Surgical History:   Procedure Laterality Date    CARDIAC SURGERY      stents    NECK SURGERY         Social History     Socioeconomic History    Marital status: Single   Occupational History     Employer: [employer]   Tobacco Use    Smoking status: Some Days     Current packs/day: 0.25     Types: Cigarettes    Smokeless  tobacco: Never   Substance and Sexual Activity    Alcohol use: Not Currently     Comment: social    Drug use: Yes     Types: Cocaine     Comment: this weekend    Sexual activity: Not Currently   Other Topics Concern    Patient feels they ought to cut down on drinking/drug use No    Patient annoyed by others criticizing their drinking/drug use No    Patient has felt bad or guilty about drinking/drug use No    Patient has had a drink/used drugs as an eye opener in the AM No     Social Determinants of Health     Financial Resource Strain: Low Risk  (7/15/2024)    Overall Financial Resource Strain (CARDIA)     Difficulty of Paying Living Expenses: Not very hard   Food Insecurity: No Food Insecurity (7/15/2024)    Hunger Vital Sign     Worried About Running Out of Food in the Last Year: Never true     Ran Out of Food in the Last Year: Never true   Transportation Needs: No Transportation Needs (7/15/2024)    TRANSPORTATION NEEDS     Transportation : No   Physical Activity: Insufficiently Active (7/15/2024)    Exercise Vital Sign     Days of Exercise per Week: 3 days     Minutes of Exercise per Session: 20 min   Stress: Stress Concern Present (7/15/2024)    [employer] - Occupational Stress Questionnaire     Feeling of Stress : Rather much   Housing Stability: Low Risk  (7/15/2024)    Housing Stability Vital Sign     Unable to Pay for Housing in the Last Year: No     Homeless in the Last Year: No       Family History   Problem Relation Name Age of Onset    Diabetes Mother      Heart disease Mother      Hypertension Mother         Review of patient's allergies indicates:  No Known Allergies    No current facility-administered medications on file prior to encounter.     Current Outpatient Medications on File Prior to Encounter   Medication Sig Dispense Refill    apixaban (ELIQUIS) 5 mg Tab Take 1 tablet (5 mg total) by mouth 2 (two) times daily. 60 tablet 11    aspirin 81 MG Chew Take 1 tablet  (81 mg total) by mouth once daily. for 17 days 17 tablet 0    atorvastatin (LIPITOR) 40 MG tablet Take 1 tablet (40 mg total) by mouth every evening. 90 tablet 3    carvediloL (COREG) 6.25 MG tablet Take 1 tablet (6.25 mg total) by mouth 2 (two) times daily. 180 tablet 3    clopidogreL (PLAVIX) 75 mg tablet Take 1 tablet (75 mg total) by mouth once daily. 90 tablet 3    ezetimibe (ZETIA) 10 mg tablet Take 1 tablet by mouth once daily.      hydrOXYzine pamoate (VISTARIL) 25 MG Cap Take 1 capsule (25 mg total) by mouth every 8 (eight) hours as needed (anxiety). 30 capsule 0    isosorbide mononitrate (IMDUR) 30 MG 24 hr tablet Take 30 mg by mouth once daily.      melatonin (MELATIN) 3 mg tablet Take 2 tablets (6 mg total) by mouth nightly as needed for Insomnia. 30 tablet 0    nicotine (NICODERM CQ) 14 mg/24 hr Place 1 patch onto the skin once daily. 7 patch 0    nitroGLYCERIN (NITROSTAT) 0.4 MG SL tablet Place 1 tablet (0.4 mg total) under the tongue every 5 (five) minutes as needed for Chest pain. 25 tablet 0       Physical Exam    Reviewed nursing notes.  Vitals:    08/07/24 0648   BP: (!) 166/90   Pulse: 89   Resp: 18   Temp: 97.9 °F (36.6 °C)   TempSrc: Oral   SpO2: 99%   Weight: 82.1 kg (181 lb)     General:  Alert, no acute distress.    Skin:  Warm, dry, intact.  No rash.  Head:  Normocephalic, atraumatic.    Neck:  Supple.   HEENT:  Pupils are equal and round, appropriate for room, extraocular movements are intact.  Normal phonation.  Moist mucous membranes.  Edentulous.  Cardiovascular:  Regular rate and rhythm, Normal peripheral perfusion, No edema.    Respiratory:  Lungs are clear to auscultation, respirations are non-labored, breath sounds are equal.    Gastrointestinal:  Soft, Nontender, Non distended.   Musculoskeletal:  Normal range of motion observed.  Neurological:  Alert and oriented to person, place, time, and situation.  No focal deficits observed.   Psychiatric:  Cooperative, flat mood.  Admits to  SI and HI.  Denies hallucinations.  Does not seem to be responding to any internal stimuli.    Initial MDM and Data Review    69 y.o. male presenting for evaluation of suicidal ideation, homicidal ideation, previous CARTER with rhabdomyolysis from dehydration and heat exhaustion.  Vitals within normal range, afebrile.  Generally well-appearing and in no acute distress.  Admits to SI and HI.    Differential includes but is not limited to:  SI, HI, decompensated depression, CARTER and rhabdomyolysis    Plan to pec    Work-up includes:  Will check CK and CMP    Interventions include:  None at this time, may need IV fluids, will need one-to-one    The patient has significant medical comorbidities that influence decision making for this acute process, such as:  Psychiatric comorbidities, hypertension, prior CVA, prior substance use    I decided to obtain the patient's medical records and review relevant documentation from hospital records.  Pertinent information is noted.      Medications - No data to display    Results and ED Course    Labs Reviewed   CBC W/ AUTO DIFFERENTIAL - Abnormal       Result Value    WBC 4.56      RBC 4.53 (*)     Hemoglobin 14.5      Hematocrit 43.7      MCV 97      MCH 32.0 (*)     MCHC 33.2      RDW 13.2      Platelets 223      MPV 8.8 (*)     Immature Granulocytes 0.2      Gran # (ANC) 1.6 (*)     Immature Grans (Abs) 0.01      Lymph # 1.9      Mono # 0.9      Eos # 0.2      Baso # 0.04      nRBC 0      Gran % 35.1 (*)     Lymph % 41.2      Mono % 18.9 (*)     Eosinophil % 3.7      Basophil % 0.9      Differential Method Automated     COMPREHENSIVE METABOLIC PANEL - Abnormal    Sodium 138      Potassium 4.4      Chloride 111 (*)     CO2 19 (*)     Glucose 82      BUN 19      Creatinine 1.5 (*)     Calcium 9.5      Total Protein 7.5      Albumin 3.9      Total Bilirubin 1.1 (*)     Alkaline Phosphatase 69      AST 27      ALT 19      eGFR 49.8 (*)     Anion Gap 8     ACETAMINOPHEN LEVEL - Abnormal     Acetaminophen (Tylenol), Serum <3.0 (*)    SALICYLATE LEVEL - Abnormal    Salicylate Lvl <5.0 (*)    CK - Abnormal     (*)    ALCOHOL,MEDICAL (ETHANOL)    Alcohol, Serum <10     TSH   URINALYSIS, REFLEX TO URINE CULTURE   DRUG SCREEN PANEL, URINE EMERGENCY       Imaging Results    None         ED Course as of 08/07/24 0828   Wed Aug 07, 2024   0827 CPK(!): 371  downtrending [AC]   0827 Creatinine(!): 1.5  Improved  [AC]      ED Course User Index  [AC] Matt Vogel DO       Medically cleared for psychiatry placement: 8/7/2024  8:28 AM        Impression and Plan    69 y.o. male with findings of SI/HI based on the work up in the emergency department as above.    Important lab/imaging findings include:  Reviewed as above   Improving labs    All tests, treatment options and disposition options were discussed with the patient.  The decision was made to  transfer  the patient  psychiatric facility .    The patient was  transferred  in stable condition and all further questions/concerns by patient and/or family were addressed.        Medically cleared for psychiatry placement: 8/7/2024  8:28 AM    Final diagnoses:  [R45.851] Suicidal ideation (Primary)  [R45.850] Homicidal ideation        ED Disposition Condition    Transfer to Psych Facility Stable          ED Prescriptions    None       Follow-up Information    None            Matt Vogel DO  08/07/24 0829

## 2024-08-09 ENCOUNTER — HOSPITAL ENCOUNTER (EMERGENCY)
Facility: HOSPITAL | Age: 69
Discharge: PSYCHIATRIC OR SUBSTANCE USE FACILITY | End: 2024-08-09
Attending: EMERGENCY MEDICINE
Payer: MEDICARE

## 2024-08-09 VITALS
OXYGEN SATURATION: 100 % | HEIGHT: 71 IN | BODY MASS INDEX: 25.2 KG/M2 | TEMPERATURE: 98 F | HEART RATE: 74 BPM | RESPIRATION RATE: 14 BRPM | DIASTOLIC BLOOD PRESSURE: 80 MMHG | WEIGHT: 180 LBS | SYSTOLIC BLOOD PRESSURE: 173 MMHG

## 2024-08-09 DIAGNOSIS — Z86.79 HISTORY OF CORONARY ARTERY DISEASE: Primary | ICD-10-CM

## 2024-08-09 DIAGNOSIS — R07.9 ACUTE CHEST PAIN: ICD-10-CM

## 2024-08-09 LAB
BASOPHILS # BLD AUTO: 0.05 K/UL (ref 0–0.2)
BASOPHILS NFR BLD: 1 % (ref 0–1.9)
DIFFERENTIAL METHOD BLD: ABNORMAL
EOSINOPHIL # BLD AUTO: 0.3 K/UL (ref 0–0.5)
EOSINOPHIL NFR BLD: 5.8 % (ref 0–8)
ERYTHROCYTE [DISTWIDTH] IN BLOOD BY AUTOMATED COUNT: 13.5 % (ref 11.5–14.5)
HCT VFR BLD AUTO: 40.9 % (ref 40–54)
HGB BLD-MCNC: 13.9 G/DL (ref 14–18)
IMM GRANULOCYTES # BLD AUTO: 0.02 K/UL (ref 0–0.04)
IMM GRANULOCYTES NFR BLD AUTO: 0.4 % (ref 0–0.5)
LYMPHOCYTES # BLD AUTO: 2.4 K/UL (ref 1–4.8)
LYMPHOCYTES NFR BLD: 45.7 % (ref 18–48)
MCH RBC QN AUTO: 32.8 PG (ref 27–31)
MCHC RBC AUTO-ENTMCNC: 34 G/DL (ref 32–36)
MCV RBC AUTO: 97 FL (ref 82–98)
MONOCYTES # BLD AUTO: 0.7 K/UL (ref 0.3–1)
MONOCYTES NFR BLD: 12.6 % (ref 4–15)
NEUTROPHILS # BLD AUTO: 1.8 K/UL (ref 1.8–7.7)
NEUTROPHILS NFR BLD: 34.5 % (ref 38–73)
NRBC BLD-RTO: 0 /100 WBC
PLATELET # BLD AUTO: 257 K/UL (ref 150–450)
PMV BLD AUTO: 9.8 FL (ref 9.2–12.9)
RBC # BLD AUTO: 4.24 M/UL (ref 4.6–6.2)
TROPONIN I SERPL DL<=0.01 NG/ML-MCNC: <0.006 NG/ML (ref 0–0.03)
WBC # BLD AUTO: 5.16 K/UL (ref 3.9–12.7)

## 2024-08-09 PROCEDURE — 84484 ASSAY OF TROPONIN QUANT: CPT | Performed by: EMERGENCY MEDICINE

## 2024-08-09 PROCEDURE — 99285 EMERGENCY DEPT VISIT HI MDM: CPT | Mod: 25

## 2024-08-09 PROCEDURE — 93005 ELECTROCARDIOGRAM TRACING: CPT

## 2024-08-09 PROCEDURE — 85025 COMPLETE CBC W/AUTO DIFF WBC: CPT | Performed by: EMERGENCY MEDICINE

## 2024-08-09 PROCEDURE — 93010 ELECTROCARDIOGRAM REPORT: CPT | Mod: ,,, | Performed by: INTERNAL MEDICINE

## 2024-08-09 RX ORDER — PANTOPRAZOLE SODIUM 40 MG/1
TABLET, DELAYED RELEASE ORAL
Qty: 30 TABLET | Refills: 0 | Status: SHIPPED | OUTPATIENT
Start: 2024-08-09

## 2024-08-09 NOTE — ED TRIAGE NOTES
Patient come sin from [facility], for C/O left wall Chest pain, has H/O 3 stents and MI in the past states CP started yesterday, took 1 nitro, with help, no current chest pain, per facility had abnormal EKG.

## 2024-08-09 NOTE — ED PROVIDER NOTES
Encounter Date: 8/9/2024    SCRIBE #1 NOTE: I, Kameron Tucker, am scribing for, and in the presence of,  Taiwo Ariza MD. Other sections scribed: HPI, ROS.       History     Chief Complaint   Patient presents with    Chest Pain     Pt reports to the ED BIB [facility] from [facility] with C/O mid sternal CP that started last night. Pt has a Hx of MI's and had an abnormal EKG's per staff at [facility]. Pt currently with a CEC in record. Pt placed in [facility]LE bed with a sitter.      CC: Chest pain    HPI: History is obtained from independent historian. 69 y.o. M who has a PMHx of Acute renal insufficiency, Cocaine abuse, Depression, History of psychiatric care, HTN, STEMI, and Stroke who presents to the ED via EMS transportation from [facility] for emergent evaluation of acute CP episode of CP yesterday. Pt states that the CP lasted 3-4 hours. He states that the CP resolved after taking a Nitroglycerin. Pt reports having an abnormal EKG yesterday, in which staff at [facility] was concerned about possible MI, or possible Stroke. Pt reports a Hx of MI and placement of 3 cardiac stents. Pt takes Carvedilol, Plavix, and Aspirin daily, which he is compliant with medications and took today. Pt denies fever, chills, nasal congestion, runny nose, sore throat, ear pain, eye problem, cough, SOB, CP, abdominal pain, nausea, vomiting, diarrhea, dysuria, arm or leg problems, rash, or headache.      The history is provided by the patient. No  was used.     Review of patient's allergies indicates:  No Known Allergies  Past Medical History:   Diagnosis Date    Acute renal insufficiency 6/21/2015    Cocaine abuse     Depression     History of psychiatric care     HTN (hypertension) 1/25/2014    STEMI (ST elevation myocardial infarction) 2/12/2017    Stroke     Tobacco dependence due to cigarettes 5/25/2016     Past Surgical History:   Procedure Laterality Date    CARDIAC SURGERY      stents    NECK SURGERY        Family History   Problem Relation Name Age of Onset    Diabetes Mother      Heart disease Mother      Hypertension Mother       Social History     Tobacco Use    Smoking status: Some Days     Current packs/day: 0.25     Types: Cigarettes    Smokeless tobacco: Never   Substance Use Topics    Alcohol use: Not Currently     Comment: social    Drug use: Yes     Types: Cocaine     Comment: this weekend     Review of Systems   Constitutional:  Negative for chills and fever.   HENT:  Negative for congestion, ear pain, rhinorrhea and sore throat.    Eyes:  Negative for pain and visual disturbance.   Respiratory:  Negative for cough and shortness of breath.    Cardiovascular:  Positive for chest pain (resolved).   Gastrointestinal:  Negative for abdominal pain, diarrhea, nausea and vomiting.   Genitourinary:  Negative for dysuria.   Musculoskeletal:  Negative for back pain and myalgias.   Skin:  Negative for rash.   Neurological:  Negative for weakness and headaches.   Hematological:  Does not bruise/bleed easily.       Physical Exam     Initial Vitals [08/09/24 1153]   BP Pulse Resp Temp SpO2   112/74 70 16 97.6 °F (36.4 °C) 98 %      MAP       --         Physical Exam  The patient was examined specifically for the following:   General:No significant distress, Good color, Warm and dry. Head and neck:Scalp atraumatic, Neck supple. Neurological:Appropriate conversation, Gross motor deficits. Eyes:Conjugate gaze, Clear corneas. ENT: No epistaxis. Cardiac: Regular rate and rhythm, Grossly normal heart tones. Pulmonary: Wheezing, Rales. Gastrointestinal: Abdominal tenderness, Abdominal distention. Musculoskeletal: Extremity deformity, Apparent pain with range of motion of the joints. Skin: Rash.   The findings on examination were normal .  Lungs are clear.  The heart tones are normal.  The abdomen is soft.  Extremities are nontender there is no apparent pain with range of motion any joints.  The patient has no rash.   ED  Course   Procedures  Labs Reviewed   CBC W/ AUTO DIFFERENTIAL - Abnormal       Result Value    WBC 5.16      RBC 4.24 (*)     Hemoglobin 13.9 (*)     Hematocrit 40.9      MCV 97      MCH 32.8 (*)     MCHC 34.0      RDW 13.5      Platelets 257      MPV 9.8      Immature Granulocytes 0.4      Gran # (ANC) 1.8      Immature Grans (Abs) 0.02      Lymph # 2.4      Mono # 0.7      Eos # 0.3      Baso # 0.05      nRBC 0      Gran % 34.5 (*)     Lymph % 45.7      Mono % 12.6      Eosinophil % 5.8      Basophil % 1.0      Differential Method Automated     TROPONIN I    Troponin I <0.006     COMPREHENSIVE METABOLIC PANEL     EKG Readings: (Independently Interpreted)   This patient is in a normal sinus rhythm heart rate of 66 there are no significant ST segment or T-wave changes.  There are some nonspecific ST segment and T-wave changes.  There is no evidence of acute myocardial infarction or malignant arrhythmia.  This is doctor Ariza dictating an I independently interpreted this EKG.       Imaging Results              X-Ray Chest AP Portable (Final result)  Result time 08/09/24 13:20:56      Final result by Ken Chen MD (08/09/24 13:20:56)                   Impression:      1. No acute cardiopulmonary process appreciated.      Electronically signed by: Ken Chen  Date:    08/09/2024  Time:    13:20               Narrative:    EXAMINATION:  XR CHEST AP PORTABLE    CLINICAL HISTORY:  chest pain;    TECHNIQUE:  Single frontal portable view of the chest was performed.    COMPARISON:  Chest radiograph 07/11/2024    FINDINGS:  Cardiomediastinal silhouette is within normal limits.    No focal consolidation, overt interstitial edema, sizable pleural effusion or pneumothorax.    Multilevel degenerative changes of the imaged spine.                                       Medications - No data to display  Medical Decision Making  Amount and/or Complexity of Data Reviewed  Independent Historian:      Details: See HPI  Labs:  ordered.  Radiology: ordered.    This patient presents emergency room with chest pain that lasted yesterday and was relieved by nitroglycerin.  The patient has a history of coronary artery disease.  He has stents.  He is maintained on Eliquis and Plavix.  His EKG is essentially unchanged from yesterday.  A troponin is negative here today.  She has not had chest pain since yesterday.  I will discharge him to outpatient evaluation and treatment I will have him follow up with his cardiologist.  Chest x-ray fails to reveal pneumothorax pneumonia pleural effusion.  I will treat this patient for reflux.  I will have him follow up with his cardiologist.  Gastritis and reflux esophagitis are also considered in the differential diagnosis.  Chest wall pain remains possible as well.  I doubt pulmonary embolus the patient is not tachycardic or hypoxic.        Scribe Attestation:   Scribe #1: I performed the above scribed service and the documentation accurately describes the services I performed. I attest to the accuracy of the note.                           Please note that the documentation on this chart was provided by the scribe above on the date of service noted above, and that the documentation in the chart accurately reflects the work and decisions made by me alone.  Signed, Dr. Ariza      Clinical Impression:  Final diagnoses:  [R07.9] Acute chest pain  [Z86.79] History of coronary artery disease (Primary)          ED Disposition Condition    Discharge Stable          ED Prescriptions    None       Follow-up Information       Follow up With Specialties Details Why Contact Info    Norman Root MD Cardiology, Interventional Cardiology In 3 days  [address]  [address]  [facility] [address]  [phone]               Taiwo Ariza MD  08/09/24 4428       Taiwo Ariza MD  08/09/24 1130

## 2024-08-09 NOTE — ED NOTES
Called  [facility] to make them aware of the PEC/CEC patient transferred to [facility] for medical clearance,then patient will be transferred back to [facility].Per Janet ( [facility]) there's no need to notify them if the patient is CEC'd.Because they're not being tracked by the [facility] anymore.

## 2024-08-09 NOTE — ED NOTES
Patient now calm in bed, reviewed will allow call to girlfriend, reviewed not allowed visitors in ED, verbalized understanding.

## 2024-08-09 NOTE — ED NOTES
Report called to Mera at [facility]. All questions answered. PT awaiting transport back

## 2024-08-09 NOTE — DISCHARGE INSTRUCTIONS
Please return immediately if you get worse or if new problems develop.  Please avoid caffeine carbonation alcohol cigarette citrus tomato Naprosyn aspirin ibuprofen.  Please continue your usual medicines.  Please begin pantoprazole.  Please follow up with your cardiologist the cardiologist above next week.  Rest.

## 2024-08-09 NOTE — ED NOTES
Patient ambulated to bed from stretcher with Ems, reviewed need to place hospital scrubs on , states wants to call his girlfriend prior to doing anything else, if not letting him use the phone, he can go back where the [facility] he came from, reviewed again was coming in hasn't been assessed, need to reviewed why he was in patient at [facility] prior to letting anything happen that came in for Chest pain, needs to be hooked up[ to check EKG, states they have been doing that all morning, and wants to call his girlfriend, security called to speak with patient.

## 2024-08-10 LAB
OHS QRS DURATION: 74 MS
OHS QTC CALCULATION: 410 MS

## 2024-09-08 ENCOUNTER — HOSPITAL ENCOUNTER (EMERGENCY)
Facility: HOSPITAL | Age: 69
Discharge: PSYCHIATRIC OR SUBSTANCE USE FACILITY | End: 2024-09-09
Attending: EMERGENCY MEDICINE
Payer: MEDICARE

## 2024-09-08 DIAGNOSIS — F14.10 COCAINE ABUSE: ICD-10-CM

## 2024-09-08 DIAGNOSIS — R45.851 DEPRESSION WITH SUICIDAL IDEATION: ICD-10-CM

## 2024-09-08 DIAGNOSIS — F32.A DEPRESSION WITH SUICIDAL IDEATION: ICD-10-CM

## 2024-09-08 DIAGNOSIS — R00.0 TACHYCARDIA: ICD-10-CM

## 2024-09-08 DIAGNOSIS — Z00.8 MEDICAL CLEARANCE FOR PSYCHIATRIC ADMISSION: ICD-10-CM

## 2024-09-08 DIAGNOSIS — R45.851 SUICIDAL IDEATION: Primary | ICD-10-CM

## 2024-09-08 LAB
ALBUMIN SERPL BCP-MCNC: 4 G/DL (ref 3.5–5.2)
ALLENS TEST: ABNORMAL
ALLENS TEST: NORMAL
ALP SERPL-CCNC: 68 U/L (ref 55–135)
ALT SERPL W/O P-5'-P-CCNC: 26 U/L (ref 10–44)
AMPHET+METHAMPHET UR QL: NEGATIVE
ANION GAP SERPL CALC-SCNC: 14 MMOL/L (ref 8–16)
APAP SERPL-MCNC: <3 UG/ML (ref 10–20)
AST SERPL-CCNC: 35 U/L (ref 10–40)
BACTERIA #/AREA URNS AUTO: ABNORMAL /HPF
BARBITURATES UR QL SCN>200 NG/ML: NEGATIVE
BASOPHILS # BLD AUTO: 0.04 K/UL (ref 0–0.2)
BASOPHILS NFR BLD: 0.7 % (ref 0–1.9)
BENZODIAZ UR QL SCN>200 NG/ML: NEGATIVE
BILIRUB SERPL-MCNC: 1.1 MG/DL (ref 0.1–1)
BILIRUB UR QL STRIP: NEGATIVE
BUN SERPL-MCNC: 18 MG/DL (ref 8–23)
BZE UR QL SCN: ABNORMAL
CALCIUM SERPL-MCNC: 9.4 MG/DL (ref 8.7–10.5)
CANNABINOIDS UR QL SCN: NEGATIVE
CHLORIDE SERPL-SCNC: 113 MMOL/L (ref 95–110)
CLARITY UR REFRACT.AUTO: CLEAR
CO2 SERPL-SCNC: 15 MMOL/L (ref 23–29)
COLOR UR AUTO: YELLOW
CREAT SERPL-MCNC: 1.5 MG/DL (ref 0.5–1.4)
CREAT UR-MCNC: 356 MG/DL (ref 23–375)
DIFFERENTIAL METHOD BLD: ABNORMAL
EOSINOPHIL # BLD AUTO: 0.1 K/UL (ref 0–0.5)
EOSINOPHIL NFR BLD: 2 % (ref 0–8)
ERYTHROCYTE [DISTWIDTH] IN BLOOD BY AUTOMATED COUNT: 13.6 % (ref 11.5–14.5)
EST. GFR  (NO RACE VARIABLE): 49.8 ML/MIN/1.73 M^2
ETHANOL SERPL-MCNC: <10 MG/DL
GLUCOSE SERPL-MCNC: 113 MG/DL (ref 70–110)
GLUCOSE UR QL STRIP: NEGATIVE
HCO3 UR-SCNC: 20.9 MMOL/L (ref 24–28)
HCT VFR BLD AUTO: 45.8 % (ref 40–54)
HGB BLD-MCNC: 15.2 G/DL (ref 14–18)
HGB UR QL STRIP: ABNORMAL
HYALINE CASTS UR QL AUTO: 6 /LPF
IMM GRANULOCYTES # BLD AUTO: 0.01 K/UL (ref 0–0.04)
IMM GRANULOCYTES NFR BLD AUTO: 0.2 % (ref 0–0.5)
KETONES UR QL STRIP: NEGATIVE
LDH SERPL L TO P-CCNC: 1.04 MMOL/L (ref 0.5–2.2)
LEUKOCYTE ESTERASE UR QL STRIP: NEGATIVE
LYMPHOCYTES # BLD AUTO: 1.8 K/UL (ref 1–4.8)
LYMPHOCYTES NFR BLD: 30.6 % (ref 18–48)
MCH RBC QN AUTO: 33.1 PG (ref 27–31)
MCHC RBC AUTO-ENTMCNC: 33.2 G/DL (ref 32–36)
MCV RBC AUTO: 100 FL (ref 82–98)
METHADONE UR QL SCN>300 NG/ML: NEGATIVE
MICROSCOPIC COMMENT: ABNORMAL
MONOCYTES # BLD AUTO: 0.7 K/UL (ref 0.3–1)
MONOCYTES NFR BLD: 12 % (ref 4–15)
NEUTROPHILS # BLD AUTO: 3.3 K/UL (ref 1.8–7.7)
NEUTROPHILS NFR BLD: 54.5 % (ref 38–73)
NITRITE UR QL STRIP: NEGATIVE
NRBC BLD-RTO: 0 /100 WBC
OPIATES UR QL SCN: NEGATIVE
PCO2 BLDA: 33.4 MMHG (ref 35–45)
PCP UR QL SCN>25 NG/ML: NEGATIVE
PH SMN: 7.41 [PH] (ref 7.35–7.45)
PH UR STRIP: 6 [PH] (ref 5–8)
PLATELET # BLD AUTO: 250 K/UL (ref 150–450)
PMV BLD AUTO: 9.1 FL (ref 9.2–12.9)
PO2 BLDA: 57 MMHG (ref 40–60)
POC BE: -4 MMOL/L
POC SATURATED O2: 90 % (ref 95–100)
POC TCO2: 22 MMOL/L (ref 24–29)
POTASSIUM SERPL-SCNC: 4.1 MMOL/L (ref 3.5–5.1)
PROT SERPL-MCNC: 8 G/DL (ref 6–8.4)
PROT UR QL STRIP: ABNORMAL
RBC # BLD AUTO: 4.59 M/UL (ref 4.6–6.2)
RBC #/AREA URNS AUTO: 4 /HPF (ref 0–4)
SAMPLE: ABNORMAL
SAMPLE: NORMAL
SITE: ABNORMAL
SITE: NORMAL
SODIUM SERPL-SCNC: 142 MMOL/L (ref 136–145)
SP GR UR STRIP: 1.03 (ref 1–1.03)
TOXICOLOGY INFORMATION: ABNORMAL
TSH SERPL DL<=0.005 MIU/L-ACNC: 0.57 UIU/ML (ref 0.4–4)
URN SPEC COLLECT METH UR: ABNORMAL
WBC # BLD AUTO: 5.98 K/UL (ref 3.9–12.7)
WBC #/AREA URNS AUTO: 2 /HPF (ref 0–5)

## 2024-09-08 PROCEDURE — 82077 ASSAY SPEC XCP UR&BREATH IA: CPT | Performed by: EMERGENCY MEDICINE

## 2024-09-08 PROCEDURE — 93010 ELECTROCARDIOGRAM REPORT: CPT | Mod: ,,, | Performed by: INTERNAL MEDICINE

## 2024-09-08 PROCEDURE — 93005 ELECTROCARDIOGRAM TRACING: CPT

## 2024-09-08 PROCEDURE — 81001 URINALYSIS AUTO W/SCOPE: CPT

## 2024-09-08 PROCEDURE — 99900035 HC TECH TIME PER 15 MIN (STAT)

## 2024-09-08 PROCEDURE — 84443 ASSAY THYROID STIM HORMONE: CPT

## 2024-09-08 PROCEDURE — 80143 DRUG ASSAY ACETAMINOPHEN: CPT

## 2024-09-08 PROCEDURE — 99285 EMERGENCY DEPT VISIT HI MDM: CPT | Mod: 25

## 2024-09-08 PROCEDURE — 83605 ASSAY OF LACTIC ACID: CPT

## 2024-09-08 PROCEDURE — 80307 DRUG TEST PRSMV CHEM ANLYZR: CPT

## 2024-09-08 PROCEDURE — 87040 BLOOD CULTURE FOR BACTERIA: CPT | Mod: 59

## 2024-09-08 PROCEDURE — 82803 BLOOD GASES ANY COMBINATION: CPT

## 2024-09-08 PROCEDURE — 85025 COMPLETE CBC W/AUTO DIFF WBC: CPT

## 2024-09-08 PROCEDURE — 80053 COMPREHEN METABOLIC PANEL: CPT

## 2024-09-09 VITALS
WEIGHT: 179.88 LBS | HEIGHT: 71 IN | RESPIRATION RATE: 18 BRPM | BODY MASS INDEX: 25.18 KG/M2 | TEMPERATURE: 98 F | HEART RATE: 72 BPM | OXYGEN SATURATION: 98 % | DIASTOLIC BLOOD PRESSURE: 67 MMHG | SYSTOLIC BLOOD PRESSURE: 144 MMHG

## 2024-09-09 LAB
OHS QRS DURATION: 76 MS
OHS QTC CALCULATION: 430 MS

## 2024-09-09 PROCEDURE — 63600175 PHARM REV CODE 636 W HCPCS: Performed by: EMERGENCY MEDICINE

## 2024-09-09 PROCEDURE — 96372 THER/PROPH/DIAG INJ SC/IM: CPT | Performed by: EMERGENCY MEDICINE

## 2024-09-09 PROCEDURE — 25000003 PHARM REV CODE 250: Performed by: EMERGENCY MEDICINE

## 2024-09-09 RX ORDER — HALOPERIDOL 5 MG/ML
5 INJECTION INTRAMUSCULAR
Status: COMPLETED | OUTPATIENT
Start: 2024-09-09 | End: 2024-09-09

## 2024-09-09 RX ORDER — HYDROXYZINE PAMOATE 25 MG/1
25 CAPSULE ORAL
Status: DISCONTINUED | OUTPATIENT
Start: 2024-09-09 | End: 2024-09-09 | Stop reason: HOSPADM

## 2024-09-09 RX ADMIN — HALOPERIDOL LACTATE 5 MG: 5 INJECTION, SOLUTION INTRAMUSCULAR at 02:09

## 2024-09-09 NOTE — ED NOTES
Pt remains in paper scrubs, resting in stretcher comfortably - with side rails up, locked, and in lowest position. Chest rise and fall noted; breathing equal, even, and unlabored. Sitter remains at bedside in direct visual contact, charting per protocol every 15 minutes. No equipment or belongings are in the patients room to prevent self harm or injury. Pt aware of plan of care. No acute distress noted and no needs expressed at this time. Will continue to assess periodically.

## 2024-09-09 NOTE — ED NOTES
Patient became agitated when he was told he wasn't going to community care. Informed community care how the patient was acting. Per community care okay to medicate patient and send him over. Stated they would give him nicotine patches.

## 2024-09-09 NOTE — ED PROVIDER NOTES
"Encounter Date: 9/8/2024       History     Chief Complaint   Patient presents with    Suicidal     Suicidal ideation without plan since yesterday.     69 y.o. male with a PMH of CHF, depression, cocaine abuse, STEMI, CVA, tobacco use, prior psychiatric hospitalizations presents to [facility] for evaluation of suicidal ideation.  Patient reports he has been feeling depressed and feels that many loved ones and close friends have passed away over the years increasing his depression.  He reports wanted to commit suicide yesterday so he started cocaine "because I have a weak heart and I thought it would kill me".  He denies HI, age/VH, chest pain, abdominal pain, or any other symptoms.  He reports he would like to get help for his depression and cocaine use.          The history is provided by the patient and medical records.     Review of patient's allergies indicates:  No Known Allergies  Past Medical History:   Diagnosis Date    Acute renal insufficiency 6/21/2015    Cocaine abuse     Depression     History of psychiatric care     HTN (hypertension) 1/25/2014    STEMI (ST elevation myocardial infarction) 2/12/2017    Stroke     Tobacco dependence due to cigarettes 5/25/2016     Past Surgical History:   Procedure Laterality Date    CARDIAC SURGERY      stents    NECK SURGERY       Family History   Problem Relation Name Age of Onset    Diabetes Mother      Heart disease Mother      Hypertension Mother       Social History     Tobacco Use    Smoking status: Some Days     Current packs/day: 0.25     Types: Cigarettes    Smokeless tobacco: Never   Substance Use Topics    Alcohol use: Not Currently     Comment: social    Drug use: Yes     Types: Cocaine     Comment: this weekend     Review of Systems    Physical Exam     Initial Vitals [09/08/24 1907]   BP Pulse Resp Temp SpO2   (!) 142/78 100 18 98.2 °F (36.8 °C) 99 %      MAP       --         Physical Exam    Nursing note and vitals " reviewed.  Constitutional: He appears well-developed and well-nourished. He is cooperative. No distress.   HENT:   Head: Normocephalic.   Mouth/Throat: Oropharynx is clear and moist.   Eyes: Pupils are equal, round, and reactive to light. No scleral icterus.   Neck: Neck supple.   Cardiovascular:  Normal rate, regular rhythm, normal heart sounds and intact distal pulses.           Pulmonary/Chest: Breath sounds normal. No stridor. No respiratory distress.   Abdominal: Abdomen is soft. He exhibits no distension. There is no abdominal tenderness.   Musculoskeletal:         General: No edema.      Cervical back: Neck supple.     Neurological: He is alert. GCS score is 15. GCS eye subscore is 4. GCS verbal subscore is 5. GCS motor subscore is 6.   Skin: Skin is warm and dry. No rash noted.   Psychiatric: He is not agitated. Thought content is not paranoid. He exhibits a depressed mood. He expresses suicidal ideation. He expresses no homicidal ideation.   Cooperative         ED Course   Procedures  Labs Reviewed   CBC W/ AUTO DIFFERENTIAL - Abnormal       Result Value    WBC 5.98      RBC 4.59 (*)     Hemoglobin 15.2      Hematocrit 45.8       (*)     MCH 33.1 (*)     MCHC 33.2      RDW 13.6      Platelets 250      MPV 9.1 (*)     Immature Granulocytes 0.2      Gran # (ANC) 3.3      Immature Grans (Abs) 0.01      Lymph # 1.8      Mono # 0.7      Eos # 0.1      Baso # 0.04      nRBC 0      Gran % 54.5      Lymph % 30.6      Mono % 12.0      Eosinophil % 2.0      Basophil % 0.7      Differential Method Automated     COMPREHENSIVE METABOLIC PANEL - Abnormal    Sodium 142      Potassium 4.1      Chloride 113 (*)     CO2 15 (*)     Glucose 113 (*)     BUN 18      Creatinine 1.5 (*)     Calcium 9.4      Total Protein 8.0      Albumin 4.0      Total Bilirubin 1.1 (*)     Alkaline Phosphatase 68      AST 35      ALT 26      eGFR 49.8 (*)     Anion Gap 14     URINALYSIS, REFLEX TO URINE CULTURE - Abnormal    Specimen UA  Urine, Clean Catch      Color, UA Yellow      Appearance, UA Clear      pH, UA 6.0      Specific Gravity, UA 1.030      Protein, UA 1+ (*)     Glucose, UA Negative      Ketones, UA Negative      Bilirubin (UA) Negative      Occult Blood UA 1+ (*)     Nitrite, UA Negative      Leukocytes, UA Negative      Narrative:     Specimen Source->Urine   ACETAMINOPHEN LEVEL - Abnormal    Acetaminophen (Tylenol), Serum <3.0 (*)     Narrative:     ADD ON ACETM AND TSH PER MARV DIAZ MD ON  09/08/2024  20:47    URINALYSIS MICROSCOPIC - Abnormal    RBC, UA 4      WBC, UA 2      Bacteria Few (*)     Hyaline Casts, UA 6 (*)     Microscopic Comment SEE COMMENT      Narrative:     Specimen Source->Urine   ISTAT PROCEDURE - Abnormal    POC PH 7.405      POC PCO2 33.4 (*)     POC PO2 57      POC HCO3 20.9 (*)     POC BE -4 (*)     POC SATURATED O2 90      POC TCO2 22 (*)     Sample VENOUS      Site Other      Allens Test N/A     CULTURE, BLOOD   CULTURE, BLOOD   ACETAMINOPHEN LEVEL   ALCOHOL,MEDICAL (ETHANOL)   TSH   TSH    TSH 0.570      Narrative:     ADD ON ACETM AND TSH PER MARV DIAZ MD ON  09/08/2024  20:47    ALCOHOL,MEDICAL (ETHANOL)    Alcohol, Serum <10     DRUG SCREEN PANEL, URINE EMERGENCY   ISTAT LACTATE    POC Lactate 1.04      Sample VENOUS      Site Other      Allens Test N/A            Imaging Results              X-Ray Chest AP Portable (Final result)  Result time 09/08/24 20:04:58      Final result by Rufino Mayen MD (09/08/24 20:04:58)                   Impression:      No radiographic acute intrathoracic process seen.  Specifically, no consolidative process.      Electronically signed by: Rufino Mayen MD  Date:    09/08/2024  Time:    20:04               Narrative:    EXAMINATION:  XR CHEST AP PORTABLE    CLINICAL HISTORY:  Sepsis;    TECHNIQUE:  Single frontal view of the chest was performed.    COMPARISON:  Chest radiograph 08/09/2024    FINDINGS:  No detrimental change.  Bibasilar minimal platelike scarring  versus atelectasis.Star otherwise well expanded without consolidation, pleural effusion or pneumothorax.    The cardiac silhouette is normal in size. The hilar and mediastinal contours are normal limits for age noting calcification at the arch, stable.    Partially imaged lower cervical ACDF hardware noted.  No acute osseous process seen.  PA and lateral views can be obtained.                                       Medications - No data to display  Medical Decision Making  69-year-old male presents to ED for evaluation of suicide ideation.    Patient is afebrile, hemodynamically stable, and in no acute distress on arrival.     Differential diagnoses considered include, but not limited to:  Depression, suicide ideation, drug induced psychosis    Patient medically cleared for psychiatric admission.      Amount and/or Complexity of Data Reviewed  Labs: ordered. Decision-making details documented in ED Course.  Radiology: ordered.                  Medically cleared for psychiatry placement: 9/8/2024 10:42 PM                   Clinical Impression:  Final diagnoses:  [R00.0] Tachycardia  [R45.851] Suicidal ideation (Primary)  [F32.A, R45.851] Depression with suicidal ideation  [Z00.8] Medical clearance for psychiatric admission  [F14.10] Cocaine abuse          ED Disposition Condition    Transfer to Psych Facility Stable          ED Prescriptions    None       Follow-up Information    None          Carola Hargrove MD  Resident  09/08/24 3688

## 2024-09-09 NOTE — PROVIDER PROGRESS NOTES - EMERGENCY DEPT.
Encounter Date: 9/8/2024    ED Physician Progress Notes        2:14 AM  Patient accepted to [facility] and became verbally aggressive. Reports he thought he was going back to community care and does not want to go to [facility] because he cannot smoke. Patient noted that they would need to call the police because he is going to become aggressive.    I went and evaluated the patient. Ems has him in soft restraints. Haldol 5 mg IM ordered. Patient still cleared for transfer to [facility].    Final diagnoses:  [R00.0] Tachycardia  [R45.851] Suicidal ideation (Primary)  [F32.A, R45.851] Depression with suicidal ideation  [Z00.8] Medical clearance for psychiatric admission  [F14.10] Cocaine abuse

## 2024-09-09 NOTE — ED TRIAGE NOTES
Damir Faria, a 69 y.o. male presents to the ED w/ complaint of SI/HI that started yesterday. Pt stated he has been sad since people around him have been dying. Pt stated since he knows he has a bad heart he figured he would do cocaine and it would do it. Last taken yesterday. Denies CP and SOB.     Triage note:  Chief Complaint   Patient presents with    Suicidal     Suicidal ideation without plan since yesterday.     Review of patient's allergies indicates:  No Known Allergies  Past Medical History:   Diagnosis Date    Acute renal insufficiency 6/21/2015    Cocaine abuse     Depression     History of psychiatric care     HTN (hypertension) 1/25/2014    STEMI (ST elevation myocardial infarction) 2/12/2017    Stroke     Tobacco dependence due to cigarettes 5/25/2016

## 2024-09-09 NOTE — PROVIDER PROGRESS NOTES - EMERGENCY DEPT.
Encounter Date: 9/8/2024    ED Physician Progress Notes          Physician Attestation Statement for Resident:  As the supervising MD   Physician Attestation Statement: I have personally seen and examined this patient.       I agree with the history unless otherwise noted.     As the supervising MD I agree with the PE unless otherwise noted.      I have reviewed and agree with the residents interpretation of the following unless otherwise noted:   I have personally reviewed and interpreted the patients laboratory studies:  CMP with creatinine of 1.5, non-anion gap metabolic acidosis noted, T bili 1.1  I have personally reviewed and interpreted imaging studies: CXR: No evidence of consolidation, cardiomegaly, pulmonary edema, pneumothroax  I have personally reviewed and interpreted the patient's EKG:  Normal sinus rhythm at 80 beats per minute, , , J-point with ST elevation in V1 through V4, similar to previous      I have also reviewed the following:   external records:  EKG from 07/2024 with similar morphology in lateral leads to current.  Prior creatinine 1.5    As the supervising MD I agree with the treatment, course, plan, and disposition unless otherwise noted.

## 2024-09-13 LAB
BACTERIA BLD CULT: NORMAL
BACTERIA BLD CULT: NORMAL

## 2024-12-15 ENCOUNTER — HOSPITAL ENCOUNTER (EMERGENCY)
Facility: HOSPITAL | Age: 69
Discharge: PSYCHIATRIC OR SUBSTANCE USE FACILITY | End: 2024-12-16
Attending: EMERGENCY MEDICINE
Payer: MEDICARE

## 2024-12-15 DIAGNOSIS — R31.9 HEMATURIA, UNSPECIFIED TYPE: ICD-10-CM

## 2024-12-15 DIAGNOSIS — R45.850 HOMICIDAL IDEATION: ICD-10-CM

## 2024-12-15 DIAGNOSIS — Z00.8 MEDICAL CLEARANCE FOR PSYCHIATRIC ADMISSION: ICD-10-CM

## 2024-12-15 DIAGNOSIS — F32.A DEPRESSION, UNSPECIFIED DEPRESSION TYPE: Primary | ICD-10-CM

## 2024-12-15 DIAGNOSIS — R45.851 SUICIDAL IDEATION: ICD-10-CM

## 2024-12-15 LAB
ALBUMIN SERPL BCP-MCNC: 3.9 G/DL (ref 3.5–5.2)
ALP SERPL-CCNC: 80 U/L (ref 40–150)
ALT SERPL W/O P-5'-P-CCNC: 17 U/L (ref 10–44)
AMPHET+METHAMPHET UR QL: NEGATIVE
ANION GAP SERPL CALC-SCNC: 8 MMOL/L (ref 8–16)
APAP SERPL-MCNC: <3 UG/ML (ref 10–20)
AST SERPL-CCNC: 20 U/L (ref 10–40)
BARBITURATES UR QL SCN>200 NG/ML: NEGATIVE
BASOPHILS # BLD AUTO: 0.03 K/UL (ref 0–0.2)
BASOPHILS NFR BLD: 0.6 % (ref 0–1.9)
BENZODIAZ UR QL SCN>200 NG/ML: NEGATIVE
BILIRUB SERPL-MCNC: 1.1 MG/DL (ref 0.1–1)
BILIRUB UR QL STRIP: NEGATIVE
BUN SERPL-MCNC: 13 MG/DL (ref 8–23)
BZE UR QL SCN: ABNORMAL
CALCIUM SERPL-MCNC: 9.2 MG/DL (ref 8.7–10.5)
CANNABINOIDS UR QL SCN: NEGATIVE
CHLORIDE SERPL-SCNC: 107 MMOL/L (ref 95–110)
CLARITY UR REFRACT.AUTO: CLEAR
CO2 SERPL-SCNC: 24 MMOL/L (ref 23–29)
COLOR UR AUTO: YELLOW
CREAT SERPL-MCNC: 1.4 MG/DL (ref 0.5–1.4)
CREAT UR-MCNC: 289 MG/DL (ref 23–375)
DIFFERENTIAL METHOD BLD: ABNORMAL
EOSINOPHIL # BLD AUTO: 0.2 K/UL (ref 0–0.5)
EOSINOPHIL NFR BLD: 3.4 % (ref 0–8)
ERYTHROCYTE [DISTWIDTH] IN BLOOD BY AUTOMATED COUNT: 13.2 % (ref 11.5–14.5)
EST. GFR  (NO RACE VARIABLE): 54.1 ML/MIN/1.73 M^2
ETHANOL SERPL-MCNC: <10 MG/DL
GLUCOSE SERPL-MCNC: 94 MG/DL (ref 70–110)
GLUCOSE UR QL STRIP: ABNORMAL
HCT VFR BLD AUTO: 43.5 % (ref 40–54)
HGB BLD-MCNC: 14.6 G/DL (ref 14–18)
HGB UR QL STRIP: ABNORMAL
IMM GRANULOCYTES # BLD AUTO: 0.02 K/UL (ref 0–0.04)
IMM GRANULOCYTES NFR BLD AUTO: 0.4 % (ref 0–0.5)
KETONES UR QL STRIP: NEGATIVE
LEUKOCYTE ESTERASE UR QL STRIP: NEGATIVE
LYMPHOCYTES # BLD AUTO: 1.9 K/UL (ref 1–4.8)
LYMPHOCYTES NFR BLD: 38.2 % (ref 18–48)
MCH RBC QN AUTO: 31.6 PG (ref 27–31)
MCHC RBC AUTO-ENTMCNC: 33.6 G/DL (ref 32–36)
MCV RBC AUTO: 94 FL (ref 82–98)
METHADONE UR QL SCN>300 NG/ML: NEGATIVE
MONOCYTES # BLD AUTO: 0.7 K/UL (ref 0.3–1)
MONOCYTES NFR BLD: 13.8 % (ref 4–15)
NEUTROPHILS # BLD AUTO: 2.2 K/UL (ref 1.8–7.7)
NEUTROPHILS NFR BLD: 43.6 % (ref 38–73)
NITRITE UR QL STRIP: NEGATIVE
NRBC BLD-RTO: 0 /100 WBC
OPIATES UR QL SCN: NEGATIVE
PCP UR QL SCN>25 NG/ML: NEGATIVE
PH UR STRIP: 6 [PH] (ref 5–8)
PLATELET # BLD AUTO: 243 K/UL (ref 150–450)
PMV BLD AUTO: 9 FL (ref 9.2–12.9)
POTASSIUM SERPL-SCNC: 4.2 MMOL/L (ref 3.5–5.1)
PROT SERPL-MCNC: 7.3 G/DL (ref 6–8.4)
PROT UR QL STRIP: ABNORMAL
RBC # BLD AUTO: 4.62 M/UL (ref 4.6–6.2)
SALICYLATES SERPL-MCNC: <5 MG/DL (ref 15–30)
SODIUM SERPL-SCNC: 139 MMOL/L (ref 136–145)
SP GR UR STRIP: 1.02 (ref 1–1.03)
TOXICOLOGY INFORMATION: ABNORMAL
TSH SERPL DL<=0.005 MIU/L-ACNC: 0.8 UIU/ML (ref 0.4–4)
URN SPEC COLLECT METH UR: ABNORMAL
WBC # BLD AUTO: 5 K/UL (ref 3.9–12.7)

## 2024-12-15 PROCEDURE — 82077 ASSAY SPEC XCP UR&BREATH IA: CPT | Performed by: EMERGENCY MEDICINE

## 2024-12-15 PROCEDURE — 96372 THER/PROPH/DIAG INJ SC/IM: CPT | Performed by: PHYSICIAN ASSISTANT

## 2024-12-15 PROCEDURE — 80143 DRUG ASSAY ACETAMINOPHEN: CPT | Performed by: EMERGENCY MEDICINE

## 2024-12-15 PROCEDURE — 99285 EMERGENCY DEPT VISIT HI MDM: CPT | Mod: 25

## 2024-12-15 PROCEDURE — 80307 DRUG TEST PRSMV CHEM ANLYZR: CPT | Performed by: EMERGENCY MEDICINE

## 2024-12-15 PROCEDURE — 80179 DRUG ASSAY SALICYLATE: CPT | Performed by: EMERGENCY MEDICINE

## 2024-12-15 PROCEDURE — 63600175 PHARM REV CODE 636 W HCPCS: Performed by: PHYSICIAN ASSISTANT

## 2024-12-15 PROCEDURE — 80053 COMPREHEN METABOLIC PANEL: CPT | Performed by: EMERGENCY MEDICINE

## 2024-12-15 PROCEDURE — 85025 COMPLETE CBC W/AUTO DIFF WBC: CPT | Performed by: EMERGENCY MEDICINE

## 2024-12-15 PROCEDURE — 84443 ASSAY THYROID STIM HORMONE: CPT | Performed by: EMERGENCY MEDICINE

## 2024-12-15 PROCEDURE — 81003 URINALYSIS AUTO W/O SCOPE: CPT | Mod: 59 | Performed by: EMERGENCY MEDICINE

## 2024-12-15 RX ORDER — HALOPERIDOL 5 MG/ML
5 INJECTION INTRAMUSCULAR
Status: COMPLETED | OUTPATIENT
Start: 2024-12-15 | End: 2024-12-15

## 2024-12-15 RX ADMIN — HALOPERIDOL LACTATE 5 MG: 5 INJECTION, SOLUTION INTRAMUSCULAR at 09:12

## 2024-12-15 NOTE — ED PROVIDER NOTES
Encounter Date: 12/15/2024       History     Chief Complaint   Patient presents with    Suicidal     Arrived via ems from home with c/o SI and depression, EMS was called [facility] after pt phone call with them, pt denies SI but does endorse depression     69 y.o. M with a PMHx of substance use disorder, CVA, CAD, depression, prior psychiatric hospitalization presents to ED via EMS after stating that he wanted to hurt himself and others this morning while in the phone with [facility] nurse. Nursing staff called police/EMS afterwards. He states that he was not being serious and has no intentions or plans of harming himself or others. He was in an argument last night with his girlfriend which precipitated feelings of depression this morning.  He states that he doesn't actually want to harm himself or his girl friend. He denies recent drug use, hallucinations.    The history is provided by the patient.     Review of patient's allergies indicates:  No Known Allergies  Past Medical History:   Diagnosis Date    Acute renal insufficiency 6/21/2015    Cocaine abuse     Depression     History of psychiatric care     HTN (hypertension) 1/25/2014    STEMI (ST elevation myocardial infarction) 2/12/2017    Stroke     Tobacco dependence due to cigarettes 5/25/2016     Past Surgical History:   Procedure Laterality Date    CARDIAC SURGERY      stents    NECK SURGERY       Family History   Problem Relation Name Age of Onset    Diabetes Mother      Heart disease Mother      Hypertension Mother       Social History     Tobacco Use    Smoking status: Some Days     Current packs/day: 0.25     Types: Cigarettes    Smokeless tobacco: Never   Substance Use Topics    Alcohol use: Not Currently     Comment: social    Drug use: Yes     Types: Cocaine     Comment: this weekend     Review of Systems   Psychiatric/Behavioral:  Positive for suicidal ideas. Negative for hallucinations.        Physical Exam     Initial Vitals   BP Pulse Resp Temp SpO2    12/15/24 1438 12/15/24 1438 12/15/24 1438 12/15/24 1608 12/15/24 1438   132/88 76 16 98 °F (36.7 °C) 98 %      MAP       --                Physical Exam    Nursing note and vitals reviewed.  Constitutional: He appears well-developed and well-nourished. He is not diaphoretic. No distress.   HENT:   Head: Normocephalic and atraumatic.   Nose: Nose normal.   Eyes: Conjunctivae and EOM are normal.   Neck: Neck supple.   Cardiovascular:  Normal rate.           Pulmonary/Chest: No respiratory distress.   Musculoskeletal:      Cervical back: Neck supple.     Neurological: He is alert and oriented to person, place, and time.   Skin: No rash noted.   Psychiatric: He has a normal mood and affect. His speech is normal and behavior is normal. Judgment normal. Thought content is not paranoid. He expresses no suicidal plans and no homicidal plans.         ED Course   Procedures  Labs Reviewed   CBC W/ AUTO DIFFERENTIAL - Abnormal       Result Value    WBC 5.00      RBC 4.62      Hemoglobin 14.6      Hematocrit 43.5      MCV 94      MCH 31.6 (*)     MCHC 33.6      RDW 13.2      Platelets 243      MPV 9.0 (*)     Immature Granulocytes 0.4      Gran # (ANC) 2.2      Immature Grans (Abs) 0.02      Lymph # 1.9      Mono # 0.7      Eos # 0.2      Baso # 0.03      nRBC 0      Gran % 43.6      Lymph % 38.2      Mono % 13.8      Eosinophil % 3.4      Basophil % 0.6      Differential Method Automated     COMPREHENSIVE METABOLIC PANEL - Abnormal    Sodium 139      Potassium 4.2      Chloride 107      CO2 24      Glucose 94      BUN 13      Creatinine 1.4      Calcium 9.2      Total Protein 7.3      Albumin 3.9      Total Bilirubin 1.1 (*)     Alkaline Phosphatase 80      AST 20      ALT 17      eGFR 54.1 (*)     Anion Gap 8     URINALYSIS, REFLEX TO URINE CULTURE - Abnormal    Specimen UA Urine, Clean Catch      Color, UA Yellow      Appearance, UA Clear      pH, UA 6.0      Specific Gravity, UA 1.025      Protein, UA Trace (*)      Glucose, UA Trace (*)     Ketones, UA Negative      Bilirubin (UA) Negative      Occult Blood UA Trace (*)     Nitrite, UA Negative      Leukocytes, UA Negative      Narrative:     Specimen Source->Urine   DRUG SCREEN PANEL, URINE EMERGENCY - Abnormal    Benzodiazepines Negative      Methadone metabolites Negative      Cocaine (Metab.) Presumptive Positive (*)     Opiate Scrn, Ur Negative      Barbiturate Screen, Ur Negative      Amphetamine Screen, Ur Negative      THC Negative      Phencyclidine Negative      Creatinine, Urine 289.0      Toxicology Information SEE COMMENT      Narrative:     Specimen Source->Urine   ACETAMINOPHEN LEVEL - Abnormal    Acetaminophen (Tylenol), Serum <3.0 (*)    SALICYLATE LEVEL - Abnormal    Salicylate Lvl <5.0 (*)    TSH    TSH 0.796     ALCOHOL,MEDICAL (ETHANOL)    Alcohol, Serum <10            Imaging Results    None          Medications - No data to display  Medical Decision Making  69 y.o. M with a PMHx of substance use disorder, CVA, CAD, depression, prior psychiatric hospitalization presents to ED via EMS after stating that he wanted to hurt himself and others this morning while in the phone with [facility] care nurse. Nontoxic appearing. Hemodynamically stable. Afebrile. Exam as above. I will initiate workup and reassess.    Ddx:  MDD, substance use disorder, gravely disabled, acute psychosis    I discussed case with psychiatry who will evaluate patient    Psychiatry evaluated patient and recommends inpatient psychiatric care. Patient showing poor insight.     At this time theres no contributory medical condition causing the psychiatric complaints present, there is no concern at present for a medical emergency and the patient is medically stable for transfer to intended facility    Amount and/or Complexity of Data Reviewed  Labs:  Decision-making details documented in ED Course.               ED Course as of 12/15/24 2025   Sun Dec 15, 2024   1923 WBC: 5.00 []   1923 Hemoglobin:  14.6 [HM]   1923 Hematocrit: 43.5 [HM]   1923 Sodium: 139 [HM]   1923 Potassium: 4.2 [HM]   1923 Chloride: 107 [HM]   1923 TSH: 0.796 [HM]   1923 Cocaine, Urine(!): Presumptive Positive [HM]   1923 Salicylate Level(!): <5.0 [HM]   1923 Alcohol, Serum: <10 [HM]   1923 Acetaminophen Level(!): <3.0 []   1923 Blood, UA(!): Trace [HM]   1923 NITRITE UA: Negative [HM]   1923 Leukocyte Esterase, UA: Negative [HM]      ED Course User Index  [HM] Shanae Austin PA-C                           Clinical Impression:  Final diagnoses:  [F32.A] Depression, unspecified depression type (Primary)  [R45.851] Suicidal ideation  [R45.850] Homicidal ideation  [Z00.8] Medical clearance for psychiatric admission  [R31.9] Hematuria, unspecified type                 Shanae Austin PA-C  12/15/24 2027

## 2024-12-15 NOTE — ED NOTES
"Damir Faria, a 69 y.o. male presents to the ED w/ complaint of BIB EMS, was having phone visit with [facility] where pt states he made "some comments about last night" but that he "never said he wanted to hurt himself" does confirm psych placement in the past for similar issues but states that is not a current issue for him    Triage note:  Chief Complaint   Patient presents with    Suicidal     Arrived via ems from home with c/o SI and depression, EMS was called [facility] after pt phone call with them, pt denies SI but does endorse depression     Review of patient's allergies indicates:  No Known Allergies  Past Medical History:   Diagnosis Date    Acute renal insufficiency 6/21/2015    Cocaine abuse     Depression     History of psychiatric care     HTN (hypertension) 1/25/2014    STEMI (ST elevation myocardial infarction) 2/12/2017    Stroke     Tobacco dependence due to cigarettes 5/25/2016       "

## 2024-12-16 VITALS
SYSTOLIC BLOOD PRESSURE: 142 MMHG | RESPIRATION RATE: 18 BRPM | TEMPERATURE: 98 F | HEART RATE: 78 BPM | WEIGHT: 179 LBS | HEIGHT: 71 IN | OXYGEN SATURATION: 99 % | DIASTOLIC BLOOD PRESSURE: 86 MMHG | BODY MASS INDEX: 25.06 KG/M2

## 2024-12-16 NOTE — CONSULTS
"Emergency Psychiatry Consult Note    12/15/2024 6:11 PM  Damir Faria  MRN: [id number]    Chief Complaint / Reason for Consult: "Passive SI/HI."      SUBJECTIVE     History of Present Illness:   Damir Faria is a 69 y.o. male with a past psychiatric history of depression and cocaine use, currently presenting to the ED involuntarily BIB EMS after disclosing suicidal statements during phone encounter with [facility] Care. Emergency Psychiatry was originally consulted to address the patient's symptoms of "passive SI/HI". UDS+ cocaine, EtOH undetectable.     Per ED RN(s):  69 y.o. male presents to the ED w/ complaint of BIB EMS, was having phone visit with [facility] care where pt states he made "some comments about last night" but that he "never said he wanted to hurt himself" does confirm psych placement in the past for similar issues but states that is not a current issue for him.    Per ED PA-C:  69 y.o. M with a PMHx of substance use disorder, CVA, CAD, depression, prior psychiatric hospitalization presents to ED via EMS after stating that he wanted to hurt himself and others this morning while in the phone with [facility] care nurse. He does not have a plan for SI or HI. He states that they were in an argument last night which precipitated feelings of depression this morning.  He states that he doesn't actually want to harm himself or his girl friend. He denies recent drug use, hallucinations.     The history is provided by the patient.     Spoke with ED PA-C prior to bedside evaluation of patient who stated that patient was unable or unwilling to provide any collateral information and stated that he lives alone.     Per Psychiatry:  Upon initiation of interview, pt was was resting in bed with lights turned off. He was easily roused for interview and immediately stands up when lights turned on. Repeatedly states current hospitalization is a mistake. Explains he attends [facility] daily, last went to group sessions on Friday. " "Today during a phone call with his [facility] nurse, he admitted he felt "depressed" and "wanted to kill myself" after an argument with his partner the previous night. States nurse then stated "she would send someone over to talk, but it ended up being the police to take me to the hospital." Adamant that "this was a figure of speech" and not in any way a suicidal gesture. Reminded and cautioned patient that these statements are taken very seriously. Asked patient why he has a history of making such provocative statements, and patient continued to be playful, laughing, and denying, stating, "If I wanted to hurt myself, don't you think  I would've done it already?" Unable to provide number for his partner, stating, "she won't ." Maintains if he is discharged, he would like to "go home, it's right down the street." Denies wanting to take "any psych meds, because I don't need them." Endorses recent snorting of cocaine 4 days ago. Denies regular alcohol use. Denies any need for rehab.     Attempted to call collateral several times using numbers in the chart. Currently unable to secure safe disposition, unable to confirm patient attends [facility], and unable to contact any collateral. Notified patient that psychiatry will be recommending inpatient psychiatric hospitalization.     Psychiatric Review of Systems:  sleep: no  appetite: no  weight: no  energy/anergy: no  interest/pleasure/anhedonia: no  somatic symptoms: no  libido: not asked   anxiety/panic: no  guilty/hopelessness: no  concentration: no  S.I.B.s/risky behavior: no  any drugs: yes  alcohol: yes     Medical Review Of Systems:  Pertinent items noted in HPI    Obtained with chart review and updated where appropriate.     Psychiatric History:  Previous Medication Trials: yes - Zoloft   Previous Psychiatric Hospitalizations:yes, most recently hospitalized at [facility] on 09/09/24. Previously hospitalized at [facility] 8/10/24.  Previous " "Suicide Attempts: yes, running into traffic several years ago   History of Violence: denies  Outpatient Psychiatrist: no  Family Psychiatric History: no    Suicide/Violence Risk Assessment:  Current/active suicidal ideation/plan/intent: No  Previous suicide attempts: Yes - see above   Current/active homicidal ideation/plan/intent: No  History of threats/arrests associated with violent conduct - no   Access to firearms/lethal weapons - denies - states nephew previously took his gun away     Social History:  Marital Status: previously   Children: 1,   Employment Status/Info: retired  :no  Education: 10th grade  Special Ed: no  Housing Status: states he lives alone "down the street"   Access to gun: denies  Psychosocial Stressors: marital and drug and alcohol  Functioning Relationships: strained with spouse or significant others    Substance Abuse History:  Recreational Drugs: cocaine  Use of Alcohol: occasional, social use  Rehab History: Yes    Tobacco Use: Yes   Use of Caffeine: not assessed   Use of OTC: not assessed   Is the patient aware of the biomedical complications associated with substance abuse and mental illness? Limited insight   Legal consequences of chemical use: Not assessed     Legal History:  Past Charges/Incarcerations: No  Pending Charges: No    Psychosocial Factors:  Stressors: partner relationship issues.   Functioning Relationships: states he is in [facility], close to sister and his girlfriend     Collateral:   No     Collateral Contacted: Hilda Faria (sister) at [phone] at 1823 this evening. No answer, no VM left. Re-attempted to call sister at alternative phone numbers listed in the chart ([phone], no answer and [phone], disconnected).     Scheduled Meds:    Psychotherapeutics (From admission, onward)      None          PRN Meds:    Home Meds:  Prior to Admission medications    Medication Sig Start Date End Date Taking? Authorizing Provider   apixaban " (ELIQUIS) 5 mg Tab Take 1 tablet (5 mg total) by mouth 2 (two) times daily. 11/26/19   Nilesh Marie MD   aspirin 81 MG Chew Take 1 tablet (81 mg total) by mouth once daily. for 17 days 7/24/23 8/10/23  Gayathri Hargrove PA-C   atorvastatin (LIPITOR) 40 MG tablet Take 1 tablet (40 mg total) by mouth every evening. 7/24/23 7/23/24  Gayathri Hargrove PA-C   carvediloL (COREG) 6.25 MG tablet Take 1 tablet (6.25 mg total) by mouth 2 (two) times daily. 7/24/23 7/23/24  Gayathri Hargrove PA-C   clopidogreL (PLAVIX) 75 mg tablet Take 1 tablet (75 mg total) by mouth once daily. 7/24/23 7/23/24  Gayathri Hargrove PA-C   ezetimibe (ZETIA) 10 mg tablet Take 1 tablet by mouth once daily. 7/10/23   Provider, Historical   hydrOXYzine pamoate (VISTARIL) 25 MG Cap Take 1 capsule (25 mg total) by mouth every 8 (eight) hours as needed (anxiety). 6/24/24   Reji Magallon PA-C   isosorbide mononitrate (IMDUR) 30 MG 24 hr tablet Take 30 mg by mouth once daily. 7/1/23   Provider, Historical   melatonin (MELATIN) 3 mg tablet Take 2 tablets (6 mg total) by mouth nightly as needed for Insomnia. 6/24/24   Reji Magallon PA-C   nicotine (NICODERM CQ) 14 mg/24 hr Place 1 patch onto the skin once daily. 8/5/24   Tiffanie Edwards MD   nitroGLYCERIN (NITROSTAT) 0.4 MG SL tablet Place 1 tablet (0.4 mg total) under the tongue every 5 (five) minutes as needed for Chest pain. 7/24/23   Gayathri Hargrove PA-C   pantoprazole (PROTONIX) 40 MG tablet Please take 1 tablet by mouth daily. 8/9/24   Taiwo Ariza MD     Psychotherapeutics (From admission, onward)      None          Allergies:  Patient has no known allergies.  Past Medical/Surgical History:  Past Medical History:   Diagnosis Date    Acute renal insufficiency 6/21/2015    Cocaine abuse     Depression     History of psychiatric care     HTN (hypertension) 1/25/2014    STEMI (ST elevation myocardial infarction) 2/12/2017    Stroke     Tobacco dependence  due to cigarettes 5/25/2016     Past Surgical History:   Procedure Laterality Date    CARDIAC SURGERY      stents    NECK SURGERY       OBJECTIVE     Vital Signs:  Temp:  [98 °F (36.7 °C)]   Pulse:  [76]   Resp:  [16]   BP: (132)/(88)   SpO2:  [98 %]     Mental Status Exam:  Appearance: unremarkable, age appropriate  Level of Consciousness: alert, awake   Behavior/Cooperation: psychomotor agitation, playful   Psychomotor: psychomotor agitation   Speech: normal tone, normal rate, normal pitch, normal volume, loud  Language: english, fluid  Orientation: grossly intact, person, place, situation, day of week, month of year, year  Attention Span/Concentration: intact  Memory: Grossly intact  Mood: Euthymic   Affect: normal  Thought Process: linear, normal and logical  Associations: normal and logical  Thought Content: normal, no suicidality, no homicidality, delusions, or paranoia although moderate to severe concerns of SI given recent statements   Fund of Knowledge: Intact  Abstraction: intact to conversation   Insight: poor  Judgment: poor    Laboratory Data:  Recent Results (from the past 48 hours)   CBC auto differential    Collection Time: 12/15/24  3:39 PM   Result Value Ref Range    WBC 5.00 3.90 - 12.70 K/uL    RBC 4.62 4.60 - 6.20 M/uL    Hemoglobin 14.6 14.0 - 18.0 g/dL    Hematocrit 43.5 40.0 - 54.0 %    MCV 94 82 - 98 fL    MCH 31.6 (H) 27.0 - 31.0 pg    MCHC 33.6 32.0 - 36.0 g/dL    RDW 13.2 11.5 - 14.5 %    Platelets 243 150 - 450 K/uL    MPV 9.0 (L) 9.2 - 12.9 fL    Immature Granulocytes 0.4 0.0 - 0.5 %    Gran # (ANC) 2.2 1.8 - 7.7 K/uL    Immature Grans (Abs) 0.02 0.00 - 0.04 K/uL    Lymph # 1.9 1.0 - 4.8 K/uL    Mono # 0.7 0.3 - 1.0 K/uL    Eos # 0.2 0.0 - 0.5 K/uL    Baso # 0.03 0.00 - 0.20 K/uL    nRBC 0 0 /100 WBC    Gran % 43.6 38.0 - 73.0 %    Lymph % 38.2 18.0 - 48.0 %    Mono % 13.8 4.0 - 15.0 %    Eosinophil % 3.4 0.0 - 8.0 %    Basophil % 0.6 0.0 - 1.9 %    Differential Method Automated     Comprehensive metabolic panel    Collection Time: 12/15/24  3:39 PM   Result Value Ref Range    Sodium 139 136 - 145 mmol/L    Potassium 4.2 3.5 - 5.1 mmol/L    Chloride 107 95 - 110 mmol/L    CO2 24 23 - 29 mmol/L    Glucose 94 70 - 110 mg/dL    BUN 13 8 - 23 mg/dL    Creatinine 1.4 0.5 - 1.4 mg/dL    Calcium 9.2 8.7 - 10.5 mg/dL    Total Protein 7.3 6.0 - 8.4 g/dL    Albumin 3.9 3.5 - 5.2 g/dL    Total Bilirubin 1.1 (H) 0.1 - 1.0 mg/dL    Alkaline Phosphatase 80 40 - 150 U/L    AST 20 10 - 40 U/L    ALT 17 10 - 44 U/L    eGFR 54.1 (A) >60 mL/min/1.73 m^2    Anion Gap 8 8 - 16 mmol/L   TSH    Collection Time: 12/15/24  3:39 PM   Result Value Ref Range    TSH 0.796 0.400 - 4.000 uIU/mL   Ethanol    Collection Time: 12/15/24  3:39 PM   Result Value Ref Range    Alcohol, Serum <10 <10 mg/dL   Acetaminophen level    Collection Time: 12/15/24  3:39 PM   Result Value Ref Range    Acetaminophen (Tylenol), Serum <3.0 (L) 10.0 - 20.0 ug/mL   Salicylate level    Collection Time: 12/15/24  3:39 PM   Result Value Ref Range    Salicylate Lvl <5.0 (L) 15.0 - 30.0 mg/dL   Urinalysis, Reflex to Urine Culture Urine, Clean Catch    Collection Time: 12/15/24  4:34 PM    Specimen: Urine   Result Value Ref Range    Specimen UA Urine, Clean Catch     Color, UA Yellow Yellow, Straw, Shivani    Appearance, UA Clear Clear    pH, UA 6.0 5.0 - 8.0    Specific Gravity, UA 1.025 1.005 - 1.030    Protein, UA Trace (A) Negative    Glucose, UA Trace (A) Negative    Ketones, UA Negative Negative    Bilirubin (UA) Negative Negative    Occult Blood UA Trace (A) Negative    Nitrite, UA Negative Negative    Leukocytes, UA Negative Negative   Drug screen panel, emergency    Collection Time: 12/15/24  4:34 PM   Result Value Ref Range    Benzodiazepines Negative Negative    Methadone metabolites Negative Negative    Cocaine (Metab.) Presumptive Positive (A) Negative    Opiate Scrn, Ur Negative Negative    Barbiturate Screen, Ur Negative Negative     "Amphetamine Screen, Ur Negative Negative    THC Negative Negative    Phencyclidine Negative Negative    Creatinine, Urine 289.0 23.0 - 375.0 mg/dL    Toxicology Information SEE COMMENT       No results found for: "PHENYTOIN", "PHENOBARB", "VALPROATE", "CBMZ"  Imaging:  Imaging Results    None          ASSESSMENT   Damir Faria is a 69 y.o. male with a past psychiatric history of depression and cocaine use, currently presenting to the ED involuntarily BIB EMS after disclosing suicidal statements during phone encounter with [facility]. Emergency Psychiatry was originally consulted to address the patient's symptoms of "passive SI/HI". UDS+ cocaine, EtOH undetectable.       IMPRESSION  Unspecified Depressive Disorder   Cocaine Use Disorder, Severe   R/o Substance-Induced Mood Disorder     RECOMMENDATION(S)      1. Scheduled Medication(s):  None     2. PRN Medication(s):  None     3. Legal Status/Precaution(s):  Continue PEC at this time as the patient is currently danger to self. Seek inpatient bed for patient safety and stabilization when/if medically cleared by the ER MD. Continue to observe patient's behavior while in the ER and reassess the patient daily until placement is found.    In cases of emergency, daily coverage provided by Acute/ED Psych MD, NP, or SW, with associated contact numbers listed in the [facility] On Call Schedule.    Case discussed with emergency psychiatry staff: Dr. Yamilet Boyer MD   [facility], PGY-2       "

## 2024-12-16 NOTE — ED NOTES
"Pt becoming increasingly more agitated after being informed that he will be continuing with 72 hour psych hold. Pt threw glasses sandwiches and juice at this nurse. Informed nurse that he would try to pull tv off of wall and "come after you" to nurse. Lunged toward nurse and strong line button pressed. Security MDs and charge nurse to bedside. Haldol IM given  "

## 2024-12-16 NOTE — ED NOTES
Personal items  Tshirt  Pants w/belt  Shoes  Hat    Items in safe  Cell phone  Wallet  Mignon on lanyard  $0.21

## 2025-01-06 ENCOUNTER — HOSPITAL ENCOUNTER (EMERGENCY)
Facility: HOSPITAL | Age: 70
Discharge: HOME | End: 2025-01-06
Attending: EMERGENCY MEDICINE
Payer: MEDICARE

## 2025-01-06 VITALS
WEIGHT: 170 LBS | OXYGEN SATURATION: 100 % | HEART RATE: 88 BPM | SYSTOLIC BLOOD PRESSURE: 151 MMHG | RESPIRATION RATE: 16 BRPM | HEIGHT: 71 IN | BODY MASS INDEX: 23.8 KG/M2 | TEMPERATURE: 98 F | DIASTOLIC BLOOD PRESSURE: 82 MMHG

## 2025-01-06 DIAGNOSIS — R07.9 CHEST PAIN, UNSPECIFIED TYPE: ICD-10-CM

## 2025-01-06 DIAGNOSIS — R00.0 TACHYCARDIA: ICD-10-CM

## 2025-01-06 DIAGNOSIS — R42 DIZZINESS: Primary | ICD-10-CM

## 2025-01-06 DIAGNOSIS — F14.10 COCAINE ABUSE: ICD-10-CM

## 2025-01-06 DIAGNOSIS — Z91.148 NON COMPLIANCE W MEDICATION REGIMEN: ICD-10-CM

## 2025-01-06 DIAGNOSIS — Z72.0 TOBACCO USE: ICD-10-CM

## 2025-01-06 LAB
ALBUMIN SERPL BCP-MCNC: 4.5 G/DL (ref 3.5–5.2)
ALP SERPL-CCNC: 82 U/L (ref 40–150)
ALT SERPL W/O P-5'-P-CCNC: 17 U/L (ref 10–44)
AMPHET+METHAMPHET UR QL: NEGATIVE
ANION GAP SERPL CALC-SCNC: 11 MMOL/L (ref 8–16)
AST SERPL-CCNC: 29 U/L (ref 10–40)
BARBITURATES UR QL SCN>200 NG/ML: NEGATIVE
BASOPHILS # BLD AUTO: 0.02 K/UL (ref 0–0.2)
BASOPHILS NFR BLD: 0.4 % (ref 0–1.9)
BENZODIAZ UR QL SCN>200 NG/ML: NEGATIVE
BILIRUB SERPL-MCNC: 1.2 MG/DL (ref 0.1–1)
BILIRUB UR QL STRIP: NEGATIVE
BNP SERPL-MCNC: 114 PG/ML (ref 0–99)
BUN SERPL-MCNC: 28 MG/DL (ref 8–23)
BZE UR QL SCN: ABNORMAL
CALCIUM SERPL-MCNC: 9.7 MG/DL (ref 8.7–10.5)
CANNABINOIDS UR QL SCN: NEGATIVE
CHLORIDE SERPL-SCNC: 107 MMOL/L (ref 95–110)
CK SERPL-CCNC: 472 U/L (ref 20–200)
CLARITY UR REFRACT.AUTO: CLEAR
CO2 SERPL-SCNC: 23 MMOL/L (ref 23–29)
COLOR UR AUTO: YELLOW
CREAT SERPL-MCNC: 1.5 MG/DL (ref 0.5–1.4)
CREAT UR-MCNC: 342 MG/DL (ref 23–375)
DIFFERENTIAL METHOD BLD: ABNORMAL
EOSINOPHIL # BLD AUTO: 0.1 K/UL (ref 0–0.5)
EOSINOPHIL NFR BLD: 2.4 % (ref 0–8)
ERYTHROCYTE [DISTWIDTH] IN BLOOD BY AUTOMATED COUNT: 13.7 % (ref 11.5–14.5)
EST. GFR  (NO RACE VARIABLE): 49.8 ML/MIN/1.73 M^2
GLUCOSE SERPL-MCNC: 100 MG/DL (ref 70–110)
GLUCOSE UR QL STRIP: ABNORMAL
HCT VFR BLD AUTO: 48.1 % (ref 40–54)
HGB BLD-MCNC: 16.2 G/DL (ref 14–18)
HGB UR QL STRIP: NEGATIVE
IMM GRANULOCYTES # BLD AUTO: 0.01 K/UL (ref 0–0.04)
IMM GRANULOCYTES NFR BLD AUTO: 0.2 % (ref 0–0.5)
KETONES UR QL STRIP: ABNORMAL
LEUKOCYTE ESTERASE UR QL STRIP: NEGATIVE
LYMPHOCYTES # BLD AUTO: 1.4 K/UL (ref 1–4.8)
LYMPHOCYTES NFR BLD: 27.3 % (ref 18–48)
MAGNESIUM SERPL-MCNC: 2.1 MG/DL (ref 1.6–2.6)
MCH RBC QN AUTO: 31.9 PG (ref 27–31)
MCHC RBC AUTO-ENTMCNC: 33.7 G/DL (ref 32–36)
MCV RBC AUTO: 95 FL (ref 82–98)
METHADONE UR QL SCN>300 NG/ML: NEGATIVE
MONOCYTES # BLD AUTO: 0.6 K/UL (ref 0.3–1)
MONOCYTES NFR BLD: 11.2 % (ref 4–15)
NEUTROPHILS # BLD AUTO: 2.9 K/UL (ref 1.8–7.7)
NEUTROPHILS NFR BLD: 58.5 % (ref 38–73)
NITRITE UR QL STRIP: NEGATIVE
NRBC BLD-RTO: 0 /100 WBC
OHS QRS DURATION: 68 MS
OHS QTC CALCULATION: 425 MS
OPIATES UR QL SCN: NEGATIVE
PCP UR QL SCN>25 NG/ML: NEGATIVE
PH UR STRIP: 6 [PH] (ref 5–8)
PLATELET # BLD AUTO: 266 K/UL (ref 150–450)
PMV BLD AUTO: 9.1 FL (ref 9.2–12.9)
POTASSIUM SERPL-SCNC: 4.4 MMOL/L (ref 3.5–5.1)
PROT SERPL-MCNC: 8.9 G/DL (ref 6–8.4)
PROT UR QL STRIP: ABNORMAL
RBC # BLD AUTO: 5.08 M/UL (ref 4.6–6.2)
SODIUM SERPL-SCNC: 141 MMOL/L (ref 136–145)
SP GR UR STRIP: >=1.03 (ref 1–1.03)
TOXICOLOGY INFORMATION: ABNORMAL
TROPONIN I SERPL DL<=0.01 NG/ML-MCNC: 10 NG/L (ref 0–35)
TROPONIN I SERPL DL<=0.01 NG/ML-MCNC: 8 NG/L (ref 0–35)
URN SPEC COLLECT METH UR: ABNORMAL
WBC # BLD AUTO: 5.01 K/UL (ref 3.9–12.7)

## 2025-01-06 PROCEDURE — 25000003 PHARM REV CODE 250: Performed by: PHYSICIAN ASSISTANT

## 2025-01-06 PROCEDURE — 84484 ASSAY OF TROPONIN QUANT: CPT | Performed by: PHYSICIAN ASSISTANT

## 2025-01-06 PROCEDURE — 83880 ASSAY OF NATRIURETIC PEPTIDE: CPT | Performed by: PHYSICIAN ASSISTANT

## 2025-01-06 PROCEDURE — 93005 ELECTROCARDIOGRAM TRACING: CPT

## 2025-01-06 PROCEDURE — 99285 EMERGENCY DEPT VISIT HI MDM: CPT | Mod: 25

## 2025-01-06 PROCEDURE — 82550 ASSAY OF CK (CPK): CPT | Performed by: PHYSICIAN ASSISTANT

## 2025-01-06 PROCEDURE — 80053 COMPREHEN METABOLIC PANEL: CPT | Performed by: EMERGENCY MEDICINE

## 2025-01-06 PROCEDURE — 83735 ASSAY OF MAGNESIUM: CPT | Performed by: EMERGENCY MEDICINE

## 2025-01-06 PROCEDURE — 81003 URINALYSIS AUTO W/O SCOPE: CPT | Performed by: PHYSICIAN ASSISTANT

## 2025-01-06 PROCEDURE — 84484 ASSAY OF TROPONIN QUANT: CPT | Mod: 91 | Performed by: EMERGENCY MEDICINE

## 2025-01-06 PROCEDURE — 93010 ELECTROCARDIOGRAM REPORT: CPT | Mod: ,,, | Performed by: INTERNAL MEDICINE

## 2025-01-06 PROCEDURE — 80307 DRUG TEST PRSMV CHEM ANLYZR: CPT | Performed by: PHYSICIAN ASSISTANT

## 2025-01-06 PROCEDURE — 85025 COMPLETE CBC W/AUTO DIFF WBC: CPT | Performed by: EMERGENCY MEDICINE

## 2025-01-06 RX ORDER — MECLIZINE HCL 12.5 MG 12.5 MG/1
25 TABLET ORAL
Status: COMPLETED | OUTPATIENT
Start: 2025-01-06 | End: 2025-01-06

## 2025-01-06 RX ADMIN — MECLIZINE HYDROCHLORIDE 25 MG: 12.5 TABLET ORAL at 04:01

## 2025-01-06 NOTE — ED NOTES
Pt c/o dizziness when he went from lying to sitting as well as going from sitting to standing. Pt states the dizziness is worse when going into the seated position.

## 2025-01-06 NOTE — ED PROVIDER NOTES
"Encounter Date: 1/6/2025       History     Chief Complaint   Patient presents with    Dizziness     Dizzy for last 3-4 days, when I get up     The patient is a 69 year old male. He has a documented past medical history of tobacco use, HTN, CAD/MI, previous stroke, left sided weakness, depression, substance abuse, and previous inpatient psychiatric treatment. He presents to the ER c/o chest pain and dizziness. He states that 2 days ago he felt a pressure across his chest while he was laying in bed. He states that he took 2 tablets of SL NTG and the pain went away. He states that the following day he began feeling lightheaded and dizzy. He states that the dizziness seems to go away while he is sitting still, but when he stands up or changes position, the dizziness returns. He also notes that he was snorting cocaine over the weekend. He states that he has not used cocaine today. He states that he has not experienced any chest pain today. He states that he walked to the ER, estimates 2-3 blocks. He states that the walk was difficult for him and that he "felt off balance, staggered, and had to sit and rest about 10 minutes" during the walk. He states that he takes Aspirin, Plavix, and Elquis daily, but forgot to take them today, last took meds yesterday. He denies any headache, vision change, confusion, palpitations, acute numbness/weakness, or difficulty with speech. He states that he has been "self-medicating with cocaine for depression", but denies any SI or HI. He states that he is hungry and is requesting something to eat, adding that he has not had anything to eat since yesterday morning. No additional symptoms or concerns reported.           Review of patient's allergies indicates:  No Known Allergies  Past Medical History:   Diagnosis Date    Acute renal insufficiency 6/21/2015    Cocaine abuse     Depression     History of psychiatric care     HTN (hypertension) 1/25/2014    STEMI (ST elevation myocardial " infarction) 2/12/2017    Stroke     Tobacco dependence due to cigarettes 5/25/2016     Past Surgical History:   Procedure Laterality Date    CARDIAC SURGERY      stents    NECK SURGERY       Family History   Problem Relation Name Age of Onset    Diabetes Mother      Heart disease Mother      Hypertension Mother       Social History     Tobacco Use    Smoking status: Some Days     Current packs/day: 0.25     Types: Cigarettes    Smokeless tobacco: Never   Substance Use Topics    Alcohol use: Not Currently     Comment: social    Drug use: Yes     Types: Cocaine     Comment: this weekend     Review of Systems   Constitutional:  Negative for chills, diaphoresis and fever.   HENT:  Negative for sore throat and trouble swallowing.    Eyes:  Negative for pain and visual disturbance.   Respiratory:  Negative for cough, shortness of breath and wheezing.    Cardiovascular:  Positive for chest pain. Negative for palpitations and leg swelling.   Gastrointestinal:  Negative for abdominal pain, blood in stool, diarrhea, nausea and vomiting.   Genitourinary:  Negative for difficulty urinating.   Musculoskeletal:  Negative for back pain, myalgias and neck pain.   Skin:  Negative for rash and wound.   Allergic/Immunologic: Negative for immunocompromised state.   Neurological:  Positive for dizziness. Negative for tremors, seizures, syncope, facial asymmetry, speech difficulty, weakness, light-headedness, numbness and headaches.   Psychiatric/Behavioral:  Negative for agitation, behavioral problems, confusion, hallucinations, self-injury, sleep disturbance and suicidal ideas. The patient is not nervous/anxious and is not hyperactive.         (+) Reports depression   (+) Substance/cocaine abuse   (+) Denies any SI or HI          Physical Exam     Initial Vitals [01/06/25 1444]   BP Pulse Resp Temp SpO2   (!) 124/95 100 20 98.3 °F (36.8 °C) 98 %      MAP       --         Physical Exam    Nursing note and vitals  reviewed.  Constitutional: He appears well-developed and well-nourished. He is not diaphoretic.   Alert and interactive. Unaccompanied. Comfortable appearing.    HENT:   Head: Normocephalic and atraumatic.   Eyes: Conjunctivae are normal.   Wears glasses.    Neck:   Normal range of motion.  Cardiovascular:            Mild tachycardia noted      Pulmonary/Chest: No respiratory distress.   Not coughing or wheezing. No conversational dyspnea. No increased work of breathing. SaO2 98% on RA.    Abdominal: Abdomen is soft. He exhibits no distension. There is no abdominal tenderness. There is no rebound and no guarding.   Musculoskeletal:         General: Normal range of motion.      Cervical back: Normal range of motion.     Neurological: He is alert and oriented to person, place, and time.   No facial droop. No slurred speech. Oriented appropriately. Moves extremities x 4. Reports chronic mild left sided weakness since previous CVA - denies any new or worsening numbness/weakness. Gait not tested.    Psychiatric: His behavior is normal.         ED Course   Procedures  Labs Reviewed   CBC W/ AUTO DIFFERENTIAL   COMPREHENSIVE METABOLIC PANEL   MAGNESIUM   URINALYSIS, REFLEX TO URINE CULTURE   TROPONIN I HIGH SENSITIVITY   B-TYPE NATRIURETIC PEPTIDE   CK   DRUG SCREEN PANEL, URINE EMERGENCY     Results for orders placed or performed during the hospital encounter of 12/15/24   CBC auto differential    Collection Time: 12/15/24  3:39 PM   Result Value Ref Range    WBC 5.00 3.90 - 12.70 K/uL    RBC 4.62 4.60 - 6.20 M/uL    Hemoglobin 14.6 14.0 - 18.0 g/dL    Hematocrit 43.5 40.0 - 54.0 %    MCV 94 82 - 98 fL    MCH 31.6 (H) 27.0 - 31.0 pg    MCHC 33.6 32.0 - 36.0 g/dL    RDW 13.2 11.5 - 14.5 %    Platelets 243 150 - 450 K/uL    MPV 9.0 (L) 9.2 - 12.9 fL    Immature Granulocytes 0.4 0.0 - 0.5 %    Gran # (ANC) 2.2 1.8 - 7.7 K/uL    Immature Grans (Abs) 0.02 0.00 - 0.04 K/uL    Lymph # 1.9 1.0 - 4.8 K/uL    Mono # 0.7 0.3 - 1.0  K/uL    Eos # 0.2 0.0 - 0.5 K/uL    Baso # 0.03 0.00 - 0.20 K/uL    nRBC 0 0 /100 WBC    Gran % 43.6 38.0 - 73.0 %    Lymph % 38.2 18.0 - 48.0 %    Mono % 13.8 4.0 - 15.0 %    Eosinophil % 3.4 0.0 - 8.0 %    Basophil % 0.6 0.0 - 1.9 %    Differential Method Automated    Comprehensive metabolic panel    Collection Time: 12/15/24  3:39 PM   Result Value Ref Range    Sodium 139 136 - 145 mmol/L    Potassium 4.2 3.5 - 5.1 mmol/L    Chloride 107 95 - 110 mmol/L    CO2 24 23 - 29 mmol/L    Glucose 94 70 - 110 mg/dL    BUN 13 8 - 23 mg/dL    Creatinine 1.4 0.5 - 1.4 mg/dL    Calcium 9.2 8.7 - 10.5 mg/dL    Total Protein 7.3 6.0 - 8.4 g/dL    Albumin 3.9 3.5 - 5.2 g/dL    Total Bilirubin 1.1 (H) 0.1 - 1.0 mg/dL    Alkaline Phosphatase 80 40 - 150 U/L    AST 20 10 - 40 U/L    ALT 17 10 - 44 U/L    eGFR 54.1 (A) >60 mL/min/1.73 m^2    Anion Gap 8 8 - 16 mmol/L   TSH    Collection Time: 12/15/24  3:39 PM   Result Value Ref Range    TSH 0.796 0.400 - 4.000 uIU/mL   Ethanol    Collection Time: 12/15/24  3:39 PM   Result Value Ref Range    Alcohol, Serum <10 <10 mg/dL   Acetaminophen level    Collection Time: 12/15/24  3:39 PM   Result Value Ref Range    Acetaminophen (Tylenol), Serum <3.0 (L) 10.0 - 20.0 ug/mL   Salicylate level    Collection Time: 12/15/24  3:39 PM   Result Value Ref Range    Salicylate Lvl <5.0 (L) 15.0 - 30.0 mg/dL   Urinalysis, Reflex to Urine Culture Urine, Clean Catch    Collection Time: 12/15/24  4:34 PM    Specimen: Urine   Result Value Ref Range    Specimen UA Urine, Clean Catch     Color, UA Yellow Yellow, Straw, Shivani    Appearance, UA Clear Clear    pH, UA 6.0 5.0 - 8.0    Specific Gravity, UA 1.025 1.005 - 1.030    Protein, UA Trace (A) Negative    Glucose, UA Trace (A) Negative    Ketones, UA Negative Negative    Bilirubin (UA) Negative Negative    Occult Blood UA Trace (A) Negative    Nitrite, UA Negative Negative    Leukocytes, UA Negative Negative   Drug screen panel, emergency    Collection  Time: 12/15/24  4:34 PM   Result Value Ref Range    Benzodiazepines Negative Negative    Methadone metabolites Negative Negative    Cocaine (Metab.) Presumptive Positive (A) Negative    Opiate Scrn, Ur Negative Negative    Barbiturate Screen, Ur Negative Negative    Amphetamine Screen, Ur Negative Negative    THC Negative Negative    Phencyclidine Negative Negative    Creatinine, Urine 289.0 23.0 - 375.0 mg/dL    Toxicology Information SEE COMMENT        EKG Readings: (Independently Interpreted)   Previous EKG: Compared with most recent EKG Rhythm: Normal Sinus Rhythm. Heart Rate: 88. ST Segments: Normal ST Segments. T Waves: Normal.   ER attending physician reviewed and signed - no STEMI or acute ischemic changes identified        Imaging Results    None          Medications   meclizine tablet 25 mg (has no administration in time range)     Medical Decision Making  Amount and/or Complexity of Data Reviewed  Labs: ordered.  Radiology: ordered.              Attending Attestation:             Attending ED Notes:   Attending Note:  I have seen the patient, have repeated the key portions of the history and physical, reviewed and agree with the medical documentation, and supervised and managed the medical care of the patient. Additionally, I was present for the critical portion of any procedure(s) performed.    69 M hx above here for intermittent dizziness and chest discomfort x 4 days.   Report cocaine use, no active chest pain.   VSS  Labs reviewed with no leukocytosis.  Hemoglobin stable.  CMP largely unremarkable.  Delta trop negative.  EKG without acute ischemic changes.  MRI negative for acute stroke  Patient is in no complaints at this time.  He would not think he requires further workup in the ED at this time.  Recommend follow up with PCP.  Advised to substance abuse is that is likely contributing to his symptoms.                 Medical Decision Making:   History:   Old Medical Records: I decided to obtain old  medical records.  Initial Assessment:   68 yo M, hx of CAD/MI, CVA, presents to the ER c/o chest pain episode at rest 2 days ago relieved with NTG, and dizziness with standing, positional, x 2 days. Also reports recent cocaine use.   Differential Diagnosis:   ACS, stable vs unstable angina, vertigo, orthostatic hyper/hypotension, dehydration, CARTER, electrolyte derangement, infection, anemia, CVA, dysrhythmia, drug abuse, etc    Clinical Tests:   Lab Tests: Ordered and Reviewed  Radiological Study: Ordered and Reviewed  Medical Tests: Ordered and Reviewed  ED Management:  Vital signs reviewed, afebrile, mild tachycardia, no tachypnea or hypoxia   Orthostatic vital signs completed,   EKG completed, no STEMI or acute ischemic changes identified - reviewed and signed by the ER attending physician   No active chest pain at this time - pt reports last episode of chest pain was 2 days ago   Dizziness seems positional and resting elevated HR of 100 bpm, fall precautions ordered, will check orthostatics and try PO Meclizine,   however hx of previous CVA, missed doses of ASA/Plavix, smoking and recent cocaine use, CVA remains high on differential, MRI brain ordered and pending    Chest x ray pending  Labs pending   I discussed the case with the ER attending physician, who will assume all further care and management due to pending work up and end of my shift                 Clinical Impression:  Final diagnoses:  [R42] Dizziness (Primary)  [R07.9] Chest pain, unspecified type  [R00.0] Tachycardia  [F14.10] Cocaine abuse  [Z72.0] Tobacco use  [Z91.148] Non compliance w medication regimen                 Christiano Han PA-C  01/06/25 1601       Christiano Han PA-C  01/06/25 1613       Juju Gómez MD  01/06/25 5209

## 2025-01-06 NOTE — ED TRIAGE NOTES
Pt c/o dizziness upon exertion, mostly when he stands up, for the past few days. Pt denies n/v/d, weakness, blurred vision, CP, and SOB at this time. Pt reports CP a few days ago while resting. He took 2 nitro which relieved the pain. Endorses cocaine use, but not on the same day as his CP episode.

## 2025-01-06 NOTE — FIRST PROVIDER EVALUATION
Medical screening examination initiated.  I have conducted a focused provider triage encounter, findings are as follows:    Brief history of present illness:  dizziness for the last few days. Feels like lightheadedness when standing or changing positions. No pain includign chest pain    There were no vitals filed for this visit.    Pertinent physical exam:  well appearing. No focal neuro deficits    Brief workup plan:  EKG, labs    Preliminary workup initiated; this workup will be continued and followed by the physician or advanced practice provider that is assigned to the patient when roomed.

## 2025-01-07 NOTE — DISCHARGE INSTRUCTIONS
Your workup overall is reassuring.  Please follow up with the primary care team.  I think that your substance abuse is likely contributing to your symptoms.  I have attached resources for help.

## 2025-01-08 ENCOUNTER — HOSPITAL ENCOUNTER (EMERGENCY)
Facility: HOSPITAL | Age: 70
Discharge: PSYCHIATRIC OR SUBSTANCE USE FACILITY | End: 2025-01-09
Attending: EMERGENCY MEDICINE
Payer: MEDICARE

## 2025-01-08 DIAGNOSIS — F32.A DEPRESSION WITH SUICIDAL IDEATION: Primary | ICD-10-CM

## 2025-01-08 DIAGNOSIS — R45.851 DEPRESSION WITH SUICIDAL IDEATION: Primary | ICD-10-CM

## 2025-01-08 DIAGNOSIS — R45.851 SUICIDAL IDEATION: ICD-10-CM

## 2025-01-08 DIAGNOSIS — Z00.8 MEDICAL CLEARANCE FOR PSYCHIATRIC ADMISSION: ICD-10-CM

## 2025-01-08 LAB
ALBUMIN SERPL BCP-MCNC: 4 G/DL (ref 3.5–5.2)
ALP SERPL-CCNC: 76 U/L (ref 40–150)
ALT SERPL W/O P-5'-P-CCNC: 15 U/L (ref 10–44)
AMPHET+METHAMPHET UR QL: NEGATIVE
ANION GAP SERPL CALC-SCNC: 13 MMOL/L (ref 8–16)
APAP SERPL-MCNC: <3 UG/ML (ref 10–20)
AST SERPL-CCNC: 22 U/L (ref 10–40)
BACTERIA #/AREA URNS AUTO: ABNORMAL /HPF
BARBITURATES UR QL SCN>200 NG/ML: NEGATIVE
BASOPHILS # BLD AUTO: 0.03 K/UL (ref 0–0.2)
BASOPHILS NFR BLD: 0.5 % (ref 0–1.9)
BENZODIAZ UR QL SCN>200 NG/ML: NEGATIVE
BILIRUB SERPL-MCNC: 1.1 MG/DL (ref 0.1–1)
BILIRUB UR QL STRIP: NEGATIVE
BUN SERPL-MCNC: 21 MG/DL (ref 8–23)
BZE UR QL SCN: ABNORMAL
CALCIUM SERPL-MCNC: 9.5 MG/DL (ref 8.7–10.5)
CANNABINOIDS UR QL SCN: NEGATIVE
CHLORIDE SERPL-SCNC: 106 MMOL/L (ref 95–110)
CLARITY UR REFRACT.AUTO: CLEAR
CO2 SERPL-SCNC: 23 MMOL/L (ref 23–29)
COLOR UR AUTO: YELLOW
CREAT SERPL-MCNC: 1.4 MG/DL (ref 0.5–1.4)
CREAT UR-MCNC: 337 MG/DL (ref 23–375)
DIFFERENTIAL METHOD BLD: ABNORMAL
EOSINOPHIL # BLD AUTO: 0.2 K/UL (ref 0–0.5)
EOSINOPHIL NFR BLD: 3.2 % (ref 0–8)
ERYTHROCYTE [DISTWIDTH] IN BLOOD BY AUTOMATED COUNT: 13.6 % (ref 11.5–14.5)
EST. GFR  (NO RACE VARIABLE): 54.1 ML/MIN/1.73 M^2
ETHANOL SERPL-MCNC: <10 MG/DL
GLUCOSE SERPL-MCNC: 136 MG/DL (ref 70–110)
GLUCOSE UR QL STRIP: NEGATIVE
HCT VFR BLD AUTO: 45.3 % (ref 40–54)
HGB BLD-MCNC: 15.3 G/DL (ref 14–18)
HGB UR QL STRIP: ABNORMAL
HYALINE CASTS UR QL AUTO: 7 /LPF
IMM GRANULOCYTES # BLD AUTO: 0.03 K/UL (ref 0–0.04)
IMM GRANULOCYTES NFR BLD AUTO: 0.5 % (ref 0–0.5)
KETONES UR QL STRIP: NEGATIVE
LEUKOCYTE ESTERASE UR QL STRIP: NEGATIVE
LYMPHOCYTES # BLD AUTO: 2 K/UL (ref 1–4.8)
LYMPHOCYTES NFR BLD: 34.4 % (ref 18–48)
MCH RBC QN AUTO: 31.9 PG (ref 27–31)
MCHC RBC AUTO-ENTMCNC: 33.8 G/DL (ref 32–36)
MCV RBC AUTO: 94 FL (ref 82–98)
METHADONE UR QL SCN>300 NG/ML: NEGATIVE
MICROSCOPIC COMMENT: ABNORMAL
MONOCYTES # BLD AUTO: 0.7 K/UL (ref 0.3–1)
MONOCYTES NFR BLD: 12.2 % (ref 4–15)
NEUTROPHILS # BLD AUTO: 2.8 K/UL (ref 1.8–7.7)
NEUTROPHILS NFR BLD: 49.2 % (ref 38–73)
NITRITE UR QL STRIP: NEGATIVE
NRBC BLD-RTO: 0 /100 WBC
OPIATES UR QL SCN: NEGATIVE
PCP UR QL SCN>25 NG/ML: NEGATIVE
PH UR STRIP: 6 [PH] (ref 5–8)
PLATELET # BLD AUTO: 248 K/UL (ref 150–450)
PMV BLD AUTO: 9.2 FL (ref 9.2–12.9)
POTASSIUM SERPL-SCNC: 3.5 MMOL/L (ref 3.5–5.1)
PROT SERPL-MCNC: 7.8 G/DL (ref 6–8.4)
PROT UR QL STRIP: ABNORMAL
RBC # BLD AUTO: 4.8 M/UL (ref 4.6–6.2)
RBC #/AREA URNS AUTO: 5 /HPF (ref 0–4)
SALICYLATES SERPL-MCNC: <5 MG/DL (ref 15–30)
SODIUM SERPL-SCNC: 142 MMOL/L (ref 136–145)
SP GR UR STRIP: 1.03 (ref 1–1.03)
SQUAMOUS #/AREA URNS AUTO: 0 /HPF
TOXICOLOGY INFORMATION: ABNORMAL
TSH SERPL DL<=0.005 MIU/L-ACNC: 0.78 UIU/ML (ref 0.4–4)
URN SPEC COLLECT METH UR: ABNORMAL
WBC # BLD AUTO: 5.67 K/UL (ref 3.9–12.7)
WBC #/AREA URNS AUTO: 2 /HPF (ref 0–5)

## 2025-01-08 PROCEDURE — 80307 DRUG TEST PRSMV CHEM ANLYZR: CPT | Performed by: EMERGENCY MEDICINE

## 2025-01-08 PROCEDURE — 80053 COMPREHEN METABOLIC PANEL: CPT | Performed by: EMERGENCY MEDICINE

## 2025-01-08 PROCEDURE — 99285 EMERGENCY DEPT VISIT HI MDM: CPT

## 2025-01-08 PROCEDURE — 81001 URINALYSIS AUTO W/SCOPE: CPT | Mod: XB | Performed by: EMERGENCY MEDICINE

## 2025-01-08 PROCEDURE — 80143 DRUG ASSAY ACETAMINOPHEN: CPT | Performed by: EMERGENCY MEDICINE

## 2025-01-08 PROCEDURE — 85025 COMPLETE CBC W/AUTO DIFF WBC: CPT | Performed by: EMERGENCY MEDICINE

## 2025-01-08 PROCEDURE — 80179 DRUG ASSAY SALICYLATE: CPT | Performed by: EMERGENCY MEDICINE

## 2025-01-08 PROCEDURE — 25000003 PHARM REV CODE 250

## 2025-01-08 PROCEDURE — 82077 ASSAY SPEC XCP UR&BREATH IA: CPT | Performed by: EMERGENCY MEDICINE

## 2025-01-08 PROCEDURE — 84443 ASSAY THYROID STIM HORMONE: CPT | Performed by: EMERGENCY MEDICINE

## 2025-01-08 RX ORDER — CARVEDILOL 3.12 MG/1
6.25 TABLET ORAL 2 TIMES DAILY
Status: DISCONTINUED | OUTPATIENT
Start: 2025-01-08 | End: 2025-01-09 | Stop reason: HOSPADM

## 2025-01-08 RX ORDER — CLOPIDOGREL BISULFATE 75 MG/1
75 TABLET ORAL DAILY
Status: DISCONTINUED | OUTPATIENT
Start: 2025-01-09 | End: 2025-01-09 | Stop reason: HOSPADM

## 2025-01-08 RX ORDER — ISOSORBIDE MONONITRATE 30 MG/1
30 TABLET, EXTENDED RELEASE ORAL DAILY
Status: DISCONTINUED | OUTPATIENT
Start: 2025-01-09 | End: 2025-01-09 | Stop reason: HOSPADM

## 2025-01-08 RX ORDER — ATORVASTATIN CALCIUM 40 MG/1
40 TABLET, FILM COATED ORAL DAILY
Status: DISCONTINUED | OUTPATIENT
Start: 2025-01-09 | End: 2025-01-09 | Stop reason: HOSPADM

## 2025-01-08 RX ORDER — LISINOPRIL 20 MG/1
20 TABLET ORAL DAILY
Status: DISCONTINUED | OUTPATIENT
Start: 2025-01-09 | End: 2025-01-09 | Stop reason: HOSPADM

## 2025-01-08 RX ADMIN — CARVEDILOL 6.25 MG: 3.12 TABLET, FILM COATED ORAL at 09:01

## 2025-01-08 RX ADMIN — APIXABAN 5 MG: 5 TABLET, FILM COATED ORAL at 10:01

## 2025-01-08 NOTE — FIRST PROVIDER EVALUATION
Medical screening examination initiated.  I have conducted a focused provider triage encounter, findings are as follows:    Brief history of present illness:  69 M tobacco use, HTN, CAD/MI, previous stroke, left sided weakness, depression, substance abuse here for depression.  Thought of SI w/o plan    There were no vitals filed for this visit.    Pertinent physical exam:  well appearing, reports depression    Seen here two days ago by me, said he didn't feel this way then    Brief workup plan:  labs    Preliminary workup initiated; this workup will be continued and followed by the physician or advanced practice provider that is assigned to the patient when roomed.

## 2025-01-09 VITALS
WEIGHT: 180 LBS | SYSTOLIC BLOOD PRESSURE: 142 MMHG | TEMPERATURE: 99 F | HEIGHT: 71 IN | HEART RATE: 99 BPM | OXYGEN SATURATION: 97 % | RESPIRATION RATE: 16 BRPM | DIASTOLIC BLOOD PRESSURE: 34 MMHG | BODY MASS INDEX: 25.2 KG/M2

## 2025-01-09 PROBLEM — R82.90 ABNORMAL URINALYSIS: Status: ACTIVE | Noted: 2025-01-09

## 2025-01-09 PROBLEM — I25.2 HISTORY OF MI (MYOCARDIAL INFARCTION): Status: ACTIVE | Noted: 2025-01-09

## 2025-01-09 PROBLEM — E67.8 EXCESSIVE CARBOHYDRATE INTAKE: Status: RESOLVED | Noted: 2025-01-09 | Resolved: 2025-01-09

## 2025-01-09 PROBLEM — E67.8 EXCESSIVE CARBOHYDRATE INTAKE: Status: ACTIVE | Noted: 2025-01-09

## 2025-01-09 PROBLEM — R63.8 INADEQUATE ORAL INTAKE: Status: ACTIVE | Noted: 2025-01-09

## 2025-01-09 NOTE — ED NOTES
Pt remains in paper scrubs and resting in stretcher comfortably. No signs of distress noted. Sitter remains at bedside in direct visual contact, charting per protocol every 15 minutes. No equipment or belongings are in the patients room. Pt aware of plan of care. Will continue to monitor.  Bed placed in low locked position, side rails up x2, call bell is within reach.

## 2025-01-09 NOTE — ED NOTES
Report received from Polina CARRION. Assumed care of patient. Pt. Roomed in 25. Pt remains in paper scrubs and resting in stretcher comfortably. No signs of distress noted. Sitter remains at bedside in direct visual contact, charting per protocol every 15 minutes. No equipment or belongings are in the patients room. Pt aware of plan of care. Will continue to monitor.  Bed placed in low locked position, side rails up x2, call bell is within reach.

## 2025-01-09 NOTE — ED NOTES
Patient was changed into paper scrubs and belongings were collected.   1 patient bag   - 2 grey sweatshirts  - 1 pair of grey nikes   - 1 pair of white socks   - 1    - 1 khaki pants   - 1 checkered long sleeve   1 red and black duffle bag locked up as well.  Security envelope   - 1 cigarette  - brown wallet   - black iphone

## 2025-01-09 NOTE — PROVIDER PROGRESS NOTES - EMERGENCY DEPT.
Encounter Date: 1/8/2025    ED Physician Progress Notes        Physician Note:   I have assumed care for this patient from  BOSTON Pollock.  I have discussed care with the previous attending.   70 y/o M with h/o HTN, CAD, prior CVA, depression, substance abuse and depression presents with depression and suicidal ideations  PEC filed by previous team  At the time of sign out blood work pending.    12:50 AM  Blood work unremarkable. DOA presumptive positive for cocaine.  Pt medically clear for psychiatric admission and evaluation

## 2025-01-09 NOTE — ED TRIAGE NOTES
Triage note:  Chief Complaint   Patient presents with    Psychiatric Evaluation     Thought about hurting self but no plan, not taking meds as prescribed     Review of patient's allergies indicates:  No Known Allergies  Past Medical History:   Diagnosis Date    Acute renal insufficiency 6/21/2015    Cocaine abuse     Depression     History of psychiatric care     HTN (hypertension) 1/25/2014    STEMI (ST elevation myocardial infarction) 2/12/2017    Stroke     Tobacco dependence due to cigarettes 5/25/2016
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
## 2025-01-28 ENCOUNTER — TELEPHONE (OUTPATIENT)
Dept: ENDOSCOPY | Facility: HOSPITAL | Age: 70
End: 2025-01-28
Payer: MEDICARE

## 2025-01-28 VITALS — BODY MASS INDEX: 26.74 KG/M2 | WEIGHT: 191 LBS | HEIGHT: 71 IN

## 2025-01-28 DIAGNOSIS — Z12.11 SPECIAL SCREENING FOR MALIGNANT NEOPLASMS, COLON: Primary | ICD-10-CM

## 2025-01-28 DIAGNOSIS — D12.8 TUBULAR ADENOMA POLYP OF RECTUM: ICD-10-CM

## 2025-01-28 RX ORDER — ALLOPURINOL 100 MG/1
100 TABLET ORAL DAILY
COMMUNITY

## 2025-01-28 RX ORDER — ESCITALOPRAM OXALATE 10 MG/1
10 TABLET ORAL DAILY
COMMUNITY

## 2025-01-28 NOTE — TELEPHONE ENCOUNTER
----- Message from Dannielle sent at 1/17/2025  7:41 AM CST -----    ----- Message -----  From: Iliana Cunningham  Sent: 1/16/2025   4:39 PM CST  To: [facility]     Hello,    Can you please provide a status on scheduling this patient. Referral scanned in media mgr on 11/21/24 and Notes on 1/6/25    Thanks  ----- Message -----  From: Iliana Cunningham  Sent: 1/6/2025  11:07 AM CST  To: [facility]    [facility],    Patient is being referred for colonoscopy. Referral/records are scanned in media mgr. Please contact patient to schedule.    Thanks

## 2025-01-28 NOTE — TELEPHONE ENCOUNTER
Referral for procedure from outside referral      Spoke to patient to schedule procedure(s) Colonoscopy       Physician to perform procedure(s) Dr. STEPHANIE Meek  Date of Procedure (s) 3/18/25  Arrival Time 12:10 PM  Time of Procedure(s) 1:10 PM   Location of Procedure(s) [place] 4th Floor  Type of Rx Prep sent to patient: PEG  Instructions provided to patient via Postal Mail    Patient was informed on the following information and verbalized understanding. Screening questionnaire reviewed with patient and complete. If procedure requires anesthesia, a responsible adult needs to be present to accompany the patient home, patient cannot drive after receiving anesthesia. Appointment details are tentative, especially check-in time. Patient will receive a prep-op call 7 days prior to confirm check-in time for procedure. If applicable the patient should contact their pharmacy to verify Rx for procedure prep is ready for pick-up. Patient was advised to call the scheduling department at [phone] if pharmacy states no Rx is available. Patient was advised to call the endoscopy scheduling department if any questions or concerns arise.      [facility]oscopy Scheduling Department

## 2025-01-28 NOTE — TELEPHONE ENCOUNTER
Colonoscopy Procedure Prep Instructions    Date of procedure: 3/18/25 Arrive at: 12:10 AM    Location of Department:   [facility] [address] [place] [address]  Take the Atrium Elevators to 4th Floor Endoscopy Lab    As soon as possible:   your prep from pharmacy and over the counter DULCOLAX LAXATIVE TABLETS            On the day before your procedure   What You CAN do:   You may have clear liquids ONLY-see below for list.     Liquids That Are OK to Drink:   Water  Sports drinks (Gatorade, Power-Aid)  Coffee or tea (no cream or nondairy creamer)  Clear juices without pulp (apple, white grape)  Gelatin desserts (no fruit or toppings)  Clear soda (sprite, coke, ginger ale)  Chicken broth (until 12 midnight the night before procedure)    What You CANNOT do:   Do not EAT solid food, drink milk or anything   colored red.  Do not drink alcohol.  Do not take oral medications within 1 hour of starting   each dose of prep.  No gum chewing or candy morning of procedure                       Note:   (Please disregard the insert instructions from pharmacy).  PEG Bowel Prep is indicated for cleansing of the colon as a preparation for colonoscopy in adults.   Be sure to tell your doctor about all the medicines you take, including prescription and non-prescription medicines, vitamins, and herbal supplements. PEG Bowel Prep may affect how other medicines work.  Medication taken by mouth may not be absorbed properly when taken within 1 hour before the start of each dose of PEG Bowel Prep.    It is not uncommon to experience some abdominal cramping, nausea and/or vomiting when taking the prep. If you have nausea and/or vomiting while taking the prep, stop drinking for 20 to 30 minutes then continue.      How to take prep:    PEG Bowel Prep is a (2-day) prep.    One (1) bottle of prep are required for a complete preparation for colonoscopy. Dilute the solution concentrate as directed prior to  use. You must drink water with each dose of prep, and additional water after each dose.    DOSE 1--Day Before Colonoscopy 3/17/25     Drink at least 6 to 8 glasses of clear liquids from time you wake up until you begin your prep and then continue until bedtime to avoid dehydration.     12:00 pm (NOON) Mix your entire container of prep with lukewarm water and refrigerate. Take four (4) Dulcolax (Bisacodyl) tablets with at least 8 ounces or more of clear liquids.       6:00 pm:    You must complete Steps 1 and 2 below before going to bed:    Step 1-Drink half the liquid in the container within one (1) hour.   Step 2-Refrigerate the remaining half of the liquid for dose 2. See below when to begin this step.                       IMPORTANT: If you experience preparation-related symptoms (for example, nausea, bloating, or cramping), stop, or slow the rate of drinking the additional water until your symptoms decrease.    DOSE 2--Day of the Colonoscopy 3/18/25 at 2-3 AM.    For this dose, repeat Step 1 shown above using the remaining half of the liquid prep.   You may continue drinking water/clear liquids until   2 hours before your colonoscopy or as directed by the scheduling nurse  11:10 AM.      For information about your procedure, two (2) things to view prior to colonoscopy:  Please watch this informational video. It is important to watch this animated consent video prior to your arrival. If you haven't watched the video prior to arriving, you are required to watch it during admission which can causes delays.    Options for viewing:   Using a keyboard:  press and hold the control tab (Ctrl) and left mouse click to follow links.           Colonoscopy Instructional Video                                                                                   OR    Type link address into your web browser's address bar:  [email or web address].com/[email or web address]      Educational Booklet with pictures:      Colonoscopy Prep -  Liquid      Comments:               IMPORTANT INFORMATION TO KNOW BEFORE YOUR PROCEDURE    [facility] [place] 4th Floor         If your procedure requires the administration of anesthesia, it is necessary for a responsible adult to drive you home. (Medical Transportation, Uber, Lyft, Taxi, etc. may ONLY be used if a responsible adult is present to accompany you home.  The responsible adult CAN'T be the  of the service).      person must be available to return to pick you up within 15 minutes of being notified of discharge.       Please bring a picture ID, insurance card, & copayment      Take Medications as directed below:        1) Stop taking Plavix (clopidogrel) 5 days (prior to the procedure) on:  last dose before procedure to be taken on Wednesday 3/12/25     2) Stop taking Eliquis (apixaban) 2 days (prior to the procedure) on: last dose before procedure to be taken on Saturday 3/15/25    If you begin taking any blood thinning medications, injectable weight loss/diabetes medications (other than insulin) , or Adipex (Phentermine) please contact the endoscopy scheduling department listed below as soon as possible.    If you are diabetic see the attached instruction sheet regarding your medication.     If you take HEART, BLOOD PRESSURE, SEIZURE, PAIN, LUNG (including inhalers/nebulizers), ANTI-REJECTION (transplant patients), or PSYCHIATRIC medications, please take at your regular times with a sip of water or as directed by the scheduling nurse.     Important contact information:    Endoscopy [phone] Hours of operation Monday-Friday 8:00-4:30pm.    Questions about insurance or financial obligations call [phone] or [phone].    If you have questions regarding the prep or need to reschedule, please call [phone]. After hours questions requiring immediate assistance, contact [facility] nurse line at [phone] or [phone].   NOTE:     On  occasion, unforeseen circumstances may cause a delay in your procedure start time. We respect your time and appreciate your patience during these circumstances.      Comments:          Are you ready for your Colonoscopy?      __ If you take blood thinners,weight loss or diabetic injectable medications, have you stopped taking them according to your doctor's instructions before your procedures?  __ Have you stopped eating solid foods and followed a clear liquid diet a full day before your procedure or followed the diet       guidelines indicated in your instructions?       REMINDER: NO BROTH AFTER MIDNIGHT THE DAY BEFORE PROCEDURE.   __ Have you completed all your prep solution? PLEASE DO NOT FOLLOW the insert/or box instructions from pharmacy)  __ Have you taken your blood pressure, heart, seizure, or other essential medications the morning of your procedure?  __ Have you planned for a ride to and from procedure?  (Medical Transportation, Uber, Lyft, Taxi, etc. may ONLY be used if a responsible adult is present to accompany you home). The responsible adult CANNOT be the  of the service.  person must be available to return and pick you up within 15 minutes of being notified of discharge.           Questions or Concerns?  Please call us!  [phone] (M-F) [phone] (Nights and weekends)    Listed below are some helpful tips:    Please bring protective cases for eyewear and hearing aids-wear comfortable clothing/shoes.  Follow prep instructions closely so you don't have to do it twice.  Bowel prep is prescription used to clean out the colon before a colonoscopy. The prep increases movement of your colon by causing you to have diarrhea (loose stools). Cleaning out stool from the colon helps your doctor to see in your colon clearly during this procedure.   It is important to stay hydrated before, during and after bowel prep to prevent loss of fluid (dehydration). You can have water and your choice of  clear liquids. Reminder: these liquids you will drink in addition to the bowel prep.     Preparing the mixture:    First, mix the prep with water.  Make the taste better by adding a sugar free drink mix (Crystal Light) can improve the taste of your prep.   Use a large bore (opening) straw. Place towards the back of mouth (throat) as tolerated.  Prepare a prep mixture that is lightly chilled, but not ice-cold. Drinking a large amount of ice-cold liquid can make you feel very ill.  Avoid drinking any RED beverages or eating popsicles with this color for the 24 hours leading up to your procedure. This color can look like blood in the colon.    Consuming the prep:    Take your time. If you feel ill, take a 15-minute break from drinking the prep mixture  Combat hunger and dehydration with clear liquids. Options like JELL-O, or popsicles will help.  Settle in with good reading material. The goal is to clean out 6 feet of colon, so you can plan on spending a good deal of time in the bathroom. Have some good reading material on-hand or an iPad ready to keep yourself entertained!  Keep yourself comfortable. We recommend applying personal hygiene wipes, Tucks pads and a soothing ointment, like A&D ointment, Desitin, or Vaseline to your bottom before starting and as needed to protect your skin.

## 2025-02-05 ENCOUNTER — HOSPITAL ENCOUNTER (EMERGENCY)
Facility: HOSPITAL | Age: 70
Discharge: PSYCHIATRIC OR SUBSTANCE USE FACILITY | End: 2025-02-05
Attending: EMERGENCY MEDICINE
Payer: MEDICARE

## 2025-02-05 VITALS
WEIGHT: 185 LBS | HEIGHT: 71 IN | TEMPERATURE: 99 F | OXYGEN SATURATION: 99 % | HEART RATE: 82 BPM | DIASTOLIC BLOOD PRESSURE: 77 MMHG | RESPIRATION RATE: 18 BRPM | BODY MASS INDEX: 25.9 KG/M2 | SYSTOLIC BLOOD PRESSURE: 164 MMHG

## 2025-02-05 DIAGNOSIS — F32.A DEPRESSION WITH SUICIDAL IDEATION: Primary | ICD-10-CM

## 2025-02-05 DIAGNOSIS — Z00.8 MEDICAL CLEARANCE FOR PSYCHIATRIC ADMISSION: ICD-10-CM

## 2025-02-05 DIAGNOSIS — R45.851 DEPRESSION WITH SUICIDAL IDEATION: Primary | ICD-10-CM

## 2025-02-05 LAB
ALBUMIN SERPL BCP-MCNC: 4.3 G/DL (ref 3.5–5.2)
ALP SERPL-CCNC: 73 U/L (ref 40–150)
ALT SERPL W/O P-5'-P-CCNC: 33 U/L (ref 10–44)
AMPHET+METHAMPHET UR QL: NEGATIVE
ANION GAP SERPL CALC-SCNC: 12 MMOL/L (ref 8–16)
APAP SERPL-MCNC: <3 UG/ML (ref 10–20)
AST SERPL-CCNC: 51 U/L (ref 10–40)
BARBITURATES UR QL SCN>200 NG/ML: NEGATIVE
BASOPHILS # BLD AUTO: 0.03 K/UL (ref 0–0.2)
BASOPHILS NFR BLD: 0.8 % (ref 0–1.9)
BENZODIAZ UR QL SCN>200 NG/ML: NEGATIVE
BILIRUB SERPL-MCNC: 1.7 MG/DL (ref 0.1–1)
BILIRUB UR QL STRIP: NEGATIVE
BUN SERPL-MCNC: 29 MG/DL (ref 8–23)
BZE UR QL SCN: ABNORMAL
CALCIUM SERPL-MCNC: 9.5 MG/DL (ref 8.7–10.5)
CANNABINOIDS UR QL SCN: NEGATIVE
CHLORIDE SERPL-SCNC: 106 MMOL/L (ref 95–110)
CLARITY UR REFRACT.AUTO: CLEAR
CO2 SERPL-SCNC: 21 MMOL/L (ref 23–29)
COLOR UR AUTO: YELLOW
CREAT SERPL-MCNC: 1.7 MG/DL (ref 0.5–1.4)
CREAT UR-MCNC: 195 MG/DL (ref 23–375)
DIFFERENTIAL METHOD BLD: ABNORMAL
EOSINOPHIL # BLD AUTO: 0.1 K/UL (ref 0–0.5)
EOSINOPHIL NFR BLD: 3.5 % (ref 0–8)
ERYTHROCYTE [DISTWIDTH] IN BLOOD BY AUTOMATED COUNT: 13.5 % (ref 11.5–14.5)
EST. GFR  (NO RACE VARIABLE): 42.8 ML/MIN/1.73 M^2
ETHANOL SERPL-MCNC: <10 MG/DL
GLUCOSE SERPL-MCNC: 79 MG/DL (ref 70–110)
GLUCOSE UR QL STRIP: NEGATIVE
HCT VFR BLD AUTO: 46.4 % (ref 40–54)
HCV AB SERPL QL IA: NORMAL
HGB BLD-MCNC: 15.4 G/DL (ref 14–18)
HGB UR QL STRIP: NEGATIVE
HIV 1+2 AB+HIV1 P24 AG SERPL QL IA: NORMAL
IMM GRANULOCYTES # BLD AUTO: 0.01 K/UL (ref 0–0.04)
IMM GRANULOCYTES NFR BLD AUTO: 0.3 % (ref 0–0.5)
KETONES UR QL STRIP: NEGATIVE
LEUKOCYTE ESTERASE UR QL STRIP: NEGATIVE
LYMPHOCYTES # BLD AUTO: 1.4 K/UL (ref 1–4.8)
LYMPHOCYTES NFR BLD: 34.6 % (ref 18–48)
MCH RBC QN AUTO: 31.4 PG (ref 27–31)
MCHC RBC AUTO-ENTMCNC: 33.2 G/DL (ref 32–36)
MCV RBC AUTO: 95 FL (ref 82–98)
METHADONE UR QL SCN>300 NG/ML: NEGATIVE
MONOCYTES # BLD AUTO: 0.5 K/UL (ref 0.3–1)
MONOCYTES NFR BLD: 12.4 % (ref 4–15)
NEUTROPHILS # BLD AUTO: 1.9 K/UL (ref 1.8–7.7)
NEUTROPHILS NFR BLD: 48.4 % (ref 38–73)
NITRITE UR QL STRIP: NEGATIVE
NRBC BLD-RTO: 0 /100 WBC
OPIATES UR QL SCN: NEGATIVE
PCP UR QL SCN>25 NG/ML: NEGATIVE
PH UR STRIP: 6 [PH] (ref 5–8)
PLATELET # BLD AUTO: 237 K/UL (ref 150–450)
PMV BLD AUTO: 8.7 FL (ref 9.2–12.9)
POTASSIUM SERPL-SCNC: 3.9 MMOL/L (ref 3.5–5.1)
PROT SERPL-MCNC: 8.1 G/DL (ref 6–8.4)
PROT UR QL STRIP: NEGATIVE
RBC # BLD AUTO: 4.91 M/UL (ref 4.6–6.2)
SALICYLATES SERPL-MCNC: <5 MG/DL (ref 15–30)
SODIUM SERPL-SCNC: 139 MMOL/L (ref 136–145)
SP GR UR STRIP: 1.03 (ref 1–1.03)
T4 FREE SERPL-MCNC: 0.98 NG/DL (ref 0.71–1.51)
TOXICOLOGY INFORMATION: ABNORMAL
TSH SERPL DL<=0.005 MIU/L-ACNC: 0.35 UIU/ML (ref 0.4–4)
URN SPEC COLLECT METH UR: NORMAL
WBC # BLD AUTO: 3.96 K/UL (ref 3.9–12.7)

## 2025-02-05 PROCEDURE — 81003 URINALYSIS AUTO W/O SCOPE: CPT | Performed by: STUDENT IN AN ORGANIZED HEALTH CARE EDUCATION/TRAINING PROGRAM

## 2025-02-05 PROCEDURE — 85025 COMPLETE CBC W/AUTO DIFF WBC: CPT | Performed by: STUDENT IN AN ORGANIZED HEALTH CARE EDUCATION/TRAINING PROGRAM

## 2025-02-05 PROCEDURE — 96360 HYDRATION IV INFUSION INIT: CPT

## 2025-02-05 PROCEDURE — 87389 HIV-1 AG W/HIV-1&-2 AB AG IA: CPT | Performed by: PHYSICIAN ASSISTANT

## 2025-02-05 PROCEDURE — 80179 DRUG ASSAY SALICYLATE: CPT | Performed by: STUDENT IN AN ORGANIZED HEALTH CARE EDUCATION/TRAINING PROGRAM

## 2025-02-05 PROCEDURE — 82077 ASSAY SPEC XCP UR&BREATH IA: CPT | Performed by: STUDENT IN AN ORGANIZED HEALTH CARE EDUCATION/TRAINING PROGRAM

## 2025-02-05 PROCEDURE — 25000003 PHARM REV CODE 250

## 2025-02-05 PROCEDURE — 84443 ASSAY THYROID STIM HORMONE: CPT | Performed by: STUDENT IN AN ORGANIZED HEALTH CARE EDUCATION/TRAINING PROGRAM

## 2025-02-05 PROCEDURE — 99285 EMERGENCY DEPT VISIT HI MDM: CPT

## 2025-02-05 PROCEDURE — 84439 ASSAY OF FREE THYROXINE: CPT | Performed by: STUDENT IN AN ORGANIZED HEALTH CARE EDUCATION/TRAINING PROGRAM

## 2025-02-05 PROCEDURE — 80143 DRUG ASSAY ACETAMINOPHEN: CPT | Performed by: STUDENT IN AN ORGANIZED HEALTH CARE EDUCATION/TRAINING PROGRAM

## 2025-02-05 PROCEDURE — 86803 HEPATITIS C AB TEST: CPT | Performed by: PHYSICIAN ASSISTANT

## 2025-02-05 PROCEDURE — 80053 COMPREHEN METABOLIC PANEL: CPT | Performed by: STUDENT IN AN ORGANIZED HEALTH CARE EDUCATION/TRAINING PROGRAM

## 2025-02-05 PROCEDURE — 80307 DRUG TEST PRSMV CHEM ANLYZR: CPT | Performed by: STUDENT IN AN ORGANIZED HEALTH CARE EDUCATION/TRAINING PROGRAM

## 2025-02-05 RX ADMIN — SODIUM CHLORIDE 1000 ML: 9 INJECTION, SOLUTION INTRAVENOUS at 01:02
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
## 2025-02-05 NOTE — ED NOTES
C/C: 69 y.o. male arrived to the ED via POV for c/o suicidal ideation. Patient with PMHx of depression and suicide attempt presents from home     APPEARANCE: awake, alert, and appears to be in NAD. Pain score 0/10. Remains in facility approved navy blue paper scrubs  SKIN: warm, dry and intact. No visible breakdown or bruising noted to extremities.  MUSCULOSKELETAL: Patient moving all extremities spontaneously, no obvious swelling or deformities noted. Ambulates independently.  RESPIRATORY: Airway open and patent. Denies shortness of breath. Respirations even, unlabored, equal bilaterally on inspiration and expiration.   CARDIAC: Denies CP, 2+ distal pulses; no peripheral edema  ABDOMEN: S/ND/NT, Denies N/V/D, changes in appetite, acute weight loss/gain  : Pt voids spontaneously, denies dysuria, hematuria, urinary frequency, or incontinence.  NEUROLOGIC: AAO x 4; speaking and following commands appropriately. Equal strength in all extremities; denies numbness/tingling. Denies dizziness, visual changes, or HA.

## 2025-02-06 PROBLEM — F43.23 ADJUSTMENT DISORDER WITH MIXED ANXIETY AND DEPRESSED MOOD: Status: ACTIVE | Noted: 2025-02-06

## 2025-02-13 ENCOUNTER — TELEPHONE (OUTPATIENT)
Dept: ENDOSCOPY | Facility: HOSPITAL | Age: 70
End: 2025-02-13
Payer: MEDICARE

## 2025-02-13 NOTE — TELEPHONE ENCOUNTER
Department of Endoscopy      Dear -    Your patient, Damir Faria 1955 is being scheduled for Colonoscopy and our records indicate they are taking Plavix (clopidogrel).    Please indicate if and when the patient can safely stop their medication prior to the procedure by completing one of the following:    Do not discontinue medication: _____    Medication can be discontinued _____ days prior to the procedure.    Please take into consideration that therapeutic maneuvers may be performed during the procedure that requires delay in restarting anticoagulation therapy.      Signature: ______________________________________________ Date:__________________    Please sign and date this letter and return fax to : [phone] If you have any questions or concerns, please call [phone] Monday-Friday, 8:00 am-3:00 pm.     Thank you for your prompt response,    [facility]       Medication                                                                Hold Time                                                                                                                                                        ANTIPLATELETS   Persantine (dipyridamole) 2 days   Aggrenox (ASA/dipyridamole) 2 days     Pletal (cilostazol) 2 days     Plavix (clopidogrel) 5 days     Effient (prasugrel) 5 days     Ticlid (ticlidopine) 10 days     Brilinta (ticagrelor) 3 days     Zontivity (vorapaxar) 5 days   ANTICOAGULANTS   Coumadin (warfarin) 5 days   Lovenox (enoxaparin)  -last dose administered to be 50% of total daily dose 24 hours   Fragmin (dalteparin)   -last dose administered to be 50% of total daily dose 24 hours   Arixtra (fondaparinux) 2 days     Xarelto (rivaroxaban)   2 days     Eliquis (apixaban)   2 days     Savaysa (edoxaban)   2 days     Pradaxa (dabigatran)   2 days     Iprivask (desirudin)   10 hrs

## 2025-02-13 NOTE — TELEPHONE ENCOUNTER
----- Message from SHEYLA Concepcion sent at 1/28/2025  3:17 PM CST -----  Regarding: 3/18 BT  The patient is currently under an external cardiologist, Adrián Hunter MD,  care. Is patient okay to hold blood thinner Plavix (clopidogrel) for their upcoming scheduled Colonoscopy on 3/18/25.     Additional request(s) required:   external cardiologist, Adrián Hunter MD   okay to hold blood thinner Eliquis (apixaban)           External provider information:    Physician name: Adrián Hunter MD  Facility/Location: [facility]  Phone number: [phone]

## 2025-03-14 ENCOUNTER — TELEPHONE (OUTPATIENT)
Dept: ENDOSCOPY | Facility: HOSPITAL | Age: 70
End: 2025-03-14
Payer: MEDICARE

## 2025-03-14 NOTE — TELEPHONE ENCOUNTER
Received phone call from PCP Dr Richard.  She informed me that the patient needs to be canceled for the colonoscopy on 3/18/25.  She stated that the last she heard he was in a rehab and they are unable to get in touch with him to bridge him while he is off the Plavix and Eliquis.    Attempted contact to discuss with patient-no answer.  Phone rang-then went to busy signal.  Voice message left at sister's number in chart.  Patient removed from schedule.  Unable to reach letter sent to address in chart.                  Endoscopy Scheduling Department  [facility] [address] [place] [address]  Take the Atrium Elevators to 4th Floor Endoscopy Lab      Date: 3/14/25      Medical Record # [id number]      Dear  Damir Faria    An order for the following procedure(s) Colonoscopy was placed for you by   Dr Richard.    Since multiple attempts have been made to get in touch with you regarding this procedure, this is the last notification.  We are canceling your procedure on 3/18/25.        Sincerely,        Endoscopy Scheduling Department [phone]        Comments: Office hours are Monday through Friday 8-430p.

## 2025-03-19 ENCOUNTER — HOSPITAL ENCOUNTER (EMERGENCY)
Facility: HOSPITAL | Age: 70
Discharge: PSYCHIATRIC OR SUBSTANCE USE FACILITY | End: 2025-03-19
Attending: EMERGENCY MEDICINE
Payer: MEDICARE

## 2025-03-19 VITALS
HEIGHT: 71 IN | SYSTOLIC BLOOD PRESSURE: 120 MMHG | BODY MASS INDEX: 27.16 KG/M2 | DIASTOLIC BLOOD PRESSURE: 72 MMHG | OXYGEN SATURATION: 99 % | TEMPERATURE: 98 F | HEART RATE: 71 BPM | WEIGHT: 194 LBS | RESPIRATION RATE: 18 BRPM

## 2025-03-19 DIAGNOSIS — Z00.8 MEDICAL CLEARANCE FOR PSYCHIATRIC ADMISSION: ICD-10-CM

## 2025-03-19 DIAGNOSIS — R45.851 DEPRESSION WITH SUICIDAL IDEATION: Primary | ICD-10-CM

## 2025-03-19 DIAGNOSIS — F32.A DEPRESSION WITH SUICIDAL IDEATION: Primary | ICD-10-CM

## 2025-03-19 LAB
ALBUMIN SERPL BCP-MCNC: 4.5 G/DL (ref 3.5–5.2)
ALP SERPL-CCNC: 94 U/L (ref 40–150)
ALT SERPL W/O P-5'-P-CCNC: 47 U/L (ref 10–44)
AMPHET+METHAMPHET UR QL: NEGATIVE
ANION GAP SERPL CALC-SCNC: 12 MMOL/L (ref 8–16)
APAP SERPL-MCNC: <3 UG/ML (ref 10–20)
AST SERPL-CCNC: 56 U/L (ref 10–40)
BACTERIA #/AREA URNS AUTO: ABNORMAL /HPF
BARBITURATES UR QL SCN>200 NG/ML: NEGATIVE
BASOPHILS # BLD AUTO: 0.04 K/UL (ref 0–0.2)
BASOPHILS NFR BLD: 0.7 % (ref 0–1.9)
BENZODIAZ UR QL SCN>200 NG/ML: NEGATIVE
BILIRUB SERPL-MCNC: 1.4 MG/DL (ref 0.1–1)
BILIRUB UR QL STRIP: NEGATIVE
BUN SERPL-MCNC: 33 MG/DL (ref 8–23)
BZE UR QL SCN: ABNORMAL
CALCIUM SERPL-MCNC: 10 MG/DL (ref 8.7–10.5)
CANNABINOIDS UR QL SCN: NEGATIVE
CHLORIDE SERPL-SCNC: 108 MMOL/L (ref 95–110)
CLARITY UR REFRACT.AUTO: CLEAR
CO2 SERPL-SCNC: 22 MMOL/L (ref 23–29)
COLOR UR AUTO: YELLOW
CREAT SERPL-MCNC: 1.9 MG/DL (ref 0.5–1.4)
CREAT UR-MCNC: 262 MG/DL (ref 23–375)
DIFFERENTIAL METHOD BLD: ABNORMAL
EOSINOPHIL # BLD AUTO: 0.1 K/UL (ref 0–0.5)
EOSINOPHIL NFR BLD: 2.5 % (ref 0–8)
ERYTHROCYTE [DISTWIDTH] IN BLOOD BY AUTOMATED COUNT: 13 % (ref 11.5–14.5)
EST. GFR  (NO RACE VARIABLE): 37.5 ML/MIN/1.73 M^2
ETHANOL SERPL-MCNC: <10 MG/DL
GLUCOSE SERPL-MCNC: 110 MG/DL (ref 70–110)
GLUCOSE UR QL STRIP: ABNORMAL
HCT VFR BLD AUTO: 46.5 % (ref 40–54)
HGB BLD-MCNC: 15.4 G/DL (ref 14–18)
HGB UR QL STRIP: ABNORMAL
HYALINE CASTS UR QL AUTO: 3 /LPF
IMM GRANULOCYTES # BLD AUTO: 0.02 K/UL (ref 0–0.04)
IMM GRANULOCYTES NFR BLD AUTO: 0.4 % (ref 0–0.5)
KETONES UR QL STRIP: ABNORMAL
LEUKOCYTE ESTERASE UR QL STRIP: ABNORMAL
LYMPHOCYTES # BLD AUTO: 2.5 K/UL (ref 1–4.8)
LYMPHOCYTES NFR BLD: 44 % (ref 18–48)
MCH RBC QN AUTO: 31.3 PG (ref 27–31)
MCHC RBC AUTO-ENTMCNC: 33.1 G/DL (ref 32–36)
MCV RBC AUTO: 95 FL (ref 82–98)
METHADONE UR QL SCN>300 NG/ML: NEGATIVE
MICROSCOPIC COMMENT: ABNORMAL
MONOCYTES # BLD AUTO: 0.9 K/UL (ref 0.3–1)
MONOCYTES NFR BLD: 15.7 % (ref 4–15)
NEUTROPHILS # BLD AUTO: 2.1 K/UL (ref 1.8–7.7)
NEUTROPHILS NFR BLD: 36.7 % (ref 38–73)
NITRITE UR QL STRIP: NEGATIVE
NRBC BLD-RTO: 0 /100 WBC
OPIATES UR QL SCN: NEGATIVE
PCP UR QL SCN>25 NG/ML: NEGATIVE
PH UR STRIP: 6 [PH] (ref 5–8)
PLATELET # BLD AUTO: 257 K/UL (ref 150–450)
PMV BLD AUTO: 9 FL (ref 9.2–12.9)
POTASSIUM SERPL-SCNC: 3.7 MMOL/L (ref 3.5–5.1)
PROT SERPL-MCNC: 8.6 G/DL (ref 6–8.4)
PROT UR QL STRIP: ABNORMAL
RBC # BLD AUTO: 4.92 M/UL (ref 4.6–6.2)
RBC #/AREA URNS AUTO: 3 /HPF (ref 0–4)
SODIUM SERPL-SCNC: 142 MMOL/L (ref 136–145)
SP GR UR STRIP: 1.03 (ref 1–1.03)
SQUAMOUS #/AREA URNS AUTO: 1 /HPF
TOXICOLOGY INFORMATION: ABNORMAL
TSH SERPL DL<=0.005 MIU/L-ACNC: 0.6 UIU/ML (ref 0.4–4)
URN SPEC COLLECT METH UR: ABNORMAL
WBC # BLD AUTO: 5.62 K/UL (ref 3.9–12.7)
WBC #/AREA URNS AUTO: 4 /HPF (ref 0–5)

## 2025-03-19 PROCEDURE — 85025 COMPLETE CBC W/AUTO DIFF WBC: CPT | Performed by: PHYSICIAN ASSISTANT

## 2025-03-19 PROCEDURE — 81001 URINALYSIS AUTO W/SCOPE: CPT | Performed by: PHYSICIAN ASSISTANT

## 2025-03-19 PROCEDURE — 99285 EMERGENCY DEPT VISIT HI MDM: CPT

## 2025-03-19 PROCEDURE — 84443 ASSAY THYROID STIM HORMONE: CPT | Performed by: PHYSICIAN ASSISTANT

## 2025-03-19 PROCEDURE — 80053 COMPREHEN METABOLIC PANEL: CPT | Performed by: PHYSICIAN ASSISTANT

## 2025-03-19 PROCEDURE — 80143 DRUG ASSAY ACETAMINOPHEN: CPT | Performed by: PHYSICIAN ASSISTANT

## 2025-03-19 PROCEDURE — 80307 DRUG TEST PRSMV CHEM ANLYZR: CPT | Performed by: PHYSICIAN ASSISTANT

## 2025-03-19 PROCEDURE — 82077 ASSAY SPEC XCP UR&BREATH IA: CPT | Performed by: PHYSICIAN ASSISTANT

## 2025-03-19 NOTE — ED TRIAGE NOTES
Pt. Is a 69 y.o. male presenting to the ED via personal transport from home. Per the pt., he has been feeling depressed lately and now is having thoughts of suicide without specific plan. Pt. Is a chronic cocaine abuser and reports he lasted used yesterday.

## 2025-03-19 NOTE — ED NOTES
Pt. Changed out of personal clothing and placed in paper scrubs by ED tech. Pt. Clothing placed in belongings bags and placed in pt. Belongings closet.

## 2025-03-19 NOTE — ED PROVIDER NOTES
"Encounter Date: 3/19/2025       History     Chief Complaint   Patient presents with    Depression     Arrived via pov from home with complaint of depression and SI. Pt states that he has thought about harming himself but has no plan in place. Pt has hx of depression and takes meds daily.      69-year-old male with history of cocaine abuse, depression, hypertension, STEMI, stroke who presents to the emergency department with chief complaint of depression and suicidal ideation.  States that he has suffered with depression for awhile.  It worsened recently.  He began to have thoughts about ending his life last night.  States that he does not have a plan. He "self medicated" with cocaine last night. He reports compliance with home medications, but does not know what he is taking. No chest pain or shortness of breath. Denies other worsening or alleviating factors.       Review of patient's allergies indicates:  No Known Allergies  Past Medical History:   Diagnosis Date    Acute renal insufficiency 6/21/2015    Cocaine abuse     Depression     History of psychiatric care     HTN (hypertension) 1/25/2014    STEMI (ST elevation myocardial infarction) 2/12/2017    Stroke     Tobacco dependence due to cigarettes 5/25/2016     Past Surgical History:   Procedure Laterality Date    CARDIAC SURGERY      stents    NECK SURGERY       Family History   Problem Relation Name Age of Onset    Diabetes Mother      Heart disease Mother      Hypertension Mother       Social History[1]  Review of Systems   Psychiatric/Behavioral:  Positive for dysphoric mood and suicidal ideas. The patient is nervous/anxious.        Physical Exam     Initial Vitals [03/19/25 0838]   BP Pulse Resp Temp SpO2   (!) 123/95 96 18 97.5 °F (36.4 °C) 95 %      MAP       --         Physical Exam    Vitals reviewed.  Constitutional: He appears well-developed and well-nourished. He is not diaphoretic. No distress.   HENT:   Head: Normocephalic and atraumatic. " Mouth/Throat: Oropharynx is clear and moist.   Eyes: EOM are normal. Pupils are equal, round, and reactive to light.   Neck: Neck supple.   Normal range of motion.  Cardiovascular:  Normal rate, regular rhythm, normal heart sounds and intact distal pulses.     Exam reveals no gallop and no friction rub.       No murmur heard.  Pulmonary/Chest: Breath sounds normal. He has no wheezes. He has no rhonchi. He has no rales.   Abdominal: Abdomen is soft. Bowel sounds are normal. There is no abdominal tenderness.   Musculoskeletal:         General: Normal range of motion.      Cervical back: Normal range of motion and neck supple.     Neurological: He is alert and oriented to person, place, and time. He has normal strength. GCS score is 15. GCS eye subscore is 4. GCS verbal subscore is 5. GCS motor subscore is 6.   Skin: Skin is warm and dry. Capillary refill takes less than 2 seconds.   Psychiatric: He exhibits a depressed mood. He expresses suicidal ideation. He expresses no suicidal plans.         ED Course   Procedures  Labs Reviewed   CBC W/ AUTO DIFFERENTIAL - Abnormal       Result Value    WBC 5.62      RBC 4.92      Hemoglobin 15.4      Hematocrit 46.5      MCV 95      MCH 31.3 (*)     MCHC 33.1      RDW 13.0      Platelets 257      MPV 9.0 (*)     Immature Granulocytes 0.4      Gran # (ANC) 2.1      Immature Grans (Abs) 0.02      Lymph # 2.5      Mono # 0.9      Eos # 0.1      Baso # 0.04      nRBC 0      Gran % 36.7 (*)     Lymph % 44.0      Mono % 15.7 (*)     Eosinophil % 2.5      Basophil % 0.7      Differential Method Automated     COMPREHENSIVE METABOLIC PANEL - Abnormal    Sodium 142      Potassium 3.7      Chloride 108      CO2 22 (*)     Glucose 110      BUN 33 (*)     Creatinine 1.9 (*)     Calcium 10.0      Total Protein 8.6 (*)     Albumin 4.5      Total Bilirubin 1.4 (*)     Alkaline Phosphatase 94      AST 56 (*)     ALT 47 (*)     eGFR 37.5 (*)     Anion Gap 12     URINALYSIS, REFLEX TO URINE  CULTURE - Abnormal    Specimen UA Urine, Clean Catch      Color, UA Yellow      Appearance, UA Clear      pH, UA 6.0      Specific Gravity, UA 1.030      Protein, UA 1+ (*)     Glucose, UA Trace (*)     Ketones, UA Trace (*)     Bilirubin (UA) Negative      Occult Blood UA 1+ (*)     Nitrite, UA Negative      Leukocytes, UA Trace (*)     Narrative:     Specimen Source->Urine   DRUG SCREEN PANEL, URINE EMERGENCY - Abnormal    Benzodiazepines Negative      Methadone metabolites Negative      Cocaine (Metab.) Presumptive Positive (*)     Opiate Scrn, Ur Negative      Barbiturate Screen, Ur Negative      Amphetamine Screen, Ur Negative      THC Negative      Phencyclidine Negative      Creatinine, Urine 262.0      Toxicology Information SEE COMMENT      Narrative:     Specimen Source->Urine   ACETAMINOPHEN LEVEL - Abnormal    Acetaminophen (Tylenol), Serum <3.0 (*)    URINALYSIS MICROSCOPIC - Abnormal    RBC, UA 3      WBC, UA 4      Bacteria Rare      Squam Epithel, UA 1      Hyaline Casts, UA 3 (*)     Microscopic Comment SEE COMMENT      Narrative:     Specimen Source->Urine   TSH    TSH 0.598     ALCOHOL,MEDICAL (ETHANOL)    Alcohol, Serum <10            Imaging Results    None          Medications - No data to display  Medical Decision Making  Emergent evaluation of a 69 y.o. male presenting to the emergency department complaining of depression with suicidal ideation. Patient is afebrile, hemodynamically stable, and non toxic appearing.   Will order psychiatric clearance labs and execute PEC.     Differential diagnosis includes but isn't limited to depression, anxiety, suicidal ideation, suicide attempt.    No severe hematologic derangements.  Creatinine 9.  His baseline creatinine is but this is slightly than most recent creatinine.  Will provide oral fluids and encourage increased PO intake.  Total bilirubin 1.4.  AST and ALT are mildly elevated.  Similar to prior.  TSH within normal limits.  Ethanol negative.   Acetaminophen negative.    Will execute PEC. Stable for transfer to psychiatric facility. Medically cleared. All questions answered.   The patient's history, physical exam, and plan of care was discussed with and agreed upon with my supervising physician.     Amount and/or Complexity of Data Reviewed  Labs:  Decision-making details documented in ED Course.               ED Course as of 03/19/25 1236   Wed Mar 19, 2025   1003 WBC: 5.62 [JM]   1003 Hemoglobin: 15.4 [JM]   1003 Hematocrit: 46.5 [JM]   1003 Platelet Count: 257 [JM]   1038 CO2(!): 22 [JM]   1038 BUN(!): 33 [JM]   1038 Creatinine(!): 1.9 [JM]   1038 Alcohol, Serum: <10 [JM]   1038 Acetaminophen Level(!): <3.0 [JM]   1038 TSH: 0.598 [JM]   1232 Cocaine, Urine(!): Presumptive Positive [JM]   1232 Blood, UA(!): 1+ [JM]   1232 Leukocyte Esterase, UA(!): Trace [JM]      ED Course User Index  [JM] Marilyn Ferrell PA-C       Medically cleared for psychiatry placement: 3/19/2025 12:33 PM                   Clinical Impression:  Final diagnoses:  [F32.A, R45.851] Depression with suicidal ideation (Primary)  [Z00.8] Medical clearance for psychiatric admission          ED Disposition Condition    Transfer to Psych Facility Stable          ED Prescriptions    None       Follow-up Information    None            [1]   Social History  Tobacco Use    Smoking status: Some Days     Current packs/day: 0.25     Types: Cigarettes    Smokeless tobacco: Never    Tobacco comments:     Patient stated he smokes 4-5 cigarettes daily   Substance Use Topics    Alcohol use: Not Currently     Comment: social    Drug use: Yes     Types: Cocaine     Comment: this weekend/occassionally        Marilyn Ferrell PA-C  03/19/25 1236

## 2025-03-19 NOTE — ED NOTES
Damir Faria is in room # 26, was placed in paper scrubs and all cords and wires have been removed. The patient has signed their mental health rights and they have been placed in the chart. All the patient's belongings have been removed, labeled and locked in the belonging's closet. The PEC has been completed, signed, dated and placed in the patient's chart. There is a sitter at the bedside with direct visual contact doing 15 minute observations and they have no belongings in the room. The transfer sheet should be signed upon the actual transfer. The Psych hold orders have been signed, dated and placed in the chart. Vital signs are being done per orders.  Patient arrives to room with lab work done.  Patient denies needing to urinate at this time.  Patient is calm and cooperative at this time.     Damir Faria, a 69 y.o. male presents to the ED w/ complaint of depression.  Patient states he has thoughts of harming himself but no plan.  States depression has been going on for a few years.

## 2025-05-07 ENCOUNTER — TELEPHONE (OUTPATIENT)
Dept: ENDOSCOPY | Facility: HOSPITAL | Age: 70
End: 2025-05-07
Payer: MEDICARE

## 2025-05-07 VITALS — BODY MASS INDEX: 26.74 KG/M2 | HEIGHT: 71 IN | WEIGHT: 191 LBS

## 2025-05-07 DIAGNOSIS — Z12.11 SPECIAL SCREENING FOR MALIGNANT NEOPLASMS, COLON: Primary | ICD-10-CM

## 2025-05-07 NOTE — TELEPHONE ENCOUNTER
Colonoscopy Procedure Prep Instructions    Date of procedure: 6/4/25 Arrive at: 11:30 AM    Location of Department:   [facility] [address] [place] [address]  Take the [facility] Elevators to 4th Floor Endoscopy Lab    As soon as possible:   your prep from pharmacy and over the counter DULCOLAX LAXATIVE TABLETS            On the day before your procedure   What You CAN do:   You may have clear liquids ONLY-see below for list.     Liquids That Are OK to Drink:   Water  Sports drinks (Gatorade, Power-Aid)  Coffee or tea (no cream or nondairy creamer)  Clear juices without pulp (apple, white grape)  Gelatin desserts (no fruit or toppings)  Clear soda (sprite, coke, ginger ale)  Chicken broth (until 12 midnight the night before procedure)    What You CANNOT do:   Do not EAT solid food, drink milk or anything   colored red.  Do not drink alcohol.  Do not take oral medications within 1 hour of starting   each dose of prep.  No gum chewing or candy morning of procedure                       Note:   (Please disregard the insert instructions from pharmacy).  PEG Bowel Prep is indicated for cleansing of the colon as a preparation for colonoscopy in adults.   Be sure to tell your doctor about all the medicines you take, including prescription and non-prescription medicines, vitamins, and herbal supplements. PEG Bowel Prep may affect how other medicines work.  Medication taken by mouth may not be absorbed properly when taken within 1 hour before the start of each dose of PEG Bowel Prep.    It is not uncommon to experience some abdominal cramping, nausea and/or vomiting when taking the prep. If you have nausea and/or vomiting while taking the prep, stop drinking for 20 to 30 minutes then continue.      How to take prep:    PEG Bowel Prep is a (2-day) prep.    One (1) bottle of prep are required for a complete preparation for colonoscopy. Dilute the solution concentrate as directed prior to use.  You must drink water with each dose of prep, and additional water after each dose.    DOSE 1--Day Before Colonoscopy 6/3/25     Drink at least 6 to 8 glasses of clear liquids from time you wake up until you begin your prep and then continue until bedtime to avoid dehydration.     12:00 pm (NOON) Mix your entire container of prep with lukewarm water and refrigerate. Take four (4) Dulcolax (Bisacodyl) tablets with at least 8 ounces or more of clear liquids.       6:00 pm:    You must complete Steps 1 and 2 below before going to bed:    Step 1-Drink half the liquid in the container within one (1) hour.   Step 2-Refrigerate the remaining half of the liquid for dose 2. See below when to begin this step.                       IMPORTANT: If you experience preparation-related symptoms (for example, nausea, bloating, or cramping), stop, or slow the rate of drinking the additional water until your symptoms decrease.    DOSE 2--Day of the Colonoscopy 6/4/25 at 2-3 AM.    For this dose, repeat Step 1 shown above using the remaining half of the liquid prep.   You may continue drinking water/clear liquids until   4 hours before your colonoscopy or as directed by the scheduling nurse  8:30 AM.      For information about your procedure, two (2) things to view prior to colonoscopy:  Please watch this informational video. It is important to watch this animated consent video prior to your arrival. If you haven't watched the video prior to arriving, you are required to watch it during admission which can causes delays.    Options for viewing:   Using a keyboard:  press and hold the control tab (Ctrl) and left mouse click to follow links.           Colonoscopy Instructional Video                                                                                   OR    Type link address into your web browser's address bar:  [email or web address]      Educational Booklet with pictures:      Colonoscopy Prep -  Liquid      Comments:           IMPORTANT INFORMATION TO KNOW BEFORE YOUR PROCEDURE    [facility] 4th Floor         If your procedure requires the administration of anesthesia, it is necessary for a responsible adult to drive you home. (Medical Transportation, Uber, Lyft, Taxi, etc. may ONLY be used if a responsible adult is present to accompany you home.  The responsible adult CAN'T be the  of the service).      person must be available to return to pick you up within 15 minutes of being notified of discharge.       Please bring a picture ID, insurance card, & copayment      Take Medications as directed below:    1) Stop taking Plavix (clopidogrel) 5 days (prior to the procedure) on:  5/30/25     2) Stop taking Eliquis (apixaban) 2 days (prior to the procedure) on:  6/2/25    If you begin taking any blood thinning medications, injectable weight loss/diabetes medications (other than insulin) , or Adipex (Phentermine) please contact the endoscopy scheduling department listed below as soon as possible.    If you are diabetic see the attached instruction sheet regarding your medication.     If you take HEART, BLOOD PRESSURE, SEIZURE, PAIN, LUNG (including inhalers/nebulizers), ANTI-REJECTION (transplant patients), or PSYCHIATRIC medications, please take at your regular times with a sip of water or as directed by the scheduling nurse.     Important contact information:    Endoscopy [phone] Hours of operation Monday-Friday 8:00-4:30pm.    Questions about insurance or financial obligations call [phone] or [phone].    If you have questions regarding the prep or need to reschedule, please call [phone]. After hours questions requiring immediate assistance, contact [facility] nurse line at [phone] or [phone].   NOTE:     On occasion, unforeseen circumstances may cause a delay in your procedure start time. We respect your time and  appreciate your patience during these circumstances.      Comments:

## 2025-05-07 NOTE — TELEPHONE ENCOUNTER
Patient is scheduled for a Colonoscopy on 6/4/25 with Dr. Schneider  Referral for procedure from [facility]

## 2025-05-08 ENCOUNTER — TELEPHONE (OUTPATIENT)
Dept: ENDOSCOPY | Facility: HOSPITAL | Age: 70
End: 2025-05-08
Payer: MEDICARE

## 2025-05-08 NOTE — TELEPHONE ENCOUNTER
----- Message from Med Assistant Christina sent at 2025  3:46 PM CDT -----  Regardin/4 BT  The patient is currently under an internal cardiologist, Hellen [facility]. Is patient okay to hold blood thinner Eliquis (apixaban) for their upcoming scheduled Colonoscopy on 25. Additional request(s) required:internal cardiologist, Hellen Carsonokay to hold blood thinner Plavix (clopidogrel)

## 2025-05-08 NOTE — TELEPHONE ENCOUNTER
Patient returned call.  He said that he was in the cardiologist office and he was told that the colonoscopy needs to be postponed because of tests that are needed.  He said he doesn't know when he will be able to reschedule but he will call back after the doctor says he's ready.  Will cancel the procedure on 6/4/25.  Main line phone number provided to return call after clearance.

## 2025-05-27 NOTE — NURSING
Continue Lovenox while inpatient    Grace arrived on unit. Admitted to room 1142. Patient oriented to room and call light. Denies pain or any discomforts at present time. Tele maintain. Call light in reach. No acute distress noted.

## 2025-08-22 ENCOUNTER — HOSPITAL ENCOUNTER (EMERGENCY)
Facility: HOSPITAL | Age: 70
Discharge: HOME | End: 2025-08-22
Attending: STUDENT IN AN ORGANIZED HEALTH CARE EDUCATION/TRAINING PROGRAM
Payer: MEDICARE

## 2025-08-22 VITALS
WEIGHT: 180 LBS | OXYGEN SATURATION: 100 % | SYSTOLIC BLOOD PRESSURE: 129 MMHG | HEART RATE: 68 BPM | RESPIRATION RATE: 16 BRPM | HEIGHT: 70 IN | BODY MASS INDEX: 25.77 KG/M2 | DIASTOLIC BLOOD PRESSURE: 85 MMHG | TEMPERATURE: 98 F

## 2025-08-22 DIAGNOSIS — R07.9 CHEST PAIN: ICD-10-CM

## 2025-08-22 LAB
ABSOLUTE EOSINOPHIL (OHS): 0.18 K/UL
ABSOLUTE MONOCYTE (OHS): 0.54 K/UL (ref 0.3–1)
ABSOLUTE NEUTROPHIL COUNT (OHS): 2.04 K/UL (ref 1.8–7.7)
ALBUMIN SERPL BCP-MCNC: 3.1 G/DL (ref 3.5–5.2)
ALP SERPL-CCNC: 55 UNIT/L (ref 40–150)
ALT SERPL W/O P-5'-P-CCNC: 12 UNIT/L (ref 0–55)
ANION GAP (OHS): 7 MMOL/L (ref 8–16)
AST SERPL-CCNC: 29 UNIT/L (ref 0–50)
BASOPHILS # BLD AUTO: 0.03 K/UL
BASOPHILS NFR BLD AUTO: 0.6 %
BILIRUB SERPL-MCNC: 0.4 MG/DL (ref 0.1–1)
BUN SERPL-MCNC: 21 MG/DL (ref 8–23)
CALCIUM SERPL-MCNC: 8 MG/DL (ref 8.7–10.5)
CHLORIDE SERPL-SCNC: 115 MMOL/L (ref 95–110)
CO2 SERPL-SCNC: 16 MMOL/L (ref 23–29)
CREAT SERPL-MCNC: 1.3 MG/DL (ref 0.5–1.4)
ERYTHROCYTE [DISTWIDTH] IN BLOOD BY AUTOMATED COUNT: 13.3 % (ref 11.5–14.5)
GFR SERPLBLD CREATININE-BSD FMLA CKD-EPI: 59 ML/MIN/1.73/M2
GLUCOSE SERPL-MCNC: 87 MG/DL (ref 70–110)
HCT VFR BLD AUTO: 35.5 % (ref 40–54)
HCV AB SERPL QL IA: NORMAL
HGB BLD-MCNC: 12 GM/DL (ref 14–18)
HIV 1+2 AB+HIV1 P24 AG SERPL QL IA: NORMAL
IMM GRANULOCYTES # BLD AUTO: 0.01 K/UL (ref 0–0.04)
IMM GRANULOCYTES NFR BLD AUTO: 0.2 % (ref 0–0.5)
LYMPHOCYTES # BLD AUTO: 2.17 K/UL (ref 1–4.8)
MCH RBC QN AUTO: 32.3 PG (ref 27–31)
MCHC RBC AUTO-ENTMCNC: 33.8 G/DL (ref 32–36)
MCV RBC AUTO: 96 FL (ref 82–98)
NT-PROBNP SERPL-MCNC: 1198 PG/ML
NUCLEATED RBC (/100WBC) (OHS): 0 /100 WBC
OHS QRS DURATION: 88 MS
OHS QTC CALCULATION: 401 MS
PLATELET # BLD AUTO: 202 K/UL (ref 150–450)
PMV BLD AUTO: 8.8 FL (ref 9.2–12.9)
POTASSIUM SERPL-SCNC: 4.4 MMOL/L (ref 3.5–5.1)
PROT SERPL-MCNC: 6.1 GM/DL (ref 6–8.4)
RBC # BLD AUTO: 3.71 M/UL (ref 4.6–6.2)
RELATIVE EOSINOPHIL (OHS): 3.6 %
RELATIVE LYMPHOCYTE (OHS): 43.7 % (ref 18–48)
RELATIVE MONOCYTE (OHS): 10.9 % (ref 4–15)
RELATIVE NEUTROPHIL (OHS): 41 % (ref 38–73)
SODIUM SERPL-SCNC: 138 MMOL/L (ref 136–145)
TROPONIN I SERPL HS-MCNC: 5 NG/L
TROPONIN I SERPL HS-MCNC: 6 NG/L
WBC # BLD AUTO: 4.97 K/UL (ref 3.9–12.7)

## 2025-08-22 PROCEDURE — 99285 EMERGENCY DEPT VISIT HI MDM: CPT | Mod: 25

## 2025-08-22 PROCEDURE — 93005 ELECTROCARDIOGRAM TRACING: CPT

## 2025-08-22 PROCEDURE — 86803 HEPATITIS C AB TEST: CPT | Performed by: STUDENT IN AN ORGANIZED HEALTH CARE EDUCATION/TRAINING PROGRAM

## 2025-08-22 PROCEDURE — 93010 ELECTROCARDIOGRAM REPORT: CPT | Mod: ,,, | Performed by: INTERNAL MEDICINE

## 2025-08-22 PROCEDURE — 87389 HIV-1 AG W/HIV-1&-2 AB AG IA: CPT | Performed by: STUDENT IN AN ORGANIZED HEALTH CARE EDUCATION/TRAINING PROGRAM

## 2025-08-22 PROCEDURE — 85025 COMPLETE CBC W/AUTO DIFF WBC: CPT | Performed by: STUDENT IN AN ORGANIZED HEALTH CARE EDUCATION/TRAINING PROGRAM

## 2025-08-22 PROCEDURE — 80053 COMPREHEN METABOLIC PANEL: CPT | Performed by: STUDENT IN AN ORGANIZED HEALTH CARE EDUCATION/TRAINING PROGRAM

## 2025-08-22 PROCEDURE — 83880 ASSAY OF NATRIURETIC PEPTIDE: CPT | Performed by: EMERGENCY MEDICINE

## 2025-08-22 PROCEDURE — 84484 ASSAY OF TROPONIN QUANT: CPT | Performed by: EMERGENCY MEDICINE

## 2025-08-22 RX ORDER — NITROGLYCERIN 0.4 MG/1
0.4 TABLET SUBLINGUAL EVERY 5 MIN PRN
Status: DISCONTINUED | OUTPATIENT
Start: 2025-08-22 | End: 2025-08-22 | Stop reason: HOSPADM

## 2025-08-25 LAB — HOLD SPECIMEN: NORMAL
